# Patient Record
Sex: FEMALE | Race: BLACK OR AFRICAN AMERICAN | Employment: UNEMPLOYED | URBAN - METROPOLITAN AREA
[De-identification: names, ages, dates, MRNs, and addresses within clinical notes are randomized per-mention and may not be internally consistent; named-entity substitution may affect disease eponyms.]

---

## 2018-10-06 ENCOUNTER — HOSPITAL ENCOUNTER (EMERGENCY)
Age: 33
Discharge: HOME | End: 2018-10-06
Attending: FAMILY MEDICINE
Payer: COMMERCIAL

## 2018-10-06 ENCOUNTER — HOSPITAL ENCOUNTER (OUTPATIENT)
Dept: ULTRASOUND IMAGING | Age: 33
Discharge: HOME | End: 2018-10-08
Payer: COMMERCIAL

## 2018-10-06 ENCOUNTER — HOSPITAL ENCOUNTER (OUTPATIENT)
Age: 33
Discharge: HOME | End: 2018-10-08
Payer: COMMERCIAL

## 2018-10-06 VITALS
OXYGEN SATURATION: 100 % | HEIGHT: 61 IN | RESPIRATION RATE: 16 BRPM | DIASTOLIC BLOOD PRESSURE: 78 MMHG | SYSTOLIC BLOOD PRESSURE: 118 MMHG | WEIGHT: 148 LBS | HEART RATE: 78 BPM | TEMPERATURE: 98.5 F | BODY MASS INDEX: 27.94 KG/M2

## 2018-10-06 DIAGNOSIS — Z86.718 HISTORY OF DVT OF LOWER EXTREMITY: ICD-10-CM

## 2018-10-06 DIAGNOSIS — M79.662 PAIN OF LEFT CALF: Primary | ICD-10-CM

## 2018-10-06 DIAGNOSIS — M79.662 PAIN OF LEFT CALF: ICD-10-CM

## 2018-10-06 LAB — HCG(URINE) PREGNANCY TEST: NEGATIVE

## 2018-10-06 PROCEDURE — 93971 EXTREMITY STUDY: CPT

## 2018-10-06 PROCEDURE — 81025 URINE PREGNANCY TEST: CPT

## 2018-10-06 PROCEDURE — 99283 EMERGENCY DEPT VISIT LOW MDM: CPT

## 2018-10-06 PROCEDURE — 6360000002 HC RX W HCPCS: Performed by: FAMILY MEDICINE

## 2018-10-06 PROCEDURE — 96372 THER/PROPH/DIAG INJ SC/IM: CPT

## 2018-10-06 RX ADMIN — ENOXAPARIN SODIUM 67 MG: 100 INJECTION SUBCUTANEOUS at 13:30

## 2018-10-06 ASSESSMENT — PAIN DESCRIPTION - ORIENTATION
ORIENTATION: LEFT
ORIENTATION: LEFT

## 2018-10-06 ASSESSMENT — PAIN DESCRIPTION - PAIN TYPE: TYPE: ACUTE PAIN

## 2018-10-06 ASSESSMENT — PAIN SCALES - GENERAL
PAINLEVEL_OUTOF10: 10
PAINLEVEL_OUTOF10: 10

## 2018-10-06 ASSESSMENT — PAIN DESCRIPTION - PROGRESSION
CLINICAL_PROGRESSION: NOT CHANGED
CLINICAL_PROGRESSION: GRADUALLY WORSENING

## 2018-10-06 ASSESSMENT — PAIN DESCRIPTION - DESCRIPTORS: DESCRIPTORS: BURNING;THROBBING;TINGLING

## 2018-10-06 ASSESSMENT — PAIN DESCRIPTION - LOCATION
LOCATION: LEG
LOCATION: LEG

## 2018-10-06 ASSESSMENT — PAIN DESCRIPTION - ONSET: ONSET: PROGRESSIVE

## 2019-01-25 ENCOUNTER — HOSPITAL ENCOUNTER (EMERGENCY)
Age: 34
Discharge: HOME | End: 2019-01-25
Payer: COMMERCIAL

## 2019-01-25 VITALS
SYSTOLIC BLOOD PRESSURE: 120 MMHG | OXYGEN SATURATION: 100 % | TEMPERATURE: 98.7 F | WEIGHT: 152 LBS | BODY MASS INDEX: 28.7 KG/M2 | HEART RATE: 94 BPM | HEIGHT: 61 IN | DIASTOLIC BLOOD PRESSURE: 72 MMHG | RESPIRATION RATE: 16 BRPM

## 2019-01-25 DIAGNOSIS — J06.9 URI WITH COUGH AND CONGESTION: ICD-10-CM

## 2019-01-25 DIAGNOSIS — J02.9 ACUTE PHARYNGITIS, UNSPECIFIED ETIOLOGY: Primary | ICD-10-CM

## 2019-01-25 LAB — STREP GRP A PCR: NEGATIVE

## 2019-01-25 PROCEDURE — 87880 STREP A ASSAY W/OPTIC: CPT

## 2019-01-25 PROCEDURE — 99283 EMERGENCY DEPT VISIT LOW MDM: CPT

## 2019-01-25 RX ORDER — ALBUTEROL SULFATE 90 UG/1
2 AEROSOL, METERED RESPIRATORY (INHALATION) EVERY 6 HOURS PRN
Qty: 1 INHALER | Refills: 0 | Status: SHIPPED | OUTPATIENT
Start: 2019-01-25 | End: 2019-06-11

## 2019-01-25 RX ORDER — DEXTROMETHORPHAN HYDROBROMIDE AND PROMETHAZINE HYDROCHLORIDE 15; 6.25 MG/5ML; MG/5ML
5 SYRUP ORAL 4 TIMES DAILY PRN
Qty: 273 ML | Refills: 0 | Status: SHIPPED | OUTPATIENT
Start: 2019-01-25 | End: 2019-02-01

## 2019-01-25 RX ORDER — CEFDINIR 300 MG/1
300 CAPSULE ORAL 2 TIMES DAILY
Qty: 14 CAPSULE | Refills: 0 | Status: SHIPPED | OUTPATIENT
Start: 2019-01-25 | End: 2019-02-01

## 2019-01-25 RX ORDER — METHYLPREDNISOLONE 4 MG/1
TABLET ORAL
Qty: 1 KIT | Refills: 0 | Status: SHIPPED | OUTPATIENT
Start: 2019-01-25 | End: 2019-01-31

## 2019-01-25 ASSESSMENT — PAIN DESCRIPTION - ONSET: ONSET: ON-GOING

## 2019-01-25 ASSESSMENT — PAIN DESCRIPTION - DESCRIPTORS: DESCRIPTORS: ACHING

## 2019-01-25 ASSESSMENT — PAIN SCALES - GENERAL: PAINLEVEL_OUTOF10: 7

## 2019-01-25 ASSESSMENT — PAIN DESCRIPTION - PAIN TYPE: TYPE: ACUTE PAIN

## 2019-01-25 ASSESSMENT — PAIN DESCRIPTION - LOCATION: LOCATION: THROAT

## 2019-01-25 ASSESSMENT — PAIN DESCRIPTION - FREQUENCY: FREQUENCY: CONTINUOUS

## 2019-06-09 ENCOUNTER — APPOINTMENT (OUTPATIENT)
Dept: CT IMAGING | Age: 34
End: 2019-06-09
Payer: COMMERCIAL

## 2019-06-09 ENCOUNTER — APPOINTMENT (OUTPATIENT)
Dept: GENERAL RADIOLOGY | Age: 34
End: 2019-06-09
Payer: COMMERCIAL

## 2019-06-09 ENCOUNTER — HOSPITAL ENCOUNTER (EMERGENCY)
Age: 34
Discharge: HOME | End: 2019-06-09
Attending: EMERGENCY MEDICINE
Payer: COMMERCIAL

## 2019-06-09 VITALS
BODY MASS INDEX: 31.22 KG/M2 | RESPIRATION RATE: 14 BRPM | SYSTOLIC BLOOD PRESSURE: 107 MMHG | WEIGHT: 159 LBS | HEIGHT: 60 IN | DIASTOLIC BLOOD PRESSURE: 66 MMHG | OXYGEN SATURATION: 100 % | HEART RATE: 66 BPM | TEMPERATURE: 98.4 F

## 2019-06-09 DIAGNOSIS — N92.0 MENORRHAGIA WITH REGULAR CYCLE: ICD-10-CM

## 2019-06-09 DIAGNOSIS — R07.89 CHEST WALL PAIN: ICD-10-CM

## 2019-06-09 DIAGNOSIS — D64.9 SYMPTOMATIC ANEMIA: Primary | ICD-10-CM

## 2019-06-09 DIAGNOSIS — E87.6 HYPOKALEMIA: ICD-10-CM

## 2019-06-09 LAB
ABO/RH: NORMAL
ANION GAP SERPL CALCULATED.3IONS-SCNC: 12 MMOL/L (ref 7–16)
ANISOCYTOSIS: ABNORMAL
ANTIBODY SCREEN: NORMAL
BASOPHILS ABSOLUTE: 0.01 E9/L (ref 0–0.2)
BASOPHILS RELATIVE PERCENT: 0.2 % (ref 0–2)
BLOOD BANK DISPENSE STATUS: NORMAL
BLOOD BANK PRODUCT CODE: NORMAL
BPU ID: NORMAL
BUN BLDV-MCNC: 3 MG/DL (ref 6–20)
CALCIUM SERPL-MCNC: 9 MG/DL (ref 8.6–10.2)
CHLORIDE BLD-SCNC: 104 MMOL/L (ref 98–107)
CO2: 24 MMOL/L (ref 22–29)
CREAT SERPL-MCNC: 0.7 MG/DL (ref 0.5–1)
D DIMER: 391 NG/ML DDU
DESCRIPTION BLOOD BANK: NORMAL
EKG ATRIAL RATE: 82 BPM
EKG P-R INTERVAL: 144 MS
EKG Q-T INTERVAL: 404 MS
EKG QRS DURATION: 70 MS
EKG QTC CALCULATION (BAZETT): 472 MS
EKG R AXIS: 18 DEGREES
EKG T AXIS: 49 DEGREES
EKG VENTRICULAR RATE: 82 BPM
EOSINOPHILS ABSOLUTE: 0.05 E9/L (ref 0.05–0.5)
EOSINOPHILS RELATIVE PERCENT: 1.1 % (ref 0–6)
GFR AFRICAN AMERICAN: >60
GFR NON-AFRICAN AMERICAN: >60 ML/MIN/1.73
GLUCOSE BLD-MCNC: 106 MG/DL (ref 74–99)
HCT VFR BLD CALC: 24.8 % (ref 34–48)
HEMOGLOBIN: 6.9 G/DL (ref 11.5–15.5)
HYPOCHROMIA: ABNORMAL
IMMATURE GRANULOCYTES #: 0.01 E9/L
IMMATURE GRANULOCYTES %: 0.2 % (ref 0–5)
IRON SATURATION: 6 % (ref 15–50)
IRON: 26 MCG/DL (ref 37–145)
LYMPHOCYTES ABSOLUTE: 1.76 E9/L (ref 1.5–4)
LYMPHOCYTES RELATIVE PERCENT: 39.3 % (ref 20–42)
MCH RBC QN AUTO: 19.2 PG (ref 26–35)
MCHC RBC AUTO-ENTMCNC: 27.8 % (ref 32–34.5)
MCV RBC AUTO: 69.1 FL (ref 80–99.9)
MONOCYTES ABSOLUTE: 0.42 E9/L (ref 0.1–0.95)
MONOCYTES RELATIVE PERCENT: 9.4 % (ref 2–12)
NEUTROPHILS ABSOLUTE: 2.23 E9/L (ref 1.8–7.3)
NEUTROPHILS RELATIVE PERCENT: 49.8 % (ref 43–80)
OVALOCYTES: ABNORMAL
PDW BLD-RTO: 19.4 FL (ref 11.5–15)
PLATELET # BLD: 310 E9/L (ref 130–450)
PMV BLD AUTO: 8.3 FL (ref 7–12)
POIKILOCYTES: ABNORMAL
POTASSIUM SERPL-SCNC: 3 MMOL/L (ref 3.5–5)
RBC # BLD: 3.59 E12/L (ref 3.5–5.5)
SCHISTOCYTES: ABNORMAL
SODIUM BLD-SCNC: 140 MMOL/L (ref 132–146)
TARGET CELLS: ABNORMAL
TEAR DROP CELLS: ABNORMAL
TOTAL IRON BINDING CAPACITY: 430 MCG/DL (ref 250–450)
TROPONIN: <0.01 NG/ML (ref 0–0.03)
WBC # BLD: 4.5 E9/L (ref 4.5–11.5)

## 2019-06-09 PROCEDURE — 93005 ELECTROCARDIOGRAM TRACING: CPT | Performed by: EMERGENCY MEDICINE

## 2019-06-09 PROCEDURE — P9016 RBC LEUKOCYTES REDUCED: HCPCS

## 2019-06-09 PROCEDURE — 6370000000 HC RX 637 (ALT 250 FOR IP): Performed by: EMERGENCY MEDICINE

## 2019-06-09 PROCEDURE — 80048 BASIC METABOLIC PNL TOTAL CA: CPT

## 2019-06-09 PROCEDURE — 86850 RBC ANTIBODY SCREEN: CPT

## 2019-06-09 PROCEDURE — 83540 ASSAY OF IRON: CPT

## 2019-06-09 PROCEDURE — 84484 ASSAY OF TROPONIN QUANT: CPT

## 2019-06-09 PROCEDURE — 85025 COMPLETE CBC W/AUTO DIFF WBC: CPT

## 2019-06-09 PROCEDURE — 6360000002 HC RX W HCPCS: Performed by: EMERGENCY MEDICINE

## 2019-06-09 PROCEDURE — 6360000004 HC RX CONTRAST MEDICATION: Performed by: RADIOLOGY

## 2019-06-09 PROCEDURE — 96374 THER/PROPH/DIAG INJ IV PUSH: CPT

## 2019-06-09 PROCEDURE — 86901 BLOOD TYPING SEROLOGIC RH(D): CPT

## 2019-06-09 PROCEDURE — 83550 IRON BINDING TEST: CPT

## 2019-06-09 PROCEDURE — 71045 X-RAY EXAM CHEST 1 VIEW: CPT

## 2019-06-09 PROCEDURE — 36430 TRANSFUSION BLD/BLD COMPNT: CPT

## 2019-06-09 PROCEDURE — 85378 FIBRIN DEGRADE SEMIQUANT: CPT

## 2019-06-09 PROCEDURE — 99285 EMERGENCY DEPT VISIT HI MDM: CPT

## 2019-06-09 PROCEDURE — 86900 BLOOD TYPING SEROLOGIC ABO: CPT

## 2019-06-09 PROCEDURE — 2580000003 HC RX 258: Performed by: RADIOLOGY

## 2019-06-09 PROCEDURE — 71275 CT ANGIOGRAPHY CHEST: CPT

## 2019-06-09 PROCEDURE — 86923 COMPATIBILITY TEST ELECTRIC: CPT

## 2019-06-09 PROCEDURE — 93010 ELECTROCARDIOGRAM REPORT: CPT | Performed by: INTERNAL MEDICINE

## 2019-06-09 RX ORDER — POTASSIUM CHLORIDE 20 MEQ/1
40 TABLET, EXTENDED RELEASE ORAL ONCE
Status: COMPLETED | OUTPATIENT
Start: 2019-06-09 | End: 2019-06-09

## 2019-06-09 RX ORDER — 0.9 % SODIUM CHLORIDE 0.9 %
250 INTRAVENOUS SOLUTION INTRAVENOUS ONCE
Status: DISCONTINUED | OUTPATIENT
Start: 2019-06-09 | End: 2019-06-09 | Stop reason: HOSPADM

## 2019-06-09 RX ORDER — KETOROLAC TROMETHAMINE 30 MG/ML
15 INJECTION, SOLUTION INTRAMUSCULAR; INTRAVENOUS ONCE
Status: COMPLETED | OUTPATIENT
Start: 2019-06-09 | End: 2019-06-09

## 2019-06-09 RX ORDER — SODIUM CHLORIDE 0.9 % (FLUSH) 0.9 %
10 SYRINGE (ML) INJECTION ONCE
Status: COMPLETED | OUTPATIENT
Start: 2019-06-09 | End: 2019-06-09

## 2019-06-09 RX ORDER — LANOLIN ALCOHOL/MO/W.PET/CERES
325 CREAM (GRAM) TOPICAL
Qty: 90 TABLET | Refills: 0 | Status: SHIPPED | OUTPATIENT
Start: 2019-06-09 | End: 2019-11-01

## 2019-06-09 RX ADMIN — IOPAMIDOL 70 ML: 755 INJECTION, SOLUTION INTRAVENOUS at 05:15

## 2019-06-09 RX ADMIN — Medication 10 ML: at 05:15

## 2019-06-09 RX ADMIN — KETOROLAC TROMETHAMINE 15 MG: 30 INJECTION, SOLUTION INTRAMUSCULAR; INTRAVENOUS at 04:20

## 2019-06-09 RX ADMIN — POTASSIUM CHLORIDE 40 MEQ: 20 TABLET, EXTENDED RELEASE ORAL at 05:33

## 2019-06-09 ASSESSMENT — ENCOUNTER SYMPTOMS
COUGH: 0
BLOOD IN STOOL: 0
SHORTNESS OF BREATH: 1
NAUSEA: 0
VOMITING: 0
ABDOMINAL PAIN: 0
BACK PAIN: 0

## 2019-06-09 ASSESSMENT — PAIN SCALES - GENERAL
PAINLEVEL_OUTOF10: 8
PAINLEVEL_OUTOF10: 8

## 2019-06-09 ASSESSMENT — PAIN DESCRIPTION - PAIN TYPE: TYPE: ACUTE PAIN

## 2019-06-09 ASSESSMENT — PAIN DESCRIPTION - LOCATION: LOCATION: CHEST

## 2019-06-09 NOTE — ED PROVIDER NOTES
Aj Lundberg is a 30 y.o female who presents to the ED with a complaint of chest pain. Patient states that she developed chest pain around 2 hours ago. She describes the pain as a stabbing sensation. She says nothing worsens or improves the pain. She currently rates the pain as an 8 out of 10 on a pain scale. She states that she has also been feeling like it is difficult to breathe when she lies flat. She denies ever experiencing this in the past. She denies any cough, congestion, fever, chills, nausea, vomiting, lightheadedness. She denies any recent travels, swelling, hemoptysis, cancer history, recent surgeries. She states that she does have a history of DVT to both legs to years ago induced by pregnancy. She has not been on Lovenox for 2 years. Review of Systems   Constitutional: Negative for chills and fever. Respiratory: Positive for shortness of breath. Negative for cough. Cardiovascular: Positive for chest pain. Gastrointestinal: Negative for abdominal pain, blood in stool, nausea and vomiting. Genitourinary: Negative for dysuria and frequency. Musculoskeletal: Negative for back pain. Skin: Negative for rash. Neurological: Negative for weakness and headaches. All other systems reviewed and are negative. Physical Exam   Constitutional: She is oriented to person, place, and time. She appears well-developed and well-nourished. HENT:   Head: Normocephalic and atraumatic. Eyes: Pupils are equal, round, and reactive to light. Neck: Normal range of motion. Neck supple. Cardiovascular: Normal rate and regular rhythm. Pulmonary/Chest: Effort normal and breath sounds normal. No respiratory distress. She has no wheezes. She has no rales. ttp to sternum and left and right chest wall   Abdominal: Soft. Bowel sounds are normal. There is no tenderness. There is no rebound and no guarding. Musculoskeletal: She exhibits no edema.    Neurological: She is alert and oriented to person, place, and time. No cranial nerve deficit. Coordination normal.   Skin: Skin is warm and dry. Nursing note and vitals reviewed. Procedures    MDM  Number of Diagnoses or Management Options  Chest wall pain:   Hypokalemia:   Menorrhagia with regular cycle:   Symptomatic anemia:   Diagnosis management comments: Patient presented with chest pain, sob. No ischemic changes on EKG. Plan is for CXR, labs. Hgb was 6.9. On discussion with patient she states she has been experiencing prolonged and heavy periods. Last one was 17 days and has currently stopped yesterday. 1 unit PRBC ordered. D-dimer elevated at 391. CTA obtained and was neg for PE. K+ was replaced. Patient's symptoms likely related to anemia. Symptoms did resolve in the ED. Iron studies sent. 1 unit PRBC infused. Patient will be discharged with iron supplements and GYN follow up. She was given return instructions. ED Course as of 2035   Terrance Young 2019   9441 Discussed results of Hgb of 6.9. Patient states she has long heavy periods. She states she had period that started May 24th and ended yesterday lasting 17 days    []      ED Course User Index  [] Jarodia Nageotte, DO           --------------------------------------------- PAST HISTORY ---------------------------------------------  Past Medical History:  has a past medical history of DVT (deep venous thrombosis) ([address] DVT of lower extremity (deep venous thrombosis) ([address] History of DVT of lower extremity, and Migraine. Past Surgical History:  has a past surgical history that includes Induced  (10/12/12) and Dilation & curettage. Social History:  reports that she has never smoked. She has never used smokeless tobacco. She reports that she does not drink alcohol or use drugs. Family History: family history is not on file. The patients home medications have been reviewed.     Allergies: Latex    -------------------------------------------------- RESULTS Calculation ([place]) 472 ms    R Axis 18 degrees    T Axis 49 degrees   TYPE AND SCREEN   Result Value Ref Range    ABO/Rh A POS     Antibody Screen NEG    PREPARE RBC (CROSSMATCH), 1 Units   Result Value Ref Range    Product Code Blood Bank [id number]     Description Blood Bank Red Blood Cells, Leuko-reduced     Unit Number [id number]     Dispense Status Blood Bank issued      EKG Interpretation    Interpreted by emergency department physician    Rhythm: normal sinus   Rate: 82  Axis: normal  Ectopy: none  Conduction: normal  ST Segments: no acute change  T Waves: no acute change  Q Waves: none    Clinical Impression: [facility]  Radiology:  CTA PULMONARY W CONTRAST   Final Result   Normal CTA of the chest.      This report has been electronically signed by Adithya Mcgrath MD.      XR CHEST PORTABLE   Final Result   normal chest                         ------------------------- NURSING NOTES AND VITALS REVIEWED ---------------------------  Date / Time Roomed:  6/9/2019  3:42 AM  ED Bed Assignment:  21/21    The nursing notes within the ED encounter and vital signs as below have been reviewed. /66   Pulse 66   Temp 98.4 °F (36.9 °C)   Resp 14   Ht 5' (1.524 m)   Wt 159 lb (72.1 kg)   LMP 05/24/2019   SpO2 100%   BMI 31.05 kg/m²   Oxygen Saturation Interpretation: Normal      ------------------------------------------ PROGRESS NOTES ------------------------------------------  I have spoken with the patient and discussed todays results, in addition to providing specific details for the plan of care and counseling regarding the diagnosis and prognosis. Their questions are answered at this time and they are agreeable with the plan. I discussed at length with them reasons for immediate return here for re evaluation. They will followup with primary care by calling their office tomorrow.       --------------------------------- ADDITIONAL PROVIDER NOTES ---------------------------------  At this time the patient is without objective evidence of an acute process requiring hospitalization or inpatient management. They have remained hemodynamically stable throughout their entire ED visit and are stable for discharge with outpatient follow-up. The plan has been discussed in detail and they are aware of the specific conditions for emergent return, as well as the importance of follow-up. Discharge Medication List as of 6/9/2019  5:52 AM      START taking these medications    Details   ferrous sulfate 325 (65 Fe) MG EC tablet Take 1 tablet by mouth 3 times daily (with meals), Disp-90 tablet, R-0Print             Diagnosis:  1. Symptomatic anemia    2. Chest wall pain    3. Hypokalemia    4. Menorrhagia with regular cycle        Disposition:  Patient's disposition: Discharge to home  Patient's condition is stable.             Steven Flannery DO  Resident  06/09/19 7160

## 2019-06-09 NOTE — ED NOTES
Radiology Procedure Waiver   Name: Zeynep Barrios  : 1985  MRN: [id number]    Date:  19    Time: 5:07 AM    Benefits of immediately proceeding with Radiology exam(s) without pre-testing outweigh the risks or are not indicated as specified below and therefore the following is/are being waived:    [x] Pregnancy test   [] Patients LMP on-time and regular.   [] Patient had Tubal Ligation or has other Contraception Device. [] Patient  is Menopausal or Premenarcheal.    [] Patient had Full or Partial Hysterectomy. [] Protocol for Iodine allergy    [] MRI Questionnaire     [] BUN/Creatinine   [] Patient age w/no hx of renal dysfunction. [] Patient on Dialysis. [] Recent Normal Labs.   Electronically signed by Carmelita Serrato MD on 19 at 5:07 Angelo Abarca MD  19 0756

## 2019-06-09 NOTE — DISCHARGE INSTR - COC
Continuity of Care Form    Patient Name: Sandra Atwood   :  1985  MRN:  [id number]    Admit date:  2019  Discharge date:  ***    Code Status Order: Prior   Advance Directives:     Admitting Physician:  No admitting provider for patient encounter.   PCP: Keshawn Hoyt MD    Discharging Nurse: [facility] Unit/Room#:   Discharging Unit Phone Number: ***    Emergency Contact:   Extended Emergency Contact Information  Primary Emergency Contact: RonnieAnamaria guerrero  Address: [address] Phone: [phone]  Mobile Phone: [phone]  Relation: Parent    Past Surgical History:  Past Surgical History:   Procedure Laterality Date    DILATION AND CURETTAGE      INDUCED   10/12/12       Immunization History:   Immunization History   Administered Date(s) Administered    Td, unspecified formulation 2013       Active Problems:  Patient Active Problem List   Diagnosis Code    Post-thrombotic syndrome I87.009    [facility]) [facility] O22.30, I82.409    Long term current use of anticoagulant therapy Z79.01    Personal history of thromboembolic disease W58.810    Previous  delivery in second trimester, antepartum O09.212    High-risk pregnancy O09.90       Isolation/Infection:   Isolation          No Isolation            Nurse Assessment:  Last Vital Signs: /66   Pulse 66   Temp 98.4 °F (36.9 °C)   Resp 14   Ht 5' (1.524 m)   Wt 159 lb (72.1 kg)   LMP 2019   SpO2 100%   BMI 31.05 kg/m²     Last documented pain score (0-10 scale): Pain Level: 8  Last Weight:   Wt Readings from Last 1 Encounters:   19 159 lb (72.1 kg)     Mental Status:  {IP PT MENTAL STATUS:}    IV Access:  [facility] PROSPER IV ACCESS:588462958}    Nursing Mobility/ADLs:  Walking   {CHP DME UMXL:980396456}  Transfer  {CHP DME FKKI:173271024}  Bathing  {CHP DME EWWO:675688755}  Dressing  {CHP DME CXLJ:089793183}  Toileting  {CHP DME RFB}  Feeding  {P DME PPQJ:353765503}  Med Admin  {P DME EHTJ:553909586}  Med Delivery   { PROSPER MED Delivery:207321895}    Wound Care Documentation and Therapy:        Elimination:  Continence:   · Bowel: {YES / CZ:34117}  · Bladder: {YES / ZV:55552}  Urinary Catheter: {Urinary Catheter:110764462}   Colostomy/Ileostomy/Ileal Conduit: {YES / EM:67725}       Date of Last BM: ***  No intake or output data in the 24 hours ending 19 0931  No intake/output data recorded.     Safety Concerns:     [address] [employer] Safety Concerns:121456549}    Impairments/Disabilities:      [employer] Impairments/Disabilities:406209712}    Nutrition Therapy:  Current Nutrition Therapy:   [employer] Diet List:829234473}    Routes of Feeding: {[facility] DME Other Feedings:977465049}  Liquids: {Slp liquid thickness:39924}  Daily Fluid Restriction: {P DME Yes amt example:251352838}  Last Modified Barium Swallow with Video (Video Swallowing Test): {Done Not Done ZCDJ:501913002}    Treatments at the Time of Hospital Discharge:   Respiratory Treatments: ***  Oxygen Therapy:  {Therapy; copd oxygen:16887}  Ventilator:    {[facility] Vent PHFY:040319597}    Rehab Therapies: {THERAPEUTIC INTERVENTION:3147633582}  Weight Bearing Status/Restrictions: [address] [employer]  Weight Bearin}  Other Medical Equipment (for information only, NOT a DME order):  {EQUIPMENT:478308707}  Other Treatments: ***    Patient's personal belongings (please select all that are sent with patient):  {[facility] DME Belongings:516989514}    RN SIGNATURE:  {Esignature:032785989}    CASE MANAGEMENT/SOCIAL WORK SECTION    Inpatient Status Date: ***    Readmission Risk Assessment Score:  Readmission Risk              Risk of Unplanned Readmission:        0           Discharging to Facility/ Agency   · Name:   · Address:  · Phone:  · Fax:    Dialysis Facility (if applicable)   · Name:  · Address:  · Dialysis Schedule:  · Phone:  · Fax:    /Social Worker signature: {Esignature:754156764}    PHYSICIAN SECTION    Prognosis: {Prognosis:8101301367}    Condition at Discharge: [address] Krista Bowden Patient Condition:891114776}    Rehab Potential (if transferring to Rehab): {Prognosis:2734756331}    Recommended Labs or Other Treatments After Discharge: ***    Physician Certification: I certify the above information and transfer of Chio Noe  is necessary for the continuing treatment of the diagnosis listed and that she requires {Admit to Appropriate Level of Care:95105} for {GREATER/LESS:795037553} 30 days.      Update Admission H&P: {CHP DME Changes in XNGVQ:091169740}    PHYSICIAN SIGNATURE:  {Esignature:281681222}

## 2019-06-11 ENCOUNTER — HOSPITAL ENCOUNTER (EMERGENCY)
Age: 34
Discharge: HOME | End: 2019-06-11
Payer: COMMERCIAL

## 2019-06-11 ENCOUNTER — APPOINTMENT (OUTPATIENT)
Dept: GENERAL RADIOLOGY | Age: 34
End: 2019-06-11
Payer: COMMERCIAL

## 2019-06-11 VITALS
BODY MASS INDEX: 29.84 KG/M2 | DIASTOLIC BLOOD PRESSURE: 64 MMHG | OXYGEN SATURATION: 98 % | SYSTOLIC BLOOD PRESSURE: 116 MMHG | HEART RATE: 103 BPM | WEIGHT: 152 LBS | HEIGHT: 60 IN | RESPIRATION RATE: 16 BRPM | TEMPERATURE: 99.2 F

## 2019-06-11 DIAGNOSIS — J03.00 ACUTE STREPTOCOCCAL TONSILLITIS, NOT SPECIFIED AS RECURRENT OR NOT: ICD-10-CM

## 2019-06-11 DIAGNOSIS — J02.0 ACUTE STREPTOCOCCAL PHARYNGITIS: Primary | ICD-10-CM

## 2019-06-11 LAB — STREP GRP A PCR: POSITIVE

## 2019-06-11 PROCEDURE — 99283 EMERGENCY DEPT VISIT LOW MDM: CPT

## 2019-06-11 PROCEDURE — 87880 STREP A ASSAY W/OPTIC: CPT

## 2019-06-11 PROCEDURE — 6360000002 HC RX W HCPCS: Performed by: NURSE PRACTITIONER

## 2019-06-11 PROCEDURE — 6370000000 HC RX 637 (ALT 250 FOR IP): Performed by: NURSE PRACTITIONER

## 2019-06-11 PROCEDURE — 71046 X-RAY EXAM CHEST 2 VIEWS: CPT

## 2019-06-11 PROCEDURE — 96372 THER/PROPH/DIAG INJ SC/IM: CPT

## 2019-06-11 RX ORDER — IPRATROPIUM BROMIDE AND ALBUTEROL SULFATE 2.5; .5 MG/3ML; MG/3ML
1 SOLUTION RESPIRATORY (INHALATION) ONCE
Status: COMPLETED | OUTPATIENT
Start: 2019-06-11 | End: 2019-06-11

## 2019-06-11 RX ORDER — METHYLPREDNISOLONE SODIUM SUCCINATE 125 MG/2ML
80 INJECTION, POWDER, LYOPHILIZED, FOR SOLUTION INTRAMUSCULAR; INTRAVENOUS ONCE
Status: COMPLETED | OUTPATIENT
Start: 2019-06-11 | End: 2019-06-11

## 2019-06-11 RX ORDER — AMOXICILLIN AND CLAVULANATE POTASSIUM 875; 125 MG/1; MG/1
1 TABLET, FILM COATED ORAL 2 TIMES DAILY
Qty: 20 TABLET | Refills: 0 | Status: SHIPPED | OUTPATIENT
Start: 2019-06-11 | End: 2019-06-21

## 2019-06-11 RX ORDER — METHYLPREDNISOLONE 4 MG/1
TABLET ORAL
Qty: 21 TABLET | Status: SHIPPED | OUTPATIENT
Start: 2019-06-11 | End: 2019-06-17

## 2019-06-11 RX ORDER — IBUPROFEN 800 MG/1
800 TABLET ORAL EVERY 6 HOURS PRN
Qty: 20 TABLET | Refills: 0 | Status: SHIPPED | OUTPATIENT
Start: 2019-06-11 | End: 2019-11-01

## 2019-06-11 RX ORDER — AMOXICILLIN AND CLAVULANATE POTASSIUM 875; 125 MG/1; MG/1
1 TABLET, FILM COATED ORAL ONCE
Status: COMPLETED | OUTPATIENT
Start: 2019-06-11 | End: 2019-06-11

## 2019-06-11 RX ADMIN — METHYLPREDNISOLONE SODIUM SUCCINATE 80 MG: 125 INJECTION, POWDER, LYOPHILIZED, FOR SOLUTION INTRAMUSCULAR; INTRAVENOUS at 19:54

## 2019-06-11 RX ADMIN — AMOXICILLIN AND CLAVULANATE POTASSIUM 1 TABLET: 875; 125 TABLET, FILM COATED ORAL at 19:55

## 2019-06-11 RX ADMIN — IPRATROPIUM BROMIDE AND ALBUTEROL SULFATE 1 AMPULE: .5; 3 SOLUTION RESPIRATORY (INHALATION) at 19:55

## 2019-06-11 ASSESSMENT — PAIN DESCRIPTION - DESCRIPTORS: DESCRIPTORS: SORE

## 2019-06-11 ASSESSMENT — PAIN SCALES - GENERAL: PAINLEVEL_OUTOF10: 8

## 2019-06-11 ASSESSMENT — PAIN DESCRIPTION - PAIN TYPE: TYPE: ACUTE PAIN

## 2019-06-11 ASSESSMENT — PAIN DESCRIPTION - PROGRESSION: CLINICAL_PROGRESSION: GRADUALLY WORSENING

## 2019-06-11 ASSESSMENT — PAIN DESCRIPTION - FREQUENCY: FREQUENCY: CONTINUOUS

## 2019-06-11 ASSESSMENT — PAIN DESCRIPTION - ONSET: ONSET: SUDDEN

## 2019-06-11 ASSESSMENT — PAIN DESCRIPTION - LOCATION: LOCATION: THROAT

## 2019-06-11 NOTE — ED PROVIDER NOTES
of 4 or 5 = Probability of Strep Pharyngitis: 51% - 53%. Treat empirically with antibiotics. ROS    Pertinent positives and negatives are stated within HPI, all other systems reviewed and are negative. Past Surgical History:  has a past surgical history that includes Induced  (10/12/12) and Dilation & curettage. Social History:  reports that she has never smoked. She has never used smokeless tobacco. She reports that she does not drink alcohol or use drugs. Family History: family history is not on file. Allergies: Latex    Physical Exam           ED Triage Vitals   BP Temp Temp Source Pulse Resp SpO2 Height Weight   19 1859 19 1859 19 18519 18519 18519 19119   116/64 99.2 °F (37.3 °C) Oral 103 16 100 % 5' (1.524 m) 152 lb (68.9 kg)     Oxygen Saturation Interpretation: Normal.    Constitutional:  Alert, development consistent with age. .  Ears:  TMs without perforation, injection, or bulging. External canals clear without exudate. Throat: Airway Patent. moderate erythema, tonsillar hypertrophy, 3+, exudates present, throat culture taken and mucous membranes moist.       Neck/Lymphatic:  Supple. There is Bilateral  anterior cervical node tenderness. respiratory:  Clear to auscultation and breath sounds equal.    CV: Regular rate and rhythm, normal heart sounds, without pathological murmurs, ectopy, gallops, or rubs. GI:  Abdomen Soft, nontender, good bowel sounds. No firm or pulsatile mass. Inegument:  No rashes, erythema present. Neurological:  Oriented. Motor functions intact. Lab / Imaging Results   (All laboratory and radiology results have been personally reviewed by myself)  Labs:  Results for orders placed or performed during the hospital encounter of 19   Strep Screen Group A Throat   Result Value Ref Range    Strep Grp A PCR POSITIVE Negative     Imaging:   All Radiology results interpreted by Radiologist unless otherwise noted. XR CHEST STANDARD (2 VW)   Final Result   NO ACUTE CARDIOPULMONARY PROCESS              ED Course / Medical Decision Making     Medications   methylPREDNISolone sodium (SOLU-MEDROL) injection 80 mg (80 mg Intramuscular Given 6/11/19 1954)   ipratropium-albuterol (DUONEB) nebulizer solution 1 ampule (1 ampule Inhalation Given 6/11/19 1955)   amoxicillin-clavulanate (AUGMENTIN) 875-125 MG per tablet 1 tablet (1 tablet Oral Given 6/11/19 1955)        Consult(s):   None    Procedure(s):   none    MDM:   Based on moderate suspicion for Strep as per history/physical findings, Rapid Strep/Throat Culture testing was done and confirms the above findings  Pharyngitis is likely to  be Strep. Antibiotics are indicated at this time based on clinical presentation and physical findings. Not hypoxic, nothing to suggest pneumonia. Patient is well appearing, non toxic and appropriate for outpatient management. Plan of Care: Normal progression of disease discussed. All questions answered. Instruction provided in the use of fluids, vaporizer, acetaminophen, and other OTC medication for symptom control. Extra fluids  Analgesics as needed, dose reviewed. Follow up as needed should symptoms fail to improve. Counseling: The emergency provider has spoken with the patient and discussed todays results, in addition to providing specific details for the plan of care and counseling regarding the diagnosis and prognosis. Questions are answered at this time and they are agreeable with the plan. Assessment     1. Acute streptococcal pharyngitis    2.  Acute streptococcal tonsillitis, not specified as recurrent or not      Plan   Discharge to home  Patient condition is stable    New Medications     Discharge Medication List as of 6/11/2019  7:50 PM      START taking these medications    Details   methylPREDNISolone (MEDROL, EDELMIRA,) 4 MG tablet Take as directed on package insert days 1-6, Disp-21 tablet, R-zeroPrint      amoxicillin-clavulanate (AUGMENTIN) 875-125 MG per tablet Take 1 tablet by mouth 2 times daily for 10 days, Disp-20 tablet, R-0Print      Benzocaine-Menthol (CEPACOL SORE THROAT) 10-2.1 MG LOZG Take 1 lozenge by mouth every 2 hours as needed (sore throat), Disp-20 lozenge, R-0Print           Electronically signed by NOLAN Knapp CNP   DD: 6/11/19  **This report was transcribed using voice recognition software. Every effort was made to ensure accuracy; however, inadvertent computerized transcription errors may be present.   END OF ED PROVIDER NOTE      NOLAN Knapp CNP  06/16/19 2965

## 2019-08-01 ENCOUNTER — APPOINTMENT (OUTPATIENT)
Dept: CT IMAGING | Age: 34
End: 2019-08-01
Payer: COMMERCIAL

## 2019-08-01 ENCOUNTER — HOSPITAL ENCOUNTER (EMERGENCY)
Age: 34
Discharge: HOME | End: 2019-08-01
Attending: FAMILY MEDICINE
Payer: COMMERCIAL

## 2019-08-01 VITALS
RESPIRATION RATE: 14 BRPM | BODY MASS INDEX: 30.43 KG/M2 | HEART RATE: 80 BPM | HEIGHT: 60 IN | DIASTOLIC BLOOD PRESSURE: 72 MMHG | WEIGHT: 155 LBS | TEMPERATURE: 99.1 F | OXYGEN SATURATION: 100 % | SYSTOLIC BLOOD PRESSURE: 110 MMHG

## 2019-08-01 DIAGNOSIS — Z34.90 PREGNANCY, UNSPECIFIED GESTATIONAL AGE: ICD-10-CM

## 2019-08-01 DIAGNOSIS — R10.13 ABDOMINAL PAIN, EPIGASTRIC: Primary | ICD-10-CM

## 2019-08-01 LAB
ALBUMIN SERPL-MCNC: 4 G/DL (ref 3.5–5.2)
ALP BLD-CCNC: 64 U/L (ref 35–104)
ALT SERPL-CCNC: 5 U/L (ref 0–32)
ANION GAP SERPL CALCULATED.3IONS-SCNC: 9 MMOL/L (ref 7–16)
AST SERPL-CCNC: 20 U/L (ref 0–31)
BACTERIA: ABNORMAL /HPF
BASOPHILS ABSOLUTE: 0.02 E9/L (ref 0–0.2)
BASOPHILS RELATIVE PERCENT: 0.4 % (ref 0–2)
BILIRUB SERPL-MCNC: 0.2 MG/DL (ref 0–1.2)
BILIRUBIN URINE: NEGATIVE
BLOOD, URINE: NEGATIVE
BUN BLDV-MCNC: 3 MG/DL (ref 6–20)
CALCIUM SERPL-MCNC: 8.8 MG/DL (ref 8.6–10.2)
CHLORIDE BLD-SCNC: 104 MMOL/L (ref 98–107)
CLARITY: CLEAR
CO2: 26 MMOL/L (ref 22–29)
COLOR: YELLOW
CREAT SERPL-MCNC: 0.8 MG/DL (ref 0.5–1)
EOSINOPHILS ABSOLUTE: 0.02 E9/L (ref 0.05–0.5)
EOSINOPHILS RELATIVE PERCENT: 0.4 % (ref 0–6)
EPITHELIAL CELLS, UA: ABNORMAL /HPF
GFR AFRICAN AMERICAN: >60
GFR NON-AFRICAN AMERICAN: >60 ML/MIN/1.73
GLUCOSE BLD-MCNC: 103 MG/DL (ref 74–99)
GLUCOSE URINE: NEGATIVE MG/DL
HCG(URINE) PREGNANCY TEST: POSITIVE
HCT VFR BLD CALC: 29.7 % (ref 34–48)
HEMOGLOBIN: 8.7 G/DL (ref 11.5–15.5)
IMMATURE GRANULOCYTES #: 0.01 E9/L
IMMATURE GRANULOCYTES %: 0.2 % (ref 0–5)
KETONES, URINE: NEGATIVE MG/DL
LACTIC ACID: 2.9 MMOL/L (ref 0.5–2.2)
LEUKOCYTE ESTERASE, URINE: ABNORMAL
LIPASE: 26 U/L (ref 13–60)
LYMPHOCYTES ABSOLUTE: 1.65 E9/L (ref 1.5–4)
LYMPHOCYTES RELATIVE PERCENT: 34.5 % (ref 20–42)
MCH RBC QN AUTO: 21.8 PG (ref 26–35)
MCHC RBC AUTO-ENTMCNC: 29.3 % (ref 32–34.5)
MCV RBC AUTO: 74.4 FL (ref 80–99.9)
MONOCYTES ABSOLUTE: 0.45 E9/L (ref 0.1–0.95)
MONOCYTES RELATIVE PERCENT: 9.4 % (ref 2–12)
MUCUS: PRESENT
NEUTROPHILS ABSOLUTE: 2.63 E9/L (ref 1.8–7.3)
NEUTROPHILS RELATIVE PERCENT: 55.1 % (ref 43–80)
NITRITE, URINE: NEGATIVE
PDW BLD-RTO: 21.8 FL (ref 11.5–15)
PH UA: 6 (ref 5–9)
PLATELET # BLD: 339 E9/L (ref 130–450)
PMV BLD AUTO: 9.1 FL (ref 7–12)
POTASSIUM REFLEX MAGNESIUM: 4 MMOL/L (ref 3.5–5)
PROTEIN UA: NEGATIVE MG/DL
RBC # BLD: 3.99 E12/L (ref 3.5–5.5)
RBC UA: ABNORMAL /HPF (ref 0–2)
SODIUM BLD-SCNC: 139 MMOL/L (ref 132–146)
SPECIFIC GRAVITY UA: 1.02 (ref 1–1.03)
TOTAL PROTEIN: 7.5 G/DL (ref 6.4–8.3)
UROBILINOGEN, URINE: 0.2 E.U./DL
WBC # BLD: 4.8 E9/L (ref 4.5–11.5)
WBC UA: ABNORMAL /HPF (ref 0–5)

## 2019-08-01 PROCEDURE — 99283 EMERGENCY DEPT VISIT LOW MDM: CPT

## 2019-08-01 PROCEDURE — 81001 URINALYSIS AUTO W/SCOPE: CPT

## 2019-08-01 PROCEDURE — 36415 COLL VENOUS BLD VENIPUNCTURE: CPT

## 2019-08-01 PROCEDURE — 2500000003 HC RX 250 WO HCPCS: Performed by: FAMILY MEDICINE

## 2019-08-01 PROCEDURE — 83690 ASSAY OF LIPASE: CPT

## 2019-08-01 PROCEDURE — 85025 COMPLETE CBC W/AUTO DIFF WBC: CPT

## 2019-08-01 PROCEDURE — 83605 ASSAY OF LACTIC ACID: CPT

## 2019-08-01 PROCEDURE — 96375 TX/PRO/DX INJ NEW DRUG ADDON: CPT

## 2019-08-01 PROCEDURE — 96374 THER/PROPH/DIAG INJ IV PUSH: CPT

## 2019-08-01 PROCEDURE — 6360000002 HC RX W HCPCS: Performed by: FAMILY MEDICINE

## 2019-08-01 PROCEDURE — 80053 COMPREHEN METABOLIC PANEL: CPT

## 2019-08-01 PROCEDURE — 2580000003 HC RX 258: Performed by: FAMILY MEDICINE

## 2019-08-01 PROCEDURE — 6370000000 HC RX 637 (ALT 250 FOR IP): Performed by: FAMILY MEDICINE

## 2019-08-01 PROCEDURE — 81025 URINE PREGNANCY TEST: CPT

## 2019-08-01 RX ORDER — ONDANSETRON 2 MG/ML
4 INJECTION INTRAMUSCULAR; INTRAVENOUS ONCE
Status: COMPLETED | OUTPATIENT
Start: 2019-08-01 | End: 2019-08-01

## 2019-08-01 RX ORDER — CALCIUM CARBONATE 200(500)MG
1 TABLET,CHEWABLE ORAL DAILY
Qty: 30 TABLET | Refills: 0 | Status: SHIPPED | OUTPATIENT
Start: 2019-08-01 | End: 2019-08-31

## 2019-08-01 RX ORDER — 0.9 % SODIUM CHLORIDE 0.9 %
1000 INTRAVENOUS SOLUTION INTRAVENOUS ONCE
Status: COMPLETED | OUTPATIENT
Start: 2019-08-01 | End: 2019-08-01

## 2019-08-01 RX ORDER — DIPHENHYDRAMINE HYDROCHLORIDE 50 MG/ML
25 INJECTION INTRAMUSCULAR; INTRAVENOUS ONCE
Status: COMPLETED | OUTPATIENT
Start: 2019-08-01 | End: 2019-08-01

## 2019-08-01 RX ORDER — FAMOTIDINE 20 MG/1
20 TABLET, FILM COATED ORAL 2 TIMES DAILY
Qty: 60 TABLET | Refills: 0 | Status: SHIPPED | OUTPATIENT
Start: 2019-08-01 | End: 2019-11-01 | Stop reason: ALTCHOICE

## 2019-08-01 RX ORDER — PANTOPRAZOLE SODIUM 40 MG/10ML
40 INJECTION, POWDER, LYOPHILIZED, FOR SOLUTION INTRAVENOUS ONCE
Status: DISCONTINUED | OUTPATIENT
Start: 2019-08-01 | End: 2019-08-01

## 2019-08-01 RX ORDER — PNV NO.95/FERROUS FUM/FOLIC AC 28MG-0.8MG
1 TABLET ORAL DAILY
Qty: 30 TABLET | Refills: 0 | Status: SHIPPED | OUTPATIENT
Start: 2019-08-01 | End: 2019-11-01 | Stop reason: ALTCHOICE

## 2019-08-01 RX ORDER — KETOROLAC TROMETHAMINE 30 MG/ML
30 INJECTION, SOLUTION INTRAMUSCULAR; INTRAVENOUS ONCE
Status: DISCONTINUED | OUTPATIENT
Start: 2019-08-01 | End: 2019-08-01

## 2019-08-01 RX ADMIN — LIDOCAINE HYDROCHLORIDE: 20 SOLUTION ORAL; TOPICAL at 15:49

## 2019-08-01 RX ADMIN — SODIUM CHLORIDE 1000 ML: 9 INJECTION, SOLUTION INTRAVENOUS at 15:39

## 2019-08-01 RX ADMIN — ONDANSETRON HYDROCHLORIDE 4 MG: 2 SOLUTION INTRAMUSCULAR; INTRAVENOUS at 15:40

## 2019-08-01 RX ADMIN — FAMOTIDINE 20 MG: 10 INJECTION, SOLUTION INTRAVENOUS at 15:49

## 2019-08-01 RX ADMIN — DIPHENHYDRAMINE HYDROCHLORIDE 25 MG: 50 INJECTION, SOLUTION INTRAMUSCULAR; INTRAVENOUS at 15:40

## 2019-08-01 ASSESSMENT — PAIN DESCRIPTION - LOCATION: LOCATION: ABDOMEN;HEAD

## 2019-08-01 ASSESSMENT — PAIN SCALES - GENERAL: PAINLEVEL_OUTOF10: 7

## 2019-08-01 ASSESSMENT — PAIN DESCRIPTION - DESCRIPTORS: DESCRIPTORS: THROBBING;DISCOMFORT

## 2019-08-01 ASSESSMENT — PAIN DESCRIPTION - PAIN TYPE: TYPE: ACUTE PAIN

## 2019-08-01 ASSESSMENT — PAIN DESCRIPTION - FREQUENCY: FREQUENCY: CONTINUOUS

## 2019-08-01 NOTE — ED PROVIDER NOTES
1.80 - 7.30 E9/L    Immature Granulocytes # 0.01 E9/L    Lymphocytes # 1.65 1.50 - 4.00 E9/L    Monocytes # 0.45 0.10 - 0.95 E9/L    Eosinophils # 0.02 (L) 0.05 - 0.50 E9/L    Basophils # 0.02 0.00 - 0.20 E9/L   Comprehensive Metabolic Panel w/ Reflex to MG   Result Value Ref Range    Sodium 139 132 - 146 mmol/L    Potassium reflex Magnesium 4.0 3.5 - 5.0 mmol/L    Chloride 104 98 - 107 mmol/L    CO2 26 22 - 29 mmol/L    Anion Gap 9 7 - 16 mmol/L    Glucose 103 (H) 74 - 99 mg/dL    BUN 3 (L) 6 - 20 mg/dL    CREATININE 0.8 0.5 - 1.0 mg/dL    GFR Non-African American >60 >=60 mL/min/1.73    GFR African American >60     Calcium 8.8 8.6 - 10.2 mg/dL    Total Protein 7.5 6.4 - 8.3 g/dL    Alb 4.0 3.5 - 5.2 g/dL    Total Bilirubin 0.2 0.0 - 1.2 mg/dL    Alkaline Phosphatase 64 35 - 104 U/L    ALT 5 0 - 32 U/L    AST 20 0 - 31 U/L   Lipase   Result Value Ref Range    Lipase 26 13 - 60 U/L   Lactic Acid, Plasma   Result Value Ref Range    Lactic Acid 2.9 (H) 0.5 - 2.2 mmol/L   Pregnancy, Urine   Result Value Ref Range    HCG(Urine) Pregnancy Test POSITIVE NEGATIVE   Urinalysis   Result Value Ref Range    Color, UA Yellow Straw/Yellow    Clarity, UA Clear Clear    Glucose, Ur Negative Negative mg/dL    Bilirubin Urine Negative Negative    Ketones, Urine Negative Negative mg/dL    Specific Gravity, UA 1.020 1.005 - 1.030    Blood, Urine Negative Negative    pH, UA 6.0 5.0 - 9.0    Protein, UA Negative Negative mg/dL    Urobilinogen, Urine 0.2 <2.0 E.U./dL    Nitrite, Urine Negative Negative    Leukocyte Esterase, Urine MODERATE (A) Negative   Microscopic Urinalysis   Result Value Ref Range    Mucus, UA Present     WBC, UA 10-20 (A) 0 - 5 /HPF    RBC, UA 2-5 0 - 2 /HPF    Epi Cells FEW /HPF    Bacteria, UA MODERATE (A) /HPF     Imaging: All Radiology results interpreted by Radiologist unless otherwise noted.   No orders to display       ED Course / Medical Decision Making     Medications   0.9 % sodium chloride bolus (0 mLs chewable tablet Take 1 tablet by mouth daily, Disp-30 tablet, R-0Print      Prenatal Vit-Fe Fumarate-FA (PRENATAL VITAMINS) 28-0.8 MG TABS Take 1 tablet by mouth daily, Disp-30 tablet, R-0Print           Electronically signed by Ubaldo Deluna MD   DD: 8/1/19  **This report was transcribed using voice recognition software. Every effort was made to ensure accuracy; however, inadvertent computerized transcription errors may be present.   END OF PROVIDER NOTE        Ubaldo Deluna MD  08/01/19 7244

## 2019-08-05 ENCOUNTER — APPOINTMENT (OUTPATIENT)
Dept: ULTRASOUND IMAGING | Age: 34
End: 2019-08-05
Payer: COMMERCIAL

## 2019-08-05 ENCOUNTER — HOSPITAL ENCOUNTER (EMERGENCY)
Age: 34
Discharge: HOME | End: 2019-08-05
Attending: EMERGENCY MEDICINE
Payer: COMMERCIAL

## 2019-08-05 VITALS
SYSTOLIC BLOOD PRESSURE: 129 MMHG | HEART RATE: 89 BPM | RESPIRATION RATE: 14 BRPM | WEIGHT: 155 LBS | OXYGEN SATURATION: 99 % | BODY MASS INDEX: 25.83 KG/M2 | HEIGHT: 65 IN | TEMPERATURE: 98.3 F | DIASTOLIC BLOOD PRESSURE: 70 MMHG

## 2019-08-05 DIAGNOSIS — N93.9 VAGINAL BLEEDING: Primary | ICD-10-CM

## 2019-08-05 DIAGNOSIS — R10.9 ABDOMINAL CRAMPING: ICD-10-CM

## 2019-08-05 LAB
ALBUMIN SERPL-MCNC: 4.2 G/DL (ref 3.5–5.2)
ALP BLD-CCNC: 73 U/L (ref 35–104)
ALT SERPL-CCNC: 18 U/L (ref 0–32)
ANION GAP SERPL CALCULATED.3IONS-SCNC: 12 MMOL/L (ref 7–16)
ANISOCYTOSIS: ABNORMAL
AST SERPL-CCNC: 20 U/L (ref 0–31)
BACTERIA: NORMAL /HPF
BASOPHILS ABSOLUTE: 0 E9/L (ref 0–0.2)
BASOPHILS RELATIVE PERCENT: 0 % (ref 0–2)
BILIRUB SERPL-MCNC: <0.2 MG/DL (ref 0–1.2)
BILIRUBIN URINE: NEGATIVE
BLOOD, URINE: ABNORMAL
BUN BLDV-MCNC: 3 MG/DL (ref 6–20)
CALCIUM SERPL-MCNC: 9 MG/DL (ref 8.6–10.2)
CHLORIDE BLD-SCNC: 101 MMOL/L (ref 98–107)
CLARITY: CLEAR
CO2: 23 MMOL/L (ref 22–29)
COLOR: ABNORMAL
CREAT SERPL-MCNC: 0.6 MG/DL (ref 0.5–1)
EOSINOPHILS ABSOLUTE: 0.01 E9/L (ref 0.05–0.5)
EOSINOPHILS RELATIVE PERCENT: 0.2 % (ref 0–6)
GFR AFRICAN AMERICAN: >60
GFR NON-AFRICAN AMERICAN: >60 ML/MIN/1.73
GLUCOSE BLD-MCNC: 92 MG/DL (ref 74–99)
GLUCOSE URINE: NEGATIVE MG/DL
GONADOTROPIN, CHORIONIC (HCG) QUANT: 10.9 MIU/ML
HCG(URINE) PREGNANCY TEST: NEGATIVE
HCT VFR BLD CALC: 30.8 % (ref 34–48)
HEMOGLOBIN: 9 G/DL (ref 11.5–15.5)
HYPOCHROMIA: ABNORMAL
IMMATURE GRANULOCYTES #: 0.02 E9/L
IMMATURE GRANULOCYTES %: 0.4 % (ref 0–5)
KETONES, URINE: NEGATIVE MG/DL
LACTIC ACID: 1.3 MMOL/L (ref 0.5–2.2)
LEUKOCYTE ESTERASE, URINE: NEGATIVE
LIPASE: 19 U/L (ref 13–60)
LYMPHOCYTES ABSOLUTE: 1.75 E9/L (ref 1.5–4)
LYMPHOCYTES RELATIVE PERCENT: 35.5 % (ref 20–42)
MCH RBC QN AUTO: 21.9 PG (ref 26–35)
MCHC RBC AUTO-ENTMCNC: 29.2 % (ref 32–34.5)
MCV RBC AUTO: 74.9 FL (ref 80–99.9)
MONOCYTES ABSOLUTE: 0.38 E9/L (ref 0.1–0.95)
MONOCYTES RELATIVE PERCENT: 7.7 % (ref 2–12)
NEUTROPHILS ABSOLUTE: 2.77 E9/L (ref 1.8–7.3)
NEUTROPHILS RELATIVE PERCENT: 56.2 % (ref 43–80)
NITRITE, URINE: NEGATIVE
PDW BLD-RTO: 21.8 FL (ref 11.5–15)
PH UA: 6 (ref 5–9)
PLATELET # BLD: 326 E9/L (ref 130–450)
PMV BLD AUTO: 8.9 FL (ref 7–12)
POIKILOCYTES: ABNORMAL
POTASSIUM SERPL-SCNC: 2.9 MMOL/L (ref 3.5–5)
PROTEIN UA: NEGATIVE MG/DL
RBC # BLD: 4.11 E12/L (ref 3.5–5.5)
RBC UA: NORMAL /HPF (ref 0–2)
SODIUM BLD-SCNC: 136 MMOL/L (ref 132–146)
SPECIFIC GRAVITY UA: <=1.005 (ref 1–1.03)
SPHEROCYTES: ABNORMAL
TEAR DROP CELLS: ABNORMAL
TOTAL PROTEIN: 7.8 G/DL (ref 6.4–8.3)
UROBILINOGEN, URINE: 0.2 E.U./DL
WBC # BLD: 4.9 E9/L (ref 4.5–11.5)
WBC UA: NORMAL /HPF (ref 0–5)

## 2019-08-05 PROCEDURE — 76830 TRANSVAGINAL US NON-OB: CPT

## 2019-08-05 PROCEDURE — 83605 ASSAY OF LACTIC ACID: CPT

## 2019-08-05 PROCEDURE — 85025 COMPLETE CBC W/AUTO DIFF WBC: CPT

## 2019-08-05 PROCEDURE — 93975 VASCULAR STUDY: CPT

## 2019-08-05 PROCEDURE — 80053 COMPREHEN METABOLIC PANEL: CPT

## 2019-08-05 PROCEDURE — 83690 ASSAY OF LIPASE: CPT

## 2019-08-05 PROCEDURE — 81025 URINE PREGNANCY TEST: CPT

## 2019-08-05 PROCEDURE — 84702 CHORIONIC GONADOTROPIN TEST: CPT

## 2019-08-05 PROCEDURE — 6370000000 HC RX 637 (ALT 250 FOR IP): Performed by: EMERGENCY MEDICINE

## 2019-08-05 PROCEDURE — 99284 EMERGENCY DEPT VISIT MOD MDM: CPT

## 2019-08-05 PROCEDURE — 81001 URINALYSIS AUTO W/SCOPE: CPT

## 2019-08-05 RX ORDER — POTASSIUM CHLORIDE 7.45 MG/ML
10 INJECTION INTRAVENOUS ONCE
Status: DISCONTINUED | OUTPATIENT
Start: 2019-08-05 | End: 2019-08-05

## 2019-08-05 RX ORDER — POTASSIUM CHLORIDE 20 MEQ/1
40 TABLET, EXTENDED RELEASE ORAL ONCE
Status: COMPLETED | OUTPATIENT
Start: 2019-08-05 | End: 2019-08-05

## 2019-08-05 RX ORDER — NAPROXEN 500 MG/1
500 TABLET ORAL 2 TIMES DAILY
Qty: 14 TABLET | Refills: 0 | Status: SHIPPED | OUTPATIENT
Start: 2019-08-05 | End: 2019-11-01 | Stop reason: ALTCHOICE

## 2019-08-05 RX ORDER — ACETAMINOPHEN 325 MG/1
650 TABLET ORAL ONCE
Status: COMPLETED | OUTPATIENT
Start: 2019-08-05 | End: 2019-08-05

## 2019-08-05 RX ADMIN — ACETAMINOPHEN 650 MG: 325 TABLET ORAL at 20:54

## 2019-08-05 RX ADMIN — POTASSIUM CHLORIDE 40 MEQ: 20 TABLET, EXTENDED RELEASE ORAL at 20:55

## 2019-08-05 ASSESSMENT — ENCOUNTER SYMPTOMS
ABDOMINAL PAIN: 1
VOMITING: 1
COUGH: 0
BLOOD IN STOOL: 0
BACK PAIN: 0
SHORTNESS OF BREATH: 0
NAUSEA: 1

## 2019-08-05 ASSESSMENT — PAIN DESCRIPTION - PAIN TYPE: TYPE: ACUTE PAIN

## 2019-08-05 ASSESSMENT — PAIN SCALES - GENERAL
PAINLEVEL_OUTOF10: 8
PAINLEVEL_OUTOF10: 8

## 2019-08-05 NOTE — ED PROVIDER NOTES
they are agreeable with the plan. I discussed at length with them reasons for immediate return here for re evaluation. They will followup with their primary care physician by calling their office tomorrow and on Monday.      --------------------------------- ADDITIONAL PROVIDER NOTES ---------------------------------  At this time the patient is without objective evidence of an acute process requiring hospitalization or inpatient management. They have remained hemodynamically stable throughout their entire ED visit and are stable for discharge with outpatient follow-up. The plan has been discussed in detail and they are aware of the specific conditions for emergent return, as well as the importance of follow-up. Discharge Medication List as of 8/5/2019  8:51 PM      START taking these medications    Details   naproxen (NAPROSYN) 500 MG tablet Take 1 tablet by mouth 2 times daily for 7 days, Disp-14 tablet, R-0Print             Diagnosis:  1. Vaginal bleeding    2. Abdominal cramping        Disposition:  Patient's disposition: Discharge to home  Patient's condition is stable.          Da Fisher DO  08/06/19 1813

## 2019-08-05 NOTE — ED NOTES
Bed:  [place] [address]  Expected date:   Expected time:   Means of arrival:   Comments:  1214 Ida Howe RN  08/05/19 8928

## 2019-11-01 ENCOUNTER — APPOINTMENT (OUTPATIENT)
Dept: GENERAL RADIOLOGY | Age: 34
End: 2019-11-01
Payer: COMMERCIAL

## 2019-11-01 ENCOUNTER — HOSPITAL ENCOUNTER (EMERGENCY)
Age: 34
Discharge: HOME | End: 2019-11-01
Payer: COMMERCIAL

## 2019-11-01 VITALS
HEART RATE: 94 BPM | WEIGHT: 145 LBS | TEMPERATURE: 98.5 F | BODY MASS INDEX: 28.47 KG/M2 | SYSTOLIC BLOOD PRESSURE: 120 MMHG | HEIGHT: 60 IN | OXYGEN SATURATION: 100 % | RESPIRATION RATE: 16 BRPM | DIASTOLIC BLOOD PRESSURE: 80 MMHG

## 2019-11-01 DIAGNOSIS — K52.9 GASTROENTERITIS: ICD-10-CM

## 2019-11-01 DIAGNOSIS — J20.9 ACUTE BRONCHITIS, UNSPECIFIED ORGANISM: Primary | ICD-10-CM

## 2019-11-01 LAB
ALBUMIN SERPL-MCNC: 4.6 G/DL (ref 3.5–5.2)
ALP BLD-CCNC: 89 U/L (ref 35–104)
ALT SERPL-CCNC: 9 U/L (ref 0–32)
ANION GAP SERPL CALCULATED.3IONS-SCNC: 12 MMOL/L (ref 7–16)
AST SERPL-CCNC: 20 U/L (ref 0–31)
BASOPHILS ABSOLUTE: 0.02 E9/L (ref 0–0.2)
BASOPHILS RELATIVE PERCENT: 0.4 % (ref 0–2)
BILIRUB SERPL-MCNC: 0.2 MG/DL (ref 0–1.2)
BILIRUBIN URINE: NEGATIVE
BLOOD, URINE: NEGATIVE
BUN BLDV-MCNC: 6 MG/DL (ref 6–20)
CALCIUM SERPL-MCNC: 9.9 MG/DL (ref 8.6–10.2)
CHLORIDE BLD-SCNC: 102 MMOL/L (ref 98–107)
CLARITY: CLEAR
CO2: 26 MMOL/L (ref 22–29)
COLOR: YELLOW
CREAT SERPL-MCNC: 0.7 MG/DL (ref 0.5–1)
EOSINOPHILS ABSOLUTE: 0.04 E9/L (ref 0.05–0.5)
EOSINOPHILS RELATIVE PERCENT: 0.7 % (ref 0–6)
GFR AFRICAN AMERICAN: >60
GFR NON-AFRICAN AMERICAN: >60 ML/MIN/1.73
GLUCOSE BLD-MCNC: 100 MG/DL (ref 74–99)
GLUCOSE URINE: NEGATIVE MG/DL
HCG(URINE) PREGNANCY TEST: NEGATIVE
HCT VFR BLD CALC: 32.3 % (ref 34–48)
HEMOGLOBIN: 9.2 G/DL (ref 11.5–15.5)
IMMATURE GRANULOCYTES #: 0.02 E9/L
IMMATURE GRANULOCYTES %: 0.4 % (ref 0–5)
KETONES, URINE: NEGATIVE MG/DL
LEUKOCYTE ESTERASE, URINE: NEGATIVE
LYMPHOCYTES ABSOLUTE: 1.87 E9/L (ref 1.5–4)
LYMPHOCYTES RELATIVE PERCENT: 34.1 % (ref 20–42)
MCH RBC QN AUTO: 20.7 PG (ref 26–35)
MCHC RBC AUTO-ENTMCNC: 28.5 % (ref 32–34.5)
MCV RBC AUTO: 72.6 FL (ref 80–99.9)
MONOCYTES ABSOLUTE: 0.46 E9/L (ref 0.1–0.95)
MONOCYTES RELATIVE PERCENT: 8.4 % (ref 2–12)
NEUTROPHILS ABSOLUTE: 3.07 E9/L (ref 1.8–7.3)
NEUTROPHILS RELATIVE PERCENT: 56 % (ref 43–80)
NITRITE, URINE: NEGATIVE
PDW BLD-RTO: 19.3 FL (ref 11.5–15)
PH UA: 7 (ref 5–9)
PLATELET # BLD: 411 E9/L (ref 130–450)
PMV BLD AUTO: 9.7 FL (ref 7–12)
POTASSIUM REFLEX MAGNESIUM: 3.6 MMOL/L (ref 3.5–5)
PROTEIN UA: NEGATIVE MG/DL
RBC # BLD: 4.45 E12/L (ref 3.5–5.5)
SODIUM BLD-SCNC: 140 MMOL/L (ref 132–146)
SPECIFIC GRAVITY UA: 1.02 (ref 1–1.03)
TOTAL PROTEIN: 9.1 G/DL (ref 6.4–8.3)
UROBILINOGEN, URINE: 0.2 E.U./DL
WBC # BLD: 5.5 E9/L (ref 4.5–11.5)

## 2019-11-01 PROCEDURE — 99283 EMERGENCY DEPT VISIT LOW MDM: CPT

## 2019-11-01 PROCEDURE — 81025 URINE PREGNANCY TEST: CPT

## 2019-11-01 PROCEDURE — 96374 THER/PROPH/DIAG INJ IV PUSH: CPT

## 2019-11-01 PROCEDURE — 6360000002 HC RX W HCPCS: Performed by: PHYSICIAN ASSISTANT

## 2019-11-01 PROCEDURE — 81003 URINALYSIS AUTO W/O SCOPE: CPT

## 2019-11-01 PROCEDURE — 2580000003 HC RX 258: Performed by: PHYSICIAN ASSISTANT

## 2019-11-01 PROCEDURE — 85025 COMPLETE CBC W/AUTO DIFF WBC: CPT

## 2019-11-01 PROCEDURE — 96361 HYDRATE IV INFUSION ADD-ON: CPT

## 2019-11-01 PROCEDURE — 71046 X-RAY EXAM CHEST 2 VIEWS: CPT

## 2019-11-01 PROCEDURE — 36415 COLL VENOUS BLD VENIPUNCTURE: CPT

## 2019-11-01 PROCEDURE — 80053 COMPREHEN METABOLIC PANEL: CPT

## 2019-11-01 RX ORDER — ONDANSETRON 2 MG/ML
4 INJECTION INTRAMUSCULAR; INTRAVENOUS ONCE
Status: COMPLETED | OUTPATIENT
Start: 2019-11-01 | End: 2019-11-01

## 2019-11-01 RX ORDER — 0.9 % SODIUM CHLORIDE 0.9 %
1000 INTRAVENOUS SOLUTION INTRAVENOUS ONCE
Status: COMPLETED | OUTPATIENT
Start: 2019-11-01 | End: 2019-11-01

## 2019-11-01 RX ORDER — AZITHROMYCIN 250 MG/1
TABLET, FILM COATED ORAL
Qty: 1 PACKET | Refills: 0 | Status: SHIPPED | OUTPATIENT
Start: 2019-11-01 | End: 2019-11-05

## 2019-11-01 RX ORDER — PROMETHAZINE HYDROCHLORIDE 25 MG/1
25 TABLET ORAL EVERY 6 HOURS PRN
Qty: 12 TABLET | Refills: 1 | Status: SHIPPED | OUTPATIENT
Start: 2019-11-01 | End: 2019-11-08

## 2019-11-01 RX ADMIN — SODIUM CHLORIDE 1000 ML: 9 INJECTION, SOLUTION INTRAVENOUS at 20:02

## 2019-11-01 RX ADMIN — ONDANSETRON 4 MG: 2 INJECTION INTRAMUSCULAR; INTRAVENOUS at 20:03

## 2019-11-01 ASSESSMENT — PAIN DESCRIPTION - DESCRIPTORS: DESCRIPTORS: DISCOMFORT

## 2019-11-01 ASSESSMENT — PAIN SCALES - GENERAL: PAINLEVEL_OUTOF10: 8

## 2019-11-01 ASSESSMENT — PAIN DESCRIPTION - PAIN TYPE: TYPE: ACUTE PAIN

## 2019-11-01 ASSESSMENT — PAIN DESCRIPTION - LOCATION: LOCATION: HEAD

## 2020-01-02 ENCOUNTER — APPOINTMENT (OUTPATIENT)
Dept: ULTRASOUND IMAGING | Age: 35
End: 2020-01-02
Payer: COMMERCIAL

## 2020-01-02 ENCOUNTER — HOSPITAL ENCOUNTER (EMERGENCY)
Age: 35
Discharge: HOME | End: 2020-01-02
Attending: EMERGENCY MEDICINE
Payer: COMMERCIAL

## 2020-01-02 ENCOUNTER — APPOINTMENT (OUTPATIENT)
Dept: GENERAL RADIOLOGY | Age: 35
End: 2020-01-02
Payer: COMMERCIAL

## 2020-01-02 VITALS
BODY MASS INDEX: 31.41 KG/M2 | DIASTOLIC BLOOD PRESSURE: 76 MMHG | TEMPERATURE: 98.9 F | SYSTOLIC BLOOD PRESSURE: 110 MMHG | RESPIRATION RATE: 16 BRPM | WEIGHT: 160 LBS | HEART RATE: 66 BPM | HEIGHT: 60 IN | OXYGEN SATURATION: 98 %

## 2020-01-02 LAB
ALBUMIN SERPL-MCNC: 4.3 G/DL (ref 3.5–5.2)
ALP BLD-CCNC: 81 U/L (ref 35–104)
ALT SERPL-CCNC: 10 U/L (ref 0–32)
ANION GAP SERPL CALCULATED.3IONS-SCNC: 10 MMOL/L (ref 7–16)
ANISOCYTOSIS: ABNORMAL
AST SERPL-CCNC: 21 U/L (ref 0–31)
BASOPHILS ABSOLUTE: 0.01 E9/L (ref 0–0.2)
BASOPHILS RELATIVE PERCENT: 0.2 % (ref 0–2)
BILIRUB SERPL-MCNC: 0.2 MG/DL (ref 0–1.2)
BILIRUBIN URINE: NEGATIVE
BLOOD, URINE: NEGATIVE
BUN BLDV-MCNC: 2 MG/DL (ref 6–20)
CALCIUM SERPL-MCNC: 9.4 MG/DL (ref 8.6–10.2)
CHLORIDE BLD-SCNC: 98 MMOL/L (ref 98–107)
CLARITY: CLEAR
CO2: 26 MMOL/L (ref 22–29)
COLOR: YELLOW
CREAT SERPL-MCNC: 0.7 MG/DL (ref 0.5–1)
EOSINOPHILS ABSOLUTE: 0.07 E9/L (ref 0.05–0.5)
EOSINOPHILS RELATIVE PERCENT: 1.4 % (ref 0–6)
GFR AFRICAN AMERICAN: >60
GFR NON-AFRICAN AMERICAN: >60 ML/MIN/1.73
GLUCOSE BLD-MCNC: 95 MG/DL (ref 74–99)
GLUCOSE URINE: NEGATIVE MG/DL
HCG, URINE, POC: NEGATIVE
HCT VFR BLD CALC: 31.3 % (ref 34–48)
HEMOGLOBIN: 8.8 G/DL (ref 11.5–15.5)
HYPOCHROMIA: ABNORMAL
IMMATURE GRANULOCYTES #: 0.01 E9/L
IMMATURE GRANULOCYTES %: 0.2 % (ref 0–5)
INR BLD: 1.1
KETONES, URINE: NEGATIVE MG/DL
LACTIC ACID: 1.1 MMOL/L (ref 0.5–2.2)
LEUKOCYTE ESTERASE, URINE: NEGATIVE
LIPASE: 17 U/L (ref 13–60)
LYMPHOCYTES ABSOLUTE: 1.37 E9/L (ref 1.5–4)
LYMPHOCYTES RELATIVE PERCENT: 28.3 % (ref 20–42)
Lab: NORMAL
MCH RBC QN AUTO: 20.3 PG (ref 26–35)
MCHC RBC AUTO-ENTMCNC: 28.1 % (ref 32–34.5)
MCV RBC AUTO: 72.3 FL (ref 80–99.9)
MONOCYTES ABSOLUTE: 0.45 E9/L (ref 0.1–0.95)
MONOCYTES RELATIVE PERCENT: 9.3 % (ref 2–12)
NEGATIVE QC PASS/FAIL: NORMAL
NEUTROPHILS ABSOLUTE: 2.93 E9/L (ref 1.8–7.3)
NEUTROPHILS RELATIVE PERCENT: 60.6 % (ref 43–80)
NITRITE, URINE: NEGATIVE
OVALOCYTES: ABNORMAL
PDW BLD-RTO: 19.1 FL (ref 11.5–15)
PH UA: 7 (ref 5–9)
PLATELET # BLD: 334 E9/L (ref 130–450)
PMV BLD AUTO: 9.1 FL (ref 7–12)
POIKILOCYTES: ABNORMAL
POLYCHROMASIA: ABNORMAL
POSITIVE QC PASS/FAIL: NORMAL
POTASSIUM SERPL-SCNC: 3.4 MMOL/L (ref 3.5–5)
PROTEIN UA: NEGATIVE MG/DL
PROTHROMBIN TIME: 12.2 SEC (ref 9.3–12.4)
RBC # BLD: 4.33 E12/L (ref 3.5–5.5)
SODIUM BLD-SCNC: 134 MMOL/L (ref 132–146)
SPECIFIC GRAVITY UA: <=1.005 (ref 1–1.03)
TOTAL PROTEIN: 8.1 G/DL (ref 6.4–8.3)
UROBILINOGEN, URINE: 0.2 E.U./DL
WBC # BLD: 4.8 E9/L (ref 4.5–11.5)

## 2020-01-02 PROCEDURE — 83690 ASSAY OF LIPASE: CPT

## 2020-01-02 PROCEDURE — 93971 EXTREMITY STUDY: CPT

## 2020-01-02 PROCEDURE — 85610 PROTHROMBIN TIME: CPT

## 2020-01-02 PROCEDURE — 83605 ASSAY OF LACTIC ACID: CPT

## 2020-01-02 PROCEDURE — 85025 COMPLETE CBC W/AUTO DIFF WBC: CPT

## 2020-01-02 PROCEDURE — 6360000002 HC RX W HCPCS: Performed by: PREVENTIVE MEDICINE

## 2020-01-02 PROCEDURE — 73590 X-RAY EXAM OF LOWER LEG: CPT

## 2020-01-02 PROCEDURE — 74022 RADEX COMPL AQT ABD SERIES: CPT

## 2020-01-02 PROCEDURE — 80053 COMPREHEN METABOLIC PANEL: CPT

## 2020-01-02 PROCEDURE — 81003 URINALYSIS AUTO W/O SCOPE: CPT

## 2020-01-02 PROCEDURE — 6370000000 HC RX 637 (ALT 250 FOR IP): Performed by: PREVENTIVE MEDICINE

## 2020-01-02 PROCEDURE — 99285 EMERGENCY DEPT VISIT HI MDM: CPT

## 2020-01-02 PROCEDURE — 96372 THER/PROPH/DIAG INJ SC/IM: CPT

## 2020-01-02 RX ORDER — DICYCLOMINE HYDROCHLORIDE 10 MG/ML
20 INJECTION INTRAMUSCULAR ONCE
Status: COMPLETED | OUTPATIENT
Start: 2020-01-02 | End: 2020-01-02

## 2020-01-02 RX ORDER — DICYCLOMINE HYDROCHLORIDE 10 MG/1
20 CAPSULE ORAL 4 TIMES DAILY PRN
Qty: 20 CAPSULE | Refills: 0 | Status: ON HOLD | OUTPATIENT
Start: 2020-01-02 | End: 2020-07-08 | Stop reason: ALTCHOICE

## 2020-01-02 RX ORDER — ACETAMINOPHEN 500 MG
1000 TABLET ORAL ONCE
Status: COMPLETED | OUTPATIENT
Start: 2020-01-02 | End: 2020-01-02

## 2020-01-02 RX ORDER — SODIUM CHLORIDE 0.9 % (FLUSH) 0.9 %
10 SYRINGE (ML) INJECTION PRN
Status: DISCONTINUED | OUTPATIENT
Start: 2020-01-02 | End: 2020-01-02 | Stop reason: HOSPADM

## 2020-01-02 RX ADMIN — ACETAMINOPHEN 1000 MG: 500 TABLET ORAL at 15:55

## 2020-01-02 RX ADMIN — DICYCLOMINE HYDROCHLORIDE 20 MG: 20 INJECTION, SOLUTION INTRAMUSCULAR at 15:55

## 2020-01-02 ASSESSMENT — ENCOUNTER SYMPTOMS
SHORTNESS OF BREATH: 0
ABDOMINAL PAIN: 1
NAUSEA: 0
CHEST TIGHTNESS: 0
ALLERGIC/IMMUNOLOGIC NEGATIVE: 1
CONSTIPATION: 0
DIARRHEA: 0
COUGH: 0
VOMITING: 0

## 2020-01-02 ASSESSMENT — PAIN SCALES - GENERAL
PAINLEVEL_OUTOF10: 7
PAINLEVEL_OUTOF10: 7

## 2020-01-02 ASSESSMENT — PAIN DESCRIPTION - PAIN TYPE: TYPE: ACUTE PAIN

## 2020-01-02 ASSESSMENT — PAIN DESCRIPTION - LOCATION: LOCATION: ABDOMEN

## 2020-01-02 ASSESSMENT — PAIN DESCRIPTION - ORIENTATION: ORIENTATION: LOWER

## 2020-01-02 NOTE — ED PROVIDER NOTES
Ear: External ear normal.      Left Ear: External ear normal.      Nose: Nose normal.      Mouth/Throat:      Pharynx: No oropharyngeal exudate. Eyes:      General:         Right eye: No discharge. Left eye: No discharge. Conjunctiva/sclera: Conjunctivae normal.      Pupils: Pupils are equal, round, and reactive to light. Neck:      Musculoskeletal: Normal range of motion and neck supple. Thyroid: No thyromegaly. Vascular: No JVD. Trachea: No tracheal deviation. Cardiovascular:      Rate and Rhythm: Normal rate and regular rhythm. Heart sounds: Normal heart sounds. No murmur. No friction rub. No gallop. Pulmonary:      Effort: Pulmonary effort is normal. No respiratory distress. Breath sounds: Normal breath sounds. No wheezing or rales. Abdominal:      General: Bowel sounds are normal. There is no distension. Palpations: Abdomen is soft. Tenderness: There is no tenderness. There is no guarding or rebound. Comments: Soft, nontender, nondistended. Bowel sounds normoactive throughout. No rebound or involuntary guarding noted. Musculoskeletal: Normal range of motion. Comments: Examination of the bilateral lower extremities demonstrates soft compressible compartments throughout. Dorsalis pedis pulses 2 out of 4. Calf tenderness elicited on the left leg. Lymphadenopathy:      Cervical: No cervical adenopathy. Skin:     General: Skin is warm and dry. Neurological:      Mental Status: She is alert and oriented to person, place, and time. Psychiatric:         Behavior: Behavior normal.         Thought Content: Thought content normal.         Judgment: Judgment normal.          Procedures     MDM  Number of Diagnoses or Management Options  Abdominal pain, unspecified abdominal location:   Pain of left lower leg:   Diagnosis management comments: 29-year-old female with the above-stated history, signs, symptoms.   During the patient's emergency department stay he was evaluated for acute causes of her symptoms. She was found to have no evidence of acute pathology, laboratory studies were unremarkable as well as Doppler ultrasound and acute abdominal series. We discharged home with supportive management recommendation to follow-up as an outpatient return if symptoms worsen. I have discussed the results of the workup as well as the plan with the patient and family who indicate understanding and agreement with the plan. All questions answered at this time. --------------------------------------------- PAST HISTORY ---------------------------------------------  Past Medical History:  has a past medical history of DVT (deep venous thrombosis) ([facility] [address] DVT of lower extremity (deep venous thrombosis) ([facility] History of DVT of lower extremity, and Migraine. Past Surgical History:  has a past surgical history that includes Induced  (10/12/12) and Dilation & curettage. Social History:  reports that she has never smoked. She has never used smokeless tobacco. She reports that she does not drink alcohol or use drugs. Family History: family history is not on file. The patients home medications have been reviewed.     Allergies: Latex    -------------------------------------------------- RESULTS -------------------------------------------------  Labs:  Results for orders placed or performed during the hospital encounter of 20   Protime-INR   Result Value Ref Range    Protime 12.2 9.3 - 12.4 sec    INR 1.1    CBC auto differential   Result Value Ref Range    WBC 4.8 4.5 - 11.5 E9/L    RBC 4.33 3.50 - 5.50 E12/L    Hemoglobin 8.8 (L) 11.5 - 15.5 g/dL    Hematocrit 31.3 (L) 34.0 - 48.0 %    MCV 72.3 (L) 80.0 - 99.9 fL    MCH 20.3 (L) 26.0 - 35.0 pg    MCHC 28.1 (L) 32.0 - 34.5 %    RDW 19.1 (H) 11.5 - 15.0 fL    Platelets 167 926 - 398 E9/L    MPV 9.1 7.0 - 12.0 fL    Neutrophils % 60.6 43.0 - 80.0 %    Immature Granulocytes % 0.2 0.0 Final Result   No evidence of left lower extremity deep venous thrombosis. XR Acute Abd Series Chest 1 VW   Final Result      1. No airspace opacities or pleural effusion. 2. No bowel obstruction or free air. XR TIBIA FIBULA LEFT (2 VIEWS)   Final Result   No significant abnormal findings. ------------------------- NURSING NOTES AND VITALS REVIEWED ---------------------------  Date / Time Roomed:  1/2/2020  2:27 PM  ED Bed Assignment:  [id number]    The nursing notes within the ED encounter and vital signs as below have been reviewed. /76   Pulse 66   Temp 98.9 °F (37.2 °C) (Oral)   Resp 16   Ht 5' (1.524 m)   Wt 160 lb (72.6 kg)   LMP 12/22/2019   SpO2 98%   BMI 31.25 kg/m²   Oxygen Saturation Interpretation: Normal      ------------------------------------------ PROGRESS NOTES ------------------------------------------  12:20 AM  I have spoken with the patient and discussed todays results, in addition to providing specific details for the plan of care and counseling regarding the diagnosis and prognosis. Their questions are answered at this time and they are agreeable with the plan. I discussed at length with them reasons for immediate return here for re evaluation. They will followup with PCP.       --------------------------------- ADDITIONAL PROVIDER NOTES ---------------------------------  At this time the patient is without objective evidence of an acute process requiring hospitalization or inpatient management. They have remained hemodynamically stable throughout their entire ED visit and are stable for discharge with outpatient follow-up. The plan has been discussed in detail and they are aware of the specific conditions for emergent return, as well as the importance of follow-up.       Discharge Medication List as of 1/2/2020  5:17 PM      START taking these medications    Details   dicyclomine (BENTYL) 10 MG capsule Take 2 capsules by mouth 4 times daily as needed (pain), Disp-20 capsule, R-nonePrint             Diagnosis:  1. Abdominal pain, unspecified abdominal location    2. Pain of left lower leg        Disposition:  Patient's disposition: Discharge to home  Patient's condition is stable.                    Cydney Julian DO  Resident  01/03/20 0740

## 2020-01-03 ENCOUNTER — APPOINTMENT (OUTPATIENT)
Dept: ULTRASOUND IMAGING | Age: 35
End: 2020-01-03
Payer: COMMERCIAL

## 2020-01-03 ENCOUNTER — HOSPITAL ENCOUNTER (EMERGENCY)
Age: 35
Discharge: HOME | End: 2020-01-04
Attending: EMERGENCY MEDICINE
Payer: COMMERCIAL

## 2020-01-03 LAB
ABO/RH: NORMAL
ANISOCYTOSIS: ABNORMAL
BASOPHILS ABSOLUTE: 0.02 E9/L (ref 0–0.2)
BASOPHILS RELATIVE PERCENT: 0.3 % (ref 0–2)
EOSINOPHILS ABSOLUTE: 0.1 E9/L (ref 0.05–0.5)
EOSINOPHILS RELATIVE PERCENT: 1.4 % (ref 0–6)
GONADOTROPIN, CHORIONIC (HCG) QUANT: <0.1 MIU/ML
HCT VFR BLD CALC: 27.8 % (ref 34–48)
HEMOGLOBIN: 8 G/DL (ref 11.5–15.5)
HYPOCHROMIA: ABNORMAL
IMMATURE GRANULOCYTES #: 0.02 E9/L
IMMATURE GRANULOCYTES %: 0.3 % (ref 0–5)
INR BLD: 1.1
LYMPHOCYTES ABSOLUTE: 1.73 E9/L (ref 1.5–4)
LYMPHOCYTES RELATIVE PERCENT: 24.7 % (ref 20–42)
MCH RBC QN AUTO: 20.6 PG (ref 26–35)
MCHC RBC AUTO-ENTMCNC: 28.8 % (ref 32–34.5)
MCV RBC AUTO: 71.5 FL (ref 80–99.9)
MONOCYTES ABSOLUTE: 0.56 E9/L (ref 0.1–0.95)
MONOCYTES RELATIVE PERCENT: 8 % (ref 2–12)
NEUTROPHILS ABSOLUTE: 4.57 E9/L (ref 1.8–7.3)
NEUTROPHILS RELATIVE PERCENT: 65.3 % (ref 43–80)
OVALOCYTES: ABNORMAL
PDW BLD-RTO: 19.2 FL (ref 11.5–15)
PLATELET # BLD: 348 E9/L (ref 130–450)
PMV BLD AUTO: 9.5 FL (ref 7–12)
POIKILOCYTES: ABNORMAL
POLYCHROMASIA: ABNORMAL
PROTHROMBIN TIME: 12.2 SEC (ref 9.3–12.4)
RBC # BLD: 3.89 E12/L (ref 3.5–5.5)
WBC # BLD: 7 E9/L (ref 4.5–11.5)

## 2020-01-03 PROCEDURE — 85610 PROTHROMBIN TIME: CPT

## 2020-01-03 PROCEDURE — 85025 COMPLETE CBC W/AUTO DIFF WBC: CPT

## 2020-01-03 PROCEDURE — 99284 EMERGENCY DEPT VISIT MOD MDM: CPT

## 2020-01-03 PROCEDURE — 84702 CHORIONIC GONADOTROPIN TEST: CPT

## 2020-01-03 PROCEDURE — 76856 US EXAM PELVIC COMPLETE: CPT

## 2020-01-03 PROCEDURE — 86900 BLOOD TYPING SEROLOGIC ABO: CPT

## 2020-01-03 PROCEDURE — 86901 BLOOD TYPING SEROLOGIC RH(D): CPT

## 2020-01-03 RX ORDER — 0.9 % SODIUM CHLORIDE 0.9 %
1000 INTRAVENOUS SOLUTION INTRAVENOUS ONCE
Status: COMPLETED | OUTPATIENT
Start: 2020-01-03 | End: 2020-01-04

## 2020-01-03 ASSESSMENT — PAIN DESCRIPTION - DESCRIPTORS: DESCRIPTORS: STABBING

## 2020-01-03 ASSESSMENT — PAIN DESCRIPTION - ORIENTATION: ORIENTATION: MID;LOWER

## 2020-01-03 ASSESSMENT — PAIN DESCRIPTION - LOCATION: LOCATION: ABDOMEN;PELVIS

## 2020-01-03 ASSESSMENT — PAIN SCALES - GENERAL: PAINLEVEL_OUTOF10: 9

## 2020-01-03 ASSESSMENT — PAIN DESCRIPTION - PAIN TYPE: TYPE: ACUTE PAIN

## 2020-01-04 VITALS
HEIGHT: 60 IN | OXYGEN SATURATION: 99 % | HEART RATE: 82 BPM | WEIGHT: 160 LBS | RESPIRATION RATE: 14 BRPM | BODY MASS INDEX: 31.41 KG/M2 | DIASTOLIC BLOOD PRESSURE: 60 MMHG | TEMPERATURE: 97.9 F | SYSTOLIC BLOOD PRESSURE: 100 MMHG

## 2020-01-04 PROCEDURE — 96374 THER/PROPH/DIAG INJ IV PUSH: CPT

## 2020-01-04 PROCEDURE — 6360000002 HC RX W HCPCS: Performed by: EMERGENCY MEDICINE

## 2020-01-04 PROCEDURE — 96375 TX/PRO/DX INJ NEW DRUG ADDON: CPT

## 2020-01-04 PROCEDURE — 2580000003 HC RX 258: Performed by: EMERGENCY MEDICINE

## 2020-01-04 RX ORDER — IBUPROFEN 600 MG/1
600 TABLET ORAL EVERY 8 HOURS PRN
Qty: 30 TABLET | Refills: 1 | Status: ON HOLD | OUTPATIENT
Start: 2020-01-04 | End: 2020-07-09 | Stop reason: HOSPADM

## 2020-01-04 RX ORDER — OXYCODONE HYDROCHLORIDE AND ACETAMINOPHEN 5; 325 MG/1; MG/1
1 TABLET ORAL EVERY 6 HOURS PRN
Qty: 12 TABLET | Refills: 0 | Status: SHIPPED | OUTPATIENT
Start: 2020-01-04 | End: 2020-01-07

## 2020-01-04 RX ORDER — MORPHINE SULFATE 4 MG/ML
4 INJECTION, SOLUTION INTRAMUSCULAR; INTRAVENOUS ONCE
Status: COMPLETED | OUTPATIENT
Start: 2020-01-04 | End: 2020-01-04

## 2020-01-04 RX ORDER — KETOROLAC TROMETHAMINE 30 MG/ML
30 INJECTION, SOLUTION INTRAMUSCULAR; INTRAVENOUS ONCE
Status: COMPLETED | OUTPATIENT
Start: 2020-01-04 | End: 2020-01-04

## 2020-01-04 RX ORDER — ONDANSETRON 2 MG/ML
4 INJECTION INTRAMUSCULAR; INTRAVENOUS ONCE
Status: COMPLETED | OUTPATIENT
Start: 2020-01-04 | End: 2020-01-04

## 2020-01-04 RX ADMIN — SODIUM CHLORIDE 1000 ML: 9 INJECTION, SOLUTION INTRAVENOUS at 00:53

## 2020-01-04 RX ADMIN — MORPHINE SULFATE 4 MG: 4 INJECTION, SOLUTION INTRAMUSCULAR; INTRAVENOUS at 00:49

## 2020-01-04 RX ADMIN — KETOROLAC TROMETHAMINE 30 MG: 30 INJECTION, SOLUTION INTRAMUSCULAR at 00:48

## 2020-01-04 RX ADMIN — ONDANSETRON 4 MG: 2 INJECTION INTRAMUSCULAR; INTRAVENOUS at 00:48

## 2020-01-04 ASSESSMENT — PAIN SCALES - GENERAL
PAINLEVEL_OUTOF10: 2
PAINLEVEL_OUTOF10: 6
PAINLEVEL_OUTOF10: 2
PAINLEVEL_OUTOF10: 8

## 2020-01-04 ASSESSMENT — PAIN DESCRIPTION - PAIN TYPE
TYPE: ACUTE PAIN

## 2020-01-04 ASSESSMENT — PAIN DESCRIPTION - DESCRIPTORS
DESCRIPTORS: ACHING
DESCRIPTORS: ACHING

## 2020-01-04 ASSESSMENT — ENCOUNTER SYMPTOMS
BACK PAIN: 0
RESPIRATORY NEGATIVE: 1
ABDOMINAL PAIN: 1
NAUSEA: 0
EYES NEGATIVE: 1

## 2020-01-04 ASSESSMENT — PAIN DESCRIPTION - LOCATION
LOCATION: ABDOMEN

## 2020-01-04 ASSESSMENT — PAIN DESCRIPTION - FREQUENCY: FREQUENCY: INTERMITTENT

## 2020-01-04 NOTE — ED NOTES
Pt seen yesterday for lower abd pain. States that she just started having a large amount of vaginal bleeding and passed big clots.  She states that the abd pain has gotten worse and is getting dizzy with ambulation     Romel Piedra RN  01/03/20 Ragini Dumont RN  01/03/20 [id number]

## 2020-01-04 NOTE — ED PROVIDER NOTES
This is 30years old female who presented to our emergency department the chief complaint of vaginal bleeding since yesterday. Patient was seen by us yesterday. She had urine pregnancy which was negative. Patient is stated last menstruation was 2 weeks ago. However she is stating this vaginal bleeding is heavier than normal.  Patient denies any trauma. Patient denies any dizziness. No chest pain or shortness of breath. No other complaints at this time. The history is provided by the patient. No  was used. Vaginal Bleeding   Quality:  Bright red  Severity:  Moderate  Onset quality:  Gradual  Timing:  Intermittent  Progression:  Unchanged  Chronicity:  Recurrent  Menstrual history:  Irregular  Possible pregnancy: no    Context: spontaneously    Context: not after intercourse, not after urination, not at rest, not during intercourse, not during urination and not foreign body    Relieved by:  Nothing  Worsened by:  Nothing  Ineffective treatments:  None tried  Associated symptoms: abdominal pain    Associated symptoms: no back pain, no dizziness, no dyspareunia, no dysuria, no fatigue, no fever, no nausea and no vaginal discharge    Abdominal pain:     Location:  Suprapubic    Quality: aching      Severity:  Moderate    Onset quality:  Gradual    Timing:  Intermittent    Progression:  Unchanged    Chronicity:  Recurrent  Risk factors: ovarian cysts    Risk factors: no bleeding disorder, no hx of ectopic pregnancy, no gynecological surgery, does not have multiple partners, no ovarian torsion, no PID, no prior miscarriage, no STD, no STD exposure, no terminated pregnancies and does not have unprotected sex         Review of Systems   Constitutional: Negative for fatigue and fever. HENT: Negative. Eyes: Negative. Respiratory: Negative. Cardiovascular: Negative. Gastrointestinal: Positive for abdominal pain. Negative for nausea. Endocrine: Negative.     Genitourinary: Positive for vaginal bleeding. Negative for dyspareunia, dysuria and vaginal discharge. Musculoskeletal: Negative for back pain. Neurological: Negative for dizziness. All other systems reviewed and are negative. Physical Exam  Vitals signs and nursing note reviewed. Constitutional:       General: She is in acute distress (Mild distress). Appearance: Normal appearance. She is normal weight. She is not ill-appearing, toxic-appearing or diaphoretic. HENT:      Head: Normocephalic and atraumatic. Nose: Nose normal. No congestion or rhinorrhea. Eyes:      General: No scleral icterus. Right eye: No discharge. Left eye: No discharge. Extraocular Movements: Extraocular movements intact. Conjunctiva/sclera: Conjunctivae normal.   Neck:      Musculoskeletal: Normal range of motion and neck supple. No neck rigidity or muscular tenderness. Cardiovascular:      Rate and Rhythm: Normal rate and regular rhythm. Pulses: Normal pulses. Heart sounds: Normal heart sounds. Pulmonary:      Effort: Pulmonary effort is normal. No respiratory distress. Breath sounds: Normal breath sounds. No stridor. No wheezing, rhonchi or rales. Chest:      Chest wall: No tenderness. Abdominal:      General: Abdomen is flat. Bowel sounds are normal. There is no distension. Palpations: Abdomen is soft. Tenderness: There is tenderness (Positive mild tenderness upon palpation over suprapubic area. ). There is no guarding or rebound. Genitourinary:     Comments: Vaginal exam was deferred by the patient  Musculoskeletal: Normal range of motion. General: No tenderness, deformity or signs of injury. Right lower leg: No edema. Left lower leg: No edema. Skin:     General: Skin is warm and dry. Coloration: Skin is not jaundiced or pale. Findings: No bruising, erythema, lesion or rash. Neurological:      General: No focal deficit present. She has never used smokeless tobacco. She reports that she does not drink alcohol or use drugs. Family History: family history is not on file. The patients home medications have been reviewed. Allergies: Latex    -------------------------------------------------- RESULTS -------------------------------------------------  Labs:  Results for orders placed or performed during the hospital encounter of 01/03/20   CBC Auto Differential   Result Value Ref Range    WBC 7.0 4.5 - 11.5 E9/L    RBC 3.89 3.50 - 5.50 E12/L    Hemoglobin 8.0 (L) 11.5 - 15.5 g/dL    Hematocrit 27.8 (L) 34.0 - 48.0 %    MCV 71.5 (L) 80.0 - 99.9 fL    MCH 20.6 (L) 26.0 - 35.0 pg    MCHC 28.8 (L) 32.0 - 34.5 %    RDW 19.2 (H) 11.5 - 15.0 fL    Platelets 433 237 - 553 E9/L    MPV 9.5 7.0 - 12.0 fL    Neutrophils % 65.3 43.0 - 80.0 %    Immature Granulocytes % 0.3 0.0 - 5.0 %    Lymphocytes % 24.7 20.0 - 42.0 %    Monocytes % 8.0 2.0 - 12.0 %    Eosinophils % 1.4 0.0 - 6.0 %    Basophils % 0.3 0.0 - 2.0 %    Neutrophils Absolute 4.57 1.80 - 7.30 E9/L    Immature Granulocytes # 0.02 E9/L    Lymphocytes Absolute 1.73 1.50 - 4.00 E9/L    Monocytes Absolute 0.56 0.10 - 0.95 E9/L    Eosinophils Absolute 0.10 0.05 - 0.50 E9/L    Basophils Absolute 0.02 0.00 - 0.20 E9/L    Anisocytosis 2+     Polychromasia 1+     Hypochromia 1+     Poikilocytes . 1+     Ovalocytes 1+    HCG, Quantitative, Pregnancy   Result Value Ref Range    hCG Quant <0.1 <10 mIU/mL   Protime-INR   Result Value Ref Range    Protime 12.2 9.3 - 12.4 sec    INR 1.1    ABO/RH   Result Value Ref Range    ABO/Rh A POS        Radiology:  US PELVIS COMPLETE   Final Result      1. Thickened appearance of endometrial stripe measuring approximately   31 mm. Clinical correlation recommended.       2. Dominant follicle or cyst associated with right ovary measures up   to 3 cm.          ------------------------- NURSING NOTES AND VITALS REVIEWED ---------------------------  Date / Time Roomed:

## 2020-02-09 ENCOUNTER — APPOINTMENT (OUTPATIENT)
Dept: GENERAL RADIOLOGY | Age: 35
End: 2020-02-09
Payer: COMMERCIAL

## 2020-02-09 ENCOUNTER — HOSPITAL ENCOUNTER (EMERGENCY)
Age: 35
Discharge: HOME | End: 2020-02-10
Attending: EMERGENCY MEDICINE
Payer: COMMERCIAL

## 2020-02-09 PROCEDURE — 96375 TX/PRO/DX INJ NEW DRUG ADDON: CPT

## 2020-02-09 PROCEDURE — 2580000003 HC RX 258: Performed by: EMERGENCY MEDICINE

## 2020-02-09 PROCEDURE — 71045 X-RAY EXAM CHEST 1 VIEW: CPT

## 2020-02-09 PROCEDURE — 99284 EMERGENCY DEPT VISIT MOD MDM: CPT

## 2020-02-09 PROCEDURE — 6360000002 HC RX W HCPCS: Performed by: EMERGENCY MEDICINE

## 2020-02-09 PROCEDURE — 96374 THER/PROPH/DIAG INJ IV PUSH: CPT

## 2020-02-09 PROCEDURE — 87502 INFLUENZA DNA AMP PROBE: CPT

## 2020-02-09 RX ORDER — 0.9 % SODIUM CHLORIDE 0.9 %
1000 INTRAVENOUS SOLUTION INTRAVENOUS ONCE
Status: COMPLETED | OUTPATIENT
Start: 2020-02-09 | End: 2020-02-10

## 2020-02-09 RX ORDER — IPRATROPIUM BROMIDE AND ALBUTEROL SULFATE 2.5; .5 MG/3ML; MG/3ML
1 SOLUTION RESPIRATORY (INHALATION) ONCE
Status: DISCONTINUED | OUTPATIENT
Start: 2020-02-09 | End: 2020-02-10 | Stop reason: HOSPADM

## 2020-02-09 RX ORDER — DIPHENHYDRAMINE HYDROCHLORIDE 50 MG/ML
25 INJECTION INTRAMUSCULAR; INTRAVENOUS ONCE
Status: COMPLETED | OUTPATIENT
Start: 2020-02-09 | End: 2020-02-09

## 2020-02-09 RX ORDER — METOCLOPRAMIDE HYDROCHLORIDE 5 MG/ML
10 INJECTION INTRAMUSCULAR; INTRAVENOUS ONCE
Status: COMPLETED | OUTPATIENT
Start: 2020-02-09 | End: 2020-02-09

## 2020-02-09 RX ORDER — ACETAMINOPHEN 500 MG
1000 TABLET ORAL ONCE
Status: DISCONTINUED | OUTPATIENT
Start: 2020-02-09 | End: 2020-02-09

## 2020-02-09 RX ADMIN — DIPHENHYDRAMINE HYDROCHLORIDE 25 MG: 50 INJECTION, SOLUTION INTRAMUSCULAR; INTRAVENOUS at 23:29

## 2020-02-09 RX ADMIN — SODIUM CHLORIDE 1000 ML: 9 INJECTION, SOLUTION INTRAVENOUS at 23:30

## 2020-02-09 RX ADMIN — METOCLOPRAMIDE 10 MG: 5 INJECTION, SOLUTION INTRAMUSCULAR; INTRAVENOUS at 23:29

## 2020-02-09 ASSESSMENT — PAIN SCALES - GENERAL: PAINLEVEL_OUTOF10: 8

## 2020-02-09 ASSESSMENT — PAIN DESCRIPTION - PAIN TYPE: TYPE: ACUTE PAIN

## 2020-02-09 NOTE — LETTER
[facility] Emergency Department  [address] [place] [id number]  Phone: [phone]               February 10, 2020    Patient: Lisa Ventura   YOB: 1985   Date of Visit: 2/9/2020       To Whom It May Concern:    Oj Gallegos was seen and treated in our emergency department on 2/9/2020. She may return to work on 2/12/2020.       Sincerely,       Scott Bolaños RN         Signature:__________________________________

## 2020-02-10 VITALS
SYSTOLIC BLOOD PRESSURE: 115 MMHG | OXYGEN SATURATION: 100 % | HEIGHT: 60 IN | HEART RATE: 88 BPM | BODY MASS INDEX: 31.41 KG/M2 | TEMPERATURE: 98.2 F | DIASTOLIC BLOOD PRESSURE: 60 MMHG | RESPIRATION RATE: 17 BRPM | WEIGHT: 160 LBS

## 2020-02-10 LAB
INFLUENZA A BY PCR: NOT DETECTED
INFLUENZA B BY PCR: DETECTED

## 2020-02-10 NOTE — ED PROVIDER NOTES
HPI:  2/10/20,   Time: 12:14 AM       Boris Malhotra is a 37 y.o. female presenting to the ED for headache cough body aches, beginning 4 days ago. The complaint has been persistent, moderate in severity, and worsened by nothing. The patient states over the last 4 days she has had a diffuse headache body aches cough and congestion. She states she has been taking over-the-counter medications without any improvements. She states due to her persistent symptoms she came to the ED to be evaluated. The patient does state that she has been exposed to influenza patients at work. Otherwise no other acute symptoms. Review of Systems:   Pertinent positives and negatives are stated within HPI, all other systems reviewed and are negative.          --------------------------------------------- PAST HISTORY ---------------------------------------------  Past Medical History:  has a past medical history of DVT (deep venous thrombosis) ([facility] [address] DVT of lower extremity (deep venous thrombosis) ([facility] [address] History of DVT of lower extremity, and Migraine. Past Surgical History:  has a past surgical history that includes Induced  (10/12/12) and Dilation & curettage. Social History:  reports that she has never smoked. She has never used smokeless tobacco. She reports that she does not drink alcohol or use drugs. Family History: family history is not on file. The patients home medications have been reviewed.     Allergies: Latex        ---------------------------------------------------PHYSICAL EXAM--------------------------------------    Constitutional/General: Alert and oriented x3, well appearing, non toxic in NAD  Head: Normocephalic and atraumatic  Eyes: PERRL, EOMI, conjunctive normal, sclera non icteric  Mouth: Oropharynx clear, handling secretions, no trismus, no asymmetry of the posterior oropharynx or uvular edema  Neck: Supple, full ROM, non tender to palpation in the midline, no stridor, no crepitus, no

## 2020-07-08 ENCOUNTER — HOSPITAL ENCOUNTER (INPATIENT)
Age: 35
LOS: 1 days | Discharge: HOME | DRG: 532 | End: 2020-07-09
Attending: EMERGENCY MEDICINE | Admitting: INTERNAL MEDICINE
Payer: COMMERCIAL

## 2020-07-08 ENCOUNTER — APPOINTMENT (OUTPATIENT)
Dept: GENERAL RADIOLOGY | Age: 35
DRG: 532 | End: 2020-07-08
Payer: COMMERCIAL

## 2020-07-08 PROBLEM — D64.9 ANEMIA: Status: ACTIVE | Noted: 2020-07-08

## 2020-07-08 LAB
ABO/RH: NORMAL
ALBUMIN SERPL-MCNC: 3.9 G/DL (ref 3.5–5.2)
ALP BLD-CCNC: 68 U/L (ref 35–104)
ALT SERPL-CCNC: <5 U/L (ref 0–32)
ANION GAP SERPL CALCULATED.3IONS-SCNC: 10 MMOL/L (ref 7–16)
ANISOCYTOSIS: ABNORMAL
ANTIBODY SCREEN: NORMAL
AST SERPL-CCNC: 14 U/L (ref 0–31)
BASOPHILS ABSOLUTE: 0.01 E9/L (ref 0–0.2)
BASOPHILS RELATIVE PERCENT: 0.2 % (ref 0–2)
BILIRUB SERPL-MCNC: 0.3 MG/DL (ref 0–1.2)
BUN BLDV-MCNC: 3 MG/DL (ref 6–20)
CALCIUM SERPL-MCNC: 8.9 MG/DL (ref 8.6–10.2)
CHLORIDE BLD-SCNC: 101 MMOL/L (ref 98–107)
CO2: 25 MMOL/L (ref 22–29)
CREAT SERPL-MCNC: 0.8 MG/DL (ref 0.5–1)
EOSINOPHILS ABSOLUTE: 0.02 E9/L (ref 0.05–0.5)
EOSINOPHILS RELATIVE PERCENT: 0.4 % (ref 0–6)
GFR AFRICAN AMERICAN: >60
GFR NON-AFRICAN AMERICAN: >60 ML/MIN/1.73
GLUCOSE BLD-MCNC: 93 MG/DL (ref 74–99)
HCG, URINE, POC: NEGATIVE
HCT VFR BLD CALC: 22.6 % (ref 34–48)
HEMOGLOBIN: 6.1 G/DL (ref 11.5–15.5)
HYPOCHROMIA: ABNORMAL
IMMATURE GRANULOCYTES #: 0.01 E9/L
IMMATURE GRANULOCYTES %: 0.2 % (ref 0–5)
INR BLD: 1
LACTATE DEHYDROGENASE: 140 U/L (ref 135–214)
LYMPHOCYTES ABSOLUTE: 1.4 E9/L (ref 1.5–4)
LYMPHOCYTES RELATIVE PERCENT: 31.3 % (ref 20–42)
Lab: NORMAL
MCH RBC QN AUTO: 17.6 PG (ref 26–35)
MCHC RBC AUTO-ENTMCNC: 27 % (ref 32–34.5)
MCV RBC AUTO: 65.3 FL (ref 80–99.9)
MONOCYTES ABSOLUTE: 0.29 E9/L (ref 0.1–0.95)
MONOCYTES RELATIVE PERCENT: 6.5 % (ref 2–12)
NEGATIVE QC PASS/FAIL: NORMAL
NEUTROPHILS ABSOLUTE: 2.75 E9/L (ref 1.8–7.3)
NEUTROPHILS RELATIVE PERCENT: 61.4 % (ref 43–80)
OVALOCYTES: ABNORMAL
PDW BLD-RTO: 19.4 FL (ref 11.5–15)
PLATELET # BLD: 233 E9/L (ref 130–450)
PMV BLD AUTO: 9.2 FL (ref 7–12)
POIKILOCYTES: ABNORMAL
POLYCHROMASIA: ABNORMAL
POSITIVE QC PASS/FAIL: NORMAL
POTASSIUM SERPL-SCNC: 3.6 MMOL/L (ref 3.5–5)
PROTHROMBIN TIME: 12.1 SEC (ref 9.3–12.4)
RBC # BLD: 3.46 E12/L (ref 3.5–5.5)
SCHISTOCYTES: ABNORMAL
SODIUM BLD-SCNC: 136 MMOL/L (ref 132–146)
STOMATOCYTES: ABNORMAL
TARGET CELLS: ABNORMAL
TEAR DROP CELLS: ABNORMAL
TOTAL PROTEIN: 7.4 G/DL (ref 6.4–8.3)
WBC # BLD: 4.5 E9/L (ref 4.5–11.5)

## 2020-07-08 PROCEDURE — P9016 RBC LEUKOCYTES REDUCED: HCPCS

## 2020-07-08 PROCEDURE — 82746 ASSAY OF FOLIC ACID SERUM: CPT

## 2020-07-08 PROCEDURE — 93005 ELECTROCARDIOGRAM TRACING: CPT | Performed by: EMERGENCY MEDICINE

## 2020-07-08 PROCEDURE — 82607 VITAMIN B-12: CPT

## 2020-07-08 PROCEDURE — 83550 IRON BINDING TEST: CPT

## 2020-07-08 PROCEDURE — 85025 COMPLETE CBC W/AUTO DIFF WBC: CPT

## 2020-07-08 PROCEDURE — 83540 ASSAY OF IRON: CPT

## 2020-07-08 PROCEDURE — 82728 ASSAY OF FERRITIN: CPT

## 2020-07-08 PROCEDURE — 85610 PROTHROMBIN TIME: CPT

## 2020-07-08 PROCEDURE — 36415 COLL VENOUS BLD VENIPUNCTURE: CPT

## 2020-07-08 PROCEDURE — 2580000003 HC RX 258: Performed by: EMERGENCY MEDICINE

## 2020-07-08 PROCEDURE — 99285 EMERGENCY DEPT VISIT HI MDM: CPT

## 2020-07-08 PROCEDURE — 80053 COMPREHEN METABOLIC PANEL: CPT

## 2020-07-08 PROCEDURE — 83615 LACTATE (LD) (LDH) ENZYME: CPT

## 2020-07-08 PROCEDURE — 86850 RBC ANTIBODY SCREEN: CPT

## 2020-07-08 PROCEDURE — 2580000003 HC RX 258: Performed by: INTERNAL MEDICINE

## 2020-07-08 PROCEDURE — 6370000000 HC RX 637 (ALT 250 FOR IP): Performed by: INTERNAL MEDICINE

## 2020-07-08 PROCEDURE — 86901 BLOOD TYPING SEROLOGIC RH(D): CPT

## 2020-07-08 PROCEDURE — 86923 COMPATIBILITY TEST ELECTRIC: CPT

## 2020-07-08 PROCEDURE — 86900 BLOOD TYPING SEROLOGIC ABO: CPT

## 2020-07-08 PROCEDURE — 71045 X-RAY EXAM CHEST 1 VIEW: CPT

## 2020-07-08 PROCEDURE — 2060000000 HC ICU INTERMEDIATE R&B

## 2020-07-08 RX ORDER — SODIUM CHLORIDE 9 MG/ML
10 INJECTION INTRAVENOUS EVERY 12 HOURS
Status: DISCONTINUED | OUTPATIENT
Start: 2020-07-08 | End: 2020-07-09

## 2020-07-08 RX ORDER — DICYCLOMINE HYDROCHLORIDE 10 MG/1
20 CAPSULE ORAL 4 TIMES DAILY PRN
Status: DISCONTINUED | OUTPATIENT
Start: 2020-07-08 | End: 2020-07-09 | Stop reason: HOSPADM

## 2020-07-08 RX ORDER — SODIUM CHLORIDE 0.9 % (FLUSH) 0.9 %
10 SYRINGE (ML) INJECTION EVERY 12 HOURS SCHEDULED
Status: DISCONTINUED | OUTPATIENT
Start: 2020-07-08 | End: 2020-07-09 | Stop reason: HOSPADM

## 2020-07-08 RX ORDER — ACETAMINOPHEN 650 MG/1
650 SUPPOSITORY RECTAL EVERY 6 HOURS PRN
Status: DISCONTINUED | OUTPATIENT
Start: 2020-07-08 | End: 2020-07-09 | Stop reason: HOSPADM

## 2020-07-08 RX ORDER — ACETAMINOPHEN 325 MG/1
650 TABLET ORAL EVERY 6 HOURS PRN
Status: DISCONTINUED | OUTPATIENT
Start: 2020-07-08 | End: 2020-07-09 | Stop reason: HOSPADM

## 2020-07-08 RX ORDER — SODIUM CHLORIDE 0.9 % (FLUSH) 0.9 %
10 SYRINGE (ML) INJECTION PRN
Status: DISCONTINUED | OUTPATIENT
Start: 2020-07-08 | End: 2020-07-09 | Stop reason: HOSPADM

## 2020-07-08 RX ORDER — ONDANSETRON 2 MG/ML
4 INJECTION INTRAMUSCULAR; INTRAVENOUS EVERY 6 HOURS PRN
Status: DISCONTINUED | OUTPATIENT
Start: 2020-07-08 | End: 2020-07-09 | Stop reason: HOSPADM

## 2020-07-08 RX ORDER — SODIUM CHLORIDE 9 MG/ML
INJECTION, SOLUTION INTRAVENOUS CONTINUOUS
Status: DISCONTINUED | OUTPATIENT
Start: 2020-07-08 | End: 2020-07-09

## 2020-07-08 RX ORDER — PROMETHAZINE HYDROCHLORIDE 25 MG/1
12.5 TABLET ORAL EVERY 6 HOURS PRN
Status: DISCONTINUED | OUTPATIENT
Start: 2020-07-08 | End: 2020-07-09 | Stop reason: HOSPADM

## 2020-07-08 RX ORDER — PANTOPRAZOLE SODIUM 40 MG/10ML
40 INJECTION, POWDER, LYOPHILIZED, FOR SOLUTION INTRAVENOUS DAILY
Status: DISCONTINUED | OUTPATIENT
Start: 2020-07-09 | End: 2020-07-09

## 2020-07-08 RX ORDER — 0.9 % SODIUM CHLORIDE 0.9 %
500 INTRAVENOUS SOLUTION INTRAVENOUS ONCE
Status: COMPLETED | OUTPATIENT
Start: 2020-07-08 | End: 2020-07-08

## 2020-07-08 RX ORDER — 0.9 % SODIUM CHLORIDE 0.9 %
20 INTRAVENOUS SOLUTION INTRAVENOUS ONCE
Status: DISCONTINUED | OUTPATIENT
Start: 2020-07-08 | End: 2020-07-09 | Stop reason: HOSPADM

## 2020-07-08 RX ADMIN — SODIUM CHLORIDE 500 ML: 9 INJECTION, SOLUTION INTRAVENOUS at 21:02

## 2020-07-08 RX ADMIN — SODIUM CHLORIDE, PRESERVATIVE FREE 10 ML: 5 INJECTION INTRAVENOUS at 22:55

## 2020-07-08 RX ADMIN — ACETAMINOPHEN 650 MG: 325 TABLET ORAL at 22:50

## 2020-07-08 ASSESSMENT — PAIN DESCRIPTION - LOCATION
LOCATION: LEG
LOCATION: LEG

## 2020-07-08 ASSESSMENT — PAIN DESCRIPTION - FREQUENCY
FREQUENCY: INTERMITTENT
FREQUENCY: INTERMITTENT

## 2020-07-08 ASSESSMENT — PAIN SCALES - GENERAL
PAINLEVEL_OUTOF10: 0
PAINLEVEL_OUTOF10: 6
PAINLEVEL_OUTOF10: 6

## 2020-07-08 ASSESSMENT — PAIN DESCRIPTION - ORIENTATION
ORIENTATION: LEFT
ORIENTATION: LEFT

## 2020-07-08 ASSESSMENT — PAIN DESCRIPTION - DESCRIPTORS
DESCRIPTORS: ACHING;THROBBING
DESCRIPTORS: ACHING;THROBBING

## 2020-07-08 ASSESSMENT — PAIN DESCRIPTION - PAIN TYPE
TYPE: ACUTE PAIN
TYPE: ACUTE PAIN

## 2020-07-08 NOTE — ED PROVIDER NOTES
HPI:  7/8/20,   Time: 4:33 PM EDT       Ayla Swenson is a 37 y.o. female presenting to the ED for anemia, beginning 1 day ago. The complaint has been persistent, mild in severity, and worsened by nothing. Patient is a 29-year-old female with history of dysfunctional uterine bleeding and heavy periods. She states that when she gets her  menstrual cycle. She bleeds for at least 2 to 3 weeks every month. She is just finishing her period now. She has no abdominal pain. She has been feels in the past the last time was about 6 months ago. She states she felt a little lightheaded recently and dizzy occasionally at work. So she went to her doctor yesterday. I did an outpatient chest x-ray was normal but showed her hemoglobin on outpatient lab work was 6.7. She saw her doctor again today he sent her for a Doppler of the left lower extremity she has been noticing occasional pain and swelling to that leg. She is had DVT in the past.  The Doppler from outpatient services done today showed that she had a left calf peroneal DVT. She had been on Lovenox in the past for her DVT. She has not been on any anticoagulation for the past 2 years. Her doctor wrote her a prescription for Xarelto and is already called into the pharmacy, but he wanted her sent in here today to be transfused a unit of blood first.  She denies any chest pain or shortness of breath at this time. She has no complaints at this time. Patient states she has had a blood clot in the past, but is only been when she has been pregnant in the past.  She has never been told she has a blood clotting disorder.     Review of Systems:   Pertinent positives and negatives are stated within HPI, all other systems reviewed and are negative.          --------------------------------------------- PAST HISTORY ---------------------------------------------  Past Medical History:  has a past medical history of Anemia, DVT (deep venous thrombosis) ([facility] [address] DVT of lower extremity (deep venous thrombosis) ([facility] [address] History of DVT of lower extremity, and Migraine. Past Surgical History:  has a past surgical history that includes Induced  (10/12/12) and Dilation & curettage. Social History:  reports that she has never smoked. She has never used smokeless tobacco. She reports that she does not drink alcohol or use drugs. Family History: family history is not on file. The patients home medications have been reviewed. Allergies: Latex        ---------------------------------------------------PHYSICAL EXAM--------------------------------------    Constitutional/General: Alert and oriented x3, well appearing, non toxic in NAD  Head: Normocephalic and atraumatic  Eyes: PERRL, EOMI, conjunctive normal, sclera non icteric  Mouth: Oropharynx clear, handling secretions, no trismus, no asymmetry of the posterior oropharynx or uvular edema  Neck: Supple, full ROM, non tender to palpation in the midline, no stridor, no crepitus, no meningeal signs  Respiratory: Lungs clear to auscultation bilaterally, no wheezes, rales, or rhonchi. Not in respiratory distress  Cardiovascular:  Regular rate. Regular rhythm. No murmurs, gallops, or rubs. 2+ distal pulses  Chest: No chest wall tenderness  GI:  Abdomen Soft, Non tender, Non distended. +BS. No organomegaly, no palpable masses,  No rebound, guarding, or rigidity. Musculoskeletal: Moves all extremities x 4. Warm and well perfused, no clubbing, cyanosis, or edema. Capillary refill <3 seconds  Integument: skin warm and dry. No rashes. Lymphatic: no lymphadenopathy noted  Neurologic: GCS 15, no focal deficits, symmetric strength 5/5 in the upper and lower extremities bilaterally  Psychiatric: Normal Affect    -------------------------------------------------- RESULTS -------------------------------------------------  I have personally reviewed all laboratory and imaging results for this patient. Results are listed below. LABS:  Results for orders placed or performed during the hospital encounter of 07/08/20   CBC auto differential   Result Value Ref Range    WBC 4.5 4.5 - 11.5 E9/L    RBC 3.46 (L) 3.50 - 5.50 E12/L    Hemoglobin 6.1 (LL) 11.5 - 15.5 g/dL    Hematocrit 22.6 (L) 34.0 - 48.0 %    MCV 65.3 (L) 80.0 - 99.9 fL    MCH 17.6 (L) 26.0 - 35.0 pg    MCHC 27.0 (L) 32.0 - 34.5 %    RDW 19.4 (H) 11.5 - 15.0 fL    Platelets 137 247 - 710 E9/L    MPV 9.2 7.0 - 12.0 fL    Neutrophils % 61.4 43.0 - 80.0 %    Immature Granulocytes % 0.2 0.0 - 5.0 %    Lymphocytes % 31.3 20.0 - 42.0 %    Monocytes % 6.5 2.0 - 12.0 %    Eosinophils % 0.4 0.0 - 6.0 %    Basophils % 0.2 0.0 - 2.0 %    Neutrophils Absolute 2.75 1.80 - 7.30 E9/L    Immature Granulocytes # 0.01 E9/L    Lymphocytes Absolute 1.40 (L) 1.50 - 4.00 E9/L    Monocytes Absolute 0.29 0.10 - 0.95 E9/L    Eosinophils Absolute 0.02 (L) 0.05 - 0.50 E9/L    Basophils Absolute 0.01 0.00 - 0.20 E9/L    Anisocytosis 2+     Polychromasia 2+     Hypochromia 2+     Poikilocytes 1+     Schistocytes 1+     Ovalocytes 1+     Stomatocytes 1+     Target Cells 1+     Tear Drop Cells 1+    Comprehensive metabolic panel   Result Value Ref Range    Sodium 136 132 - 146 mmol/L    Potassium 3.6 3.5 - 5.0 mmol/L    Chloride 101 98 - 107 mmol/L    CO2 25 22 - 29 mmol/L    Anion Gap 10 7 - 16 mmol/L    Glucose 93 74 - 99 mg/dL    BUN 3 (L) 6 - 20 mg/dL    CREATININE 0.8 0.5 - 1.0 mg/dL    GFR Non-African American >60 >=60 mL/min/1.73    GFR African American >60     Calcium 8.9 8.6 - 10.2 mg/dL    Total Protein 7.4 6.4 - 8.3 g/dL    Alb 3.9 3.5 - 5.2 g/dL    Total Bilirubin 0.3 0.0 - 1.2 mg/dL    Alkaline Phosphatase 68 35 - 104 U/L    ALT <5 0 - 32 U/L    AST 14 0 - 31 U/L   Protime-INR   Result Value Ref Range    Protime 12.1 9.3 - 12.4 sec    INR 1.0    Lactate Dehydrogenase   Result Value Ref Range     135 - 214 U/L   POC Pregnancy Urine Qual   Result Value Ref Range    HCG, Urine, POC Negative Negative    Lot Number [id number]     Positive QC Pass/Fail Pass     Negative QC Pass/Fail Pass    EKG 12 Lead   Result Value Ref Range    Ventricular Rate 80 BPM    Atrial Rate 80 BPM    P-R Interval 160 ms    QRS Duration 66 ms    Q-T Interval 374 ms    QTc Calculation (Bazett) 431 ms    P Axis 42 degrees    R Axis 0 degrees    T Axis 23 degrees   TYPE AND SCREEN   Result Value Ref Range    ABO/Rh A POS     Antibody Screen NEG    PREPARE RBC (CROSSMATCH), 1 Units   Result Value Ref Range    Product Code Blood Bank [id number]     Description Blood Bank Red Blood Cells, Leuko-reduced     Unit Number [id number]     Dispense Status Blood Bank selected        RADIOLOGY:  Interpreted by Radiologist.  XR CHEST PORTABLE   Final Result      NO ACUTE CARDIOPULMONARY PROCESS             EKG:  This EKG is signed and interpreted by the EP. Time: 18:01  Rate: 80  Rhythm: Sinus  Interpretation: no acute changes; no ST changes  Comparison: no previous EKG and None      ------------------------- NURSING NOTES AND VITALS REVIEWED ---------------------------   The nursing notes within the ED encounter and vital signs as below have been reviewed by myself. /69   Pulse 87   Temp 98.5 °F (36.9 °C) (Oral)   Resp 16   Ht 5' (1.524 m)   Wt 164 lb (74.4 kg)   LMP 06/24/2020   SpO2 100%   BMI 32.03 kg/m²   Oxygen Saturation Interpretation: Normal    The patients available past medical records and past encounters were reviewed.         ------------------------------ ED COURSE/MEDICAL DECISION MAKING----------------------  Medications   0.9 % sodium chloride bolus (has no administration in time range)   dicyclomine (BENTYL) capsule 20 mg (has no administration in time range)   0.9 % sodium chloride infusion (has no administration in time range)   sodium chloride flush 0.9 % injection 10 mL (10 mLs Intravenous Given 7/8/20 4243)   sodium chloride flush 0.9 % injection 10 mL (has no administration in time range) acetaminophen (TYLENOL) tablet 650 mg (650 mg Oral Given 7/8/20 2250)     Or   acetaminophen (TYLENOL) suppository 650 mg ( Rectal See Alternative 7/8/20 2250)   promethazine (PHENERGAN) tablet 12.5 mg (has no administration in time range)     Or   ondansetron (ZOFRAN) injection 4 mg (has no administration in time range)   pantoprazole (PROTONIX) injection 40 mg (has no administration in time range)     And   sodium chloride (PF) 0.9 % injection 10 mL (10 mLs Intravenous Not Given 7/8/20 2255)   0.9 % sodium chloride bolus (0 mLs Intravenous Stopped 7/8/20 2251)         ED COURSE:       Medical Decision Making:    Patient is a pleasant 82-year-old female who comes in from her PCPs office today. She was found to have a hemoglobin of 6.7 on outpatient blood work and has been transfused in the past.  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 He wanted to be transfused unit of blood here today. Patient will repeat we will recheck lab work here I will talk to her PCP regarding her plan for transfusion. We will check EKG and chest x-ray prior to transfusion. Differential diagnosis dysfunctional uterine bleeding, dehydration, electrolyte abnormality, acute anemia, recent diagnosis of DVT. Patient had an EKG that was normal that showed a sinus rhythm 80 bpm no signs of any ST changes. He had a CBC that showed a markedly low hemoglobin at 6.1 her previous was from January of this year her hemoglobin was 8. She will need acute transfusion. Her chemistry was normal.  Coags were normal pregnancy test was negative as well. X-ray showed no acute process as read by radiology. I spoke to patient's PCP Dr. Liam Guadalupe who wants the patient to be admitted and have a hematology consult in-house.   He had planned on starting her on Xarelto as an outpatient, with her recent bleeding and new blood clot while she is not pregnant, he wanted her further worked up and it plan determined by hematology. She will be transfused a unit of blood I spoke to Dr. Elle Rosario for the [facility] will admit the patient to their service with hematology consult in house. Re-Evaluations:             Re-evaluation. Patients symptoms show no change    Re-examination  7/8/20   4:33 PM EDT          Vital Signs:   Vitals:    07/08/20 1533 07/08/20 2022 07/08/20 2112 07/08/20 2140   BP: 116/62 108/63 120/68 115/69   Pulse: 104 88 97 87   Resp: 16 16 14 16   Temp: 98.9 °F (37.2 °C)  98.7 °F (37.1 °C) 98.5 °F (36.9 °C)   TempSrc:   Oral Oral   SpO2: 100% 97% 100% 100%   Weight: 164 lb (74.4 kg)      Height: 5' (1.524 m)                Consultations:             patient's PCP Dr. Jah Santillan who requested the patient be admitted. He would like an heme-onc consult given patient's recent anemia as well as recent DVT. Spoke to Dr. Elle Rosario who admitted will admit the patient to their service. He will consult hematology in house. CRITICAL CARE:   33 MINUTES. Please note that the withdrawal or failure to initiate urgent interventions for this patient would likely result in a life threatening deterioration or permanent disability. Accordingly this patient received the above mentioned time, excluding separately billable procedures. Counseling: The emergency provider has spoken with the patient and discussed todays results, in addition to providing specific details for the plan of care and counseling regarding the diagnosis and prognosis. Questions are answered at this time and they are agreeable with the plan.       --------------------------------- IMPRESSION AND DISPOSITION ---------------------------------    IMPRESSION  1. Anemia due to acute blood loss    2.  Acute deep vein thrombosis (DVT) of left lower extremity, unspecified vein (HCC)        DISPOSITION  Disposition: Admit to med/surg floor  Patient condition is stable    NOTE: This report was transcribed using voice recognition software.  Every effort was made to ensure accuracy; however, inadvertent computerized transcription errors may be present        Simón Garcia MD  07/08/20 1200

## 2020-07-09 ENCOUNTER — APPOINTMENT (OUTPATIENT)
Dept: ULTRASOUND IMAGING | Age: 35
DRG: 532 | End: 2020-07-09
Payer: COMMERCIAL

## 2020-07-09 VITALS
HEART RATE: 89 BPM | RESPIRATION RATE: 18 BRPM | BODY MASS INDEX: 31.98 KG/M2 | DIASTOLIC BLOOD PRESSURE: 74 MMHG | HEIGHT: 60 IN | WEIGHT: 162.9 LBS | TEMPERATURE: 98.6 F | SYSTOLIC BLOOD PRESSURE: 106 MMHG | OXYGEN SATURATION: 100 %

## 2020-07-09 PROBLEM — Z86.718 HISTORY OF DVT (DEEP VEIN THROMBOSIS): Chronic | Status: ACTIVE | Noted: 2020-07-09

## 2020-07-09 PROBLEM — E66.9 OBESITY (BMI 30-39.9): Chronic | Status: ACTIVE | Noted: 2020-07-09

## 2020-07-09 PROBLEM — N93.8 DYSFUNCTIONAL UTERINE BLEEDING: Chronic | Status: ACTIVE | Noted: 2020-07-09

## 2020-07-09 LAB
ANION GAP SERPL CALCULATED.3IONS-SCNC: 8 MMOL/L (ref 7–16)
BLOOD BANK DISPENSE STATUS: NORMAL
BLOOD BANK PRODUCT CODE: NORMAL
BPU ID: NORMAL
BUN BLDV-MCNC: 3 MG/DL (ref 6–20)
CALCIUM SERPL-MCNC: 8.8 MG/DL (ref 8.6–10.2)
CHLORIDE BLD-SCNC: 105 MMOL/L (ref 98–107)
CO2: 26 MMOL/L (ref 22–29)
CREAT SERPL-MCNC: 0.7 MG/DL (ref 0.5–1)
DESCRIPTION BLOOD BANK: NORMAL
EKG ATRIAL RATE: 80 BPM
EKG P AXIS: 42 DEGREES
EKG P-R INTERVAL: 160 MS
EKG Q-T INTERVAL: 374 MS
EKG QRS DURATION: 66 MS
EKG QTC CALCULATION (BAZETT): 431 MS
EKG R AXIS: 0 DEGREES
EKG T AXIS: 23 DEGREES
EKG VENTRICULAR RATE: 80 BPM
FERRITIN: 3 NG/ML
FOLATE: 3.7 NG/ML (ref 4.8–24.2)
GFR AFRICAN AMERICAN: >60
GFR NON-AFRICAN AMERICAN: >60 ML/MIN/1.73
GLUCOSE BLD-MCNC: 97 MG/DL (ref 74–99)
HCT VFR BLD CALC: 25.1 % (ref 34–48)
HEMOGLOBIN: 7.1 G/DL (ref 11.5–15.5)
IRON SATURATION: 20 % (ref 15–50)
IRON SATURATION: 8 % (ref 15–50)
IRON: 30 MCG/DL (ref 37–145)
IRON: 70 MCG/DL (ref 37–145)
MAGNESIUM: 1.9 MG/DL (ref 1.6–2.6)
POTASSIUM REFLEX MAGNESIUM: 3.5 MMOL/L (ref 3.5–5)
SODIUM BLD-SCNC: 139 MMOL/L (ref 132–146)
TOTAL IRON BINDING CAPACITY: 354 MCG/DL (ref 250–450)
TOTAL IRON BINDING CAPACITY: 383 MCG/DL (ref 250–450)
VITAMIN B-12: 324 PG/ML (ref 211–946)

## 2020-07-09 PROCEDURE — C9113 INJ PANTOPRAZOLE SODIUM, VIA: HCPCS | Performed by: INTERNAL MEDICINE

## 2020-07-09 PROCEDURE — 2580000003 HC RX 258: Performed by: INTERNAL MEDICINE

## 2020-07-09 PROCEDURE — 80048 BASIC METABOLIC PNL TOTAL CA: CPT

## 2020-07-09 PROCEDURE — 76770 US EXAM ABDO BACK WALL COMP: CPT

## 2020-07-09 PROCEDURE — 6360000002 HC RX W HCPCS: Performed by: INTERNAL MEDICINE

## 2020-07-09 PROCEDURE — 76856 US EXAM PELVIC COMPLETE: CPT

## 2020-07-09 PROCEDURE — 36430 TRANSFUSION BLD/BLD COMPNT: CPT

## 2020-07-09 PROCEDURE — 36415 COLL VENOUS BLD VENIPUNCTURE: CPT

## 2020-07-09 PROCEDURE — 6370000000 HC RX 637 (ALT 250 FOR IP): Performed by: INTERNAL MEDICINE

## 2020-07-09 PROCEDURE — 83735 ASSAY OF MAGNESIUM: CPT

## 2020-07-09 PROCEDURE — 83550 IRON BINDING TEST: CPT

## 2020-07-09 PROCEDURE — 93970 EXTREMITY STUDY: CPT

## 2020-07-09 PROCEDURE — 83540 ASSAY OF IRON: CPT

## 2020-07-09 PROCEDURE — 85014 HEMATOCRIT: CPT

## 2020-07-09 PROCEDURE — P9016 RBC LEUKOCYTES REDUCED: HCPCS

## 2020-07-09 PROCEDURE — 93010 ELECTROCARDIOGRAM REPORT: CPT | Performed by: INTERNAL MEDICINE

## 2020-07-09 PROCEDURE — 85018 HEMOGLOBIN: CPT

## 2020-07-09 RX ORDER — CYANOCOBALAMIN 1000 UG/ML
1000 INJECTION INTRAMUSCULAR; SUBCUTANEOUS ONCE
Status: COMPLETED | OUTPATIENT
Start: 2020-07-09 | End: 2020-07-09

## 2020-07-09 RX ORDER — FERROUS SULFATE 325(65) MG
325 TABLET ORAL
Qty: 90 TABLET | Refills: 0 | Status: SHIPPED | OUTPATIENT
Start: 2020-07-09 | End: 2021-06-03 | Stop reason: ALTCHOICE

## 2020-07-09 RX ORDER — OMEPRAZOLE 40 MG/1
40 CAPSULE, DELAYED RELEASE ORAL EVERY MORNING
Status: ON HOLD | COMMUNITY
End: 2022-10-11 | Stop reason: HOSPADM

## 2020-07-09 RX ORDER — FERROUS SULFATE 325(65) MG
325 TABLET ORAL
Status: DISCONTINUED | OUTPATIENT
Start: 2020-07-09 | End: 2020-07-09 | Stop reason: HOSPADM

## 2020-07-09 RX ORDER — ASPIRIN 81 MG/1
81 TABLET ORAL DAILY
Qty: 90 TABLET | Refills: 0 | Status: SHIPPED | OUTPATIENT
Start: 2020-07-09 | End: 2022-10-04 | Stop reason: ALTCHOICE

## 2020-07-09 RX ADMIN — FERROUS SULFATE TAB 325 MG (65 MG ELEMENTAL FE) 325 MG: 325 (65 FE) TAB at 17:54

## 2020-07-09 RX ADMIN — SODIUM CHLORIDE 100 MG: 9 INJECTION, SOLUTION INTRAVENOUS at 14:57

## 2020-07-09 RX ADMIN — SODIUM CHLORIDE 25 MG: 9 INJECTION, SOLUTION INTRAVENOUS at 12:01

## 2020-07-09 RX ADMIN — ACETAMINOPHEN 650 MG: 325 TABLET ORAL at 17:18

## 2020-07-09 RX ADMIN — FERROUS SULFATE TAB 325 MG (65 MG ELEMENTAL FE) 325 MG: 325 (65 FE) TAB at 12:01

## 2020-07-09 RX ADMIN — PANTOPRAZOLE SODIUM 40 MG: 40 INJECTION, POWDER, FOR SOLUTION INTRAVENOUS at 08:07

## 2020-07-09 RX ADMIN — SODIUM CHLORIDE: 9 INJECTION, SOLUTION INTRAVENOUS at 03:16

## 2020-07-09 RX ADMIN — SODIUM CHLORIDE, PRESERVATIVE FREE 10 ML: 5 INJECTION INTRAVENOUS at 08:06

## 2020-07-09 RX ADMIN — CYANOCOBALAMIN 1000 MCG: 1000 INJECTION, SOLUTION INTRAMUSCULAR at 12:01

## 2020-07-09 ASSESSMENT — PAIN SCALES - GENERAL
PAINLEVEL_OUTOF10: 0
PAINLEVEL_OUTOF10: 8
PAINLEVEL_OUTOF10: 0

## 2020-07-09 NOTE — PROGRESS NOTES
Patient c/o severe right sided flank pain. Dr. Lauren Richards here to see patient. To order an ultrasound. Dr. Miko Parikh notified of above per Dr. Kimmy Lopez request and also of his recommendation of no anticoagulation at this time.

## 2020-07-09 NOTE — PROGRESS NOTES
CLINICAL PHARMACY NOTE: MEDS TO [address] Select Patient?: No  Total # of Prescriptions Filled: 2   The following medications were delivered to the patient:  · Iron 325 mg  · Adult aspirin      Total # of Interventions Completed: 2  Time Spent (min): 30    Additional Documentation:

## 2020-07-09 NOTE — H&P
[id number] [facility]  Attending History and Physical      CHIEF COMPLAINT:  weakness      HISTORY OF PRESENT ILLNESS:      The patient is a 37 y.o. female patient of dr Marlee Rebolledo who presents with complains of weakness. Past Medical History:    Past Medical History:   Diagnosis Date    Anemia 2020    DVT (deep venous thrombosis) (HCC)     DVT of lower extremity (deep venous thrombosis) (HCC)     left lower    History of DVT of lower extremity     left leg     Migraine        Past Surgical History:    Past Surgical History:   Procedure Laterality Date    DILATION AND CURETTAGE      INDUCED   10/12/12       Medications Prior to Admission:    Medications Prior to Admission: omeprazole (PRILOSEC) 40 MG delayed release capsule, Take 40 mg by mouth daily  ibuprofen (IBU) 600 MG tablet, Take 1 tablet by mouth every 8 hours as needed for Pain Take with food. Allergies:    Latex    Social History:    reports that she has never smoked. She has never used smokeless tobacco. She reports that she does not drink alcohol or use drugs. Family History:   family history is not on file. REVIEW OF SYSTEMS:  As above in the HPI, otherwise negative    PHYSICAL EXAM:    Vitals:  BP (!) 91/50   Pulse 80   Temp 98.2 °F (36.8 °C) (Oral)   Resp 16   Ht 5' (1.524 m)   Wt 162 lb 14.4 oz (73.9 kg)   LMP 2020   SpO2 100%   BMI 31.81 kg/m²     General:  Awake, alert, oriented X 3. Well developed, well nourished, well groomed. No apparent distress. HEENT:  Normocephalic, atraumatic. Pupils equal, round, reactive to light. No scleral icterus. No conjunctival injection. Normal lips, teeth, and gums. No nasal discharge. Neck:  Supple  Heart:  RRR, no murmurs, gallops, rubs  Lungs:  CTA bilaterally, bilat symmetrical expansion, no wheeze, rales, or rhonchi  Abdomen:   Bowel sounds present, soft, nontender, no masses, no organomegaly, no peritoneal signs  Extremities:  No clubbing, cyanosis, or edema  Skin:  Warm and dry, no open lesions or rash  Neuro:  Cranial nerves 2-12 intact, no focal deficits  Breast: deferred  Rectal: deferred  Genitalia:  deferred    LABS:    CBC with Differential:    Lab Results   Component Value Date    WBC 4.5 07/08/2020    RBC 3.46 07/08/2020    HGB 7.1 07/09/2020    HCT 25.1 07/09/2020     07/08/2020    MCV 65.3 07/08/2020    MCH 17.6 07/08/2020    MCHC 27.0 07/08/2020    RDW 19.4 07/08/2020    SEGSPCT 71 07/18/2013    LYMPHOPCT 31.3 07/08/2020    MONOPCT 6.5 07/08/2020    BASOPCT 0.2 07/08/2020    MONOSABS 0.29 07/08/2020    LYMPHSABS 1.40 07/08/2020    EOSABS 0.02 07/08/2020    BASOSABS 0.01 07/08/2020     CMP:    Lab Results   Component Value Date     07/09/2020    K 3.5 07/09/2020     07/09/2020    CO2 26 07/09/2020    BUN 3 07/09/2020    CREATININE 0.7 07/09/2020    GFRAA >60 07/09/2020    LABGLOM >60 07/09/2020    GLUCOSE 97 07/09/2020    GLUCOSE 96 02/16/2012    PROT 7.4 07/08/2020    LABALBU 3.9 07/08/2020    LABALBU 4.0 02/16/2012    CALCIUM 8.8 07/09/2020    BILITOT 0.3 07/08/2020    ALKPHOS 68 07/08/2020    AST 14 07/08/2020    ALT <5 07/08/2020     BMP:    Lab Results   Component Value Date     07/09/2020    K 3.5 07/09/2020     07/09/2020    CO2 26 07/09/2020    BUN 3 07/09/2020    LABALBU 3.9 07/08/2020    LABALBU 4.0 02/16/2012    CREATININE 0.7 07/09/2020    CALCIUM 8.8 07/09/2020    GFRAA >60 07/09/2020    LABGLOM >60 07/09/2020    GLUCOSE 97 07/09/2020    GLUCOSE 96 02/16/2012     Magnesium:    Lab Results   Component Value Date    MG 1.9 07/09/2020     Phosphorus:  No results found for: PHOS  PT/INR:    Lab Results   Component Value Date    PROTIME 12.1 07/08/2020    INR 1.0 07/08/2020     PTT:    Lab Results   Component Value Date    APTT 23.5 02/11/2016   [APTT}  Troponin:    Lab Results   Component Value Date    TROPONINI <0.01 06/09/2019     Last 3 Troponin:    Lab Results   Component Value Date TROPONINI <0.01 06/09/2019     U/A:    Lab Results   Component Value Date    COLORU Yellow 01/02/2020    PROTEINU Negative 01/02/2020    PHUR 7.0 01/02/2020    WBCUA NONE 08/05/2019    WBCUA 5-10 03/21/2012    RBCUA 1-3 08/05/2019    RBCUA 1-3 07/18/2013    MUCUS Present 08/01/2019    BACTERIA NONE 08/05/2019    CLARITYU Clear 01/02/2020    SPECGRAV <=1.005 01/02/2020    LEUKOCYTESUR Negative 01/02/2020    UROBILINOGEN 0.2 01/02/2020    BILIRUBINUR Negative 01/02/2020    BILIRUBINUR NEGATIVE 03/21/2012    BLOODU Negative 01/02/2020    GLUCOSEU Negative 01/02/2020    GLUCOSEU NEGATIVE 03/21/2012     HgBA1c:  No results found for: LABA1C  FLP:  No results found for: TRIG, HDL, LDLCALC, LDLDIRECT, LABVLDL  TSH:  No results found for: TSH  VITAMIN B12: No components found for: B12  FOLATE:  No results found for: FOLATE  IRON:    Lab Results   Component Value Date    IRON 30 07/08/2020     Iron Saturation:  No components found for: PERCENTFE  TIBC:    Lab Results   Component Value Date    TIBC 383 07/08/2020     FERRITIN:    Lab Results   Component Value Date    FERRITIN 3 07/08/2020       ASSESSMENT:      Patient Active Problem List   Diagnosis    Anemia    Obesity (BMI 30-39. 9)    History of DVT (deep vein thrombosis)    Dysfunctional uterine bleeding         PLAN:    Microcytic anemia secondary to chronic dysfunctional uterine bleeding. Transfused. Supplement iron. Continue to encourage weight loss. No acute issues. Follow up with Ob/Gyn. Discharge soon.     Sohail Nguyen MD  8:19 AM  7/9/2020

## 2020-07-09 NOTE — PLAN OF CARE
Problem: Pain:  Goal: Pain level will decrease  Description: Pain level will decrease  Outcome: Met This Shift  Goal: Control of acute pain  Description: Control of acute pain  Outcome: Met This Shift  Goal: Control of chronic pain  Description: Control of chronic pain  Outcome: Met This Shift     Problem: Falls - Risk of:  Goal: Will remain free from falls  Description: Will remain free from falls  Outcome: Met This Shift  Goal: Absence of physical injury  Description: Absence of physical injury  Outcome: Met This Shift     Problem: Bleeding:  Goal: Will show no signs and symptoms of excessive bleeding  Description: Will show no signs and symptoms of excessive bleeding  Outcome: Met This Shift

## 2020-07-09 NOTE — CONSULTS
Historical Provider, MD   ferrous sulfate (IRON 325) 325 (65 Fe) MG tablet Take 1 tablet by mouth 3 times daily (with meals) 7/9/20  Yes Jude Winkler MD   aspirin EC 81 MG EC tablet Take 1 tablet by mouth daily 7/9/20  Yes Jude Winlker MD       Allergies  Allergies   Allergen Reactions    Latex        Review of Systems: Relevant for fatigue and severe menorrhagia otherwise non contributory     Constitutional:  No fever chills or rigors.  Eyes: No changes in vision, discharge, or pain   ENT: No Headaches, hearing loss or vertigo. No mouth sores or sore throat. No change in taste or smell.  Cardiovascular: No chest discomfort, dyspnea on exertion, palpitations or loss of consciousness. or phlebitis.  Respiratory: Has no cough or wheezing, Has no sputum production. Has no hemoptysis, Has no pleuritic pain, .  Gastrointestinal: No abdominal pain, appetite loss, blood in stools. No change in bowel habits. No hematemesis    Genitourinary: Patient acknowledges no dysuria, trouble voiding, or hematuria. No nocturia or increased frequency.  Musculoskeletal: No gait disturbance, weakness or joint complaints.  Integumentary: No rash or pruritis.  Neurological: No headache, diplopia, change in muscle strength, numbness or tingling. No change in gait, balance, coordination, mood, affect, memory, mentation, behavior.  Psychiatric: No anxiety, or depression.  Endocrine: No temperature intolerance. No excessive thirst, fluid intake, or urination. No tremor.  Hematologic/Lymphatic: No abnormal bruising or bleeding, blood clots or swollen lymph nodes.  Allergic/Immunologic: No nasal congestion or hives. Objective  BP (!) 91/50   Pulse 80   Temp 98.2 °F (36.8 °C) (Oral)   Resp 16   Ht 5' (1.524 m)   Wt 162 lb 14.4 oz (73.9 kg)   LMP 06/24/2020   SpO2 100%   BMI 31.81 kg/m²     Physical Exam:     General: AAO to person, place, time, in no acute distress,   Head and neck : PERRLA, EOMI . The bony thorax demonstrates no gross abnormality. NO ACUTE CARDIOPULMONARY PROCESS     Us Dup Lower Extremities Bilateral Venous    Result Date: 2020  Patient MRN:  [id number] : 1985 Age: 37 years Gender: Female Order Date:  2020 10:30 AM EXAM: US DUP LOWER EXTREMITIES BILATERAL VENOUS NUMBER OF IMAGES:  46 INDICATION: Bilateral lower extremity pain and swelling, greater on the left COMPARISON: Previous ultrasound evaluation 2020 reporting no evidence of left-sided DVT TECHNIQUE: Venous structures were evaluated for patency with color Doppler imaging, and compressibility was evaluated with 2-D grayscale ultrasound imaging. Response to augmentation maneuvers and respiratory variation was assessed with spectral Doppler. FINDINGS: Both lower extremities were evaluated ultrasonographically. The right lower extremity shows no intraluminal thrombus was identified, and there is good compressibility, appropriate response to augmentation maneuvers and respiratory variation, and color flow confirm patency of venous structures. The left lower extremity shows nonocclusive peroneal thrombus in the left calf region, without propagation into the popliteal vein or more proximal vessels. The finding is manifest by incompressibility and vague internal echoes. The more proximal venous structures show normal compressibility, respiratory variation, and augmentation color and spectral Doppler appearance. There is a thrombus developing in the posterior tibial tributary of the popliteal vein in the left calf, which does not propagate into the more proximal deep venous system. The left side is otherwise unremarkable. The right leg shows no evidence of deep venous thrombosis. ALERT:  THIS IS AN ABNORMAL REPORT-left calf nonocclusive peroneal acute thrombus. ASSESSMENT/PLAN :36 yo woman    Severe Iron deficiency anemia related to menorrhagia   May be discharged home.   She will follow in office for Parenteral IV Iron to normalize Hb and optimize iron stores    Left distal DVT involving left peroneal vein with non occlusive thrombus . Would not recommend anticoagulation since left distal DVT puts her at minimal risk of PE and anticoagulation would aggravate her menorrhagia and severe anemia . Recommend GYN follow up for menorrhagia and to avoid hormonal therapy to control her menorrahgia to decrease risk of VTE, venous thromboembolism. Patient was scheduled for discharge this afternoon but developed acute severe right flank pain radiating to her right groin  She will be scheduled for renal ultrasound to exclude kidney stone  She will also have a pelvic ultrasound for her severe menorrhagia. Evan Evans. Genaro Anderson M.D., F.A.C.P.   Electronically signed 7/9/2020 at 4:39 PM

## 2020-07-09 NOTE — CARE COORDINATION
7/9/2020  Social Work Discharge Planning: This worker met with Pt to discuss  role and transition of care/discharge planning. Pt resides alone, is independent and employed. Pt anticipates no needs at discharge. Pharmacy is [address] [place] [address] and PCP is Dr. Aman Ma.  Electronically signed by ANN Yost on 7/9/2020 at 10:22 AM

## 2020-07-09 NOTE — PROGRESS NOTES
Pt began receiving blood transfusion at [facility]. After 12mL had been infused pt's IV infiltrated. Blood was stopped and stat consult to IV team was put in to gain IV access. Initial 15 minute observation with vitals was completed. New IV was inserted and blood was restarted. Another 15 minute observation with vitals was completed.

## 2020-08-27 ENCOUNTER — HOSPITAL ENCOUNTER (EMERGENCY)
Age: 35
Discharge: HOME | End: 2020-08-28
Attending: EMERGENCY MEDICINE
Payer: COMMERCIAL

## 2020-08-27 ENCOUNTER — APPOINTMENT (OUTPATIENT)
Dept: CT IMAGING | Age: 35
End: 2020-08-27
Payer: COMMERCIAL

## 2020-08-27 LAB
ANION GAP SERPL CALCULATED.3IONS-SCNC: 8 MMOL/L (ref 7–16)
ANISOCYTOSIS: ABNORMAL
BASOPHILS ABSOLUTE: 0 E9/L (ref 0–0.2)
BASOPHILS RELATIVE PERCENT: 0 % (ref 0–2)
BUN BLDV-MCNC: 5 MG/DL (ref 6–20)
CALCIUM SERPL-MCNC: 8.9 MG/DL (ref 8.6–10.2)
CHLORIDE BLD-SCNC: 102 MMOL/L (ref 98–107)
CO2: 24 MMOL/L (ref 22–29)
CREAT SERPL-MCNC: 0.8 MG/DL (ref 0.5–1)
EKG ATRIAL RATE: 88 BPM
EKG P AXIS: 45 DEGREES
EKG P-R INTERVAL: 160 MS
EKG Q-T INTERVAL: 344 MS
EKG QRS DURATION: 64 MS
EKG QTC CALCULATION (BAZETT): 416 MS
EKG R AXIS: 17 DEGREES
EKG T AXIS: 46 DEGREES
EKG VENTRICULAR RATE: 88 BPM
EOSINOPHILS ABSOLUTE: 0.11 E9/L (ref 0.05–0.5)
EOSINOPHILS RELATIVE PERCENT: 1.8 % (ref 0–6)
GFR AFRICAN AMERICAN: >60
GFR NON-AFRICAN AMERICAN: >60 ML/MIN/1.73
GLUCOSE BLD-MCNC: 83 MG/DL (ref 74–99)
HCG QUALITATIVE: NEGATIVE
HCT VFR BLD CALC: 35 % (ref 34–48)
HEMOGLOBIN: 11.3 G/DL (ref 11.5–15.5)
HYPOCHROMIA: ABNORMAL
INR BLD: 1
LYMPHOCYTES ABSOLUTE: 1.38 E9/L (ref 1.5–4)
LYMPHOCYTES RELATIVE PERCENT: 22.6 % (ref 20–42)
MAGNESIUM: 2 MG/DL (ref 1.6–2.6)
MCH RBC QN AUTO: 28.7 PG (ref 26–35)
MCHC RBC AUTO-ENTMCNC: 32.3 % (ref 32–34.5)
MCV RBC AUTO: 88.8 FL (ref 80–99.9)
MONOCYTES ABSOLUTE: 0.48 E9/L (ref 0.1–0.95)
MONOCYTES RELATIVE PERCENT: 7.8 % (ref 2–12)
NEUTROPHILS ABSOLUTE: 4.08 E9/L (ref 1.8–7.3)
NEUTROPHILS RELATIVE PERCENT: 67.8 % (ref 43–80)
NUCLEATED RED BLOOD CELLS: 0 /100 WBC
PDW BLD-RTO: ABNORMAL FL (ref 11.5–15)
PLATELET # BLD: 219 E9/L (ref 130–450)
PMV BLD AUTO: 9.7 FL (ref 7–12)
POLYCHROMASIA: ABNORMAL
POTASSIUM REFLEX MAGNESIUM: 3.5 MMOL/L (ref 3.5–5)
PRO-BNP: 54 PG/ML (ref 0–125)
PROTHROMBIN TIME: 12.1 SEC (ref 9.3–12.4)
RBC # BLD: 3.94 E12/L (ref 3.5–5.5)
REASON FOR REJECTION: NORMAL
REJECTED TEST: NORMAL
SODIUM BLD-SCNC: 134 MMOL/L (ref 132–146)
TROPONIN: <0.01 NG/ML (ref 0–0.03)
WBC # BLD: 6 E9/L (ref 4.5–11.5)

## 2020-08-27 PROCEDURE — 80048 BASIC METABOLIC PNL TOTAL CA: CPT

## 2020-08-27 PROCEDURE — 85025 COMPLETE CBC W/AUTO DIFF WBC: CPT

## 2020-08-27 PROCEDURE — 83735 ASSAY OF MAGNESIUM: CPT

## 2020-08-27 PROCEDURE — 99283 EMERGENCY DEPT VISIT LOW MDM: CPT

## 2020-08-27 PROCEDURE — 84484 ASSAY OF TROPONIN QUANT: CPT

## 2020-08-27 PROCEDURE — 71275 CT ANGIOGRAPHY CHEST: CPT

## 2020-08-27 PROCEDURE — 99282 EMERGENCY DEPT VISIT SF MDM: CPT

## 2020-08-27 PROCEDURE — 93005 ELECTROCARDIOGRAM TRACING: CPT | Performed by: EMERGENCY MEDICINE

## 2020-08-27 PROCEDURE — 83880 ASSAY OF NATRIURETIC PEPTIDE: CPT

## 2020-08-27 PROCEDURE — 93010 ELECTROCARDIOGRAM REPORT: CPT | Performed by: INTERNAL MEDICINE

## 2020-08-27 PROCEDURE — 85610 PROTHROMBIN TIME: CPT

## 2020-08-27 PROCEDURE — 6360000004 HC RX CONTRAST MEDICATION: Performed by: RADIOLOGY

## 2020-08-27 PROCEDURE — 84703 CHORIONIC GONADOTROPIN ASSAY: CPT

## 2020-08-27 RX ADMIN — IOPAMIDOL 70 ML: 755 INJECTION, SOLUTION INTRAVENOUS at 23:16

## 2020-08-27 ASSESSMENT — PAIN DESCRIPTION - ONSET
ONSET: ON-GOING
ONSET_2: ON-GOING

## 2020-08-27 ASSESSMENT — PAIN DESCRIPTION - ORIENTATION
ORIENTATION_2: LEFT
ORIENTATION: MID

## 2020-08-27 ASSESSMENT — PAIN DESCRIPTION - FREQUENCY: FREQUENCY: CONTINUOUS

## 2020-08-27 ASSESSMENT — PAIN SCALES - GENERAL: PAINLEVEL_OUTOF10: 8

## 2020-08-27 ASSESSMENT — PAIN DESCRIPTION - INTENSITY: RATING_2: 7

## 2020-08-27 ASSESSMENT — PAIN DESCRIPTION - PROGRESSION
CLINICAL_PROGRESSION: NOT CHANGED
CLINICAL_PROGRESSION_2: NOT CHANGED

## 2020-08-27 ASSESSMENT — PAIN DESCRIPTION - DESCRIPTORS
DESCRIPTORS: SHARP
DESCRIPTORS_2: SHARP

## 2020-08-27 ASSESSMENT — PAIN DESCRIPTION - PAIN TYPE: TYPE: ACUTE PAIN

## 2020-08-27 ASSESSMENT — PAIN DESCRIPTION - LOCATION
LOCATION: CHEST
LOCATION_2: LEG

## 2020-08-28 VITALS
HEIGHT: 60 IN | OXYGEN SATURATION: 99 % | DIASTOLIC BLOOD PRESSURE: 63 MMHG | HEART RATE: 70 BPM | WEIGHT: 158 LBS | RESPIRATION RATE: 18 BRPM | TEMPERATURE: 97.1 F | SYSTOLIC BLOOD PRESSURE: 101 MMHG | BODY MASS INDEX: 31.02 KG/M2

## 2020-08-28 NOTE — ED PROVIDER NOTES
HPI:  20, Time: 11:40 PM EDT         Mis Graham is a 37 y.o. female presenting to the ED for chest pain, beginning 1300 ago. The complaint has been intermittent, mild in severity, and worsened by nothing. 36 yo f with history of severe iron def anemia from heavy periods, pt states her h/h has been as low as 4-5. Pt notes f/w dr Ruben avila and dr Don Bean and gets iron infusions every 2 weeks. Pt notes had a dvt in her left leg dr Don Bean decided not to anticoagulate for until he sees her next visit scheduled in 2 weeks. Pt notes inspiratory chest pain today 3/10 and sharp since 1300. Pt denies cpressure or exertional sx, denies sob, denies cough, fever, covid exposures, n,v,d,abd pain, lmp 20 and period more controlled now. Review of Systems:   Pertinent positives and negatives are stated within HPI, all other systems reviewed and are negative.          --------------------------------------------- PAST HISTORY ---------------------------------------------  Past Medical History:  has a past medical history of Anemia, [facility] DVT of lower extremity (deep venous thrombosis) ([facility] History of DVT of lower extremity, and Migraine. Past Surgical History:  has a past surgical history that includes Induced  (10/12/12) and Dilation & curettage. Social History:  reports that she has never smoked. She has never used smokeless tobacco. She reports that she does not drink alcohol or use drugs. Family History: family history is not on file. The patients home medications have been reviewed.     Allergies: Latex    -------------------------------------------------- RESULTS -------------------------------------------------  All laboratory and radiology results have been personally reviewed by myself   LABS:  Results for orders placed or performed during the hospital encounter of    Basic Metabolic Panel w/ Reflex to MG   Result Value Ref Range    Sodium unremarakb,e cta showed no pe. Pt can be discharged she needs to f/w hematology dr Cherry Otero and her ob and family doctor. She is to call them tomorrow and review her h/h. Counseled regarding todays diagnosis, including possible risks and complications,  especially if left uncontrolled. Counseled regarding the possible side effects, risks, benefits and alternatives to treatment; patient and/or guardian verbalizes understanding, agrees, feels comfortable with and wishes to proceed with treatment plan. Advised patient to call her primary care physician with any new medication issues, and read all Rx info from pharmacy to assure aware of all possible risks and side effects of medication before taking. I did discuss warning signs for when to return to the Emergency Room, and the patient verbalized understanding      Counseling: The emergency provider has spoken with the patient and discussed todays results, in addition to providing specific details for the plan of care and counseling regarding the diagnosis and prognosis. Questions are answered at this time and they are agreeable with the plan.      --------------------------------- IMPRESSION AND DISPOSITION ---------------------------------    IMPRESSION  1. Chest pain, unspecified type    2. History of anemia    3. History of DVT (deep vein thrombosis)        DISPOSITION  Disposition: Discharge to home  Patient condition is good      NOTE: This report was transcribed using voice recognition software.  Every effort was made to ensure accuracy; however, inadvertent computerized transcription errors may be present       Radha Acosta DO  08/27/20 7844

## 2020-10-20 ENCOUNTER — APPOINTMENT (OUTPATIENT)
Dept: ULTRASOUND IMAGING | Age: 35
End: 2020-10-20
Payer: COMMERCIAL

## 2020-10-20 ENCOUNTER — HOSPITAL ENCOUNTER (EMERGENCY)
Age: 35
Discharge: HOME | End: 2020-10-20
Attending: EMERGENCY MEDICINE
Payer: COMMERCIAL

## 2020-10-20 VITALS
WEIGHT: 160 LBS | DIASTOLIC BLOOD PRESSURE: 79 MMHG | TEMPERATURE: 98 F | SYSTOLIC BLOOD PRESSURE: 112 MMHG | HEIGHT: 60 IN | HEART RATE: 86 BPM | OXYGEN SATURATION: 99 % | BODY MASS INDEX: 31.41 KG/M2 | RESPIRATION RATE: 18 BRPM

## 2020-10-20 LAB
ALBUMIN SERPL-MCNC: 3.7 G/DL (ref 3.5–5.2)
ALP BLD-CCNC: 90 U/L (ref 35–104)
ALT SERPL-CCNC: 24 U/L (ref 0–32)
ANION GAP SERPL CALCULATED.3IONS-SCNC: 6 MMOL/L (ref 7–16)
AST SERPL-CCNC: 23 U/L (ref 0–31)
BACTERIA: ABNORMAL /HPF
BASOPHILS ABSOLUTE: 0.01 E9/L (ref 0–0.2)
BASOPHILS RELATIVE PERCENT: 0.2 % (ref 0–2)
BILIRUB SERPL-MCNC: <0.2 MG/DL (ref 0–1.2)
BILIRUBIN URINE: NEGATIVE
BLOOD, URINE: ABNORMAL
BUN BLDV-MCNC: 5 MG/DL (ref 6–20)
CALCIUM SERPL-MCNC: 9.1 MG/DL (ref 8.6–10.2)
CHLORIDE BLD-SCNC: 104 MMOL/L (ref 98–107)
CLARITY: CLEAR
CO2: 27 MMOL/L (ref 22–29)
COLOR: YELLOW
CREAT SERPL-MCNC: 0.7 MG/DL (ref 0.5–1)
EOSINOPHILS ABSOLUTE: 0.02 E9/L (ref 0.05–0.5)
EOSINOPHILS RELATIVE PERCENT: 0.4 % (ref 0–6)
GFR AFRICAN AMERICAN: >60
GFR NON-AFRICAN AMERICAN: >60 ML/MIN/1.73
GLUCOSE BLD-MCNC: 95 MG/DL (ref 74–99)
GLUCOSE URINE: NEGATIVE MG/DL
HCG, URINE, POC: NEGATIVE
HCT VFR BLD CALC: 41.2 % (ref 34–48)
HEMOGLOBIN: 13.5 G/DL (ref 11.5–15.5)
IMMATURE GRANULOCYTES #: 0.01 E9/L
IMMATURE GRANULOCYTES %: 0.2 % (ref 0–5)
KETONES, URINE: NEGATIVE MG/DL
LEUKOCYTE ESTERASE, URINE: NEGATIVE
LIPASE: 18 U/L (ref 13–60)
LYMPHOCYTES ABSOLUTE: 1.15 E9/L (ref 1.5–4)
LYMPHOCYTES RELATIVE PERCENT: 22.4 % (ref 20–42)
Lab: NORMAL
MCH RBC QN AUTO: 32 PG (ref 26–35)
MCHC RBC AUTO-ENTMCNC: 32.8 % (ref 32–34.5)
MCV RBC AUTO: 97.6 FL (ref 80–99.9)
MONOCYTES ABSOLUTE: 0.28 E9/L (ref 0.1–0.95)
MONOCYTES RELATIVE PERCENT: 5.5 % (ref 2–12)
NEGATIVE QC PASS/FAIL: NORMAL
NEUTROPHILS ABSOLUTE: 3.66 E9/L (ref 1.8–7.3)
NEUTROPHILS RELATIVE PERCENT: 71.3 % (ref 43–80)
NITRITE, URINE: NEGATIVE
PDW BLD-RTO: 14.1 FL (ref 11.5–15)
PH UA: 7 (ref 5–9)
PLATELET # BLD: 246 E9/L (ref 130–450)
PMV BLD AUTO: 9.4 FL (ref 7–12)
POSITIVE QC PASS/FAIL: NORMAL
POTASSIUM REFLEX MAGNESIUM: 4.9 MMOL/L (ref 3.5–5)
PROTEIN UA: NEGATIVE MG/DL
RBC # BLD: 4.22 E12/L (ref 3.5–5.5)
RBC UA: ABNORMAL /HPF (ref 0–2)
SODIUM BLD-SCNC: 137 MMOL/L (ref 132–146)
SPECIFIC GRAVITY UA: 1.01 (ref 1–1.03)
TOTAL PROTEIN: 7.3 G/DL (ref 6.4–8.3)
UROBILINOGEN, URINE: 0.2 E.U./DL
WBC # BLD: 5.1 E9/L (ref 4.5–11.5)
WBC UA: ABNORMAL /HPF (ref 0–5)

## 2020-10-20 PROCEDURE — 93970 EXTREMITY STUDY: CPT

## 2020-10-20 PROCEDURE — 80053 COMPREHEN METABOLIC PANEL: CPT

## 2020-10-20 PROCEDURE — 6360000002 HC RX W HCPCS: Performed by: STUDENT IN AN ORGANIZED HEALTH CARE EDUCATION/TRAINING PROGRAM

## 2020-10-20 PROCEDURE — 96374 THER/PROPH/DIAG INJ IV PUSH: CPT

## 2020-10-20 PROCEDURE — 96375 TX/PRO/DX INJ NEW DRUG ADDON: CPT

## 2020-10-20 PROCEDURE — 81001 URINALYSIS AUTO W/SCOPE: CPT

## 2020-10-20 PROCEDURE — 85025 COMPLETE CBC W/AUTO DIFF WBC: CPT

## 2020-10-20 PROCEDURE — 83690 ASSAY OF LIPASE: CPT

## 2020-10-20 PROCEDURE — 2580000003 HC RX 258: Performed by: STUDENT IN AN ORGANIZED HEALTH CARE EDUCATION/TRAINING PROGRAM

## 2020-10-20 PROCEDURE — 99284 EMERGENCY DEPT VISIT MOD MDM: CPT

## 2020-10-20 RX ORDER — 0.9 % SODIUM CHLORIDE 0.9 %
1000 INTRAVENOUS SOLUTION INTRAVENOUS ONCE
Status: COMPLETED | OUTPATIENT
Start: 2020-10-20 | End: 2020-10-20

## 2020-10-20 RX ORDER — ONDANSETRON 4 MG/1
4 TABLET, ORALLY DISINTEGRATING ORAL EVERY 12 HOURS PRN
Qty: 4 TABLET | Refills: 0 | Status: SHIPPED | OUTPATIENT
Start: 2020-10-20 | End: 2020-10-22

## 2020-10-20 RX ORDER — KETOROLAC TROMETHAMINE 30 MG/ML
15 INJECTION, SOLUTION INTRAMUSCULAR; INTRAVENOUS ONCE
Status: COMPLETED | OUTPATIENT
Start: 2020-10-20 | End: 2020-10-20

## 2020-10-20 RX ORDER — ONDANSETRON 2 MG/ML
4 INJECTION INTRAMUSCULAR; INTRAVENOUS ONCE
Status: COMPLETED | OUTPATIENT
Start: 2020-10-20 | End: 2020-10-20

## 2020-10-20 RX ORDER — RIVAROXABAN 20 MG/1
20 TABLET, FILM COATED ORAL DAILY
COMMUNITY
Start: 2020-08-31 | End: 2020-12-06 | Stop reason: ALTCHOICE

## 2020-10-20 RX ADMIN — ONDANSETRON 4 MG: 2 INJECTION INTRAMUSCULAR; INTRAVENOUS at 11:23

## 2020-10-20 RX ADMIN — KETOROLAC TROMETHAMINE 15 MG: 30 INJECTION, SOLUTION INTRAMUSCULAR; INTRAVENOUS at 11:23

## 2020-10-20 RX ADMIN — SODIUM CHLORIDE 1000 ML: 9 INJECTION, SOLUTION INTRAVENOUS at 11:23

## 2020-10-20 ASSESSMENT — PAIN DESCRIPTION - PAIN TYPE
TYPE: ACUTE PAIN
TYPE: ACUTE PAIN

## 2020-10-20 ASSESSMENT — PAIN DESCRIPTION - LOCATION
LOCATION: ABDOMEN
LOCATION: ABDOMEN

## 2020-10-20 ASSESSMENT — PAIN SCALES - GENERAL
PAINLEVEL_OUTOF10: 8
PAINLEVEL_OUTOF10: 4

## 2020-10-20 ASSESSMENT — PAIN DESCRIPTION - DESCRIPTORS: DESCRIPTORS: STABBING;SHARP

## 2020-10-20 ASSESSMENT — PAIN DESCRIPTION - FREQUENCY: FREQUENCY: CONTINUOUS

## 2020-10-20 NOTE — ED PROVIDER NOTES
[facility] ENCOUNTER      Pt Name: Mariam Angel  MRN: [id number]  [place] 1985  Date of evaluation: 10/20/2020      CHIEF COMPLAINT       Chief Complaint   Patient presents with    Abdominal Pain     started last night radiate to back. N/V/D    Leg Pain     BLE hx of blood clots, on xarelto        HPI  Mariam Angel is a 37 y.o. female past medical history of DVTs on Xarelto presents with complaints of abdominal pain and bilateral leg pain. Patient states that she has epigastric abdominal pain that is sharp, moderate in severity, intermittent, that radiates to her back. No alleviating or exacerbating factors. She denies taking any medications or measures to alleviate her symptoms. In addition she is complaining of \"numbness of her calves\" bilaterally starting yesterday as well. Also no alleviating or exacerbating factors. States that she is never had these symptoms before. States that she is consistent taking her Xarelto as she does have a history of bilateral DVTs. No other associated symptoms. Denies fever, chills, syncope, near syncope, cough, hemoptysis, unilateral leg swelling, recent surgeries, and history of cancer. Except as noted above the remainder of the review of systems was reviewed and negative. Review of Systems   Constitutional: Negative for activity change, appetite change, chills and fever. HENT: Negative for sinus pressure, sore throat and voice change. Respiratory: Negative for cough, shortness of breath and wheezing. Cardiovascular: Negative for chest pain, palpitations and leg swelling. Gastrointestinal: Positive for abdominal pain and nausea. Negative for abdominal distention, diarrhea and vomiting. Endocrine: Negative for polyphagia and polyuria. Genitourinary: Negative for dysuria and frequency. Musculoskeletal: Negative for arthralgias and back pain.    Skin: Negative for color change, pallor, rash and wound. Neurological: Negative for dizziness, facial asymmetry, weakness and headaches. Hematological: Negative for adenopathy. Psychiatric/Behavioral: Negative for agitation. All other systems reviewed and are negative. Physical Exam  Vitals signs and nursing note reviewed. Constitutional:       General: She is not in acute distress. Appearance: She is well-developed. She is obese. She is not ill-appearing or diaphoretic. HENT:      Head: Normocephalic and atraumatic. Mouth/Throat:      Mouth: Mucous membranes are moist.   Eyes:      Pupils: Pupils are equal, round, and reactive to light. Neck:      Musculoskeletal: Normal range of motion and neck supple. Cardiovascular:      Rate and Rhythm: Normal rate and regular rhythm. Heart sounds: No murmur. No friction rub. No gallop. Pulmonary:      Effort: Pulmonary effort is normal. No respiratory distress. Breath sounds: Normal breath sounds. No stridor. No wheezing, rhonchi or rales. Chest:      Chest wall: No tenderness. Abdominal:      General: Abdomen is flat. Bowel sounds are normal.      Palpations: Abdomen is soft. There is no fluid wave, hepatomegaly or mass. Tenderness: There is generalized abdominal tenderness. There is no right CVA tenderness, left CVA tenderness, guarding or rebound. Positive signs include Segura's sign. Negative signs include McBurney's sign. Skin:     General: Skin is warm and dry. Capillary Refill: Capillary refill takes less than 2 seconds. Neurological:      General: No focal deficit present. Mental Status: She is alert and oriented to person, place, and time.       Coordination: Coordination normal.   Psychiatric:         Mood and Affect: Mood normal.         Behavior: Behavior normal.          Procedures     MDM  Number of Diagnoses or Management Options  Epigastric pain:   Diagnosis management comments: 70-year-old female with a past medical history of DVTs on Xarelto presents with complaints of abdominal pain, and leg pain. Vitals are within normal limits. On physical exam patient is in no acute distress or discomfort. She is speaking full sentences. Alert and oriented x4. Lungs are clear to auscultation bilaterally. Normal S1-2. No murmurs rubs gallops. Mild epigastric tenderness. No rebound or guarding. Negative Segura sign. No tenderness at McBurney's point. No periumbilical or flank bruising. Bowel sounds normal.  Patient's calves are nontender to touch. Radial and dorsal pedal pulses 2+ and intact. Diagnostic labs are unremarkable. No signs of pancreatitis on labs. In addition her ALT and AST and ALP are normal.  Low suspicion for biliary involvement. Bilateral ultrasounds of patient's legs show a nonocclusive thrombus seen within the left posterior tibial vein. Patient is already on outpatient Xarelto from PCP. She was given fluids, Zofran, and Toradol with relief of her symptoms. Patient was formed all the results for evaluation. She has unspecified abdominal pain. Patient is awake alert, hemodynamic stable, afebrile and in no respiratory distress. Discussed with patient plan for close outpatient follow-up with the patient's PCP as well as return precautions and the patient understands and agrees to the plan.                  --------------------------------------------- PAST HISTORY ---------------------------------------------  Past Medical History:  has a past medical history of Anemia, DVT (deep venous thrombosis) ([facility] DVT of lower extremity (deep venous thrombosis) ([facility] History of DVT of lower extremity, and Migraine. Past Surgical History:  has a past surgical history that includes Induced  (10/12/12) and Dilation & curettage. Social History:  reports that she has never smoked.  She has never used smokeless tobacco. She reports that she does not drink alcohol or use drugs. Family History: family history is not on file. The patients home medications have been reviewed.     Allergies: Latex    -------------------------------------------------- RESULTS -------------------------------------------------  Labs:  Results for orders placed or performed during the hospital encounter of 10/20/20   CBC Auto Differential   Result Value Ref Range    WBC 5.1 4.5 - 11.5 E9/L    RBC 4.22 3.50 - 5.50 E12/L    Hemoglobin 13.5 11.5 - 15.5 g/dL    Hematocrit 41.2 34.0 - 48.0 %    MCV 97.6 80.0 - 99.9 fL    MCH 32.0 26.0 - 35.0 pg    MCHC 32.8 32.0 - 34.5 %    RDW 14.1 11.5 - 15.0 fL    Platelets 246 190 - 252 E9/L    MPV 9.4 7.0 - 12.0 fL    Neutrophils % 71.3 43.0 - 80.0 %    Immature Granulocytes % 0.2 0.0 - 5.0 %    Lymphocytes % 22.4 20.0 - 42.0 %    Monocytes % 5.5 2.0 - 12.0 %    Eosinophils % 0.4 0.0 - 6.0 %    Basophils % 0.2 0.0 - 2.0 %    Neutrophils Absolute 3.66 1.80 - 7.30 E9/L    Immature Granulocytes # 0.01 E9/L    Lymphocytes Absolute 1.15 (L) 1.50 - 4.00 E9/L    Monocytes Absolute 0.28 0.10 - 0.95 E9/L    Eosinophils Absolute 0.02 (L) 0.05 - 0.50 E9/L    Basophils Absolute 0.01 0.00 - 0.20 E9/L   Comprehensive Metabolic Panel w/ Reflex to MG   Result Value Ref Range    Sodium 137 132 - 146 mmol/L    Potassium reflex Magnesium 4.9 3.5 - 5.0 mmol/L    Chloride 104 98 - 107 mmol/L    CO2 27 22 - 29 mmol/L    Anion Gap 6 (L) 7 - 16 mmol/L    Glucose 95 74 - 99 mg/dL    BUN 5 (L) 6 - 20 mg/dL    CREATININE 0.7 0.5 - 1.0 mg/dL    GFR Non-African American >60 >=60 mL/min/1.73    GFR African American >60     Calcium 9.1 8.6 - 10.2 mg/dL    Total Protein 7.3 6.4 - 8.3 g/dL    Alb 3.7 3.5 - 5.2 g/dL    Total Bilirubin <0.2 0.0 - 1.2 mg/dL    Alkaline Phosphatase 90 35 - 104 U/L    ALT 24 0 - 32 U/L    AST 23 0 - 31 U/L   Lipase   Result Value Ref Range    Lipase 18 13 - 60 U/L   Urinalysis, reflex to microscopic   Result Value Ref Range    Color, UA Yellow Straw/Yellow    Clarity, UA Clear Clear    Glucose, Ur Negative Negative mg/dL    Bilirubin Urine Negative Negative    Ketones, Urine Negative Negative mg/dL    Specific Gravity, UA 1.015 1.005 - 1.030    Blood, Urine LARGE (A) Negative    pH, UA 7.0 5.0 - 9.0    Protein, UA Negative Negative mg/dL    Urobilinogen, Urine 0.2 <2.0 E.U./dL    Nitrite, Urine Negative Negative    Leukocyte Esterase, Urine Negative Negative   Microscopic Urinalysis   Result Value Ref Range    WBC, UA 2-5 0 - 5 /HPF    RBC, UA 2-5 0 - 2 /HPF    Bacteria, UA FEW (A) None Seen /HPF   POC Pregnancy Urine   Result Value Ref Range    HCG, Urine, POC Negative Negative    Lot Number [id number]     Positive QC Pass/Fail Pass     Negative QC Pass/Fail Pass        Radiology:  US DUP LOWER EXTREMITIES BILATERAL VENOUS   Final Result   1. There is no evidence of right lower extremity deep venous thrombosis. 2. Nonocclusive thrombus seen within the left posterior tibial vein. There   is no thrombus above the level of the left knee.             ------------------------- NURSING NOTES AND VITALS REVIEWED ---------------------------  Date / Time Roomed:  10/20/2020  9:05 AM  ED Bed Assignment:  14/14    The nursing notes within the ED encounter and vital signs as below have been reviewed. /79   Pulse 86   Temp 98 °F (36.7 °C)   Resp 18   Ht 5' (1.524 m)   Wt 160 lb (72.6 kg)   SpO2 99%   BMI 31.25 kg/m²   Oxygen Saturation Interpretation: normal      ------------------------------------------ PROGRESS NOTES ------------------------------------------    I have spoken with the patient and discussed todays results, in addition to providing specific details for the plan of care and counseling regarding the diagnosis and prognosis. Their questions are answered at this time and they are agreeable with the plan. I discussed at length with them reasons for immediate return here for re evaluation.  They will followup with their PCP by calling their office tomorrow. --------------------------------- ADDITIONAL PROVIDER NOTES ---------------------------------  At this time the patient is without objective evidence of an acute process requiring hospitalization or inpatient management. They have remained hemodynamically stable throughout their entire ED visit and are stable for discharge with outpatient follow-up. The plan has been discussed in detail and they are aware of the specific conditions for emergent return, as well as the importance of follow-up. Discharge Medication List as of 10/20/2020  1:42 PM      START taking these medications    Details   ondansetron (ZOFRAN ODT) 4 MG disintegrating tablet Take 1 tablet by mouth every 12 hours as needed for Nausea or Vomiting, Disp-4 tablet,R-0Print             Diagnosis:  1. Epigastric pain    2. Nonspecific abdominal pain        Disposition:  Patient's disposition: Discharge to home  Patient's condition is stable.       Darrel Martinez MD  Resident  10/21/20 0787

## 2020-10-21 ASSESSMENT — ENCOUNTER SYMPTOMS
COUGH: 0
BACK PAIN: 0
DIARRHEA: 0
SINUS PRESSURE: 0
COLOR CHANGE: 0
VOMITING: 0
SHORTNESS OF BREATH: 0
ABDOMINAL PAIN: 1
NAUSEA: 1
VOICE CHANGE: 0
SORE THROAT: 0
WHEEZING: 0
ABDOMINAL DISTENTION: 0

## 2020-10-28 ENCOUNTER — TELEPHONE (OUTPATIENT)
Dept: VASCULAR SURGERY | Age: 35
End: 2020-10-28

## 2020-11-15 ENCOUNTER — HOSPITAL ENCOUNTER (EMERGENCY)
Age: 35
Discharge: HOME | End: 2020-11-15
Attending: EMERGENCY MEDICINE
Payer: COMMERCIAL

## 2020-11-15 ENCOUNTER — APPOINTMENT (OUTPATIENT)
Dept: ULTRASOUND IMAGING | Age: 35
End: 2020-11-15
Payer: COMMERCIAL

## 2020-11-15 VITALS
TEMPERATURE: 98.1 F | BODY MASS INDEX: 31.41 KG/M2 | WEIGHT: 160 LBS | HEART RATE: 88 BPM | RESPIRATION RATE: 18 BRPM | OXYGEN SATURATION: 99 % | HEIGHT: 60 IN | DIASTOLIC BLOOD PRESSURE: 93 MMHG | SYSTOLIC BLOOD PRESSURE: 135 MMHG

## 2020-11-15 LAB
ALBUMIN SERPL-MCNC: 3.8 G/DL (ref 3.5–5.2)
ALP BLD-CCNC: 97 U/L (ref 35–104)
ALT SERPL-CCNC: 15 U/L (ref 0–32)
ANION GAP SERPL CALCULATED.3IONS-SCNC: 7 MMOL/L (ref 7–16)
APTT: 24.6 SEC (ref 24.5–35.1)
AST SERPL-CCNC: 17 U/L (ref 0–31)
BACTERIA: ABNORMAL /HPF
BASOPHILS ABSOLUTE: 0.01 E9/L (ref 0–0.2)
BASOPHILS RELATIVE PERCENT: 0.2 % (ref 0–2)
BILIRUB SERPL-MCNC: 0.4 MG/DL (ref 0–1.2)
BILIRUBIN URINE: NEGATIVE
BLOOD, URINE: ABNORMAL
BUN BLDV-MCNC: 5 MG/DL (ref 6–20)
CALCIUM SERPL-MCNC: 8.8 MG/DL (ref 8.6–10.2)
CHLORIDE BLD-SCNC: 102 MMOL/L (ref 98–107)
CLARITY: CLEAR
CO2: 26 MMOL/L (ref 22–29)
COLOR: YELLOW
CREAT SERPL-MCNC: 0.8 MG/DL (ref 0.5–1)
EOSINOPHILS ABSOLUTE: 0.02 E9/L (ref 0.05–0.5)
EOSINOPHILS RELATIVE PERCENT: 0.4 % (ref 0–6)
GFR AFRICAN AMERICAN: >60
GFR NON-AFRICAN AMERICAN: >60 ML/MIN/1.73
GLUCOSE BLD-MCNC: 92 MG/DL (ref 74–99)
GLUCOSE URINE: NEGATIVE MG/DL
HCG, URINE, POC: NEGATIVE
HCT VFR BLD CALC: 40.3 % (ref 34–48)
HEMOGLOBIN: 13.5 G/DL (ref 11.5–15.5)
IMMATURE GRANULOCYTES #: 0.01 E9/L
IMMATURE GRANULOCYTES %: 0.2 % (ref 0–5)
INR BLD: 1
KETONES, URINE: NEGATIVE MG/DL
LEUKOCYTE ESTERASE, URINE: NEGATIVE
LYMPHOCYTES ABSOLUTE: 0.97 E9/L (ref 1.5–4)
LYMPHOCYTES RELATIVE PERCENT: 19.9 % (ref 20–42)
Lab: NORMAL
MCH RBC QN AUTO: 32.3 PG (ref 26–35)
MCHC RBC AUTO-ENTMCNC: 33.5 % (ref 32–34.5)
MCV RBC AUTO: 96.4 FL (ref 80–99.9)
MONOCYTES ABSOLUTE: 0.25 E9/L (ref 0.1–0.95)
MONOCYTES RELATIVE PERCENT: 5.1 % (ref 2–12)
NEGATIVE QC PASS/FAIL: NORMAL
NEUTROPHILS ABSOLUTE: 3.62 E9/L (ref 1.8–7.3)
NEUTROPHILS RELATIVE PERCENT: 74.2 % (ref 43–80)
NITRITE, URINE: NEGATIVE
PDW BLD-RTO: 13.4 FL (ref 11.5–15)
PH UA: 6.5 (ref 5–9)
PLATELET # BLD: 266 E9/L (ref 130–450)
PMV BLD AUTO: 9.9 FL (ref 7–12)
POSITIVE QC PASS/FAIL: NORMAL
POTASSIUM SERPL-SCNC: 3.5 MMOL/L (ref 3.5–5)
PROTEIN UA: NEGATIVE MG/DL
PROTHROMBIN TIME: 11.7 SEC (ref 9.3–12.4)
RBC # BLD: 4.18 E12/L (ref 3.5–5.5)
RBC UA: >20 /HPF (ref 0–2)
SODIUM BLD-SCNC: 135 MMOL/L (ref 132–146)
SPECIFIC GRAVITY UA: 1.01 (ref 1–1.03)
TOTAL PROTEIN: 7.4 G/DL (ref 6.4–8.3)
UROBILINOGEN, URINE: 0.2 E.U./DL
WBC # BLD: 4.9 E9/L (ref 4.5–11.5)
WBC UA: ABNORMAL /HPF (ref 0–5)

## 2020-11-15 PROCEDURE — 6360000002 HC RX W HCPCS: Performed by: EMERGENCY MEDICINE

## 2020-11-15 PROCEDURE — 99282 EMERGENCY DEPT VISIT SF MDM: CPT

## 2020-11-15 PROCEDURE — 85730 THROMBOPLASTIN TIME PARTIAL: CPT

## 2020-11-15 PROCEDURE — 81001 URINALYSIS AUTO W/SCOPE: CPT

## 2020-11-15 PROCEDURE — 76856 US EXAM PELVIC COMPLETE: CPT

## 2020-11-15 PROCEDURE — 85025 COMPLETE CBC W/AUTO DIFF WBC: CPT

## 2020-11-15 PROCEDURE — 36415 COLL VENOUS BLD VENIPUNCTURE: CPT

## 2020-11-15 PROCEDURE — 96375 TX/PRO/DX INJ NEW DRUG ADDON: CPT

## 2020-11-15 PROCEDURE — 80053 COMPREHEN METABOLIC PANEL: CPT

## 2020-11-15 PROCEDURE — 85610 PROTHROMBIN TIME: CPT

## 2020-11-15 PROCEDURE — 96374 THER/PROPH/DIAG INJ IV PUSH: CPT

## 2020-11-15 RX ORDER — KETOROLAC TROMETHAMINE 30 MG/ML
30 INJECTION, SOLUTION INTRAMUSCULAR; INTRAVENOUS ONCE
Status: COMPLETED | OUTPATIENT
Start: 2020-11-15 | End: 2020-11-15

## 2020-11-15 RX ORDER — MORPHINE SULFATE 8 MG/ML
5 INJECTION, SOLUTION INTRAMUSCULAR; INTRAVENOUS ONCE
Status: DISCONTINUED | OUTPATIENT
Start: 2020-11-15 | End: 2020-11-15 | Stop reason: HOSPADM

## 2020-11-15 RX ORDER — HYDROCODONE BITARTRATE AND ACETAMINOPHEN 5; 325 MG/1; MG/1
1 TABLET ORAL EVERY 4 HOURS PRN
Qty: 18 TABLET | Refills: 0 | Status: SHIPPED | OUTPATIENT
Start: 2020-11-15 | End: 2020-11-18

## 2020-11-15 RX ORDER — ONDANSETRON 2 MG/ML
4 INJECTION INTRAMUSCULAR; INTRAVENOUS ONCE
Status: COMPLETED | OUTPATIENT
Start: 2020-11-15 | End: 2020-11-15

## 2020-11-15 RX ADMIN — ONDANSETRON 4 MG: 2 INJECTION INTRAMUSCULAR; INTRAVENOUS at 13:23

## 2020-11-15 RX ADMIN — KETOROLAC TROMETHAMINE 30 MG: 30 INJECTION, SOLUTION INTRAMUSCULAR at 13:23

## 2020-11-15 ASSESSMENT — PAIN DESCRIPTION - ORIENTATION: ORIENTATION: RIGHT;LEFT;LOWER

## 2020-11-15 ASSESSMENT — PAIN DESCRIPTION - DESCRIPTORS: DESCRIPTORS: CRAMPING

## 2020-11-15 ASSESSMENT — PAIN SCALES - GENERAL: PAINLEVEL_OUTOF10: 8

## 2020-11-15 ASSESSMENT — PAIN DESCRIPTION - PAIN TYPE: TYPE: ACUTE PAIN

## 2020-11-15 ASSESSMENT — PAIN DESCRIPTION - FREQUENCY: FREQUENCY: CONTINUOUS

## 2020-11-15 ASSESSMENT — PAIN - FUNCTIONAL ASSESSMENT: PAIN_FUNCTIONAL_ASSESSMENT: ACTIVITIES ARE NOT PREVENTED

## 2020-11-15 ASSESSMENT — PAIN DESCRIPTION - LOCATION: LOCATION: ABDOMEN

## 2020-11-25 NOTE — ED PROVIDER NOTES
non contributory    The patients home medications have been reviewed. Allergies: Latex        ---------------------------------------------------PHYSICAL EXAM--------------------------------------    Constitutional/General: Alert and oriented x3 in distress due to pain  Head: Normocephalic and atraumatic  Eyes: PERRL, EOMI, sclera non icteric  Mouth: Oropharynx clear, handling secretions, no trismus, no asymmetry of the posterior oropharynx or uvular edema  Neck: Supple, full ROM, no stridor, no meningeal signs  Respiratory: Lungs clear to auscultation bilaterally,Not in respiratory distress  Cardiovascular:  Regular rate. Regular rhythm. 2+ distal pulses. Equal extremity pulses. Chest: No chest wall tenderness  GI:  Abdomen Soft, nondistended, no CVA tenderness, she has diffuse nonfocal lower abdominal pain. Musculoskeletal: Moves all extremities x 4. Warm and well perfused, no clubbing, cyanosis, or edema. Capillary refill <3 seconds  Integument: skin warm and dry. No rashes. Neurologic: GCS 15, no focal deficits, symmetric strength 5/5 in the upper and lower extremities bilaterally  Psychiatric: Normal Affect         -------------------------------------------------- RESULTS -------------------------------------------------  I have personally reviewed all laboratory and imaging results for this patient. Results are listed below.      LABS: (Lab results interpreted by me)  Results for orders placed or performed during the hospital encounter of 11/15/20   CBC Auto Differential   Result Value Ref Range    WBC 4.9 4.5 - 11.5 E9/L    RBC 4.18 3.50 - 5.50 E12/L    Hemoglobin 13.5 11.5 - 15.5 g/dL    Hematocrit 40.3 34.0 - 48.0 %    MCV 96.4 80.0 - 99.9 fL    MCH 32.3 26.0 - 35.0 pg    MCHC 33.5 32.0 - 34.5 %    RDW 13.4 11.5 - 15.0 fL    Platelets 422 047 - 117 E9/L    MPV 9.9 7.0 - 12.0 fL    Neutrophils % 74.2 43.0 - 80.0 %    Immature Granulocytes % 0.2 0.0 - 5.0 %    Lymphocytes % 19.9 (L) 20.0 - 42.0 % Monocytes % 5.1 2.0 - 12.0 %    Eosinophils % 0.4 0.0 - 6.0 %    Basophils % 0.2 0.0 - 2.0 %    Neutrophils Absolute 3.62 1.80 - 7.30 E9/L    Immature Granulocytes # 0.01 E9/L    Lymphocytes Absolute 0.97 (L) 1.50 - 4.00 E9/L    Monocytes Absolute 0.25 0.10 - 0.95 E9/L    Eosinophils Absolute 0.02 (L) 0.05 - 0.50 E9/L    Basophils Absolute 0.01 0.00 - 0.20 E9/L   Comprehensive Metabolic Panel   Result Value Ref Range    Sodium 135 132 - 146 mmol/L    Potassium 3.5 3.5 - 5.0 mmol/L    Chloride 102 98 - 107 mmol/L    CO2 26 22 - 29 mmol/L    Anion Gap 7 7 - 16 mmol/L    Glucose 92 74 - 99 mg/dL    BUN 5 (L) 6 - 20 mg/dL    CREATININE 0.8 0.5 - 1.0 mg/dL    GFR Non-African American >60 >=60 mL/min/1.73    GFR African American >60     Calcium 8.8 8.6 - 10.2 mg/dL    Total Protein 7.4 6.4 - 8.3 g/dL    Alb 3.8 3.5 - 5.2 g/dL    Total Bilirubin 0.4 0.0 - 1.2 mg/dL    Alkaline Phosphatase 97 35 - 104 U/L    ALT 15 0 - 32 U/L    AST 17 0 - 31 U/L   Protime-INR   Result Value Ref Range    Protime 11.7 9.3 - 12.4 sec    INR 1.0    APTT   Result Value Ref Range    aPTT 24.6 24.5 - 35.1 sec   Urinalysis   Result Value Ref Range    Color, UA Yellow Straw/Yellow    Clarity, UA Clear Clear    Glucose, Ur Negative Negative mg/dL    Bilirubin Urine Negative Negative    Ketones, Urine Negative Negative mg/dL    Specific Gravity, UA 1.010 1.005 - 1.030    Blood, Urine LARGE (A) Negative    pH, UA 6.5 5.0 - 9.0    Protein, UA Negative Negative mg/dL    Urobilinogen, Urine 0.2 <2.0 E.U./dL    Nitrite, Urine Negative Negative    Leukocyte Esterase, Urine Negative Negative   Microscopic Urinalysis   Result Value Ref Range    WBC, UA 1-3 0 - 5 /HPF    RBC, UA >20 0 - 2 /HPF    Bacteria, UA FEW (A) None Seen /HPF   POC Pregnancy Urine   Result Value Ref Range    HCG, Urine, POC Negative Negative    Lot Number [id number]     Positive QC Pass/Fail Pass     Negative QC Pass/Fail Pass    ,       RADIOLOGY:  Interpreted by Radiologist unless addition to providing specific details for the plan of care and counseling regarding the diagnosis and prognosis. Questions are answered at this time and they are agreeable with the plan.       --------------------------------- IMPRESSION AND DISPOSITION ---------------------------------    IMPRESSION  1. DUB (dysfunctional uterine bleeding)    2. Lower abdominal pain        DISPOSITION  Disposition: Discharge to home  Patient condition is stable        NOTE: This report was transcribed using voice recognition software.  Every effort was made to ensure accuracy; however, inadvertent computerized transcription errors may be present       Renea Marie DO  11/25/20 0000

## 2020-12-04 ENCOUNTER — TELEPHONE (OUTPATIENT)
Dept: VASCULAR SURGERY | Age: 35
End: 2020-12-04

## 2020-12-06 ENCOUNTER — HOSPITAL ENCOUNTER (EMERGENCY)
Age: 35
Discharge: HOME | End: 2020-12-06
Payer: COMMERCIAL

## 2020-12-06 VITALS
OXYGEN SATURATION: 100 % | BODY MASS INDEX: 31.41 KG/M2 | WEIGHT: 160 LBS | SYSTOLIC BLOOD PRESSURE: 121 MMHG | DIASTOLIC BLOOD PRESSURE: 93 MMHG | TEMPERATURE: 98.4 F | HEART RATE: 107 BPM | RESPIRATION RATE: 16 BRPM | HEIGHT: 60 IN

## 2020-12-06 PROCEDURE — U0003 INFECTIOUS AGENT DETECTION BY NUCLEIC ACID (DNA OR RNA); SEVERE ACUTE RESPIRATORY SYNDROME CORONAVIRUS 2 (SARS-COV-2) (CORONAVIRUS DISEASE [COVID-19]), AMPLIFIED PROBE TECHNIQUE, MAKING USE OF HIGH THROUGHPUT TECHNOLOGIES AS DESCRIBED BY CMS-2020-01-R: HCPCS

## 2020-12-06 PROCEDURE — 99283 EMERGENCY DEPT VISIT LOW MDM: CPT

## 2020-12-06 RX ORDER — IBUPROFEN 800 MG/1
800 TABLET ORAL EVERY 6 HOURS PRN
Qty: 120 TABLET | Refills: 3 | Status: SHIPPED | OUTPATIENT
Start: 2020-12-06 | End: 2020-12-06 | Stop reason: CLARIF

## 2020-12-06 RX ORDER — AZITHROMYCIN 250 MG/1
TABLET, FILM COATED ORAL
Qty: 1 PACKET | Refills: 0 | Status: SHIPPED | OUTPATIENT
Start: 2020-12-06 | End: 2020-12-16

## 2020-12-06 RX ORDER — BROMPHENIRAMINE MALEATE, PSEUDOEPHEDRINE HYDROCHLORIDE, AND DEXTROMETHORPHAN HYDROBROMIDE 2; 30; 10 MG/5ML; MG/5ML; MG/5ML
5 SYRUP ORAL 4 TIMES DAILY PRN
Qty: 118 ML | Refills: 0 | Status: SHIPPED | OUTPATIENT
Start: 2020-12-06 | End: 2021-05-16

## 2020-12-06 ASSESSMENT — PAIN DESCRIPTION - PROGRESSION: CLINICAL_PROGRESSION: NOT CHANGED

## 2020-12-06 ASSESSMENT — PAIN DESCRIPTION - ONSET: ONSET: ON-GOING

## 2020-12-06 ASSESSMENT — PAIN DESCRIPTION - PAIN TYPE: TYPE: ACUTE PAIN

## 2020-12-06 ASSESSMENT — PAIN DESCRIPTION - FREQUENCY: FREQUENCY: CONTINUOUS

## 2020-12-06 ASSESSMENT — PAIN DESCRIPTION - LOCATION: LOCATION: HEAD

## 2020-12-06 ASSESSMENT — PAIN SCALES - GENERAL: PAINLEVEL_OUTOF10: 7

## 2020-12-06 ASSESSMENT — PAIN DESCRIPTION - DESCRIPTORS: DESCRIPTORS: CONSTANT;THROBBING

## 2020-12-06 ASSESSMENT — PAIN - FUNCTIONAL ASSESSMENT: PAIN_FUNCTIONAL_ASSESSMENT: ACTIVITIES ARE NOT PREVENTED

## 2020-12-07 ENCOUNTER — CARE COORDINATION (OUTPATIENT)
Dept: CARE COORDINATION | Age: 35
End: 2020-12-07

## 2020-12-07 NOTE — CARE COORDINATION
Patient contacted regarding COVID-19 exposure. Discussed COVID-19 related testing which was pending at this time. Test results were pending. Patient informed of results, if available? Pending at time of this call. Pt was encouraged to activate a [employer] account. Pt has information regarding [employer] sign up    Care Transition Nurse/ Ambulatory Care Manager contacted the patient by telephone to perform post discharge assessment. Call within 2 business days of discharge: Yes. Verified name and  with patient as identifiers. Provided introduction to self, and explanation of the CTN/ACM role, and reason for call due to risk factors for infection and/or exposure to COVID-19. Symptoms reviewed with patient who verbalized the following symptoms: cough, no new symptoms, no worsening symptoms and sore throat, R ear pain, HA. Due to no new or worsening symptoms encounter was not routed to provider for escalation. Discussed follow-up appointments. If no appointment was previously scheduled, appointment scheduling offered: No-pt states that she is scheduled for an appt tomorrow with her pcp. [facility] follow up appointment(s):   Future Appointments   Date Time Provider Ragini Rose   2020  2:15 PM Jaswinder Woodward MD [facility]     Non-[facility] follow up appointment(s): Dr. Rahel Iqbal 2020    Non-face-to-face services provided:  Obtained and reviewed discharge summary and/or continuity of care documents     Advance Care Planning:   Does patient have an Advance Directive:  Health care decision maker information verified. Patient has following risk factors of: obesity, anemia. CTN/ACM reviewed discharge instructions, medical action plan and red flags such as increased shortness of breath, increasing fever and signs of decompensation with patient who verbalized understanding. Discussed exposure protocols and quarantine with CDC Guidelines What to do if you are sick with coronavirus disease 2019.  Patient was given an opportunity for questions and concerns. The patient agrees to contact the [employer] exposure line [phone], local [facility] department [place] [facility]: ([phone]) and PCP office for questions related to their healthcare. CTN/ACM provided contact information for future needs. Reviewed and educated patient on any new and changed medications related to discharge diagnosis     Patient/family/caregiver given information for Get[employer] Loop and agrees to enroll yes  Patient's preferred e-mail: [email or web address]   Patient's preferred phone number: [phone]  Based on Loop alert triggers, patient will be contacted by nurse care manager for worsening symptoms. Pt will be further monitored by COVID Loop Team based on severity of symptoms and risk factors. Called and spoke with pt regarding her ED visit on 12/6/2020 for cough, sore throat, HA, and R ear pain. Pt +COVID-19 exposure through work. Pt states that she feels about the same. States she feels \"stuffy\" in her head/nose. Pt has started new scripts. Reports +cough with yellow-green mucus production. Denies sob, fevers, or chills. Pt was agreeable to Loop enrollment. Was encouraged to activate [employer] account to monitor COVID-19 results.

## 2020-12-07 NOTE — ED PROVIDER NOTES
[facility]    HPI:  20,   Time: 7:40 PM MATILDE Ruggiero is a 37 y.o. female presenting to the ED for cough, headache, right ear pain, sore throat for the last 3 days. Patient states that she works at a [employer] unit states that she has been in close contact with Covid exposure. Patient denies fever nausea vomiting abdominal pain chest pain or shortness of breath. Patient states nothing makes her symptoms better nothing makes her symptoms worse. ROS:   Pertinent positives and negatives are stated within HPI, all other systems reviewed and are negative.  --------------------------------------------- PAST HISTORY ---------------------------------------------  Past Medical History:  has a past medical history of Anemia, DVT (deep venous thrombosis) ([facility] [address] DVT of lower extremity (deep venous thrombosis) ([facility] [address] History of DVT of lower extremity, and Migraine. Past Surgical History:  has a past surgical history that includes Induced  (10/12/12) and Dilation & curettage. Social History:  reports that she has never smoked. She has never used smokeless tobacco. She reports that she does not drink alcohol or use drugs. Family History: family history is not on file. The patients home medications have been reviewed. Allergies: Latex    -------------------------------------------------- RESULTS -------------------------------------------------  All laboratory and radiology results have been personally reviewed by myself   LABS:  Results for orders placed or performed during the hospital encounter of 20   Covid-19 Ambulatory   Result Value Ref Range    Source OP swab        RADIOLOGY:  Interpreted by Radiologist.  No orders to display       ------------------------- NURSING NOTES AND VITALS REVIEWED ---------------------------   The nursing notes within the ED encounter and vital signs as below have been reviewed.    BP (!) 121/93   Pulse 107   Temp 98.4 °F (36.9 °C) (Infrared)   Resp 16   Ht 5' (1.524 m)   Wt 160 lb (72.6 kg)   LMP 11/25/2020   SpO2 100%   BMI 31.25 kg/m²   Oxygen Saturation Interpretation: Normal      ---------------------------------------------------PHYSICAL EXAM--------------------------------------      Constitutional/General: Alert and oriented x3, well appearing, non toxic in NAD  Head: NC/AT  Eyes: PERRL, EOMI  Mouth: Oropharynx clear, handling secretions, no trismus  Neck: Supple, full ROM, no meningeal signs  Pulmonary: Lungs clear to auscultation bilaterally, no wheezes, rales, or rhonchi. Not in respiratory distress  Cardiovascular:  Regular rate and rhythm, no murmurs, gallops, or rubs. 2+ distal pulses  Abdomen: Soft, non tender, non distended,   Extremities: Moves all extremities x 4. Warm and well perfused  Skin: warm and dry without rash  Neurologic: GCS 15,  Psych: Normal Affect      ------------------------------ ED COURSE/MEDICAL DECISION MAKING----------------------  Medications - No data to display      Medical Decision Making: At this time the patient is without objective evidence of an acute process requiring hospitalization or inpatient management. They have remained hemodynamically stable throughout their entire ED visit and are stable for discharge with outpatient follow-up. The plan has been discussed in detail and they are aware of the specific conditions for emergent return, as well as the importance of follow-up. Patient at this time is nontoxic in appearance and in no distress she is hemodynamically stable she is not hypoxic. Patient is stable for outpatient follow-up she is educated to self quarantine until her Covid test results. Patient is agreeable to plan of care all questions were answered    Counseling: The emergency provider has spoken with the patient and discussed todays results, in addition to providing specific details for the plan of care and counseling regarding the diagnosis and prognosis.

## 2020-12-08 LAB
SARS-COV-2: DETECTED
SOURCE: ABNORMAL

## 2020-12-13 ENCOUNTER — APPOINTMENT (OUTPATIENT)
Dept: ULTRASOUND IMAGING | Age: 35
End: 2020-12-13
Payer: COMMERCIAL

## 2020-12-13 ENCOUNTER — APPOINTMENT (OUTPATIENT)
Dept: CT IMAGING | Age: 35
End: 2020-12-13
Payer: COMMERCIAL

## 2020-12-13 ENCOUNTER — APPOINTMENT (OUTPATIENT)
Dept: GENERAL RADIOLOGY | Age: 35
End: 2020-12-13
Payer: COMMERCIAL

## 2020-12-13 ENCOUNTER — HOSPITAL ENCOUNTER (EMERGENCY)
Age: 35
Discharge: HOME | End: 2020-12-13
Attending: EMERGENCY MEDICINE
Payer: COMMERCIAL

## 2020-12-13 VITALS
RESPIRATION RATE: 16 BRPM | DIASTOLIC BLOOD PRESSURE: 55 MMHG | TEMPERATURE: 97.6 F | HEIGHT: 60 IN | BODY MASS INDEX: 31.8 KG/M2 | HEART RATE: 75 BPM | WEIGHT: 162 LBS | SYSTOLIC BLOOD PRESSURE: 107 MMHG | OXYGEN SATURATION: 98 %

## 2020-12-13 LAB
ABO/RH: NORMAL
ALBUMIN SERPL-MCNC: 4.1 G/DL (ref 3.5–5.2)
ALP BLD-CCNC: 90 U/L (ref 35–104)
ALT SERPL-CCNC: 13 U/L (ref 0–32)
ANION GAP SERPL CALCULATED.3IONS-SCNC: 8 MMOL/L (ref 7–16)
ANTIBODY SCREEN: NORMAL
APTT: 24.6 SEC (ref 24.5–35.1)
AST SERPL-CCNC: 15 U/L (ref 0–31)
BACTERIA: NORMAL /HPF
BASOPHILS ABSOLUTE: 0.01 E9/L (ref 0–0.2)
BASOPHILS RELATIVE PERCENT: 0.2 % (ref 0–2)
BILIRUB SERPL-MCNC: 0.4 MG/DL (ref 0–1.2)
BILIRUBIN URINE: NEGATIVE
BLOOD, URINE: ABNORMAL
BUN BLDV-MCNC: 6 MG/DL (ref 6–20)
CALCIUM SERPL-MCNC: 9.3 MG/DL (ref 8.6–10.2)
CHLORIDE BLD-SCNC: 102 MMOL/L (ref 98–107)
CLARITY: CLEAR
CO2: 26 MMOL/L (ref 22–29)
COLOR: YELLOW
CREAT SERPL-MCNC: 0.7 MG/DL (ref 0.5–1)
EOSINOPHILS ABSOLUTE: 0.01 E9/L (ref 0.05–0.5)
EOSINOPHILS RELATIVE PERCENT: 0.2 % (ref 0–6)
GFR AFRICAN AMERICAN: >60
GFR NON-AFRICAN AMERICAN: >60 ML/MIN/1.73
GLUCOSE BLD-MCNC: 113 MG/DL (ref 74–99)
GLUCOSE URINE: NEGATIVE MG/DL
HCG(URINE) PREGNANCY TEST: NEGATIVE
HCT VFR BLD CALC: 37.6 % (ref 34–48)
HEMOGLOBIN: 13 G/DL (ref 11.5–15.5)
IMMATURE GRANULOCYTES #: 0.01 E9/L
IMMATURE GRANULOCYTES %: 0.2 % (ref 0–5)
INR BLD: 1.1
KETONES, URINE: NEGATIVE MG/DL
LEUKOCYTE ESTERASE, URINE: NEGATIVE
LYMPHOCYTES ABSOLUTE: 1.5 E9/L (ref 1.5–4)
LYMPHOCYTES RELATIVE PERCENT: 33.6 % (ref 20–42)
MCH RBC QN AUTO: 32.5 PG (ref 26–35)
MCHC RBC AUTO-ENTMCNC: 34.6 % (ref 32–34.5)
MCV RBC AUTO: 94 FL (ref 80–99.9)
MONOCYTES ABSOLUTE: 0.22 E9/L (ref 0.1–0.95)
MONOCYTES RELATIVE PERCENT: 4.9 % (ref 2–12)
NEUTROPHILS ABSOLUTE: 2.72 E9/L (ref 1.8–7.3)
NEUTROPHILS RELATIVE PERCENT: 60.9 % (ref 43–80)
NITRITE, URINE: NEGATIVE
PDW BLD-RTO: 12.5 FL (ref 11.5–15)
PH UA: 5.5 (ref 5–9)
PLATELET # BLD: 259 E9/L (ref 130–450)
PMV BLD AUTO: 9.8 FL (ref 7–12)
POTASSIUM SERPL-SCNC: 4 MMOL/L (ref 3.5–5)
PROTEIN UA: NEGATIVE MG/DL
PROTHROMBIN TIME: 13 SEC (ref 9.3–12.4)
RBC # BLD: 4 E12/L (ref 3.5–5.5)
RBC UA: NORMAL /HPF (ref 0–2)
SODIUM BLD-SCNC: 136 MMOL/L (ref 132–146)
SPECIFIC GRAVITY UA: <=1.005 (ref 1–1.03)
TOTAL PROTEIN: 7.5 G/DL (ref 6.4–8.3)
UROBILINOGEN, URINE: 0.2 E.U./DL
WBC # BLD: 4.5 E9/L (ref 4.5–11.5)
WBC UA: NORMAL /HPF (ref 0–5)

## 2020-12-13 PROCEDURE — 80053 COMPREHEN METABOLIC PANEL: CPT

## 2020-12-13 PROCEDURE — 85730 THROMBOPLASTIN TIME PARTIAL: CPT

## 2020-12-13 PROCEDURE — C9113 INJ PANTOPRAZOLE SODIUM, VIA: HCPCS | Performed by: PHYSICIAN ASSISTANT

## 2020-12-13 PROCEDURE — 96375 TX/PRO/DX INJ NEW DRUG ADDON: CPT

## 2020-12-13 PROCEDURE — 86850 RBC ANTIBODY SCREEN: CPT

## 2020-12-13 PROCEDURE — 85610 PROTHROMBIN TIME: CPT

## 2020-12-13 PROCEDURE — 71275 CT ANGIOGRAPHY CHEST: CPT

## 2020-12-13 PROCEDURE — 6360000002 HC RX W HCPCS: Performed by: EMERGENCY MEDICINE

## 2020-12-13 PROCEDURE — 6360000004 HC RX CONTRAST MEDICATION: Performed by: RADIOLOGY

## 2020-12-13 PROCEDURE — 86901 BLOOD TYPING SEROLOGIC RH(D): CPT

## 2020-12-13 PROCEDURE — 93971 EXTREMITY STUDY: CPT

## 2020-12-13 PROCEDURE — 93005 ELECTROCARDIOGRAM TRACING: CPT | Performed by: PHYSICIAN ASSISTANT

## 2020-12-13 PROCEDURE — 85025 COMPLETE CBC W/AUTO DIFF WBC: CPT

## 2020-12-13 PROCEDURE — 6370000000 HC RX 637 (ALT 250 FOR IP): Performed by: PHYSICIAN ASSISTANT

## 2020-12-13 PROCEDURE — 86900 BLOOD TYPING SEROLOGIC ABO: CPT

## 2020-12-13 PROCEDURE — 2580000003 HC RX 258: Performed by: PHYSICIAN ASSISTANT

## 2020-12-13 PROCEDURE — 81025 URINE PREGNANCY TEST: CPT

## 2020-12-13 PROCEDURE — 81001 URINALYSIS AUTO W/SCOPE: CPT

## 2020-12-13 PROCEDURE — 96372 THER/PROPH/DIAG INJ SC/IM: CPT

## 2020-12-13 PROCEDURE — 99283 EMERGENCY DEPT VISIT LOW MDM: CPT

## 2020-12-13 PROCEDURE — 71045 X-RAY EXAM CHEST 1 VIEW: CPT

## 2020-12-13 PROCEDURE — 96374 THER/PROPH/DIAG INJ IV PUSH: CPT

## 2020-12-13 PROCEDURE — 6360000002 HC RX W HCPCS: Performed by: PHYSICIAN ASSISTANT

## 2020-12-13 RX ORDER — ALBUTEROL SULFATE 90 UG/1
2 AEROSOL, METERED RESPIRATORY (INHALATION) EVERY 6 HOURS PRN
Qty: 1 INHALER | Refills: 0 | Status: SHIPPED | OUTPATIENT
Start: 2020-12-13 | End: 2021-05-16

## 2020-12-13 RX ORDER — 0.9 % SODIUM CHLORIDE 0.9 %
1000 INTRAVENOUS SOLUTION INTRAVENOUS ONCE
Status: COMPLETED | OUTPATIENT
Start: 2020-12-13 | End: 2020-12-13

## 2020-12-13 RX ORDER — PANTOPRAZOLE SODIUM 40 MG/10ML
40 INJECTION, POWDER, LYOPHILIZED, FOR SOLUTION INTRAVENOUS ONCE
Status: COMPLETED | OUTPATIENT
Start: 2020-12-13 | End: 2020-12-13

## 2020-12-13 RX ORDER — KETOROLAC TROMETHAMINE 30 MG/ML
15 INJECTION, SOLUTION INTRAMUSCULAR; INTRAVENOUS ONCE
Status: COMPLETED | OUTPATIENT
Start: 2020-12-13 | End: 2020-12-13

## 2020-12-13 RX ORDER — METHYLPREDNISOLONE 4 MG/1
TABLET ORAL
Qty: 21 TABLET | Status: SHIPPED | OUTPATIENT
Start: 2020-12-13 | End: 2020-12-19

## 2020-12-13 RX ADMIN — PANTOPRAZOLE SODIUM 40 MG: 40 INJECTION, POWDER, FOR SOLUTION INTRAVENOUS at 22:15

## 2020-12-13 RX ADMIN — KETOROLAC TROMETHAMINE 15 MG: 30 INJECTION, SOLUTION INTRAMUSCULAR at 20:27

## 2020-12-13 RX ADMIN — IOPAMIDOL 75 ML: 755 INJECTION, SOLUTION INTRAVENOUS at 21:21

## 2020-12-13 RX ADMIN — LIDOCAINE HYDROCHLORIDE: 20 SOLUTION ORAL; TOPICAL at 22:15

## 2020-12-13 RX ADMIN — ENOXAPARIN SODIUM 70 MG: 80 INJECTION SUBCUTANEOUS at 22:15

## 2020-12-13 RX ADMIN — SODIUM CHLORIDE 1000 ML: 9 INJECTION, SOLUTION INTRAVENOUS at 20:27

## 2020-12-13 ASSESSMENT — PAIN SCALES - GENERAL: PAINLEVEL_OUTOF10: 7

## 2020-12-13 NOTE — ED PROVIDER NOTES
ED Attending  [facility]  [facility] of [facility] Medicine   ED  Encounter Note  Admit Date/RoomTime: 2020  6:14 PM  ED Room:     NAME: Kerri Ruggiero  : 1985  MRN: [id number]     Chief Complaint:  Leg Pain (Left, Started this morning, reports she has a history of dvt in that leg, no on thinner due to blood condition) and Concern For COVID-19 (Pt diagnosed on Monday, has fever and cough)    History of Present 1800 Regis Carey is a 37 y.o. old female who presents to the emergency department by private vehicle, for swelling of the left calf with pain, and has a history of blood clots in that leg. She is tested positive for covid Monday (6 days ago) and has been  Having continued body aches. Her fever and NVD have been resolved for a few days. She reports she is still having chest pain and exertional shortness of breath. She also reports she has been on her menses for 11 days and it is heavy and she is having cramps. She says this is her usual and her GYN has done many tests to determine cause. She had a Mirena and it did not work,same with depot shot. She states she cannot take blood thinners due to having low hemoglobin and instead takes asa daily. Her LLE blood clots are intermittent usually for the past few years. Minimal cough at this point. Associated Signs & Symptoms:     []  Fever     [x]  Cough     [x]  Dyspnea     []  Hemoptysis     [x]  Chest Pain     Wells' Score Criteria for DVT: (possible score ? 2 to 9)      Active cancer (treatment within last 6 months or palliative) no (0)     Calf swelling ?  3 cm compared to asymptomatic calf (measured 10 cm             below tibial tuberosity) yes (1)     Swollen unilateral superficial veins (non-varicose, in symptomatic leg) yes (1)     Unilateral pitting edema (in symptomatic leg): no (0)     Previous documented DVT: yes (1)     Swelling of entire leg no (0)      Localized tenderness along the deep venous system yes (1)     Paralysis, paresis, or recent cast immobilization of lower extremities no (0)     Recently bedridden ? 3 days, or major surgery requiring regional or                    general anesthetic in the past 12 weeks no (0)     Alternative diagnosis at least as likely No (0)     TOTAL SCORE 4     * Those with Wells scores of two or more have a 28% chance of having DVT, those with    a lower score have 6% odds. ** Alternatively, Wells scores can be categorized as high if greater than two, moderate if      one or two, and low if less than one, with likelihoods of 53%, 17%, and 5%     respectively. ROS   Pertinent positives and negatives are stated within HPI, all other systems reviewed and are negative. Past Medical History:  has a past medical history of Anemia, DVT (deep venous thrombosis) ([address] DVT of lower extremity (deep venous thrombosis) ([address] History of DVT of lower extremity, and Migraine. Surgical History:  has a past surgical history that includes Induced  (10/12/12) and Dilation & curettage. Social History:  reports that she has never smoked. She has never used smokeless tobacco. She reports that she does not drink alcohol or use drugs. Family History: family history is not on file. Allergies: Latex    Physical Exam   Oxygen Saturation Interpretation: Normal.        ED Triage Vitals [20 1416]   BP Temp Temp Source Pulse Resp SpO2 Height Weight   124/74 97.6 °F (36.4 °C) Temporal 110 16 98 % 5' (1.524 m) 162 lb (73.5 kg)         Constitutional:  Alert, development consistent with age. HEENT:  NC/NT. Airway patent. Oropharynx normal appearing, TM normal bilateral, nasal passages clear. Neck:  Normal ROM. Supple. No thyromegaly noted  Respiratory: diminished breath sound of right upper lobe, normal respiratory effort and rate.  No wheezes, rales, or rhonchi  CV:  Tachycardic rate and regular rhythm, normal heart sounds, without pathological - 107 mmol/L    CO2 26 22 - 29 mmol/L    Anion Gap 8 7 - 16 mmol/L    Glucose 113 (H) 74 - 99 mg/dL    BUN 6 6 - 20 mg/dL    CREATININE 0.7 0.5 - 1.0 mg/dL    GFR Non-African American >60 >=60 mL/min/1.73    GFR African American >60     Calcium 9.3 8.6 - 10.2 mg/dL    Total Protein 7.5 6.4 - 8.3 g/dL    Alb 4.1 3.5 - 5.2 g/dL    Total Bilirubin 0.4 0.0 - 1.2 mg/dL    Alkaline Phosphatase 90 35 - 104 U/L    ALT 13 0 - 32 U/L    AST 15 0 - 31 U/L   Protime-INR   Result Value Ref Range    Protime 13.0 (H) 9.3 - 12.4 sec    INR 1.1    APTT   Result Value Ref Range    aPTT 24.6 24.5 - 35.1 sec   Urinalysis   Result Value Ref Range    Color, UA Yellow Straw/Yellow    Clarity, UA Clear Clear    Glucose, Ur Negative Negative mg/dL    Bilirubin Urine Negative Negative    Ketones, Urine Negative Negative mg/dL    Specific Gravity, UA <=1.005 1.005 - 1.030    Blood, Urine MODERATE (A) Negative    pH, UA 5.5 5.0 - 9.0    Protein, UA Negative Negative mg/dL    Urobilinogen, Urine 0.2 <2.0 E.U./dL    Nitrite, Urine Negative Negative    Leukocyte Esterase, Urine Negative Negative   Microscopic Urinalysis   Result Value Ref Range    WBC, UA NONE 0 - 5 /HPF    RBC, UA 1-3 0 - 2 /HPF    Bacteria, UA NONE SEEN None Seen /HPF   Pregnancy, Urine   Result Value Ref Range    HCG(Urine) Pregnancy Test NEGATIVE NEGATIVE   EKG 12 Lead   Result Value Ref Range    Ventricular Rate 86 BPM    Atrial Rate 86 BPM    P-R Interval 146 ms    QRS Duration 64 ms    Q-T Interval 338 ms    QTc Calculation (Bazett) 404 ms    P Axis 44 degrees    R Axis 9 degrees    T Axis 50 degrees   TYPE AND SCREEN   Result Value Ref Range    ABO/Rh A POS     Antibody Screen NEG        Imaging: All Radiology results interpreted by Radiologist unless otherwise noted. CTA PULMONARY W CONTRAST   Final Result   No evidence of pulmonary embolism.    Trace patchy and ground-glass opacities are noted in the bilateral lower   lungs, these are nonspecific and could be related to the provided history of   COVID-19. US DUP LOWER EXTREMITY LEFT MILADYS   Final Result   1. Partially occlusive thrombus in one  of the duplicated posterior tibial   veins. 2. No above the knee DVT seen. XR CHEST PORTABLE   Final Result   Normal chest.          ED Course / Medical Decision Making     Medications   0.9 % sodium chloride bolus (1,000 mLs Intravenous New Bag 12/13/20 2027)   ketorolac (TORADOL) injection 15 mg (15 mg Intravenous Given 12/13/20 2027)   enoxaparin (LOVENOX) injection 70 mg (70 mg Subcutaneous Given 12/13/20 2215)   pantoprazole (PROTONIX) injection 40 mg (40 mg Intravenous Given 12/13/20 2215)   aluminum & magnesium hydroxide-simethicone (MAALOX) 30 mL, lidocaine viscous hcl (XYLOCAINE) 5 mL (GI COCKTAIL) ( Oral Given 12/13/20 2215)   iopamidol (ISOVUE-370) 76 % injection 75 mL (75 mLs Intravenous Given 12/13/20 2121)     ED Course as of Dec 13 2249   Sun Dec 13, 2020   2000 Pt still having pain, will order toradol. Pt asking for breathing treatment. There is no wheezing and her HR is 110, will hold off on this as there is no benefit. [LS]   2050 Pt pain is not resolved. [LS]      ED Course User Index  [LS] Judith Frost PA-C     Consult(s):   None    Procedure(s):   none    MDM:  Imaging was obtained based on high suspicion for PE as per history/physical findings. Pt ultrasound positive for non occlusive DVT of left lower extremity. Pt is covid positive but symptoms are minimal at this time, though she has persistent central chest pain, normal oxygen, normal breathing. CTA negative for PE, bloodwork wnl. Pt home with eliquis and advised to follow-up with OB/GYN about heavier menstrual periods. Patient reports she is trying to get her doctor to give her hysterectomy. She already has 4 children. I gave her a referral to a different gynecologist if she wants to try alternate provider.   Patient advised to return emergency if she develops severe shortness of breath. Plan of Care/Counseling:  I reviewed today's visit with the patient in addition to providing specific details for the plan of care and counseling regarding the diagnosis and prognosis. Questions are answered at this time and are agreeable with the plan. Assessment      1. Deep vein thrombosis (DVT) of tibial vein of left lower extremity, unspecified chronicity ([facility] [address]    2. Menstrual cramps    3. Shortness of breath      Plan   Discharge to home. Patient condition is stable    New Medications     New Prescriptions    ALBUTEROL SULFATE HFA (PROAIR HFA) 108 (90 BASE) MCG/ACT INHALER    Inhale 2 puffs into the lungs every 6 hours as needed for Shortness of Breath    APIXABAN (ELIQUIS) 5 MG TABS TABLET    Take 10mg twice daily for seven days,  Then take 5mg twice daily until discontinued by PCP or specialist.    METHYLPREDNISOLONE (MEDROL, EDELMIRA,) 4 MG TABLET    Take as directed on package insert days 1-6     Electronically signed by Judith Frost PA-C   DD: 12/13/20  **This report was transcribed using voice recognition software. Every effort was made to ensure accuracy; however, inadvertent computerized transcription errors may be present.   END OF ED PROVIDER NOTE       Judith Frost PA-C  12/13/20 2323

## 2020-12-14 ENCOUNTER — CARE COORDINATION (OUTPATIENT)
Dept: CARE COORDINATION | Age: 35
End: 2020-12-14

## 2020-12-14 LAB
EKG ATRIAL RATE: 86 BPM
EKG P AXIS: 44 DEGREES
EKG P-R INTERVAL: 146 MS
EKG Q-T INTERVAL: 338 MS
EKG QRS DURATION: 64 MS
EKG QTC CALCULATION (BAZETT): 404 MS
EKG R AXIS: 9 DEGREES
EKG T AXIS: 50 DEGREES
EKG VENTRICULAR RATE: 86 BPM

## 2020-12-14 PROCEDURE — 93010 ELECTROCARDIOGRAM REPORT: CPT | Performed by: INTERNAL MEDICINE

## 2020-12-14 NOTE — CARE COORDINATION
Patient contacted regarding TBWYP-65 diagnosis\". Discussed COVID-19 related testing which was available at this time. Test results were positive. Patient informed of results, if available? Yes    Care Transition Nurse/ Ambulatory Care Manager contacted the patient by telephone to perform post discharge assessment. Call within 2 business days of discharge: Yes. Verified name and  with patient as identifiers. Provided introduction to self, and explanation of the CTN/ACM role, and reason for call due to risk factors for infection and/or exposure to COVID-19. Symptoms reviewed with patient who verbalized the following symptoms: pain or aching joints, cough, loss of taste or smell, chest pain and left lef pain and swelling. Due to no new or worsening symptoms encounter was not routed to provider for escalation. Discussed follow-up appointments. If no appointment was previously scheduled, appointment scheduling offered: Yes  [facility] follow up appointment(s): No future appointments. Non-[facility] follow up appointment(s): yes. -PCP appt 2020. Non-face-to-face services provided:  Reviewed AVS     Advance Care Planning:   Does patient have an Advance Directive:  decision maker updated. Patient has following risk factors of: obesity, DVT left, anemia. CTN/ACM reviewed discharge instructions, medical action plan and red flags such as increased shortness of breath, increasing fever and signs of decompensation with patient who verbalized understanding. Discussed exposure protocols and quarantine with CDC Guidelines What to do if you are sick with coronavirus disease .  Patient was given an opportunity for questions and concerns. The patient agrees to contact the [facility] exposure line [phone], local [facility] department [place] [facility]: ([phone]) and PCP office for questions related to their healthcare. CTN/ACM provided contact information for future needs.     Reviewed and educated patient on any new and changed medications related to discharge diagnosis     Patient/family/caregiver given information for GetWell Loop and agrees to enroll yes  Patient's preferred e-mail: [email or web address]   Patient's preferred phone number: [phone]  Based on Loop alert triggers, patient will be contacted by nurse care manager for worsening symptoms.    -Pt reports that she feels some better.    -Left leg pain and swelling continues. She took Ibuprofen 800mg with minimal relief. She started the Eliquis & prednisone today.    -Loss of taste & smell continue. Cough and body aches are less. She only has chest pain when she coughs. Pt said she is taking Bromphed DM and Robitussin with honey and these give relief. Fever and SOB are gone. Pt used albuterol inhaler once today with a little relief.    -Pt reports that she is trying to keep hydrated but nothing will stay down. She took Zofran with minimal relief.    -Pt reports that she wears a mask around family members and they wear a mask around her. -PCP appt 12-.   -Pt is active with [employer].   -Pt agrees to continue LOOP. Message sent to LOOP Team to extend LOOP. Pt will be further monitored by COVID Loop Team based on severity of symptoms and risk factors.

## 2021-04-22 ENCOUNTER — HOSPITAL ENCOUNTER (EMERGENCY)
Age: 36
Discharge: HOME | End: 2021-04-22
Payer: COMMERCIAL

## 2021-04-22 VITALS
SYSTOLIC BLOOD PRESSURE: 110 MMHG | HEIGHT: 60 IN | BODY MASS INDEX: 31.41 KG/M2 | OXYGEN SATURATION: 100 % | WEIGHT: 160 LBS | RESPIRATION RATE: 14 BRPM | HEART RATE: 88 BPM | TEMPERATURE: 97.3 F | DIASTOLIC BLOOD PRESSURE: 77 MMHG

## 2021-04-22 DIAGNOSIS — K08.89 PAIN, DENTAL: Primary | ICD-10-CM

## 2021-04-22 PROCEDURE — 99283 EMERGENCY DEPT VISIT LOW MDM: CPT

## 2021-04-22 RX ORDER — AMOXICILLIN 500 MG/1
500 CAPSULE ORAL 3 TIMES DAILY
Qty: 30 CAPSULE | Refills: 0 | Status: SHIPPED | OUTPATIENT
Start: 2021-04-22 | End: 2021-05-02

## 2021-04-22 RX ORDER — LIDOCAINE HYDROCHLORIDE 20 MG/ML
15 SOLUTION OROPHARYNGEAL
Qty: 100 ML | Refills: 0 | Status: SHIPPED | OUTPATIENT
Start: 2021-04-22 | End: 2021-08-11

## 2021-04-22 RX ORDER — CHLORHEXIDINE GLUCONATE 0.12 MG/ML
15 RINSE ORAL 2 TIMES DAILY
Qty: 420 ML | Refills: 0 | Status: SHIPPED | OUTPATIENT
Start: 2021-04-22 | End: 2021-05-06

## 2021-04-22 ASSESSMENT — PAIN DESCRIPTION - FREQUENCY: FREQUENCY: CONTINUOUS

## 2021-04-22 ASSESSMENT — PAIN DESCRIPTION - ORIENTATION: ORIENTATION: RIGHT;LOWER

## 2021-04-22 ASSESSMENT — PAIN SCALES - GENERAL: PAINLEVEL_OUTOF10: 10

## 2021-04-22 NOTE — ED PROVIDER NOTES
· Constitutional:  Alert, development consistent with age. · HEENT:  NC/NT. Airway patent. · Neck:  Supple. Normal ROM. · Lips:  upper and lower normal.  · Mouth:  normal tongue and buccal mucosa. · Dental:  Cracked tooth with filling to tooth number 30. Trismus: No.         Drooling: No.           Airway stridor: No.  · Facial skin: bilateral no erythema, rash or swelling noted. · Respiratory:  Clear to auscultation and breath sounds equal.    · CV: Regular rate and rhythm, normal heart sounds, without pathological murmurs, ectopy, gallops, or rubs. · Skin:  No rashes, erythema or lesions present, unless noted elsewhere. .  · Lymphatics: No lymphangitis or adenopathy noted. · Neurological:  Oriented. Motor functions intact. Lab / Imaging Results   (All laboratory and radiology results have been personally reviewed by myself)  Labs:  No results found for this visit on 04/22/21. Imaging: All Radiology results interpreted by Radiologist unless otherwise noted. No orders to display     ED Course / Medical Decision Making   Medications - No data to display     Consult(s):   Dental Resident was not consulted to see patient regarding complaint. Procedure(s):    none. Counseling/MDM:    I discussed todays visit summary with patient,  in addition to providing specific details for the plan of care and counseling regarding the diagnosis and prognosis. Questions are answered at this time and they are agreeable with the plan. Assessment      1. Pain, dental      Plan   Discharge home.   Patient condition is good    New Medications     Discharge Medication List as of 4/22/2021  7:22 PM      START taking these medications    Details   amoxicillin (AMOXIL) 500 MG capsule Take 1 capsule by mouth 3 times daily for 10 days, Disp-30 capsule, R-0Normal      lidocaine viscous hcl (XYLOCAINE) 2 % SOLN solution Take 15 mLs by mouth every 3 hours as needed for Irritation or Dental Pain (SWISH AND SPIT), Disp-100 mL, R-0Normal      chlorhexidine (PERIDEX) 0.12 % solution Take 15 mLs by mouth 2 times daily for 14 days, Disp-420 mL, R-0Normal           Electronically signed by NOLAN Crane CNP   DD: 4/22/21  **This report was transcribed using voice recognition software. Every effort was made to ensure accuracy; however, inadvertent computerized transcription errors may be present.   END OF ED PROVIDER NOTE        NOLAN Llanos CNP  04/23/21 1363

## 2021-05-16 ENCOUNTER — HOSPITAL ENCOUNTER (EMERGENCY)
Age: 36
Discharge: HOME | End: 2021-05-16
Attending: EMERGENCY MEDICINE
Payer: COMMERCIAL

## 2021-05-16 ENCOUNTER — APPOINTMENT (OUTPATIENT)
Dept: CT IMAGING | Age: 36
End: 2021-05-16
Payer: COMMERCIAL

## 2021-05-16 VITALS
WEIGHT: 180 LBS | SYSTOLIC BLOOD PRESSURE: 108 MMHG | RESPIRATION RATE: 18 BRPM | OXYGEN SATURATION: 100 % | TEMPERATURE: 98.6 F | DIASTOLIC BLOOD PRESSURE: 71 MMHG | BODY MASS INDEX: 35.34 KG/M2 | HEART RATE: 82 BPM | HEIGHT: 60 IN

## 2021-05-16 DIAGNOSIS — R10.30 LOWER ABDOMINAL PAIN: Primary | ICD-10-CM

## 2021-05-16 LAB
ALBUMIN SERPL-MCNC: 4.3 G/DL (ref 3.5–5.2)
ALP BLD-CCNC: 121 U/L (ref 35–104)
ALT SERPL-CCNC: 12 U/L (ref 0–32)
ANION GAP SERPL CALCULATED.3IONS-SCNC: 9 MMOL/L (ref 7–16)
AST SERPL-CCNC: 16 U/L (ref 0–31)
BASOPHILS ABSOLUTE: 0.02 E9/L (ref 0–0.2)
BASOPHILS RELATIVE PERCENT: 0.4 % (ref 0–2)
BILIRUB SERPL-MCNC: 0.3 MG/DL (ref 0–1.2)
BUN BLDV-MCNC: 4 MG/DL (ref 6–20)
CALCIUM SERPL-MCNC: 9 MG/DL (ref 8.6–10.2)
CHLORIDE BLD-SCNC: 101 MMOL/L (ref 98–107)
CO2: 26 MMOL/L (ref 22–29)
CREAT SERPL-MCNC: 0.8 MG/DL (ref 0.5–1)
EOSINOPHILS ABSOLUTE: 0.02 E9/L (ref 0.05–0.5)
EOSINOPHILS RELATIVE PERCENT: 0.4 % (ref 0–6)
GFR AFRICAN AMERICAN: >60
GFR NON-AFRICAN AMERICAN: >60 ML/MIN/1.73
GLUCOSE BLD-MCNC: 90 MG/DL (ref 74–99)
HCG, URINE, POC: NEGATIVE
HCT VFR BLD CALC: 34.4 % (ref 34–48)
HEMOGLOBIN: 10.2 G/DL (ref 11.5–15.5)
IMMATURE GRANULOCYTES #: 0.01 E9/L
IMMATURE GRANULOCYTES %: 0.2 % (ref 0–5)
LYMPHOCYTES ABSOLUTE: 1.28 E9/L (ref 1.5–4)
LYMPHOCYTES RELATIVE PERCENT: 22.4 % (ref 20–42)
Lab: NORMAL
MCH RBC QN AUTO: 24 PG (ref 26–35)
MCHC RBC AUTO-ENTMCNC: 29.7 % (ref 32–34.5)
MCV RBC AUTO: 80.9 FL (ref 80–99.9)
MONOCYTES ABSOLUTE: 0.5 E9/L (ref 0.1–0.95)
MONOCYTES RELATIVE PERCENT: 8.8 % (ref 2–12)
NEGATIVE QC PASS/FAIL: NORMAL
NEUTROPHILS ABSOLUTE: 3.88 E9/L (ref 1.8–7.3)
NEUTROPHILS RELATIVE PERCENT: 67.8 % (ref 43–80)
PDW BLD-RTO: 16.8 FL (ref 11.5–15)
PLATELET # BLD: 358 E9/L (ref 130–450)
PMV BLD AUTO: 9.8 FL (ref 7–12)
POSITIVE QC PASS/FAIL: NORMAL
POTASSIUM REFLEX MAGNESIUM: 3.6 MMOL/L (ref 3.5–5)
RBC # BLD: 4.25 E12/L (ref 3.5–5.5)
SODIUM BLD-SCNC: 136 MMOL/L (ref 132–146)
TOTAL PROTEIN: 7.8 G/DL (ref 6.4–8.3)
WBC # BLD: 5.7 E9/L (ref 4.5–11.5)

## 2021-05-16 PROCEDURE — 74177 CT ABD & PELVIS W/CONTRAST: CPT

## 2021-05-16 PROCEDURE — 6370000000 HC RX 637 (ALT 250 FOR IP): Performed by: FAMILY MEDICINE

## 2021-05-16 PROCEDURE — 6360000004 HC RX CONTRAST MEDICATION: Performed by: RADIOLOGY

## 2021-05-16 PROCEDURE — 99285 EMERGENCY DEPT VISIT HI MDM: CPT

## 2021-05-16 PROCEDURE — 85025 COMPLETE CBC W/AUTO DIFF WBC: CPT

## 2021-05-16 PROCEDURE — 80053 COMPREHEN METABOLIC PANEL: CPT

## 2021-05-16 RX ORDER — ONDANSETRON 4 MG/1
4 TABLET, ORALLY DISINTEGRATING ORAL 3 TIMES DAILY PRN
Qty: 21 TABLET | Refills: 0 | Status: SHIPPED | OUTPATIENT
Start: 2021-05-16 | End: 2021-08-11

## 2021-05-16 RX ORDER — ACETAMINOPHEN 500 MG
1000 TABLET ORAL ONCE
Status: COMPLETED | OUTPATIENT
Start: 2021-05-16 | End: 2021-05-16

## 2021-05-16 RX ADMIN — IOPAMIDOL 75 ML: 755 INJECTION, SOLUTION INTRAVENOUS at 19:38

## 2021-05-16 RX ADMIN — ACETAMINOPHEN 1000 MG: 500 TABLET ORAL at 19:02

## 2021-05-16 ASSESSMENT — ENCOUNTER SYMPTOMS
NAUSEA: 0
SHORTNESS OF BREATH: 0
WHEEZING: 0
COUGH: 0
RHINORRHEA: 0
BLOOD IN STOOL: 0
CONSTIPATION: 1
VOMITING: 0
ABDOMINAL PAIN: 1
SORE THROAT: 0
FACIAL SWELLING: 0

## 2021-05-16 ASSESSMENT — PAIN SCALES - GENERAL
PAINLEVEL_OUTOF10: 8

## 2021-05-16 ASSESSMENT — PAIN DESCRIPTION - LOCATION
LOCATION: ABDOMEN
LOCATION: ABDOMEN

## 2021-05-16 ASSESSMENT — PAIN DESCRIPTION - PAIN TYPE: TYPE: ACUTE PAIN

## 2021-05-16 NOTE — LETTER
[facility] Emergency Department  [address] [place] [id number]  Phone: [phone]             May 16, 2021    Patient: Florencia Salcido   YOB: 1985   Date of Visit: 5/16/2021       To Whom It May Concern:    Abby Johnson was seen and treated in our emergency department on 5/16/2021. She may return to work on 5/17/21.       Sincerely,             Signature:__________________________________

## 2021-05-16 NOTE — ED PROVIDER NOTES
31year-old female with no significant past medical history presented to ER with constant lower abdominal pain ongoing for 4 days now. Patient stated that the pain is an 8 out of 10 generalized, dull, no alleviating or exacerbating factors. Patient stated that she recently started on iron supplements for anemia 3 to 4 weeks ago and she did not have bowel movement for 2 weeks. Patient stated that she tried 2 enemas few laxatives and stool softeners which did not help at all. She denies any fever, chills, nausea, vomiting, chest pain, shortness of breath, difficulty breathing, fall, dysuria ,injury, trauma, sore throat, headache, lightheadedness. Review of Systems   Constitutional: Negative for chills and fever. HENT: Negative for ear pain, facial swelling, rhinorrhea and sore throat. Eyes: Negative for visual disturbance. Respiratory: Negative for cough, shortness of breath and wheezing. Cardiovascular: Negative for chest pain, palpitations and leg swelling. Gastrointestinal: Positive for abdominal pain and constipation. Negative for blood in stool, nausea and vomiting. Endocrine: Negative for polyuria. Genitourinary: Negative for hematuria. Musculoskeletal: Negative for arthralgias and myalgias. Skin: Negative for rash and wound. Neurological: Negative for dizziness, weakness and light-headedness. Psychiatric/Behavioral: Negative for confusion and sleep disturbance. Physical Exam  Vitals and nursing note reviewed. HENT:      Head: Normocephalic and atraumatic. Right Ear: External ear normal.      Left Ear: External ear normal.      Mouth/Throat:      Mouth: Mucous membranes are moist.   Eyes:      General: No scleral icterus. Extraocular Movements: Extraocular movements intact. Pupils: Pupils are equal, round, and reactive to light. Cardiovascular:      Rate and Rhythm: Normal rate and regular rhythm. Heart sounds: No murmur heard.    No friction rub. No gallop. Pulmonary:      Effort: Pulmonary effort is normal.      Breath sounds: Normal breath sounds. No wheezing, rhonchi or rales. Abdominal:      General: Abdomen is flat. Palpations: Abdomen is soft. Tenderness: There is abdominal tenderness in the suprapubic area. Musculoskeletal:      Cervical back: Normal range of motion and neck supple. Right lower leg: No edema. Left lower leg: No edema. Skin:     General: Skin is warm. Findings: No erythema or rash. Neurological:      General: No focal deficit present. Mental Status: She is alert. Psychiatric:         Mood and Affect: Mood normal.         Behavior: Behavior normal.          Procedures     MDM         Patient presents to the ED for the constipation  for 2 weeks . differential diagnoses included but not limited to diverticulitis, bowel obstruction, . Workup in the ED revealed normal labs and CT abdomen showed masslike focus in the uterine fundus most likely fibroid versus malignant process . Patient was given tylenol  for their symptoms with good improvement. Patient continues to be non-toxic on re-evaluation. Findings were discussed with the patient and reasons to immediately return to the ED were articulated to them. They will follow-up with their PMD and Gyn doctor for evaluation and management of fibroid. Effie Ron --------------------------------------------- PAST HISTORY ---------------------------------------------  Past Medical History:  has a past medical history of Anemia, DVT (deep venous thrombosis) ([facility] [address] DVT of lower extremity (deep venous thrombosis) ([facility] [address] History of DVT of lower extremity, and Migraine. Past Surgical History:  has a past surgical history that includes Induced  (10/12/12) and Dilation & curettage. Social History:  reports that she has never smoked. She has never used smokeless tobacco. She reports that she does not drink alcohol and does not use drugs.     Family History: family history is not on file. The patients home medications have been reviewed.     Allergies: Latex    -------------------------------------------------- RESULTS -------------------------------------------------  Labs:  Results for orders placed or performed during the hospital encounter of 05/16/21   CBC WITH AUTO DIFFERENTIAL   Result Value Ref Range    WBC 5.7 4.5 - 11.5 E9/L    RBC 4.25 3.50 - 5.50 E12/L    Hemoglobin 10.2 (L) 11.5 - 15.5 g/dL    Hematocrit 34.4 34.0 - 48.0 %    MCV 80.9 80.0 - 99.9 fL    MCH 24.0 (L) 26.0 - 35.0 pg    MCHC 29.7 (L) 32.0 - 34.5 %    RDW 16.8 (H) 11.5 - 15.0 fL    Platelets 160 824 - 088 E9/L    MPV 9.8 7.0 - 12.0 fL    Neutrophils % 67.8 43.0 - 80.0 %    Immature Granulocytes % 0.2 0.0 - 5.0 %    Lymphocytes % 22.4 20.0 - 42.0 %    Monocytes % 8.8 2.0 - 12.0 %    Eosinophils % 0.4 0.0 - 6.0 %    Basophils % 0.4 0.0 - 2.0 %    Neutrophils Absolute 3.88 1.80 - 7.30 E9/L    Immature Granulocytes # 0.01 E9/L    Lymphocytes Absolute 1.28 (L) 1.50 - 4.00 E9/L    Monocytes Absolute 0.50 0.10 - 0.95 E9/L    Eosinophils Absolute 0.02 (L) 0.05 - 0.50 E9/L    Basophils Absolute 0.02 0.00 - 0.20 E9/L   Comprehensive Metabolic Panel w/ Reflex to MG   Result Value Ref Range    Sodium 136 132 - 146 mmol/L    Potassium reflex Magnesium 3.6 3.5 - 5.0 mmol/L    Chloride 101 98 - 107 mmol/L    CO2 26 22 - 29 mmol/L    Anion Gap 9 7 - 16 mmol/L    Glucose 90 74 - 99 mg/dL    BUN 4 (L) 6 - 20 mg/dL    CREATININE 0.8 0.5 - 1.0 mg/dL    GFR Non-African American >60 >=60 mL/min/1.73    GFR African American >60     Calcium 9.0 8.6 - 10.2 mg/dL    Total Protein 7.8 6.4 - 8.3 g/dL    Albumin 4.3 3.5 - 5.2 g/dL    Total Bilirubin 0.3 0.0 - 1.2 mg/dL    Alkaline Phosphatase 121 (H) 35 - 104 U/L    ALT 12 0 - 32 U/L    AST 16 0 - 31 U/L   POC Pregnancy Urine Qual   Result Value Ref Range    HCG, Urine, POC Negative Negative    Lot Number [id number]     Positive QC Pass/Fail Pass     Negative QC Pass/Fail Pass        Radiology:  CT ABDOMEN PELVIS W IV CONTRAST Additional Contrast? None   Preliminary Result   Heterogeneous masslike focus in the uterine fundus measuring 5.7 cm. This   may be reflective of a degenerating fibroid although a malignant process   cannot be completely excluded. Correlation with pelvic ultrasound is   recommended. Additionally, the endometrial canal is dilated up to 2.1 cm and   there is a 3.7 cm left ovarian cyst.  Each of these abnormalities would be   better appreciated with pelvic ultrasound.             ------------------------- NURSING NOTES AND VITALS REVIEWED ---------------------------  Date / Time Roomed:  5/16/2021  5:18 PM  ED Bed Assignment:  06/06    The nursing notes within the ED encounter and vital signs as below have been reviewed. /71   Pulse 82   Temp 98.6 °F (37 °C) (Oral)   Resp 18   Ht 5' (1.524 m)   Wt 180 lb (81.6 kg)   LMP 05/08/2021   SpO2 100%   BMI 35.15 kg/m²   Oxygen Saturation Interpretation: Normal      ------------------------------------------ PROGRESS NOTES ------------------------------------------  8:23 PM EDT  I have spoken with the patient and discussed todays results, in addition to providing specific details for the plan of care and counseling regarding the diagnosis and prognosis. Their questions are answered at this time and they are agreeable with the plan. I discussed at length with them reasons for immediate return here for re evaluation. They will followup with their primary care physician by calling their office tomorrow and ObGyn      --------------------------------- ADDITIONAL PROVIDER NOTES ---------------------------------  At this time the patient is without objective evidence of an acute process requiring hospitalization or inpatient management. They have remained hemodynamically stable throughout their entire ED visit and are stable for discharge with outpatient follow-up.      The plan has been discussed in detail and they are aware of the specific conditions for emergent return, as well as the importance of follow-up. New Prescriptions    No medications on file       Diagnosis:  1. Lower abdominal pain        Disposition:  Patient's disposition: Discharge to home  Patient's condition is stable.            Sara Tesfaye MD  Resident  05/16/21 4157

## 2021-05-16 NOTE — LETTER
[facility] Emergency Department  [address] [place] [address]  Phone: [phone]             May 16, 2021    Patient: Ish Mckay   YOB: 1985   Date of Visit: 5/16/2021       To Whom It May Concern:    Francia Osgood was seen and treated in our emergency department on 5/16/2021. She may return to work on 5/17/21.       Sincerely,             Signature:__________________________________

## 2021-06-03 ENCOUNTER — HOSPITAL ENCOUNTER (EMERGENCY)
Age: 36
Discharge: HOME | End: 2021-06-03
Attending: EMERGENCY MEDICINE
Payer: COMMERCIAL

## 2021-06-03 VITALS
HEIGHT: 60 IN | OXYGEN SATURATION: 100 % | WEIGHT: 160 LBS | HEART RATE: 109 BPM | DIASTOLIC BLOOD PRESSURE: 74 MMHG | SYSTOLIC BLOOD PRESSURE: 108 MMHG | TEMPERATURE: 97.7 F | RESPIRATION RATE: 14 BRPM | BODY MASS INDEX: 31.41 KG/M2

## 2021-06-03 DIAGNOSIS — Z86.018 HX OF UTERINE LEIOMYOMA: ICD-10-CM

## 2021-06-03 DIAGNOSIS — N93.9 VAGINAL BLEEDING: Primary | ICD-10-CM

## 2021-06-03 LAB
ALBUMIN SERPL-MCNC: 3.8 G/DL (ref 3.5–5.2)
ALP BLD-CCNC: 105 U/L (ref 35–104)
ALT SERPL-CCNC: 14 U/L (ref 0–32)
ANION GAP SERPL CALCULATED.3IONS-SCNC: 9 MMOL/L (ref 7–16)
AST SERPL-CCNC: 16 U/L (ref 0–31)
BACTERIA: ABNORMAL /HPF
BASOPHILS ABSOLUTE: 0.02 E9/L (ref 0–0.2)
BASOPHILS RELATIVE PERCENT: 0.3 % (ref 0–2)
BILIRUB SERPL-MCNC: <0.2 MG/DL (ref 0–1.2)
BILIRUBIN DIRECT: <0.2 MG/DL (ref 0–0.3)
BILIRUBIN URINE: NEGATIVE
BILIRUBIN, INDIRECT: ABNORMAL MG/DL (ref 0–1)
BLOOD, URINE: ABNORMAL
BUN BLDV-MCNC: 7 MG/DL (ref 6–20)
CALCIUM SERPL-MCNC: 8.8 MG/DL (ref 8.6–10.2)
CHLORIDE BLD-SCNC: 104 MMOL/L (ref 98–107)
CLARITY: CLEAR
CO2: 25 MMOL/L (ref 22–29)
COLOR: YELLOW
CREAT SERPL-MCNC: 0.8 MG/DL (ref 0.5–1)
EOSINOPHILS ABSOLUTE: 0.04 E9/L (ref 0.05–0.5)
EOSINOPHILS RELATIVE PERCENT: 0.7 % (ref 0–6)
GFR AFRICAN AMERICAN: >60
GFR NON-AFRICAN AMERICAN: >60 ML/MIN/1.73
GLUCOSE BLD-MCNC: 90 MG/DL (ref 74–99)
GLUCOSE URINE: NEGATIVE MG/DL
HCG, URINE, POC: NEGATIVE
HCT VFR BLD CALC: 29.2 % (ref 34–48)
HEMOGLOBIN: 8.7 G/DL (ref 11.5–15.5)
IMMATURE GRANULOCYTES #: 0.01 E9/L
IMMATURE GRANULOCYTES %: 0.2 % (ref 0–5)
KETONES, URINE: NEGATIVE MG/DL
LEUKOCYTE ESTERASE, URINE: NEGATIVE
LYMPHOCYTES ABSOLUTE: 1.31 E9/L (ref 1.5–4)
LYMPHOCYTES RELATIVE PERCENT: 22.2 % (ref 20–42)
Lab: NORMAL
MCH RBC QN AUTO: 23.3 PG (ref 26–35)
MCHC RBC AUTO-ENTMCNC: 29.8 % (ref 32–34.5)
MCV RBC AUTO: 78.3 FL (ref 80–99.9)
MONOCYTES ABSOLUTE: 0.37 E9/L (ref 0.1–0.95)
MONOCYTES RELATIVE PERCENT: 6.3 % (ref 2–12)
NEGATIVE QC PASS/FAIL: NORMAL
NEUTROPHILS ABSOLUTE: 4.15 E9/L (ref 1.8–7.3)
NEUTROPHILS RELATIVE PERCENT: 70.3 % (ref 43–80)
NITRITE, URINE: NEGATIVE
PDW BLD-RTO: 17.6 FL (ref 11.5–15)
PH UA: 6 (ref 5–9)
PLATELET # BLD: 305 E9/L (ref 130–450)
PMV BLD AUTO: 8.6 FL (ref 7–12)
POSITIVE QC PASS/FAIL: NORMAL
POTASSIUM SERPL-SCNC: 3.4 MMOL/L (ref 3.5–5)
PROTEIN UA: NEGATIVE MG/DL
RBC # BLD: 3.73 E12/L (ref 3.5–5.5)
RBC UA: ABNORMAL /HPF (ref 0–2)
SODIUM BLD-SCNC: 138 MMOL/L (ref 132–146)
SPECIFIC GRAVITY UA: <=1.005 (ref 1–1.03)
TOTAL PROTEIN: 6.9 G/DL (ref 6.4–8.3)
UROBILINOGEN, URINE: 0.2 E.U./DL
WBC # BLD: 5.9 E9/L (ref 4.5–11.5)
WBC UA: ABNORMAL /HPF (ref 0–5)

## 2021-06-03 PROCEDURE — 2580000003 HC RX 258: Performed by: EMERGENCY MEDICINE

## 2021-06-03 PROCEDURE — 85025 COMPLETE CBC W/AUTO DIFF WBC: CPT

## 2021-06-03 PROCEDURE — 80076 HEPATIC FUNCTION PANEL: CPT

## 2021-06-03 PROCEDURE — 36415 COLL VENOUS BLD VENIPUNCTURE: CPT

## 2021-06-03 PROCEDURE — 96374 THER/PROPH/DIAG INJ IV PUSH: CPT

## 2021-06-03 PROCEDURE — 81001 URINALYSIS AUTO W/SCOPE: CPT

## 2021-06-03 PROCEDURE — 80048 BASIC METABOLIC PNL TOTAL CA: CPT

## 2021-06-03 PROCEDURE — 99284 EMERGENCY DEPT VISIT MOD MDM: CPT

## 2021-06-03 PROCEDURE — 6360000002 HC RX W HCPCS: Performed by: EMERGENCY MEDICINE

## 2021-06-03 RX ORDER — SODIUM CHLORIDE 0.9 % (FLUSH) 0.9 %
10 SYRINGE (ML) INJECTION PRN
Status: DISCONTINUED | OUTPATIENT
Start: 2021-06-03 | End: 2021-06-03 | Stop reason: HOSPADM

## 2021-06-03 RX ORDER — MEDROXYPROGESTERONE ACETATE 2.5 MG/1
10 TABLET ORAL DAILY
Qty: 10 TABLET | Refills: 3 | Status: SHIPPED | OUTPATIENT
Start: 2021-06-03 | End: 2021-08-11

## 2021-06-03 RX ORDER — 0.9 % SODIUM CHLORIDE 0.9 %
1000 INTRAVENOUS SOLUTION INTRAVENOUS ONCE
Status: COMPLETED | OUTPATIENT
Start: 2021-06-03 | End: 2021-06-03

## 2021-06-03 RX ORDER — FERROUS SULFATE 325(65) MG
325 TABLET ORAL 2 TIMES DAILY
Qty: 60 TABLET | Refills: 0 | Status: SHIPPED | OUTPATIENT
Start: 2021-06-03 | End: 2022-10-04 | Stop reason: ALTCHOICE

## 2021-06-03 RX ORDER — KETOROLAC TROMETHAMINE 30 MG/ML
30 INJECTION, SOLUTION INTRAMUSCULAR; INTRAVENOUS ONCE
Status: COMPLETED | OUTPATIENT
Start: 2021-06-03 | End: 2021-06-03

## 2021-06-03 RX ORDER — KETOROLAC TROMETHAMINE 10 MG/1
10 TABLET, FILM COATED ORAL EVERY 8 HOURS PRN
Qty: 20 TABLET | Refills: 0 | Status: SHIPPED | OUTPATIENT
Start: 2021-06-03 | End: 2021-12-15

## 2021-06-03 RX ADMIN — KETOROLAC TROMETHAMINE 30 MG: 30 INJECTION, SOLUTION INTRAMUSCULAR; INTRAVENOUS at 06:24

## 2021-06-03 RX ADMIN — SODIUM CHLORIDE 1000 ML: 9 INJECTION, SOLUTION INTRAVENOUS at 06:49

## 2021-06-03 ASSESSMENT — ENCOUNTER SYMPTOMS
EYE REDNESS: 0
NAUSEA: 0
SINUS PRESSURE: 0
ABDOMINAL DISTENTION: 0
SHORTNESS OF BREATH: 0
EYE PAIN: 0
COUGH: 0
EYE DISCHARGE: 0
ABDOMINAL PAIN: 1
CONSTIPATION: 0
SORE THROAT: 0
HEMATOCHEZIA: 0
DIARRHEA: 0
BACK PAIN: 0
WHEEZING: 0
BELCHING: 0
VOMITING: 0

## 2021-06-03 ASSESSMENT — PAIN DESCRIPTION - PAIN TYPE: TYPE: ACUTE PAIN

## 2021-06-03 ASSESSMENT — PAIN DESCRIPTION - LOCATION: LOCATION: ABDOMEN

## 2021-06-03 ASSESSMENT — PAIN SCALES - GENERAL
PAINLEVEL_OUTOF10: 9
PAINLEVEL_OUTOF10: 9

## 2021-06-03 ASSESSMENT — PAIN DESCRIPTION - DESCRIPTORS: DESCRIPTORS: ACHING;CONSTANT;DISCOMFORT

## 2021-06-03 ASSESSMENT — PAIN DESCRIPTION - ORIENTATION: ORIENTATION: LOWER

## 2021-06-03 ASSESSMENT — PAIN DESCRIPTION - PROGRESSION: CLINICAL_PROGRESSION: GRADUALLY WORSENING

## 2021-06-03 ASSESSMENT — PAIN DESCRIPTION - FREQUENCY: FREQUENCY: CONTINUOUS

## 2021-06-03 NOTE — ED PROVIDER NOTES
68-year-old female history of uterine fibroids presents to the emergency department with lower abdominal pain. She states she has an appointment later today to see her OB/GYN doctor,states the pain is been significantly worse than usual.  The patient states that these pains flareup during her menstrual cycle which is currently on. Patient also was recently diagnosed with DVT and states some left leg pain. She is currently on Eliquis and states she has not missed any doses. She denies nausea vomiting or diarrhea does have some vaginal bleeding but this is not unexpected given her current menstrual flow. Patient denies diarrhea or constipation fevers chills cough or shortness of breath. The history is provided by the patient. Abdominal Pain  Pain location:  Suprapubic  Pain quality: aching    Pain radiates to:  Does not radiate  Pain severity:  Mild  Onset quality:  Gradual  Duration:  2 days  Timing:  Intermittent  Progression:  Waxing and waning  Chronicity:  Recurrent  Relieved by:  Nothing  Worsened by:  Nothing  Ineffective treatments:  None tried  Associated symptoms: vaginal bleeding    Associated symptoms: no anorexia, no belching, no chest pain, no chills, no constipation, no cough, no diarrhea, no dysuria, no fatigue, no fever, no hematochezia, no hematuria, no nausea, no shortness of breath, no sore throat and no vomiting         Review of Systems   Constitutional: Negative for chills, fatigue and fever. HENT: Negative for ear pain, sinus pressure and sore throat. Eyes: Negative for pain, discharge and redness. Respiratory: Negative for cough, shortness of breath and wheezing. Cardiovascular: Negative for chest pain. Gastrointestinal: Positive for abdominal pain. Negative for abdominal distention, anorexia, constipation, diarrhea, hematochezia, nausea and vomiting. Genitourinary: Positive for vaginal bleeding. Negative for dysuria, frequency and hematuria.    Musculoskeletal: ---------------------------------------------  Past Medical History:  has a past medical history of Anemia, DVT (deep venous thrombosis) ([facility] DVT of lower extremity (deep venous thrombosis) ([facility] History of DVT of lower extremity, and Migraine. Past Surgical History:  has a past surgical history that includes Induced  (10/12/12) and Dilation & curettage. Social History:  reports that she has never smoked. She has never used smokeless tobacco. She reports that she does not drink alcohol and does not use drugs. Family History: family history is not on file. The patients home medications have been reviewed.     Allergies: Latex    -------------------------------------------------- RESULTS -------------------------------------------------  Labs:  Results for orders placed or performed during the hospital encounter of 21   CBC Auto Differential   Result Value Ref Range    WBC 5.9 4.5 - 11.5 E9/L    RBC 3.73 3.50 - 5.50 E12/L    Hemoglobin 8.7 (L) 11.5 - 15.5 g/dL    Hematocrit 29.2 (L) 34.0 - 48.0 %    MCV 78.3 (L) 80.0 - 99.9 fL    MCH 23.3 (L) 26.0 - 35.0 pg    MCHC 29.8 (L) 32.0 - 34.5 %    RDW 17.6 (H) 11.5 - 15.0 fL    Platelets 089 327 - 789 E9/L    MPV 8.6 7.0 - 12.0 fL    Neutrophils % 70.3 43.0 - 80.0 %    Immature Granulocytes % 0.2 0.0 - 5.0 %    Lymphocytes % 22.2 20.0 - 42.0 %    Monocytes % 6.3 2.0 - 12.0 %    Eosinophils % 0.7 0.0 - 6.0 %    Basophils % 0.3 0.0 - 2.0 %    Neutrophils Absolute 4.15 1.80 - 7.30 E9/L    Immature Granulocytes # 0.01 E9/L    Lymphocytes Absolute 1.31 (L) 1.50 - 4.00 E9/L    Monocytes Absolute 0.37 0.10 - 0.95 E9/L    Eosinophils Absolute 0.04 (L) 0.05 - 0.50 E9/L    Basophils Absolute 0.02 0.00 - 0.20 R6/K   Basic Metabolic Panel   Result Value Ref Range    Sodium 138 132 - 146 mmol/L    Potassium 3.4 (L) 3.5 - 5.0 mmol/L    Chloride 104 98 - 107 mmol/L    CO2 25 22 - 29 mmol/L    Anion Gap 9 7 - 16 mmol/L    Glucose 90 74 - 99 mg/dL    BUN 7 6 - 20 mg/dL    CREATININE 0.8 0.5 - 1.0 mg/dL    GFR Non-African American >60 >=60 mL/min/1.73    GFR African American >60     Calcium 8.8 8.6 - 10.2 mg/dL   Hepatic Function Panel   Result Value Ref Range    Total Protein 6.9 6.4 - 8.3 g/dL    Albumin 3.8 3.5 - 5.2 g/dL    Alkaline Phosphatase 105 (H) 35 - 104 U/L    ALT 14 0 - 32 U/L    AST 16 0 - 31 U/L    Total Bilirubin <0.2 0.0 - 1.2 mg/dL    Bilirubin, Direct <0.2 0.0 - 0.3 mg/dL    Bilirubin, Indirect see below 0.0 - 1.0 mg/dL   Urinalysis   Result Value Ref Range    Color, UA Yellow Straw/Yellow    Clarity, UA Clear Clear    Glucose, Ur Negative Negative mg/dL    Bilirubin Urine Negative Negative    Ketones, Urine Negative Negative mg/dL    Specific Gravity, UA <=1.005 1.005 - 1.030    Blood, Urine MODERATE (A) Negative    pH, UA 6.0 5.0 - 9.0    Protein, UA Negative Negative mg/dL    Urobilinogen, Urine 0.2 <2.0 E.U./dL    Nitrite, Urine Negative Negative    Leukocyte Esterase, Urine Negative Negative   Microscopic Urinalysis   Result Value Ref Range    WBC, UA NONE 0 - 5 /HPF    RBC, UA 1-3 0 - 2 /HPF    Bacteria, UA RARE (A) None Seen /HPF   POC Pregnancy Urine   Result Value Ref Range    HCG, Urine, POC Negative Negative    Lot Number [id number]     Positive QC Pass/Fail Pass     Negative QC Pass/Fail Pass        Radiology:  No orders to display       ------------------------- NURSING NOTES AND VITALS REVIEWED ---------------------------  Date / Time Roomed:  6/3/2021  5:12 AM  ED Bed Assignment:  20/20    The nursing notes within the ED encounter and vital signs as below have been reviewed.    /74   Pulse 109   Temp 97.7 °F (36.5 °C) (Temporal)   Resp 14   Ht 5' (1.524 m)   Wt 160 lb (72.6 kg)   LMP 05/08/2021   SpO2 100%   BMI 31.25 kg/m²   Oxygen Saturation Interpretation: Normal      ------------------------------------------ PROGRESS NOTES ------------------------------------------  I have spoken with the patient and discussed todays results, in addition to providing specific details for the plan of care and counseling regarding the diagnosis and prognosis. Their questions are answered at this time and they are agreeable with the plan. I discussed at length with them reasons for immediate return here for re evaluation. They will followup with their primary care physician by calling their office tomorrow. --------------------------------- ADDITIONAL PROVIDER NOTES ---------------------------------  At this time the patient is without objective evidence of an acute process requiring hospitalization or inpatient management. They have remained hemodynamically stable throughout their entire ED visit and are stable for discharge with outpatient follow-up. The plan has been discussed in detail and they are aware of the specific conditions for emergent return, as well as the importance of follow-up. Discharge Medication List as of 6/3/2021  9:42 AM      START taking these medications    Details   medroxyPROGESTERone (PROVERA) 2.5 MG tablet Take 4 tablets by mouth daily for 10 days, Disp-10 tablet, R-3Print      ferrous sulfate (IRON 325) 325 (65 Fe) MG tablet Take 1 tablet by mouth 2 times daily, Disp-60 tablet, R-0Print      ketorolac (TORADOL) 10 MG tablet Take 1 tablet by mouth every 8 hours as needed for Pain, Disp-20 tablet, R-0Print             Diagnosis:  1. Vaginal bleeding    2. Hx of uterine leiomyoma        Disposition:  Patient's disposition: Discharge to home  Patient's condition is stable.        Andrew Bruno DO  06/03/21 3995

## 2021-06-03 NOTE — ED NOTES
Assumed care of patient. Introduced self to patient as RN. Patient denies any complaints at this time. Awaiting dis postioning.  [address]  06/03/21 3778

## 2021-08-11 ENCOUNTER — HOSPITAL ENCOUNTER (EMERGENCY)
Age: 36
Discharge: HOME | End: 2021-08-11
Payer: COMMERCIAL

## 2021-08-11 VITALS
BODY MASS INDEX: 31.41 KG/M2 | HEIGHT: 60 IN | HEART RATE: 89 BPM | RESPIRATION RATE: 16 BRPM | DIASTOLIC BLOOD PRESSURE: 82 MMHG | SYSTOLIC BLOOD PRESSURE: 112 MMHG | WEIGHT: 160 LBS | TEMPERATURE: 96.9 F | OXYGEN SATURATION: 100 %

## 2021-08-11 DIAGNOSIS — T23.111A SUPERFICIAL BURN OF RIGHT THUMB, INITIAL ENCOUNTER: Primary | ICD-10-CM

## 2021-08-11 PROCEDURE — 16020 DRESS/DEBRID P-THICK BURN S: CPT

## 2021-08-11 PROCEDURE — 90471 IMMUNIZATION ADMIN: CPT

## 2021-08-11 PROCEDURE — 99283 EMERGENCY DEPT VISIT LOW MDM: CPT

## 2021-08-11 PROCEDURE — 6360000002 HC RX W HCPCS

## 2021-08-11 PROCEDURE — 2500000003 HC RX 250 WO HCPCS: Performed by: NURSE PRACTITIONER

## 2021-08-11 PROCEDURE — 99282 EMERGENCY DEPT VISIT SF MDM: CPT

## 2021-08-11 PROCEDURE — 90715 TDAP VACCINE 7 YRS/> IM: CPT

## 2021-08-11 RX ORDER — IBUPROFEN 600 MG/1
600 TABLET ORAL 3 TIMES DAILY PRN
Qty: 30 TABLET | Refills: 0 | Status: SHIPPED | OUTPATIENT
Start: 2021-08-11 | End: 2021-12-15

## 2021-08-11 RX ORDER — IBUPROFEN 800 MG/1
800 TABLET ORAL ONCE
Status: DISCONTINUED | OUTPATIENT
Start: 2021-08-11 | End: 2021-08-11 | Stop reason: HOSPADM

## 2021-08-11 RX ADMIN — SILVER SULFADIAZINE: 10 CREAM TOPICAL at 14:27

## 2021-08-11 RX ADMIN — TETANUS TOXOID, REDUCED DIPHTHERIA TOXOID AND ACELLULAR PERTUSSIS VACCINE, ADSORBED 0.5 ML: 5; 2.5; 8; 8; 2.5 SUSPENSION INTRAMUSCULAR at 14:39

## 2021-08-11 ASSESSMENT — PAIN DESCRIPTION - ONSET: ONSET: SUDDEN

## 2021-08-11 ASSESSMENT — PAIN DESCRIPTION - ORIENTATION: ORIENTATION: RIGHT

## 2021-08-11 ASSESSMENT — PAIN DESCRIPTION - PROGRESSION: CLINICAL_PROGRESSION: GRADUALLY WORSENING

## 2021-08-11 ASSESSMENT — PAIN SCALES - GENERAL: PAINLEVEL_OUTOF10: 10

## 2021-08-11 ASSESSMENT — PAIN DESCRIPTION - PAIN TYPE: TYPE: ACUTE PAIN

## 2021-08-11 ASSESSMENT — PAIN DESCRIPTION - DESCRIPTORS: DESCRIPTORS: BURNING

## 2021-08-11 ASSESSMENT — PAIN DESCRIPTION - LOCATION: LOCATION: HAND

## 2021-08-11 ASSESSMENT — PAIN DESCRIPTION - FREQUENCY: FREQUENCY: CONTINUOUS

## 2021-08-11 NOTE — LETTER
[facility] Emergency Department  [id number] [facility] [address]  Phone: [phone]               August 11, 2021    Patient: Mariam Angel   YOB: 1985   Date of Visit: 8/11/2021       To Whom It May Concern:    Jerson Moran was seen and treated in our emergency department on 8/11/2021. She may return to work on 8/12/2021.       Sincerely,       Geena Asif RN

## 2021-08-11 NOTE — ED NOTES
rx x 2 escribed to anupama manuel [address] [place] [address] kirstin Lopez, JACK  08/11/21 7419
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
## 2021-10-02 ENCOUNTER — HOSPITAL ENCOUNTER (EMERGENCY)
Age: 36
Discharge: HOME | End: 2021-10-02
Attending: EMERGENCY MEDICINE
Payer: COMMERCIAL

## 2021-10-02 ENCOUNTER — APPOINTMENT (OUTPATIENT)
Dept: ULTRASOUND IMAGING | Age: 36
End: 2021-10-02
Payer: COMMERCIAL

## 2021-10-02 VITALS
HEART RATE: 98 BPM | OXYGEN SATURATION: 99 % | SYSTOLIC BLOOD PRESSURE: 136 MMHG | BODY MASS INDEX: 31.41 KG/M2 | TEMPERATURE: 98.3 F | RESPIRATION RATE: 14 BRPM | WEIGHT: 160 LBS | HEIGHT: 60 IN | DIASTOLIC BLOOD PRESSURE: 59 MMHG

## 2021-10-02 DIAGNOSIS — R10.84 GENERALIZED ABDOMINAL PAIN: Primary | ICD-10-CM

## 2021-10-02 LAB
ALBUMIN SERPL-MCNC: 3.7 G/DL (ref 3.5–5.2)
ALP BLD-CCNC: 111 U/L (ref 35–104)
ALT SERPL-CCNC: 9 U/L (ref 0–32)
ANION GAP SERPL CALCULATED.3IONS-SCNC: 10 MMOL/L (ref 7–16)
ANISOCYTOSIS: ABNORMAL
AST SERPL-CCNC: 19 U/L (ref 0–31)
BACTERIA: ABNORMAL /HPF
BASOPHILS ABSOLUTE: 0.01 E9/L (ref 0–0.2)
BASOPHILS RELATIVE PERCENT: 0.2 % (ref 0–2)
BILIRUB SERPL-MCNC: <0.2 MG/DL (ref 0–1.2)
BILIRUBIN URINE: NEGATIVE
BLOOD, URINE: ABNORMAL
BUN BLDV-MCNC: 4 MG/DL (ref 6–20)
BURR CELLS: ABNORMAL
CALCIUM SERPL-MCNC: 8.7 MG/DL (ref 8.6–10.2)
CHLORIDE BLD-SCNC: 104 MMOL/L (ref 98–107)
CLARITY: CLEAR
CO2: 23 MMOL/L (ref 22–29)
COLOR: YELLOW
CREAT SERPL-MCNC: 0.7 MG/DL (ref 0.5–1)
EOSINOPHILS ABSOLUTE: 0.01 E9/L (ref 0.05–0.5)
EOSINOPHILS RELATIVE PERCENT: 0.2 % (ref 0–6)
EPITHELIAL CELLS, UA: ABNORMAL /HPF
GFR AFRICAN AMERICAN: >60
GFR NON-AFRICAN AMERICAN: >60 ML/MIN/1.73
GLUCOSE BLD-MCNC: 96 MG/DL (ref 74–99)
GLUCOSE URINE: NEGATIVE MG/DL
HCG(URINE) PREGNANCY TEST: NEGATIVE
HCT VFR BLD CALC: 32.3 % (ref 34–48)
HEMOGLOBIN: 9.7 G/DL (ref 11.5–15.5)
HYPOCHROMIA: ABNORMAL
IMMATURE GRANULOCYTES #: 0.01 E9/L
IMMATURE GRANULOCYTES %: 0.2 % (ref 0–5)
KETONES, URINE: NEGATIVE MG/DL
LEUKOCYTE ESTERASE, URINE: ABNORMAL
LIPASE: 18 U/L (ref 13–60)
LYMPHOCYTES ABSOLUTE: 1.36 E9/L (ref 1.5–4)
LYMPHOCYTES RELATIVE PERCENT: 25 % (ref 20–42)
MCH RBC QN AUTO: 23.7 PG (ref 26–35)
MCHC RBC AUTO-ENTMCNC: 30 % (ref 32–34.5)
MCV RBC AUTO: 78.8 FL (ref 80–99.9)
MONOCYTES ABSOLUTE: 0.36 E9/L (ref 0.1–0.95)
MONOCYTES RELATIVE PERCENT: 6.6 % (ref 2–12)
MUCUS: PRESENT /LPF
NEUTROPHILS ABSOLUTE: 3.7 E9/L (ref 1.8–7.3)
NEUTROPHILS RELATIVE PERCENT: 67.8 % (ref 43–80)
NITRITE, URINE: NEGATIVE
OVALOCYTES: ABNORMAL
PDW BLD-RTO: 20.1 FL (ref 11.5–15)
PH UA: 6 (ref 5–9)
PLATELET # BLD: 342 E9/L (ref 130–450)
PMV BLD AUTO: 9.5 FL (ref 7–12)
POIKILOCYTES: ABNORMAL
POLYCHROMASIA: ABNORMAL
POTASSIUM REFLEX MAGNESIUM: 3.8 MMOL/L (ref 3.5–5)
PROTEIN UA: NEGATIVE MG/DL
RBC # BLD: 4.1 E12/L (ref 3.5–5.5)
RBC UA: >20 /HPF (ref 0–2)
SCHISTOCYTES: ABNORMAL
SODIUM BLD-SCNC: 137 MMOL/L (ref 132–146)
SPECIFIC GRAVITY UA: 1.01 (ref 1–1.03)
TOTAL PROTEIN: 7.4 G/DL (ref 6.4–8.3)
TOXIC GRANULATION: ABNORMAL
UROBILINOGEN, URINE: 0.2 E.U./DL
VACUOLATED NEUTROPHILS: ABNORMAL
WBC # BLD: 5.5 E9/L (ref 4.5–11.5)
WBC UA: ABNORMAL /HPF (ref 0–5)

## 2021-10-02 PROCEDURE — 81001 URINALYSIS AUTO W/SCOPE: CPT

## 2021-10-02 PROCEDURE — 6360000002 HC RX W HCPCS: Performed by: STUDENT IN AN ORGANIZED HEALTH CARE EDUCATION/TRAINING PROGRAM

## 2021-10-02 PROCEDURE — 76856 US EXAM PELVIC COMPLETE: CPT

## 2021-10-02 PROCEDURE — 96374 THER/PROPH/DIAG INJ IV PUSH: CPT

## 2021-10-02 PROCEDURE — 81025 URINE PREGNANCY TEST: CPT

## 2021-10-02 PROCEDURE — 87088 URINE BACTERIA CULTURE: CPT

## 2021-10-02 PROCEDURE — 85025 COMPLETE CBC W/AUTO DIFF WBC: CPT

## 2021-10-02 PROCEDURE — 99282 EMERGENCY DEPT VISIT SF MDM: CPT

## 2021-10-02 PROCEDURE — 80053 COMPREHEN METABOLIC PANEL: CPT

## 2021-10-02 PROCEDURE — 93975 VASCULAR STUDY: CPT

## 2021-10-02 PROCEDURE — 83690 ASSAY OF LIPASE: CPT

## 2021-10-02 RX ORDER — KETOROLAC TROMETHAMINE 30 MG/ML
15 INJECTION, SOLUTION INTRAMUSCULAR; INTRAVENOUS ONCE
Status: COMPLETED | OUTPATIENT
Start: 2021-10-02 | End: 2021-10-02

## 2021-10-02 RX ADMIN — KETOROLAC TROMETHAMINE 15 MG: 30 INJECTION, SOLUTION INTRAMUSCULAR at 12:48

## 2021-10-02 ASSESSMENT — ENCOUNTER SYMPTOMS
DIARRHEA: 0
NAUSEA: 0
ABDOMINAL PAIN: 1
BACK PAIN: 0
COUGH: 0
VOMITING: 0
SHORTNESS OF BREATH: 0
CHEST TIGHTNESS: 0
SORE THROAT: 0
ABDOMINAL DISTENTION: 0

## 2021-10-02 ASSESSMENT — PAIN DESCRIPTION - PAIN TYPE: TYPE: ACUTE PAIN

## 2021-10-02 ASSESSMENT — PAIN DESCRIPTION - LOCATION: LOCATION: ABDOMEN

## 2021-10-02 ASSESSMENT — PAIN DESCRIPTION - ORIENTATION: ORIENTATION: LEFT

## 2021-10-02 ASSESSMENT — PAIN SCALES - GENERAL: PAINLEVEL_OUTOF10: 8

## 2021-10-02 NOTE — ED PROVIDER NOTES
HPI     Patient is a 31 y.o. female presents with a chief complaint of abdominal pain  This has been occurring for one day. Patient states that it gets better with nothing. Patient states that it gets worse with nothing. Patient states that it is moderate in severity. Patient presents with left lower quadrant pain. Patient was previously scanned with a CT scanner of the abdomen that indicated a uterine fibroid. Patient states that she has been doing well for the past couple months. Patient notes that she developed sharp left lower quadrant pain starting yesterday. Patient states he is the same quality of pain as prior but is just worse. Patient does not closely follow with her gynecologist.  Patient states that she is currently on her period. Patient denies any fevers, chills, chest pain, shortness of breath, changes in urinary or bowel habits. Patient denies any abnormal vaginal discharge. Patient states that she is not currently sexually active. Review of Systems   Constitutional: Negative for activity change, appetite change, chills, fatigue and fever. HENT: Negative for congestion, drooling and sore throat. Respiratory: Negative for cough, chest tightness and shortness of breath. Cardiovascular: Negative for chest pain and palpitations. Gastrointestinal: Positive for abdominal pain. Negative for abdominal distention, diarrhea, nausea and vomiting. Genitourinary: Negative for decreased urine volume, difficulty urinating, enuresis, flank pain, frequency and hematuria. Musculoskeletal: Negative for arthralgias, back pain and neck stiffness. Skin: Negative for rash and wound. Neurological: Negative for dizziness, facial asymmetry, light-headedness and headaches. Psychiatric/Behavioral: Negative for agitation, confusion and decreased concentration. Physical Exam  Vitals and nursing note reviewed. Constitutional:       Appearance: She is well-developed.    HENT:      Head: Normocephalic and atraumatic. Eyes:      Conjunctiva/sclera: Conjunctivae normal.   Cardiovascular:      Rate and Rhythm: Normal rate and regular rhythm. Heart sounds: Normal heart sounds. No murmur heard. Pulmonary:      Effort: Pulmonary effort is normal. No respiratory distress. Breath sounds: Normal breath sounds. No wheezing or rales. Abdominal:      General: Bowel sounds are normal.      Palpations: Abdomen is soft. Tenderness: There is abdominal tenderness. There is no guarding or rebound. Comments: Lower left abdominal pain to deep palpation. Musculoskeletal:      Cervical back: Normal range of motion and neck supple. Skin:     General: Skin is warm and dry. Neurological:      Mental Status: She is alert and oriented to person, place, and time. Cranial Nerves: No cranial nerve deficit. Coordination: Coordination normal.          Procedures     MDM         Patient is a 31 y.o. female presenting with abdominal pain. Patient has a history of uterine fibroids. Patient states that this feels similar to her previous pain but just slightly worse. Patient denies any abnormal vaginal discharge. Patient is currently on her period. Patient will have a pregnancy test. Patient pregnancy test was negative. Patient had labs drawn. Patient's white count is normal. Patient is afebrile. Patient will have an ultrasound. Patient was given return precautions. Patient will follow up with their primary care provider. Patient is agreeable to this plan. Patient has remained stable throughout their stay in the ED. Patient was seen and evaluated by myself and my attending Dr. Rudi Santos. Assessment and Plan discussed with attending provider, please see attestation for final plan of care. This note was done using dictation software and there may be some grammatical errors associated with this.     Trish Bautista MD          --------------------------------------------- PAST HISTORY ---------------------------------------------  Past Medical History:  has a past medical history of Anemia, DVT (deep venous thrombosis) ([facility].), DVT of lower extremity (deep venous thrombosis) ([facility].), History of DVT of lower extremity, and Migraine. Past Surgical History:  has a past surgical history that includes Induced  (10/12/12) and Dilation & curettage. Social History:  reports that she has never smoked. She has never used smokeless tobacco. She reports that she does not drink alcohol and does not use drugs. Family History: family history is not on file. The patients home medications have been reviewed.     Allergies: Latex    -------------------------------------------------- RESULTS -------------------------------------------------  Labs:  Results for orders placed or performed during the hospital encounter of 10/02/21   CBC Auto Differential   Result Value Ref Range    WBC 5.5 4.5 - 11.5 E9/L    RBC 4.10 3.50 - 5.50 E12/L    Hemoglobin 9.7 (L) 11.5 - 15.5 g/dL    Hematocrit 32.3 (L) 34.0 - 48.0 %    MCV 78.8 (L) 80.0 - 99.9 fL    MCH 23.7 (L) 26.0 - 35.0 pg    MCHC 30.0 (L) 32.0 - 34.5 %    RDW 20.1 (H) 11.5 - 15.0 fL    Platelets 059 666 - 051 E9/L    MPV 9.5 7.0 - 12.0 fL    Neutrophils % 67.8 43.0 - 80.0 %    Immature Granulocytes % 0.2 0.0 - 5.0 %    Lymphocytes % 25.0 20.0 - 42.0 %    Monocytes % 6.6 2.0 - 12.0 %    Eosinophils % 0.2 0.0 - 6.0 %    Basophils % 0.2 0.0 - 2.0 %    Neutrophils Absolute 3.70 1.80 - 7.30 E9/L    Immature Granulocytes # 0.01 E9/L    Lymphocytes Absolute 1.36 (L) 1.50 - 4.00 E9/L    Monocytes Absolute 0.36 0.10 - 0.95 E9/L    Eosinophils Absolute 0.01 (L) 0.05 - 0.50 E9/L    Basophils Absolute 0.01 0.00 - 0.20 E9/L    Toxic Granulation 1+     Vacuolated Neutrophils 1+     Anisocytosis 1+     Polychromasia 1+     Hypochromia 1+     Poikilocytes 1+     Schistocytes 1+     Gonzalo Cells 1+     Ovalocytes 1+    Comprehensive Metabolic Panel w/ Reflex to MG Result Value Ref Range    Sodium 137 132 - 146 mmol/L    Potassium reflex Magnesium 3.8 3.5 - 5.0 mmol/L    Chloride 104 98 - 107 mmol/L    CO2 23 22 - 29 mmol/L    Anion Gap 10 7 - 16 mmol/L    Glucose 96 74 - 99 mg/dL    BUN 4 (L) 6 - 20 mg/dL    CREATININE 0.7 0.5 - 1.0 mg/dL    GFR Non-African American >60 >=60 mL/min/1.73    GFR African American >60     Calcium 8.7 8.6 - 10.2 mg/dL    Total Protein 7.4 6.4 - 8.3 g/dL    Albumin 3.7 3.5 - 5.2 g/dL    Total Bilirubin <0.2 0.0 - 1.2 mg/dL    Alkaline Phosphatase 111 (H) 35 - 104 U/L    ALT 9 0 - 32 U/L    AST 19 0 - 31 U/L   Lipase   Result Value Ref Range    Lipase 18 13 - 60 U/L   Urinalysis, reflex to microscopic   Result Value Ref Range    Color, UA Yellow Straw/Yellow    Clarity, UA Clear Clear    Glucose, Ur Negative Negative mg/dL    Bilirubin Urine Negative Negative    Ketones, Urine Negative Negative mg/dL    Specific Gravity, UA 1.015 1.005 - 1.030    Blood, Urine LARGE (A) Negative    pH, UA 6.0 5.0 - 9.0    Protein, UA Negative Negative mg/dL    Urobilinogen, Urine 0.2 <2.0 E.U./dL    Nitrite, Urine Negative Negative    Leukocyte Esterase, Urine SMALL (A) Negative   Microscopic Urinalysis   Result Value Ref Range    Mucus, UA Present (A) None Seen /LPF    WBC, UA 5-10 (A) 0 - 5 /HPF    RBC, UA >20 0 - 2 /HPF    Epithelial Cells, UA RARE /HPF    Bacteria, UA FEW (A) None Seen /HPF   Pregnancy, Urine   Result Value Ref Range    HCG(Urine) Pregnancy Test NEGATIVE NEGATIVE       Radiology:  US PELVIS COMPLETE   Final Result   1. No acute ovarian abnormality   2. Endometrium appears thickened, may be physiologic with menses reported   although pathologic etiology is not excluded   3. Uterine heterogeneity redemonstrated, again consider adenomyosis and   indistinct fibroid disease         US DUP ABD PEL RETRO SCROT COMPLETE   Final Result   No ovarian torsion is seen.              ------------------------- NURSING NOTES AND VITALS REVIEWED ---------------------------  Date / Time Roomed:  10/2/2021 10:44 AM  ED Bed Assignment:  29/29    The nursing notes within the ED encounter and vital signs as below have been reviewed. BP (!) 136/59   Pulse 98   Temp 98.3 °F (36.8 °C) (Oral)   Resp 14   Ht 5' (1.524 m)   Wt 160 lb (72.6 kg)   SpO2 99%   BMI 31.25 kg/m²   Oxygen Saturation Interpretation: Normal      ------------------------------------------ PROGRESS NOTES ------------------------------------------  7:04 PM EDT  I have spoken with the patient and family if present and discussed todays results, in addition to providing specific details for the plan of care and counseling regarding the diagnosis and prognosis. Their questions are answered at this time and they are agreeable with the plan. I discussed at length with them reasons for immediate return here for re evaluation. They will followup with their primary care provider by calling their office as soon as possible.      --------------------------------- ADDITIONAL PROVIDER NOTES ---------------------------------  At this time the patient is without objective evidence of an acute process requiring hospitalization or inpatient management. They have remained hemodynamically stable throughout their entire ED visit and are stable for discharge with outpatient follow-up. The plan has been discussed in detail and they are aware of the specific conditions for emergent return, as well as the importance of follow-up. Discharge Medication List as of 10/2/2021  1:33 PM          Diagnosis:  1. Generalized abdominal pain        Disposition:  Patient's disposition: Discharge to home  Patient's condition is stable.          Felisa Mcdonald MD  Resident  10/02/21 5428

## 2021-10-04 LAB — URINE CULTURE, ROUTINE: NORMAL

## 2021-10-24 ENCOUNTER — APPOINTMENT (OUTPATIENT)
Dept: CT IMAGING | Age: 36
End: 2021-10-24
Payer: COMMERCIAL

## 2021-10-24 ENCOUNTER — APPOINTMENT (OUTPATIENT)
Dept: GENERAL RADIOLOGY | Age: 36
End: 2021-10-24
Payer: COMMERCIAL

## 2021-10-24 ENCOUNTER — HOSPITAL ENCOUNTER (EMERGENCY)
Age: 36
Discharge: HOME | End: 2021-10-24
Attending: EMERGENCY MEDICINE
Payer: COMMERCIAL

## 2021-10-24 VITALS
DIASTOLIC BLOOD PRESSURE: 77 MMHG | TEMPERATURE: 98.1 F | OXYGEN SATURATION: 97 % | RESPIRATION RATE: 19 BRPM | WEIGHT: 152 LBS | HEART RATE: 109 BPM | BODY MASS INDEX: 28.7 KG/M2 | HEIGHT: 61 IN | SYSTOLIC BLOOD PRESSURE: 132 MMHG

## 2021-10-24 DIAGNOSIS — E87.6 HYPOKALEMIA: ICD-10-CM

## 2021-10-24 DIAGNOSIS — R07.9 CHEST PAIN, UNSPECIFIED TYPE: Primary | ICD-10-CM

## 2021-10-24 LAB
ALBUMIN SERPL-MCNC: 3.7 G/DL (ref 3.5–5.2)
ALP BLD-CCNC: 109 U/L (ref 35–104)
ALT SERPL-CCNC: 33 U/L (ref 0–32)
ANION GAP SERPL CALCULATED.3IONS-SCNC: 12 MMOL/L (ref 7–16)
ANISOCYTOSIS: ABNORMAL
AST SERPL-CCNC: 44 U/L (ref 0–31)
BACTERIA: ABNORMAL /HPF
BASOPHILS ABSOLUTE: 0.02 E9/L (ref 0–0.2)
BASOPHILS RELATIVE PERCENT: 0.2 % (ref 0–2)
BILIRUB SERPL-MCNC: 0.2 MG/DL (ref 0–1.2)
BILIRUBIN URINE: NEGATIVE
BLOOD, URINE: NEGATIVE
BUN BLDV-MCNC: 5 MG/DL (ref 6–20)
CALCIUM SERPL-MCNC: 8.6 MG/DL (ref 8.6–10.2)
CHLORIDE BLD-SCNC: 103 MMOL/L (ref 98–107)
CLARITY: CLEAR
CO2: 23 MMOL/L (ref 22–29)
COLOR: YELLOW
CREAT SERPL-MCNC: 0.9 MG/DL (ref 0.5–1)
EOSINOPHILS ABSOLUTE: 0.06 E9/L (ref 0.05–0.5)
EOSINOPHILS RELATIVE PERCENT: 0.6 % (ref 0–6)
GFR AFRICAN AMERICAN: >60
GFR NON-AFRICAN AMERICAN: >60 ML/MIN/1.73
GLUCOSE BLD-MCNC: 120 MG/DL (ref 74–99)
GLUCOSE URINE: NEGATIVE MG/DL
HCG, URINE, POC: NEGATIVE
HCT VFR BLD CALC: 28.5 % (ref 34–48)
HEMOGLOBIN: 8.5 G/DL (ref 11.5–15.5)
HYPOCHROMIA: ABNORMAL
IMMATURE GRANULOCYTES #: 0.05 E9/L
IMMATURE GRANULOCYTES %: 0.5 % (ref 0–5)
KETONES, URINE: NEGATIVE MG/DL
LEUKOCYTE ESTERASE, URINE: ABNORMAL
LYMPHOCYTES ABSOLUTE: 0.46 E9/L (ref 1.5–4)
LYMPHOCYTES RELATIVE PERCENT: 4.6 % (ref 20–42)
Lab: NORMAL
MCH RBC QN AUTO: 22.1 PG (ref 26–35)
MCHC RBC AUTO-ENTMCNC: 29.8 % (ref 32–34.5)
MCV RBC AUTO: 74 FL (ref 80–99.9)
MONOCYTES ABSOLUTE: 0.49 E9/L (ref 0.1–0.95)
MONOCYTES RELATIVE PERCENT: 4.9 % (ref 2–12)
NEGATIVE QC PASS/FAIL: NORMAL
NEUTROPHILS ABSOLUTE: 9.01 E9/L (ref 1.8–7.3)
NEUTROPHILS RELATIVE PERCENT: 89.2 % (ref 43–80)
NITRITE, URINE: NEGATIVE
OVALOCYTES: ABNORMAL
PDW BLD-RTO: 18.7 FL (ref 11.5–15)
PH UA: 7 (ref 5–9)
PLATELET # BLD: 266 E9/L (ref 130–450)
PMV BLD AUTO: 9.2 FL (ref 7–12)
POIKILOCYTES: ABNORMAL
POLYCHROMASIA: ABNORMAL
POSITIVE QC PASS/FAIL: NORMAL
POTASSIUM SERPL-SCNC: 3.4 MMOL/L (ref 3.5–5)
PROTEIN UA: NEGATIVE MG/DL
RBC # BLD: 3.85 E12/L (ref 3.5–5.5)
RBC UA: ABNORMAL /HPF (ref 0–2)
SODIUM BLD-SCNC: 138 MMOL/L (ref 132–146)
SPECIFIC GRAVITY UA: 1.02 (ref 1–1.03)
TOTAL PROTEIN: 7.2 G/DL (ref 6.4–8.3)
TROPONIN, HIGH SENSITIVITY: <6 NG/L (ref 0–9)
UROBILINOGEN, URINE: 0.2 E.U./DL
WBC # BLD: 10.1 E9/L (ref 4.5–11.5)
WBC UA: ABNORMAL /HPF (ref 0–5)

## 2021-10-24 PROCEDURE — 80053 COMPREHEN METABOLIC PANEL: CPT

## 2021-10-24 PROCEDURE — 71275 CT ANGIOGRAPHY CHEST: CPT

## 2021-10-24 PROCEDURE — 96374 THER/PROPH/DIAG INJ IV PUSH: CPT

## 2021-10-24 PROCEDURE — 96375 TX/PRO/DX INJ NEW DRUG ADDON: CPT

## 2021-10-24 PROCEDURE — 71045 X-RAY EXAM CHEST 1 VIEW: CPT

## 2021-10-24 PROCEDURE — 85025 COMPLETE CBC W/AUTO DIFF WBC: CPT

## 2021-10-24 PROCEDURE — 84484 ASSAY OF TROPONIN QUANT: CPT

## 2021-10-24 PROCEDURE — 6360000002 HC RX W HCPCS: Performed by: NURSE PRACTITIONER

## 2021-10-24 PROCEDURE — 6370000000 HC RX 637 (ALT 250 FOR IP): Performed by: NURSE PRACTITIONER

## 2021-10-24 PROCEDURE — 2580000003 HC RX 258: Performed by: NURSE PRACTITIONER

## 2021-10-24 PROCEDURE — 99283 EMERGENCY DEPT VISIT LOW MDM: CPT

## 2021-10-24 PROCEDURE — 96361 HYDRATE IV INFUSION ADD-ON: CPT

## 2021-10-24 PROCEDURE — 93005 ELECTROCARDIOGRAM TRACING: CPT | Performed by: NURSE PRACTITIONER

## 2021-10-24 PROCEDURE — 81001 URINALYSIS AUTO W/SCOPE: CPT

## 2021-10-24 PROCEDURE — 6360000004 HC RX CONTRAST MEDICATION: Performed by: RADIOLOGY

## 2021-10-24 RX ORDER — POTASSIUM CHLORIDE 20 MEQ/1
40 TABLET, EXTENDED RELEASE ORAL ONCE
Status: COMPLETED | OUTPATIENT
Start: 2021-10-24 | End: 2021-10-24

## 2021-10-24 RX ORDER — ONDANSETRON 2 MG/ML
4 INJECTION INTRAMUSCULAR; INTRAVENOUS ONCE
Status: COMPLETED | OUTPATIENT
Start: 2021-10-24 | End: 2021-10-24

## 2021-10-24 RX ORDER — FENTANYL CITRATE 50 UG/ML
25 INJECTION, SOLUTION INTRAMUSCULAR; INTRAVENOUS ONCE
Status: COMPLETED | OUTPATIENT
Start: 2021-10-24 | End: 2021-10-24

## 2021-10-24 RX ORDER — 0.9 % SODIUM CHLORIDE 0.9 %
1000 INTRAVENOUS SOLUTION INTRAVENOUS ONCE
Status: COMPLETED | OUTPATIENT
Start: 2021-10-24 | End: 2021-10-24

## 2021-10-24 RX ADMIN — POTASSIUM CHLORIDE 40 MEQ: 1500 TABLET, EXTENDED RELEASE ORAL at 05:37

## 2021-10-24 RX ADMIN — IOPAMIDOL 75 ML: 755 INJECTION, SOLUTION INTRAVENOUS at 04:56

## 2021-10-24 RX ADMIN — FENTANYL CITRATE 25 MCG: 0.05 INJECTION, SOLUTION INTRAMUSCULAR; INTRAVENOUS at 03:28

## 2021-10-24 RX ADMIN — SODIUM CHLORIDE 1000 ML: 9 INJECTION, SOLUTION INTRAVENOUS at 03:28

## 2021-10-24 RX ADMIN — ONDANSETRON HYDROCHLORIDE 4 MG: 2 SOLUTION INTRAMUSCULAR; INTRAVENOUS at 03:28

## 2021-10-24 ASSESSMENT — PAIN SCALES - GENERAL: PAINLEVEL_OUTOF10: 7

## 2021-10-24 NOTE — ED PROVIDER NOTES
ED Physician     HPI:  10/24/21, Time: 2:51 AM EDT         [id number] Lilliana Aquino is a 31 y.o. female presenting to the ED for sudden onset of chest pain. Patient presents to emergency department with complaints of midsternal chest pain, states that it woke her up out of her sleep around 1 AM.  Patient does have a history significant for left lower extremity DVT and currently on Eliquis 5 mg twice daily. Patient reports compliance with medication. Patient does report feeling slightly nauseous as well as having an episode of vomiting. She denies fever she denies any abdominal pain as well as no no back or flank pain. She also denies any recent long travel and no unusual lower extremity swelling. She also denies being a smoker. Denies take anything at home for symptom relief. Patient states that the midsternal chest pain does not radiate anywhere else. Symptoms moderate in severity and persistent. Review of Systems:   A complete review of systems was performed and pertinent positives and negatives are stated within HPI, all other systems reviewed and are negative.          --------------------------------------------- PAST HISTORY ---------------------------------------------  Past Medical History:  has a past medical history of Anemia, DVT (deep venous thrombosis) ([facility].), DVT of lower extremity (deep venous thrombosis) ([facility] History of DVT of lower extremity, and Migraine. Past Surgical History:  has a past surgical history that includes Induced  (10/12/12) and Dilation & curettage. Social History:  reports that she has never smoked. She has never used smokeless tobacco. She reports that she does not drink alcohol and does not use drugs. Family History: family history is not on file. The patients home medications have been reviewed.     Allergies: Latex    -------------------------------------------------- RESULTS -------------------------------------------------  All laboratory and radiology results have been personally reviewed by myself   LABS:  Results for orders placed or performed during the hospital encounter of 10/24/21   Troponin   Result Value Ref Range    Troponin, High Sensitivity <6 0 - 9 ng/L   CBC Auto Differential   Result Value Ref Range    WBC 10.1 4.5 - 11.5 E9/L    RBC 3.85 3.50 - 5.50 E12/L    Hemoglobin 8.5 (L) 11.5 - 15.5 g/dL    Hematocrit 28.5 (L) 34.0 - 48.0 %    MCV 74.0 (L) 80.0 - 99.9 fL    MCH 22.1 (L) 26.0 - 35.0 pg    MCHC 29.8 (L) 32.0 - 34.5 %    RDW 18.7 (H) 11.5 - 15.0 fL    Platelets 314 257 - 405 E9/L    MPV 9.2 7.0 - 12.0 fL    Neutrophils % 89.2 (H) 43.0 - 80.0 %    Immature Granulocytes % 0.5 0.0 - 5.0 %    Lymphocytes % 4.6 (L) 20.0 - 42.0 %    Monocytes % 4.9 2.0 - 12.0 %    Eosinophils % 0.6 0.0 - 6.0 %    Basophils % 0.2 0.0 - 2.0 %    Neutrophils Absolute 9.01 (H) 1.80 - 7.30 E9/L    Immature Granulocytes # 0.05 E9/L    Lymphocytes Absolute 0.46 (L) 1.50 - 4.00 E9/L    Monocytes Absolute 0.49 0.10 - 0.95 E9/L    Eosinophils Absolute 0.06 0.05 - 0.50 E9/L    Basophils Absolute 0.02 0.00 - 0.20 E9/L    Anisocytosis 1+     Polychromasia 2+     Hypochromia 2+     Poikilocytes 1+     Ovalocytes 1+    Comprehensive Metabolic Panel   Result Value Ref Range    Sodium 138 132 - 146 mmol/L    Potassium 3.4 (L) 3.5 - 5.0 mmol/L    Chloride 103 98 - 107 mmol/L    CO2 23 22 - 29 mmol/L    Anion Gap 12 7 - 16 mmol/L    Glucose 120 (H) 74 - 99 mg/dL    BUN 5 (L) 6 - 20 mg/dL    CREATININE 0.9 0.5 - 1.0 mg/dL    GFR Non-African American >60 >=60 mL/min/1.73    GFR African American >60     Calcium 8.6 8.6 - 10.2 mg/dL    Total Protein 7.2 6.4 - 8.3 g/dL    Albumin 3.7 3.5 - 5.2 g/dL    Total Bilirubin 0.2 0.0 - 1.2 mg/dL    Alkaline Phosphatase 109 (H) 35 - 104 U/L    ALT 33 (H) 0 - 32 U/L    AST 44 (H) 0 - 31 U/L   Urinalysis   Result Value Ref Range    Color, UA Yellow Straw/Yellow    Clarity, UA Clear Clear    Glucose, Ur Negative Negative mg/dL    Bilirubin Urine Negative Negative    Ketones, Urine Negative Negative mg/dL    Specific Gravity, UA 1.020 1.005 - 1.030    Blood, Urine Negative Negative    pH, UA 7.0 5.0 - 9.0    Protein, UA Negative Negative mg/dL    Urobilinogen, Urine 0.2 <2.0 E.U./dL    Nitrite, Urine Negative Negative    Leukocyte Esterase, Urine TRACE (A) Negative   Microscopic Urinalysis   Result Value Ref Range    WBC, UA 2-5 0 - 5 /HPF    RBC, UA NONE 0 - 2 /HPF    Bacteria, UA RARE (A) None Seen /HPF   POC Pregnancy Urine   Result Value Ref Range    HCG, Urine, POC Negative Negative    Lot Number [id number]     Positive QC Pass/Fail Pass     Negative QC Pass/Fail Pass        RADIOLOGY:  Interpreted by Radiologist.  CTA CHEST W CONTRAST   Final Result   No evidence of pulmonary embolism or acute pulmonary abnormality. XR CHEST PORTABLE   Final Result   No acute abnormality identified.             ------------------------- NURSING NOTES AND VITALS REVIEWED ---------------------------   The nursing notes within the ED encounter and vital signs as below have been reviewed. /63   Pulse 109   Resp 20   Ht 5' 1\" (1.549 m)   Wt 152 lb (68.9 kg)   SpO2 (!) 9%   BMI 28.72 kg/m²   Oxygen Saturation Interpretation: Normal      ---------------------------------------------------PHYSICAL EXAM--------------------------------------      Constitutional/General: Alert and oriented x3, mildly uncomfortable  Head: Normocephalic and atraumatic  Eyes: PERRL, EOMI  Mouth: Oropharynx clear, handling secretions, no trismus  Neck: Supple, full ROM,   Pulmonary: Lungs clear to auscultation bilaterally, no wheezes, rales, or rhonchi. Not in respiratory distress, point tenderness across anterior chest on exam.  Cardiovascular:  Regular rate and rhythm, no murmurs, gallops, or rubs. 2+ distal pulses  Abdomen: Soft, non tender, non distended,   Extremities: Moves all extremities x 4.  Warm and well perfused  Skin: warm and dry without rash  Neurologic: GCS 15,  Psych: Normal Affect      ------------------------------ ED COURSE/MEDICAL DECISION MAKING----------------------  Medications   fentaNYL (SUBLIMAZE) injection 25 mcg (25 mcg IntraVENous Given 10/24/21 0328)   0.9 % sodium chloride bolus (0 mLs IntraVENous Stopped 10/24/21 5068)   ondansetron (ZOFRAN) injection 4 mg (4 mg IntraVENous Given 10/24/21 0328)   potassium chloride (KLOR-CON M) extended release tablet 40 mEq (40 mEq Oral Given 10/24/21 1837)   iopamidol (ISOVUE-370) 76 % injection 75 mL (75 mLs IntraVENous Given 10/24/21 8446)         ED COURSE:  ED Course as of Oct 24 2249   Sun Oct 24, 2021   0520 Patient CT was negative. Patient's laboratory work-up unremarkable except for potassium 3.4 which wasReplaced in the emergency department. Troponin EKG showed no ischemic findings. On reevaluation patient feeling much better. Patient appears nontoxic. Patient be discharged home with close follow-up. Return precautions given. Patient agrees with plan.    [CB]      ED Course User Index  [CB] Melanie Jauregui, DO       Medical Decision Making:    Plan will be for labs will also obtain imaging. Twelve-lead EKG interpreted showing a heart rate of 103, sinus tachycardia. No acute ST elevation or depression noted. Left axis deviation. Labs resulted, troponin negative at less than 6, CBC resulted with hemoglobin hematocrit slightly low at 8.5 and 28, WBC normal at 10, chemistry panel resulted potassium level slightly low at 3.4, also very slight elevation in liver enzymes, alk phos 109, ALT 33, AST 44, pregnancy test negative. Chest x-ray unremarkable, patient will be provided with 40 mEq of p.o. K. Dur her potassium of 3.4. Awaiting CT of the chest.  Anticipate discharge home if CAT scan is negative. CTA of the chest negative. Plan be for discharge home. Patient made aware the importance to get better. There is no suspicion for any acute coronary syndrome.   Patient provided with reassurance and educated on the importance of good follow-up care as well as when to return back to the emergency department. Patient expressed understanding and will be safely discharged home    Counseling: The emergency provider has spoken with the patient and discussed todays results, in addition to providing specific details for the plan of care and counseling regarding the diagnosis and prognosis. Questions are answered at this time and they are agreeable with the plan.      --------------------------------- IMPRESSION AND DISPOSITION ---------------------------------    IMPRESSION  1. Chest pain, unspecified type    2. Hypokalemia        DISPOSITION  Disposition: Discharge to home  Patient condition is good      NOTE: This report was transcribed using voice recognition software.  Every effort was made to ensure accuracy; however, inadvertent computerized transcription errors may be present     NOLAN Lowe - CNP  10/24/21 7517

## 2021-10-25 LAB
EKG ATRIAL RATE: 103 BPM
EKG P AXIS: 44 DEGREES
EKG P-R INTERVAL: 150 MS
EKG Q-T INTERVAL: 338 MS
EKG QRS DURATION: 62 MS
EKG QTC CALCULATION (BAZETT): 442 MS
EKG R AXIS: 2 DEGREES
EKG T AXIS: 33 DEGREES
EKG VENTRICULAR RATE: 103 BPM

## 2021-10-25 PROCEDURE — 93010 ELECTROCARDIOGRAM REPORT: CPT | Performed by: INTERNAL MEDICINE

## 2021-11-15 ENCOUNTER — HOSPITAL ENCOUNTER (EMERGENCY)
Age: 36
Discharge: HOME | End: 2021-11-15
Attending: EMERGENCY MEDICINE
Payer: COMMERCIAL

## 2021-11-15 ENCOUNTER — APPOINTMENT (OUTPATIENT)
Dept: CT IMAGING | Age: 36
End: 2021-11-15
Payer: COMMERCIAL

## 2021-11-15 ENCOUNTER — APPOINTMENT (OUTPATIENT)
Dept: GENERAL RADIOLOGY | Age: 36
End: 2021-11-15
Payer: COMMERCIAL

## 2021-11-15 VITALS
BODY MASS INDEX: 31.41 KG/M2 | HEIGHT: 60 IN | DIASTOLIC BLOOD PRESSURE: 80 MMHG | HEART RATE: 90 BPM | RESPIRATION RATE: 16 BRPM | TEMPERATURE: 97.6 F | SYSTOLIC BLOOD PRESSURE: 144 MMHG | WEIGHT: 160 LBS | OXYGEN SATURATION: 100 %

## 2021-11-15 DIAGNOSIS — R11.2 NON-INTRACTABLE VOMITING WITH NAUSEA, UNSPECIFIED VOMITING TYPE: Primary | ICD-10-CM

## 2021-11-15 DIAGNOSIS — R07.9 CHEST PAIN, UNSPECIFIED TYPE: ICD-10-CM

## 2021-11-15 LAB
ALBUMIN SERPL-MCNC: 4.3 G/DL (ref 3.5–5.2)
ALP BLD-CCNC: 105 U/L (ref 35–104)
ALT SERPL-CCNC: 12 U/L (ref 0–32)
ANION GAP SERPL CALCULATED.3IONS-SCNC: 12 MMOL/L (ref 7–16)
AST SERPL-CCNC: 19 U/L (ref 0–31)
BASOPHILS ABSOLUTE: 0.01 E9/L (ref 0–0.2)
BASOPHILS RELATIVE PERCENT: 0.2 % (ref 0–2)
BILIRUB SERPL-MCNC: <0.2 MG/DL (ref 0–1.2)
BUN BLDV-MCNC: 7 MG/DL (ref 6–20)
CALCIUM SERPL-MCNC: 9.1 MG/DL (ref 8.6–10.2)
CHLORIDE BLD-SCNC: 102 MMOL/L (ref 98–107)
CO2: 24 MMOL/L (ref 22–29)
CREAT SERPL-MCNC: 0.7 MG/DL (ref 0.5–1)
D DIMER: 417 NG/ML DDU
EKG ATRIAL RATE: 81 BPM
EKG P AXIS: 21 DEGREES
EKG P-R INTERVAL: 150 MS
EKG Q-T INTERVAL: 372 MS
EKG QRS DURATION: 64 MS
EKG QTC CALCULATION (BAZETT): 432 MS
EKG R AXIS: 4 DEGREES
EKG T AXIS: 39 DEGREES
EKG VENTRICULAR RATE: 81 BPM
EOSINOPHILS ABSOLUTE: 0 E9/L (ref 0.05–0.5)
EOSINOPHILS RELATIVE PERCENT: 0 % (ref 0–6)
GFR AFRICAN AMERICAN: >60
GFR NON-AFRICAN AMERICAN: >60 ML/MIN/1.73
GLUCOSE BLD-MCNC: 106 MG/DL (ref 74–99)
HCG QUALITATIVE: NEGATIVE
HCT VFR BLD CALC: 29.9 % (ref 34–48)
HEMOGLOBIN: 8.9 G/DL (ref 11.5–15.5)
IMMATURE GRANULOCYTES #: 0.02 E9/L
IMMATURE GRANULOCYTES %: 0.4 % (ref 0–5)
LIPASE: 16 U/L (ref 13–60)
LYMPHOCYTES ABSOLUTE: 1.33 E9/L (ref 1.5–4)
LYMPHOCYTES RELATIVE PERCENT: 27.4 % (ref 20–42)
MCH RBC QN AUTO: 22.3 PG (ref 26–35)
MCHC RBC AUTO-ENTMCNC: 29.8 % (ref 32–34.5)
MCV RBC AUTO: 74.8 FL (ref 80–99.9)
MONOCYTES ABSOLUTE: 0.34 E9/L (ref 0.1–0.95)
MONOCYTES RELATIVE PERCENT: 7 % (ref 2–12)
NEUTROPHILS ABSOLUTE: 3.16 E9/L (ref 1.8–7.3)
NEUTROPHILS RELATIVE PERCENT: 65 % (ref 43–80)
PDW BLD-RTO: 18.6 FL (ref 11.5–15)
PLATELET # BLD: 310 E9/L (ref 130–450)
PMV BLD AUTO: 9.2 FL (ref 7–12)
POTASSIUM REFLEX MAGNESIUM: 3.8 MMOL/L (ref 3.5–5)
RBC # BLD: 4 E12/L (ref 3.5–5.5)
SODIUM BLD-SCNC: 138 MMOL/L (ref 132–146)
TOTAL PROTEIN: 8.1 G/DL (ref 6.4–8.3)
TROPONIN, HIGH SENSITIVITY: <6 NG/L (ref 0–9)
WBC # BLD: 4.9 E9/L (ref 4.5–11.5)

## 2021-11-15 PROCEDURE — 93005 ELECTROCARDIOGRAM TRACING: CPT | Performed by: EMERGENCY MEDICINE

## 2021-11-15 PROCEDURE — 84703 CHORIONIC GONADOTROPIN ASSAY: CPT

## 2021-11-15 PROCEDURE — 93010 ELECTROCARDIOGRAM REPORT: CPT | Performed by: INTERNAL MEDICINE

## 2021-11-15 PROCEDURE — 80053 COMPREHEN METABOLIC PANEL: CPT

## 2021-11-15 PROCEDURE — 71045 X-RAY EXAM CHEST 1 VIEW: CPT

## 2021-11-15 PROCEDURE — 99284 EMERGENCY DEPT VISIT MOD MDM: CPT

## 2021-11-15 PROCEDURE — 6360000004 HC RX CONTRAST MEDICATION: Performed by: RADIOLOGY

## 2021-11-15 PROCEDURE — 71275 CT ANGIOGRAPHY CHEST: CPT

## 2021-11-15 PROCEDURE — 85025 COMPLETE CBC W/AUTO DIFF WBC: CPT

## 2021-11-15 PROCEDURE — 83690 ASSAY OF LIPASE: CPT

## 2021-11-15 PROCEDURE — 84484 ASSAY OF TROPONIN QUANT: CPT

## 2021-11-15 PROCEDURE — 85378 FIBRIN DEGRADE SEMIQUANT: CPT

## 2021-11-15 RX ADMIN — IOPAMIDOL 75 ML: 755 INJECTION, SOLUTION INTRAVENOUS at 09:34

## 2021-11-15 ASSESSMENT — PAIN DESCRIPTION - LOCATION: LOCATION: CHEST

## 2021-11-15 ASSESSMENT — ENCOUNTER SYMPTOMS
SHORTNESS OF BREATH: 1
EYE PAIN: 0
BLOOD IN STOOL: 0
BACK PAIN: 0
VOMITING: 1
NAUSEA: 1
SORE THROAT: 0
ABDOMINAL PAIN: 0
DIARRHEA: 0

## 2021-11-15 ASSESSMENT — PAIN DESCRIPTION - PAIN TYPE: TYPE: ACUTE PAIN

## 2021-11-15 ASSESSMENT — PAIN SCALES - GENERAL: PAINLEVEL_OUTOF10: 5

## 2021-11-15 NOTE — ED PROVIDER NOTES
[address] [facility] ENCOUNTER      Pt Name: Darek Snow  MRN: [id number]  [place] 1985  Date of evaluation: 11/15/2021      CHIEF COMPLAINT       Chief Complaint   Patient presents with    Chest Pain     \"I have the jitters\" x30 minutes    Nausea    Emesis        HPI  Darek Snow is a 31 y.o. female presents to the emergency department with a complaint of chest pain, nausea, and vomiting that started 30 minutes prior to arrival.  She admits to a previous episode 3 weeks ago. Patient states she has a history of blood clots in her legs, and anemia for an unknown reason. Patient is G5, P4 with her last delivery being 4 years ago. She denies taking any birth control and is not on blood thinners. She states her symptoms were mild in severity, intermittent, and lasted approximately 4 hours. Her pain was worse with palpation of her chest and had no relieving factors. She denies taking any medications for treatment. Associated symptoms include headache and shortness of breath. By the time I examined the patient she states she had had complete resolution of all of her symptoms. She denies any fevers, chills, dizziness, lightheadedness, abdominal pain, hematemesis, hematochezia, or dysuria. Except as noted above the remainder of the review of systems was reviewed and negative. Review of Systems   Constitutional: Negative for chills and fever. HENT: Negative for ear pain and sore throat. Eyes: Negative for pain. Respiratory: Positive for shortness of breath. Cardiovascular: Positive for chest pain. Negative for palpitations and leg swelling. Gastrointestinal: Positive for nausea and vomiting. Negative for abdominal pain, blood in stool and diarrhea. Genitourinary: Negative for dysuria, flank pain, menstrual problem and vaginal bleeding. Musculoskeletal: Negative for back pain and neck pain.    Skin: Negative for rash. Neurological: Positive for headaches. Psychiatric/Behavioral: Negative for behavioral problems. The patient is not nervous/anxious. Physical Exam  Constitutional:       Appearance: Normal appearance. HENT:      Head: Normocephalic and atraumatic. Right Ear: External ear normal.      Left Ear: External ear normal.      Nose: Nose normal.      Mouth/Throat:      Mouth: Mucous membranes are moist.      Pharynx: No oropharyngeal exudate. Eyes:      Extraocular Movements: Extraocular movements intact. Conjunctiva/sclera: Conjunctivae normal.      Pupils: Pupils are equal, round, and reactive to light. Cardiovascular:      Rate and Rhythm: Normal rate. Pulses: Normal pulses. Pulmonary:      Effort: No respiratory distress. Breath sounds: Normal breath sounds. Abdominal:      Palpations: Abdomen is soft. Tenderness: There is no abdominal tenderness. There is no guarding or rebound. Musculoskeletal:         General: Tenderness (Tenderness to palpation on her sternum) present. No deformity or signs of injury. Cervical back: Neck supple. No rigidity. Right lower leg: No edema. Left lower leg: No edema. Skin:     General: Skin is warm and dry. Capillary Refill: Capillary refill takes less than 2 seconds. Neurological:      General: No focal deficit present. Mental Status: She is alert. Mental status is at baseline. Psychiatric:         Mood and Affect: Mood normal.          Procedures     MDM   Patient is a 82-year-old female who presents with a complaint of chest pain, shortness of breath, nausea, and vomiting. She appeared in no acute distress with stable vitals although she was borderline tachycardic. An EKG was obtained which showed no ST elevation. We started a cardiac work-up. Her troponin was negative and the rest of her blood work was within normal limits except for a D-dimer. We ordered a CTA pulmonary to rule out PE.   The CT was negative for blood clots. Patient was p.o. challenged and tolerated it well. Patient is awake alert, hemodynamic stable, afebrile and in no respiratory distress. Discussed with patient plan for close outpatient follow-up with the patient's PCP as well as return precautions and the patient understands and agrees to the plan. ED Course as of 11/15/21 1032   Mon Nov 15, 2021   0751 EKG read and interpreted by me. Rate 81, normal sinus rhythm, normal axis, NH interval 150, , no ST elevation or depression, stable compared to previous EKG. [TO]   [id number] Patient reevaluated, she denies any chest pain or nausea at this time and states she feels fine. [TO]      ED Course User Index  [TO] Sky Vivas, DO               --------------------------------------------- PAST HISTORY ---------------------------------------------  Past Medical History:  has a past medical history of Anemia, DVT (deep venous thrombosis) ([facility] [address] DVT of lower extremity (deep venous thrombosis) ([facility] [address] History of DVT of lower extremity, and Migraine. Past Surgical History:  has a past surgical history that includes Induced  (10/12/12) and Dilation & curettage. Social History:  reports that she has never smoked. She has never used smokeless tobacco. She reports that she does not drink alcohol and does not use drugs. Family History: family history is not on file. The patients home medications have been reviewed.     Allergies: Latex    -------------------------------------------------- RESULTS -------------------------------------------------  Labs:  Results for orders placed or performed during the hospital encounter of 11/15/21   CBC Auto Differential   Result Value Ref Range    WBC 4.9 4.5 - 11.5 E9/L    RBC 4.00 3.50 - 5.50 E12/L    Hemoglobin 8.9 (L) 11.5 - 15.5 g/dL    Hematocrit 29.9 (L) 34.0 - 48.0 %    MCV 74.8 (L) 80.0 - 99.9 fL    MCH 22.3 (L) 26.0 - 35.0 pg    MCHC 29.8 (L) 32.0 - 34.5 %    RDW 18.6 (H) 11.5 - 15.0 fL    Platelets 024 410 - 733 E9/L    MPV 9.2 7.0 - 12.0 fL    Neutrophils % 65.0 43.0 - 80.0 %    Immature Granulocytes % 0.4 0.0 - 5.0 %    Lymphocytes % 27.4 20.0 - 42.0 %    Monocytes % 7.0 2.0 - 12.0 %    Eosinophils % 0.0 0.0 - 6.0 %    Basophils % 0.2 0.0 - 2.0 %    Neutrophils Absolute 3.16 1.80 - 7.30 E9/L    Immature Granulocytes # 0.02 E9/L    Lymphocytes Absolute 1.33 (L) 1.50 - 4.00 E9/L    Monocytes Absolute 0.34 0.10 - 0.95 E9/L    Eosinophils Absolute 0.00 (L) 0.05 - 0.50 E9/L    Basophils Absolute 0.01 0.00 - 0.20 E9/L   Comprehensive Metabolic Panel w/ Reflex to MG   Result Value Ref Range    Sodium 138 132 - 146 mmol/L    Potassium reflex Magnesium 3.8 3.5 - 5.0 mmol/L    Chloride 102 98 - 107 mmol/L    CO2 24 22 - 29 mmol/L    Anion Gap 12 7 - 16 mmol/L    Glucose 106 (H) 74 - 99 mg/dL    BUN 7 6 - 20 mg/dL    CREATININE 0.7 0.5 - 1.0 mg/dL    GFR Non-African American >60 >=60 mL/min/1.73    GFR African American >60     Calcium 9.1 8.6 - 10.2 mg/dL    Total Protein 8.1 6.4 - 8.3 g/dL    Albumin 4.3 3.5 - 5.2 g/dL    Total Bilirubin <0.2 0.0 - 1.2 mg/dL    Alkaline Phosphatase 105 (H) 35 - 104 U/L    ALT 12 0 - 32 U/L    AST 19 0 - 31 U/L   Lipase   Result Value Ref Range    Lipase 16 13 - 60 U/L   Troponin   Result Value Ref Range    Troponin, High Sensitivity <6 0 - 9 ng/L   HCG Qualitative, Serum   Result Value Ref Range    hCG Qual NEGATIVE NEGATIVE   D-Dimer, Quantitative   Result Value Ref Range    D-Dimer, Quant 417 ng/mL DDU   EKG 12 Lead   Result Value Ref Range    Ventricular Rate 81 BPM    Atrial Rate 81 BPM    P-R Interval 150 ms    QRS Duration 64 ms    Q-T Interval 372 ms    QTc Calculation (Bazett) 432 ms    P Axis 21 degrees    R Axis 4 degrees    T Axis 39 degrees       Radiology:  CTA PULMONARY W CONTRAST   Final Result   No evidence of pulmonary embolism or acute pulmonary abnormality. XR CHEST PORTABLE   Final Result   No acute process. ------------------------- NURSING NOTES AND VITALS REVIEWED ---------------------------  Date / Time Roomed:  11/15/2021  7:22 AM  ED Bed Assignment:  26/26    The nursing notes within the ED encounter and vital signs as below have been reviewed. BP (!) 144/80   Pulse 90   Temp 97.6 °F (36.4 °C)   Resp 16   Ht 5' (1.524 m)   Wt 160 lb (72.6 kg)   SpO2 100%   BMI 31.25 kg/m²   Oxygen Saturation Interpretation: normal      ------------------------------------------ PROGRESS NOTES ------------------------------------------    I have spoken with the patient and discussed todays results, in addition to providing specific details for the plan of care and counseling regarding the diagnosis and prognosis. Their questions are answered at this time and they are agreeable with the plan. I discussed at length with them reasons for immediate return here for re evaluation. They will followup with their PCP by calling their office tomorrow. --------------------------------- ADDITIONAL PROVIDER NOTES ---------------------------------  At this time the patient is without objective evidence of an acute process requiring hospitalization or inpatient management. They have remained hemodynamically stable throughout their entire ED visit and are stable for discharge with outpatient follow-up. The plan has been discussed in detail and they are aware of the specific conditions for emergent return, as well as the importance of follow-up. Current Discharge Medication List          Diagnosis:  1. Non-intractable vomiting with nausea, unspecified vomiting type    2. Chest pain, unspecified type        Disposition:  Patient's disposition: Discharge to home  Patient's condition is stable.       Eagle Sanchez DO  Resident  11/15/21 2997

## 2021-12-15 ENCOUNTER — HOSPITAL ENCOUNTER (EMERGENCY)
Age: 36
Discharge: HOME | End: 2021-12-15
Payer: COMMERCIAL

## 2021-12-15 ENCOUNTER — APPOINTMENT (OUTPATIENT)
Dept: CT IMAGING | Age: 36
End: 2021-12-15
Payer: COMMERCIAL

## 2021-12-15 VITALS
SYSTOLIC BLOOD PRESSURE: 116 MMHG | BODY MASS INDEX: 32.39 KG/M2 | WEIGHT: 165 LBS | HEART RATE: 104 BPM | RESPIRATION RATE: 16 BRPM | OXYGEN SATURATION: 100 % | HEIGHT: 60 IN | DIASTOLIC BLOOD PRESSURE: 75 MMHG | TEMPERATURE: 97.3 F

## 2021-12-15 DIAGNOSIS — D25.9 UTERINE LEIOMYOMA, UNSPECIFIED LOCATION: Primary | ICD-10-CM

## 2021-12-15 DIAGNOSIS — R10.84 GENERALIZED ABDOMINAL PAIN: ICD-10-CM

## 2021-12-15 LAB
ALBUMIN SERPL-MCNC: 4.1 G/DL (ref 3.5–5.2)
ALP BLD-CCNC: 97 U/L (ref 35–104)
ALT SERPL-CCNC: 11 U/L (ref 0–32)
ANION GAP SERPL CALCULATED.3IONS-SCNC: 9 MMOL/L (ref 7–16)
AST SERPL-CCNC: 18 U/L (ref 0–31)
BACTERIA: ABNORMAL /HPF
BASOPHILS ABSOLUTE: 0.02 E9/L (ref 0–0.2)
BASOPHILS RELATIVE PERCENT: 0.5 % (ref 0–2)
BILIRUB SERPL-MCNC: 0.3 MG/DL (ref 0–1.2)
BILIRUBIN URINE: NEGATIVE
BLOOD, URINE: ABNORMAL
BUN BLDV-MCNC: 6 MG/DL (ref 6–20)
CALCIUM SERPL-MCNC: 9.2 MG/DL (ref 8.6–10.2)
CHLORIDE BLD-SCNC: 104 MMOL/L (ref 98–107)
CLARITY: CLEAR
CO2: 24 MMOL/L (ref 22–29)
COLOR: YELLOW
CREAT SERPL-MCNC: 0.7 MG/DL (ref 0.5–1)
EOSINOPHILS ABSOLUTE: 0.02 E9/L (ref 0.05–0.5)
EOSINOPHILS RELATIVE PERCENT: 0.5 % (ref 0–6)
EPITHELIAL CELLS, UA: ABNORMAL /HPF
GFR AFRICAN AMERICAN: >60
GFR NON-AFRICAN AMERICAN: >60 ML/MIN/1.73
GLUCOSE BLD-MCNC: 93 MG/DL (ref 74–99)
GLUCOSE URINE: NEGATIVE MG/DL
HCG(URINE) PREGNANCY TEST: NEGATIVE
HCT VFR BLD CALC: 31.9 % (ref 34–48)
HEMOGLOBIN: 9.6 G/DL (ref 11.5–15.5)
IMMATURE GRANULOCYTES #: 0.02 E9/L
IMMATURE GRANULOCYTES %: 0.5 % (ref 0–5)
KETONES, URINE: NEGATIVE MG/DL
LACTIC ACID: 1 MMOL/L (ref 0.5–2.2)
LEUKOCYTE ESTERASE, URINE: NEGATIVE
LIPASE: 14 U/L (ref 13–60)
LYMPHOCYTES ABSOLUTE: 1.35 E9/L (ref 1.5–4)
LYMPHOCYTES RELATIVE PERCENT: 31.7 % (ref 20–42)
MCH RBC QN AUTO: 23.5 PG (ref 26–35)
MCHC RBC AUTO-ENTMCNC: 30.1 % (ref 32–34.5)
MCV RBC AUTO: 78.2 FL (ref 80–99.9)
MONOCYTES ABSOLUTE: 0.33 E9/L (ref 0.1–0.95)
MONOCYTES RELATIVE PERCENT: 7.7 % (ref 2–12)
NEUTROPHILS ABSOLUTE: 2.52 E9/L (ref 1.8–7.3)
NEUTROPHILS RELATIVE PERCENT: 59.1 % (ref 43–80)
NITRITE, URINE: NEGATIVE
PDW BLD-RTO: 24 FL (ref 11.5–15)
PH UA: 6.5 (ref 5–9)
PLATELET # BLD: 348 E9/L (ref 130–450)
PMV BLD AUTO: 9.9 FL (ref 7–12)
POTASSIUM REFLEX MAGNESIUM: 4.2 MMOL/L (ref 3.5–5)
PROTEIN UA: NEGATIVE MG/DL
RBC # BLD: 4.08 E12/L (ref 3.5–5.5)
RBC UA: ABNORMAL /HPF (ref 0–2)
SODIUM BLD-SCNC: 137 MMOL/L (ref 132–146)
SPECIFIC GRAVITY UA: <=1.005 (ref 1–1.03)
TOTAL PROTEIN: 7.5 G/DL (ref 6.4–8.3)
UROBILINOGEN, URINE: 0.2 E.U./DL
WBC # BLD: 4.3 E9/L (ref 4.5–11.5)
WBC UA: ABNORMAL /HPF (ref 0–5)

## 2021-12-15 PROCEDURE — 74176 CT ABD & PELVIS W/O CONTRAST: CPT

## 2021-12-15 PROCEDURE — 36415 COLL VENOUS BLD VENIPUNCTURE: CPT

## 2021-12-15 PROCEDURE — 96374 THER/PROPH/DIAG INJ IV PUSH: CPT

## 2021-12-15 PROCEDURE — 85025 COMPLETE CBC W/AUTO DIFF WBC: CPT

## 2021-12-15 PROCEDURE — 81001 URINALYSIS AUTO W/SCOPE: CPT

## 2021-12-15 PROCEDURE — 87088 URINE BACTERIA CULTURE: CPT

## 2021-12-15 PROCEDURE — 6360000002 HC RX W HCPCS: Performed by: NURSE PRACTITIONER

## 2021-12-15 PROCEDURE — 2580000003 HC RX 258: Performed by: NURSE PRACTITIONER

## 2021-12-15 PROCEDURE — 83690 ASSAY OF LIPASE: CPT

## 2021-12-15 PROCEDURE — 80053 COMPREHEN METABOLIC PANEL: CPT

## 2021-12-15 PROCEDURE — 81025 URINE PREGNANCY TEST: CPT

## 2021-12-15 PROCEDURE — 83605 ASSAY OF LACTIC ACID: CPT

## 2021-12-15 PROCEDURE — 99282 EMERGENCY DEPT VISIT SF MDM: CPT

## 2021-12-15 RX ORDER — IBUPROFEN 600 MG/1
600 TABLET ORAL 3 TIMES DAILY PRN
Qty: 30 TABLET | Refills: 0 | Status: ON HOLD | OUTPATIENT
Start: 2021-12-15 | End: 2022-01-03 | Stop reason: HOSPADM

## 2021-12-15 RX ORDER — KETOROLAC TROMETHAMINE 30 MG/ML
30 INJECTION, SOLUTION INTRAMUSCULAR; INTRAVENOUS ONCE
Status: COMPLETED | OUTPATIENT
Start: 2021-12-15 | End: 2021-12-15

## 2021-12-15 RX ORDER — HYDROCODONE BITARTRATE AND ACETAMINOPHEN 5; 325 MG/1; MG/1
1 TABLET ORAL EVERY 8 HOURS PRN
Qty: 9 TABLET | Refills: 0 | Status: SHIPPED | OUTPATIENT
Start: 2021-12-15 | End: 2021-12-18

## 2021-12-15 RX ORDER — 0.9 % SODIUM CHLORIDE 0.9 %
1000 INTRAVENOUS SOLUTION INTRAVENOUS ONCE
Status: COMPLETED | OUTPATIENT
Start: 2021-12-15 | End: 2021-12-15

## 2021-12-15 RX ADMIN — KETOROLAC TROMETHAMINE 30 MG: 30 INJECTION, SOLUTION INTRAMUSCULAR at 12:18

## 2021-12-15 RX ADMIN — SODIUM CHLORIDE 1000 ML: 9 INJECTION, SOLUTION INTRAVENOUS at 12:18

## 2021-12-15 ASSESSMENT — PAIN SCALES - GENERAL
PAINLEVEL_OUTOF10: 7
PAINLEVEL_OUTOF10: 10

## 2021-12-15 ASSESSMENT — PAIN DESCRIPTION - LOCATION: LOCATION: ABDOMEN

## 2021-12-15 ASSESSMENT — PAIN DESCRIPTION - FREQUENCY: FREQUENCY: CONTINUOUS

## 2021-12-15 ASSESSMENT — PAIN DESCRIPTION - ONSET: ONSET: SUDDEN

## 2021-12-15 ASSESSMENT — PAIN DESCRIPTION - PAIN TYPE: TYPE: ACUTE PAIN

## 2021-12-15 ASSESSMENT — PAIN DESCRIPTION - DESCRIPTORS: DESCRIPTORS: STABBING;SHARP

## 2021-12-15 ASSESSMENT — PAIN DESCRIPTION - PROGRESSION: CLINICAL_PROGRESSION: GRADUALLY WORSENING

## 2021-12-16 NOTE — ED PROVIDER NOTES
[address]  Department of Emergency Medicine   ED  Encounter Note  Admit Date/RoomTime: 12/15/2021 10:49 AM  ED Room: COSME/COSME    NAME: Ramon Elizabeth  : 1985  MRN: [id number]     Chief Complaint:  Abdominal Pain (started 2 days ago and is getting worse. )    History of Present Illness        Ramon Elizabeth is a 31 y.o. old female who presents to the emergency department by private vehicle, for sudden onset aching, cramping pain in the lower abdomen without radiation which began 2 day(s) prior to arrival.  There has been similar episodes in the past due to a uterine fibroid. Since onset the symptoms have been waxing and waning. The pain is associated with no additional symptoms. The pain is aggravated by nothing in particular and relieved by nothing. There has been NO back pain, chest pain, shortness of breath, fever, chills, sweating, nausea, vomiting or anorexia. .  ROS   Pertinent positives and negatives are stated within HPI, all other systems reviewed and are negative. Past Medical History:  has a past medical history of Anemia, DVT (deep venous thrombosis) ([address] DVT of lower extremity (deep venous thrombosis) ([address] History of DVT of lower extremity, and Migraine. Surgical History:  has a past surgical history that includes Induced  (10/12/12) and Dilation & curettage. Social History:  reports that she has never smoked. She has never used smokeless tobacco. She reports that she does not drink alcohol and does not use drugs. Family History: family history is not on file.      Allergies: Latex    Physical Exam   Oxygen Saturation Interpretation: Normal.        ED Triage Vitals   BP Temp Temp src Pulse Resp SpO2 Height Weight   12/15/21 1028 12/15/21 1028 -- 12/15/21 1028 12/15/21 1028 12/15/21 1028 12/15/21 1046 12/15/21 1046   116/75 97.3 °F (36.3 °C)  104 16 100 % 5' (1.524 m) 165 lb (74.8 kg)       Physical Exam  General Neutrophils % 59.1 43.0 - 80.0 %    Immature Granulocytes % 0.5 0.0 - 5.0 %    Lymphocytes % 31.7 20.0 - 42.0 %    Monocytes % 7.7 2.0 - 12.0 %    Eosinophils % 0.5 0.0 - 6.0 %    Basophils % 0.5 0.0 - 2.0 %    Neutrophils Absolute 2.52 1.80 - 7.30 E9/L    Immature Granulocytes # 0.02 E9/L    Lymphocytes Absolute 1.35 (L) 1.50 - 4.00 E9/L    Monocytes Absolute 0.33 0.10 - 0.95 E9/L    Eosinophils Absolute 0.02 (L) 0.05 - 0.50 E9/L    Basophils Absolute 0.02 0.00 - 0.20 E9/L   Comprehensive Metabolic Panel w/ Reflex to MG   Result Value Ref Range    Sodium 137 132 - 146 mmol/L    Potassium reflex Magnesium 4.2 3.5 - 5.0 mmol/L    Chloride 104 98 - 107 mmol/L    CO2 24 22 - 29 mmol/L    Anion Gap 9 7 - 16 mmol/L    Glucose 93 74 - 99 mg/dL    BUN 6 6 - 20 mg/dL    CREATININE 0.7 0.5 - 1.0 mg/dL    GFR Non-African American >60 >=60 mL/min/1.73    GFR African American >60     Calcium 9.2 8.6 - 10.2 mg/dL    Total Protein 7.5 6.4 - 8.3 g/dL    Albumin 4.1 3.5 - 5.2 g/dL    Total Bilirubin 0.3 0.0 - 1.2 mg/dL    Alkaline Phosphatase 97 35 - 104 U/L    ALT 11 0 - 32 U/L    AST 18 0 - 31 U/L   Lactic Acid, Plasma   Result Value Ref Range    Lactic Acid 1.0 0.5 - 2.2 mmol/L   Lipase   Result Value Ref Range    Lipase 14 13 - 60 U/L   Microscopic Urinalysis   Result Value Ref Range    WBC, UA 1-3 0 - 5 /HPF    RBC, UA 1-3 0 - 2 /HPF    Epithelial Cells, UA FEW /HPF    Bacteria, UA FEW (A) None Seen /HPF   Pregnancy, urine   Result Value Ref Range    HCG(Urine) Pregnancy Test NEGATIVE NEGATIVE     Imaging: All Radiology results interpreted by Radiologist unless otherwise noted. CT ABDOMEN PELVIS WO CONTRAST Additional Contrast? None   Final Result   1. No acute abnormality on noncontrast examination. 2. Stable uterine enlargement likely associated with leiomyoma.              ED Course / Medical Decision Making     Medications   0.9 % sodium chloride bolus (0 mLs IntraVENous Stopped 12/15/21 3124)   ketorolac (TORADOL) injection 30 mg (30 mg IntraVENous Given 12/15/21 1218)        Re-Evaluations:  12/15/21      Time: 7623  Patients condition is improving after treatment. Consultations:             None    Procedures:   none    MDM:  At this time the patient is without objective evidence of an acute process requiring hospitalization or inpatient management. They have remained hemodynamically stable throughout their entire ED visit and are stable for discharge with outpatient follow-up. The plan has been discussed in detail and they are aware of the specific conditions for emergent return, as well as the importance of follow-up. Updated on CT scan results and lab results. Patient educated that she does have a uterine fibroid no other acute process identified. Patient follow-up with her OB/GYN she is agreeable to plan of care all questions were answered. Patient stable for close outpatient follow-up. Plan of Care/Counseling:  NOLAN Fisher CNP  reviewed today's visit with the patient in addition to providing specific details for the plan of care and counseling regarding the diagnosis and prognosis. Questions are answered at this time and are agreeable with the plan. Assessment      1. Uterine leiomyoma, unspecified location    2. Generalized abdominal pain      This patient's ED course included: a personal history and physicial examination  This patient has remained hemodynamically stable during their ED course. Plan   Discharged home  Patient condition is good. New Medications     New Prescriptions    HYDROCODONE-ACETAMINOPHEN (NORCO) 5-325 MG PER TABLET    Take 1 tablet by mouth every 8 hours as needed for Pain for up to 3 days. Intended supply: 3 days.  Take lowest dose possible to manage pain    IBUPROFEN (ADVIL;MOTRIN) 600 MG TABLET    Take 1 tablet by mouth 3 times daily as needed for Pain     Electronically signed by NOLAN Fisher CNP   DD: 12/15/21  **This report was transcribed using voice recognition software. Every effort was made to ensure accuracy; however, inadvertent computerized transcription errors may be present.   END OF PROVIDER NOTE     NOLAN Owens CNP  12/15/21 9944

## 2021-12-17 LAB
ORGANISM: ABNORMAL
URINE CULTURE, ROUTINE: ABNORMAL

## 2021-12-18 RX ORDER — DOXYCYCLINE HYCLATE 100 MG
100 TABLET ORAL 2 TIMES DAILY
Qty: 20 TABLET | Refills: 0 | Status: SHIPPED | OUTPATIENT
Start: 2021-12-18 | End: 2021-12-28

## 2021-12-30 ENCOUNTER — APPOINTMENT (OUTPATIENT)
Dept: GENERAL RADIOLOGY | Age: 36
DRG: 137 | End: 2021-12-30
Payer: COMMERCIAL

## 2021-12-30 ENCOUNTER — HOSPITAL ENCOUNTER (EMERGENCY)
Age: 36
Discharge: ANOTHER HOSPITAL | DRG: 137 | End: 2021-12-31
Attending: STUDENT IN AN ORGANIZED HEALTH CARE EDUCATION/TRAINING PROGRAM | Admitting: INTERNAL MEDICINE
Payer: COMMERCIAL

## 2021-12-30 ENCOUNTER — APPOINTMENT (OUTPATIENT)
Dept: CT IMAGING | Age: 36
DRG: 137 | End: 2021-12-30
Payer: COMMERCIAL

## 2021-12-30 DIAGNOSIS — D64.9 SYMPTOMATIC ANEMIA: ICD-10-CM

## 2021-12-30 DIAGNOSIS — U07.1 COVID-19: Primary | ICD-10-CM

## 2021-12-30 DIAGNOSIS — D62 ACUTE BLOOD LOSS ANEMIA: ICD-10-CM

## 2021-12-30 LAB
ALBUMIN SERPL-MCNC: 3.9 G/DL (ref 3.5–5.2)
ALP BLD-CCNC: 94 U/L (ref 35–104)
ALT SERPL-CCNC: 9 U/L (ref 0–32)
ANION GAP SERPL CALCULATED.3IONS-SCNC: 13 MMOL/L (ref 7–16)
ANISOCYTOSIS: ABNORMAL
AST SERPL-CCNC: 31 U/L (ref 0–31)
BACTERIA: ABNORMAL /HPF
BASOPHILS ABSOLUTE: 0.02 E9/L (ref 0–0.2)
BASOPHILS RELATIVE PERCENT: 0.8 % (ref 0–2)
BILIRUB SERPL-MCNC: 0.2 MG/DL (ref 0–1.2)
BILIRUBIN URINE: NEGATIVE
BLOOD, URINE: ABNORMAL
BUN BLDV-MCNC: 4 MG/DL (ref 6–20)
CALCIUM SERPL-MCNC: 8.7 MG/DL (ref 8.6–10.2)
CHLORIDE BLD-SCNC: 101 MMOL/L (ref 98–107)
CLARITY: CLEAR
CO2: 20 MMOL/L (ref 22–29)
COLOR: YELLOW
CREAT SERPL-MCNC: 0.7 MG/DL (ref 0.5–1)
D DIMER: 589 NG/ML DDU
EOSINOPHILS ABSOLUTE: 0.01 E9/L (ref 0.05–0.5)
EOSINOPHILS RELATIVE PERCENT: 0.4 % (ref 0–6)
GFR AFRICAN AMERICAN: >60
GFR NON-AFRICAN AMERICAN: >60 ML/MIN/1.73
GLUCOSE BLD-MCNC: 99 MG/DL (ref 74–99)
GLUCOSE URINE: NEGATIVE MG/DL
HCG(URINE) PREGNANCY TEST: NEGATIVE
HCT VFR BLD CALC: 25.1 % (ref 34–48)
HEMOGLOBIN: 7.5 G/DL (ref 11.5–15.5)
HYPOCHROMIA: ABNORMAL
IMMATURE GRANULOCYTES #: 0.01 E9/L
IMMATURE GRANULOCYTES %: 0.4 % (ref 0–5)
INFLUENZA A BY PCR: NOT DETECTED
INFLUENZA B BY PCR: NOT DETECTED
KETONES, URINE: NEGATIVE MG/DL
LEUKOCYTE ESTERASE, URINE: NEGATIVE
LYMPHOCYTES ABSOLUTE: 0.71 E9/L (ref 1.5–4)
LYMPHOCYTES RELATIVE PERCENT: 29.1 % (ref 20–42)
MAGNESIUM: 1.9 MG/DL (ref 1.6–2.6)
MCH RBC QN AUTO: 23.4 PG (ref 26–35)
MCHC RBC AUTO-ENTMCNC: 29.9 % (ref 32–34.5)
MCV RBC AUTO: 78.4 FL (ref 80–99.9)
MONOCYTES ABSOLUTE: 0.35 E9/L (ref 0.1–0.95)
MONOCYTES RELATIVE PERCENT: 14.3 % (ref 2–12)
NEUTROPHILS ABSOLUTE: 1.34 E9/L (ref 1.8–7.3)
NEUTROPHILS RELATIVE PERCENT: 55 % (ref 43–80)
NITRITE, URINE: NEGATIVE
OVALOCYTES: ABNORMAL
PDW BLD-RTO: 23.4 FL (ref 11.5–15)
PH UA: 6 (ref 5–9)
PLATELET # BLD: 328 E9/L (ref 130–450)
PMV BLD AUTO: 10 FL (ref 7–12)
POIKILOCYTES: ABNORMAL
POTASSIUM SERPL-SCNC: 3.6 MMOL/L (ref 3.5–5)
PROTEIN UA: NEGATIVE MG/DL
RBC # BLD: 3.2 E12/L (ref 3.5–5.5)
RBC UA: >20 /HPF (ref 0–2)
SARS-COV-2, NAAT: DETECTED
SODIUM BLD-SCNC: 134 MMOL/L (ref 132–146)
SPECIFIC GRAVITY UA: <=1.005 (ref 1–1.03)
SPHEROCYTES: ABNORMAL
TOTAL PROTEIN: 7.3 G/DL (ref 6.4–8.3)
TROPONIN, HIGH SENSITIVITY: <6 NG/L (ref 0–9)
UROBILINOGEN, URINE: 0.2 E.U./DL
WBC # BLD: 2.4 E9/L (ref 4.5–11.5)
WBC UA: ABNORMAL /HPF (ref 0–5)

## 2021-12-30 PROCEDURE — 81001 URINALYSIS AUTO W/SCOPE: CPT

## 2021-12-30 PROCEDURE — 6360000002 HC RX W HCPCS: Performed by: PHYSICIAN ASSISTANT

## 2021-12-30 PROCEDURE — 85378 FIBRIN DEGRADE SEMIQUANT: CPT

## 2021-12-30 PROCEDURE — 85025 COMPLETE CBC W/AUTO DIFF WBC: CPT

## 2021-12-30 PROCEDURE — 71275 CT ANGIOGRAPHY CHEST: CPT

## 2021-12-30 PROCEDURE — 83735 ASSAY OF MAGNESIUM: CPT

## 2021-12-30 PROCEDURE — 93005 ELECTROCARDIOGRAM TRACING: CPT

## 2021-12-30 PROCEDURE — 84443 ASSAY THYROID STIM HORMONE: CPT

## 2021-12-30 PROCEDURE — 87088 URINE BACTERIA CULTURE: CPT

## 2021-12-30 PROCEDURE — 84484 ASSAY OF TROPONIN QUANT: CPT

## 2021-12-30 PROCEDURE — 6360000002 HC RX W HCPCS

## 2021-12-30 PROCEDURE — 80053 COMPREHEN METABOLIC PANEL: CPT

## 2021-12-30 PROCEDURE — 71046 X-RAY EXAM CHEST 2 VIEWS: CPT

## 2021-12-30 PROCEDURE — 87502 INFLUENZA DNA AMP PROBE: CPT

## 2021-12-30 PROCEDURE — 99285 EMERGENCY DEPT VISIT HI MDM: CPT

## 2021-12-30 PROCEDURE — 96374 THER/PROPH/DIAG INJ IV PUSH: CPT

## 2021-12-30 PROCEDURE — 87635 SARS-COV-2 COVID-19 AMP PRB: CPT

## 2021-12-30 PROCEDURE — 96375 TX/PRO/DX INJ NEW DRUG ADDON: CPT

## 2021-12-30 PROCEDURE — 84436 ASSAY OF TOTAL THYROXINE: CPT

## 2021-12-30 PROCEDURE — 2500000003 HC RX 250 WO HCPCS

## 2021-12-30 PROCEDURE — 6360000004 HC RX CONTRAST MEDICATION: Performed by: RADIOLOGY

## 2021-12-30 PROCEDURE — 81025 URINE PREGNANCY TEST: CPT

## 2021-12-30 PROCEDURE — 36415 COLL VENOUS BLD VENIPUNCTURE: CPT

## 2021-12-30 RX ORDER — SODIUM CHLORIDE 9 MG/ML
INJECTION, SOLUTION INTRAVENOUS CONTINUOUS
Status: CANCELLED | OUTPATIENT
Start: 2021-12-30

## 2021-12-30 RX ORDER — ACETAMINOPHEN 650 MG/1
650 SUPPOSITORY RECTAL EVERY 6 HOURS PRN
Status: CANCELLED | OUTPATIENT
Start: 2021-12-30

## 2021-12-30 RX ORDER — FLUOXETINE HYDROCHLORIDE 20 MG/1
20 CAPSULE ORAL ONCE
Status: DISCONTINUED | OUTPATIENT
Start: 2021-12-30 | End: 2021-12-30 | Stop reason: CLARIF

## 2021-12-30 RX ORDER — METOCLOPRAMIDE HYDROCHLORIDE 5 MG/ML
INJECTION INTRAMUSCULAR; INTRAVENOUS
Status: COMPLETED
Start: 2021-12-30 | End: 2021-12-30

## 2021-12-30 RX ORDER — METOCLOPRAMIDE HYDROCHLORIDE 5 MG/ML
10 INJECTION INTRAMUSCULAR; INTRAVENOUS ONCE
Status: COMPLETED | OUTPATIENT
Start: 2021-12-30 | End: 2021-12-30

## 2021-12-30 RX ORDER — IBUPROFEN 600 MG/1
600 TABLET ORAL 3 TIMES DAILY PRN
Status: CANCELLED | OUTPATIENT
Start: 2021-12-30

## 2021-12-30 RX ORDER — FLUOXETINE HYDROCHLORIDE 20 MG/1
20 CAPSULE ORAL ONCE
Status: DISCONTINUED | OUTPATIENT
Start: 2021-12-30 | End: 2021-12-31 | Stop reason: HOSPADM

## 2021-12-30 RX ORDER — SODIUM CHLORIDE 9 MG/ML
25 INJECTION, SOLUTION INTRAVENOUS PRN
Status: CANCELLED | OUTPATIENT
Start: 2021-12-30

## 2021-12-30 RX ORDER — GUAIFENESIN/DEXTROMETHORPHAN 100-10MG/5
5 SYRUP ORAL EVERY 4 HOURS PRN
Status: CANCELLED | OUTPATIENT
Start: 2021-12-30

## 2021-12-30 RX ORDER — SODIUM CHLORIDE 0.9 % (FLUSH) 0.9 %
5-40 SYRINGE (ML) INJECTION PRN
Status: CANCELLED | OUTPATIENT
Start: 2021-12-30

## 2021-12-30 RX ORDER — SODIUM CHLORIDE 0.9 % (FLUSH) 0.9 %
5-40 SYRINGE (ML) INJECTION EVERY 12 HOURS SCHEDULED
Status: CANCELLED | OUTPATIENT
Start: 2021-12-30

## 2021-12-30 RX ORDER — POLYETHYLENE GLYCOL 3350 17 G/17G
17 POWDER, FOR SOLUTION ORAL DAILY PRN
Status: CANCELLED | OUTPATIENT
Start: 2021-12-30

## 2021-12-30 RX ORDER — DIPHENHYDRAMINE HYDROCHLORIDE 50 MG/ML
25 INJECTION INTRAMUSCULAR; INTRAVENOUS ONCE
Status: COMPLETED | OUTPATIENT
Start: 2021-12-30 | End: 2021-12-30

## 2021-12-30 RX ORDER — ACETAMINOPHEN 325 MG/1
650 TABLET ORAL EVERY 6 HOURS PRN
Status: CANCELLED | OUTPATIENT
Start: 2021-12-30

## 2021-12-30 RX ORDER — OMEPRAZOLE 40 MG/1
40 CAPSULE, DELAYED RELEASE ORAL DAILY
Status: CANCELLED | OUTPATIENT
Start: 2021-12-31

## 2021-12-30 RX ADMIN — METOCLOPRAMIDE HYDROCHLORIDE 10 MG: 5 INJECTION INTRAMUSCULAR; INTRAVENOUS at 18:42

## 2021-12-30 RX ADMIN — DIPHENHYDRAMINE HYDROCHLORIDE 25 MG: 50 INJECTION, SOLUTION INTRAMUSCULAR; INTRAVENOUS at 23:29

## 2021-12-30 RX ADMIN — FAMOTIDINE 20 MG: 10 INJECTION INTRAVENOUS at 18:42

## 2021-12-30 RX ADMIN — IOPAMIDOL 75 ML: 755 INJECTION, SOLUTION INTRAVENOUS at 18:45

## 2021-12-30 ASSESSMENT — PAIN DESCRIPTION - LOCATION: LOCATION: THROAT

## 2021-12-30 ASSESSMENT — PAIN SCALES - GENERAL: PAINLEVEL_OUTOF10: 7

## 2021-12-30 NOTE — ED NOTES
FIRST PROVIDER CONTACT ASSESSMENT NOTE           Department of Emergency Medicine                 First Provider Note            21  4:12 PM EST    Date of Encounter: No admission date for patient encounter. Patient Name: Racquel Cortes  : 1985  MRN: [id number]    Chief Complaint: Otalgia (Patient c/o ear ache and cold and hot chills for last two days )      History of Present Illness:   Racquel Cortes is a [address] y.o. female who presents to the ED for multiple symptoms. Primarily c/o chills, sweats, cough. Hx DVT. Focused Physical Exam:  VS:    ED Triage Vitals   BP Temp Temp Source Pulse Resp SpO2 Height Weight   21 1154 21 1154 21 1154 21 1154 21 1154 21 1154 21 1608 21 1608   110/62 97.5 °F (36.4 °C) Temporal 110 16 100 % 5' (1.524 m) 165 lb (74.8 kg)        Physical Ex: Constitutional: Alert and non-toxic. Medical History:  has a past medical history of Anemia, DVT (deep venous thrombosis) ([address] DVT of lower extremity (deep venous thrombosis) ([address] History of DVT of lower extremity, and Migraine. Surgical History:  has a past surgical history that includes Induced  (10/12/12) and Dilation & curettage. Social History:  reports that she has never smoked. She has never used smokeless tobacco. She reports that she does not drink alcohol and does not use drugs. Family History: family history is not on file.     Allergies: Latex     Initial Plan of Care: Initiate Treatment-Testing, Proceed toTreatment Area When Bed Available for ED Attending/MLP to Continue Care      ---END OF FIRST PROVIDER CONTACT ASSESSMENT NOTE---  Electronically signed by Tristan Mallory PA-C   DD: 21       Tristan Mallory PA-C  [id number]

## 2021-12-30 NOTE — ED PROVIDER NOTES
Seen with physician:      HPI:  21, Time: 5:24 PM MATILDE Hughes is a 31 y.o. female presenting to the ED for evaluation of shortness of breath, cough, congestion as well as left sided chest pain which has been sharp on and off over the last 2 days. The patient history of previous DVT and is taking Eliquis. She states she has not missed any doses. She reports that she also has a history of iron deficiency anemia and has had low blood count. She also has a history of dysfunctional uterine bleeding and last saw her gynecologist over 1 year ago . She states she has been bleeding vaginally over the last 3 weeks and states she has been changing her pads 4-5 times per hour in the last few days. She states that she has not contacted her GYN for this heavy bleeding. She has been told in the past that she needed a hysterectomy by her GYN Dr. Elin Ferrari , but she has not chosen to do so at this time. She has had a headache as well over the last few days with  extreme fatigue. The complaint has been persistent, moderate to severe in severity. She reports she has not had the COVID vaccines or the flu vaccine. Review of Systems:   A complete review of systems was performed and pertinent positives and negatives are stated within HPI, all other systems reviewed and are negative.    --------------------------------------------- PAST HISTORY ---------------------------------------------  Past Medical History:  has a past medical history of Anemia, DVT (deep venous thrombosis) ([facility] [address] DVT of lower extremity (deep venous thrombosis) ([facility] [address] History of DVT of lower extremity, and Migraine. Past Surgical History:  has a past surgical history that includes Induced  (10/12/12) and Dilation & curettage. Social History:  reports that she has never smoked. She has never used smokeless tobacco. She reports that she does not drink alcohol and does not use drugs.     Family History: family history is not on file.     The patients home medications have been reviewed.     Allergies: Latex    -------------------------------------------------- RESULTS -------------------------------------------------  All laboratory and radiology results have been personally reviewed by myself   LABS:  Results for orders placed or performed during the hospital encounter of 12/30/21   Rapid influenza A/B antigens    Specimen: Nares   Result Value Ref Range    Influenza A by PCR Not Detected Not Detected    Influenza B by PCR Not Detected Not Detected   COVID-19, Rapid    Specimen: Nasopharyngeal Swab   Result Value Ref Range    SARS-CoV-2, NAAT DETECTED (A) Not Detected   CBC Auto Differential   Result Value Ref Range    WBC 2.4 (L) 4.5 - 11.5 E9/L    RBC 3.20 (L) 3.50 - 5.50 E12/L    Hemoglobin 7.5 (L) 11.5 - 15.5 g/dL    Hematocrit 25.1 (L) 34.0 - 48.0 %    MCV 78.4 (L) 80.0 - 99.9 fL    MCH 23.4 (L) 26.0 - 35.0 pg    MCHC 29.9 (L) 32.0 - 34.5 %    RDW 23.4 (H) 11.5 - 15.0 fL    Platelets 899 615 - 164 E9/L    MPV 10.0 7.0 - 12.0 fL    Neutrophils % 55.0 43.0 - 80.0 %    Immature Granulocytes % 0.4 0.0 - 5.0 %    Lymphocytes % 29.1 20.0 - 42.0 %    Monocytes % 14.3 (H) 2.0 - 12.0 %    Eosinophils % 0.4 0.0 - 6.0 %    Basophils % 0.8 0.0 - 2.0 %    Neutrophils Absolute 1.34 (L) 1.80 - 7.30 E9/L    Immature Granulocytes # 0.01 E9/L    Lymphocytes Absolute 0.71 (L) 1.50 - 4.00 E9/L    Monocytes Absolute 0.35 0.10 - 0.95 E9/L    Eosinophils Absolute 0.01 (L) 0.05 - 0.50 E9/L    Basophils Absolute 0.02 0.00 - 0.20 E9/L    Anisocytosis 1+     Hypochromia 1+     Poikilocytes 1+     Ovalocytes 1+     Spherocytes 1+    Comprehensive Metabolic Panel   Result Value Ref Range    Sodium 134 132 - 146 mmol/L    Potassium 3.6 3.5 - 5.0 mmol/L    Chloride 101 98 - 107 mmol/L    CO2 20 (L) 22 - 29 mmol/L    Anion Gap 13 7 - 16 mmol/L    Glucose 99 74 - 99 mg/dL    BUN 4 (L) 6 - 20 mg/dL    CREATININE 0.7 0.5 - 1.0 mg/dL    GFR Non-African American >60 >=60 mL/min/1.73    GFR African American >60     Calcium 8.7 8.6 - 10.2 mg/dL    Total Protein 7.3 6.4 - 8.3 g/dL    Albumin 3.9 3.5 - 5.2 g/dL    Total Bilirubin 0.2 0.0 - 1.2 mg/dL    Alkaline Phosphatase 94 35 - 104 U/L    ALT 9 0 - 32 U/L    AST 31 0 - 31 U/L   Troponin   Result Value Ref Range    Troponin, High Sensitivity <6 0 - 9 ng/L   Magnesium   Result Value Ref Range    Magnesium 1.9 1.6 - 2.6 mg/dL   D-Dimer, Quantitative   Result Value Ref Range    D-Dimer, Quant 589 ng/mL DDU   Urinalysis with Microscopic   Result Value Ref Range    Color, UA Yellow Straw/Yellow    Clarity, UA Clear Clear    Glucose, Ur Negative Negative mg/dL    Bilirubin Urine Negative Negative    Ketones, Urine Negative Negative mg/dL    Specific Gravity, UA <=1.005 1.005 - 1.030    Blood, Urine LARGE (A) Negative    pH, UA 6.0 5.0 - 9.0    Protein, UA Negative Negative mg/dL    Urobilinogen, Urine 0.2 <2.0 E.U./dL    Nitrite, Urine Negative Negative    Leukocyte Esterase, Urine Negative Negative    WBC, UA 0-1 0 - 5 /HPF    RBC, UA >20 0 - 2 /HPF    Bacteria, UA RARE (A) None Seen /HPF   Pregnancy, Urine   Result Value Ref Range    HCG(Urine) Pregnancy Test NEGATIVE NEGATIVE   EKG 12 Lead   Result Value Ref Range    Ventricular Rate 99 BPM    Atrial Rate 99 BPM    P-R Interval 154 ms    QRS Duration 64 ms    Q-T Interval 336 ms    QTc Calculation (Bazett) 431 ms    P Axis 67 degrees    R Axis 43 degrees    T Axis 61 degrees       RADIOLOGY:  Interpreted by Radiologist.  CTA CHEST W CONTRAST   Final Result   No evidence of pulmonary embolism or acute pulmonary abnormality. RECOMMENDATIONS:   Unavailable         XR CHEST (2 VW)   Final Result   No acute process. ------------------------- NURSING NOTES AND VITALS REVIEWED ---------------------------   The nursing notes within the ED encounter and vital signs as below have been reviewed.    /60   Pulse 94   Temp 97.5 °F (36.4 °C) (Temporal)   Resp 16   Ht 5' (1.524 m)   Wt 165 lb (74.8 kg)   LMP 12/03/2021   SpO2 99%   BMI 32.22 kg/m²   Oxygen Saturation Interpretation: Normal      ---------------------------------------------------PHYSICAL EXAM--------------------------------------      Constitutional/General: Alert and oriented x3, anxious appearing, non toxic in NAD  Head: Normocephalic and atraumatic  Eyes: PERRL, EOMI, conjunctiva pale  Ears: TM's dull, no erythema  Nose: Turbinates clear, no erythema. Mouth: Oropharynx with mild erythema, no exudate, mucosa moist,  handling secretions, no trismus  Neck: Supple, full ROM, no significant adenopathy, no rigidity or stridor. Pulmonary: Lungs clear to auscultation bilaterally, no wheezes or rhonchi. Not in respiratory distress  Cardiovascular:  Tachycardic rate and rhythm. 2+ distal pulses  Abdomen: Soft, mild diffuse lower abdominal tenderness, no rebound or guarding. Pelvic: No external vaginal lesions. Speculum exam reveals large amount of dark vaginal bleeding, flowing quickly from vaginal area with some clots noted. Os appears closed. Bimanual not performed- done just to access amount of bleeding at this time. Extremities: Moves all extremities x 4. No local tenderness or STS noted. Warm and well perfused  Skin: warm and dry without rash  Neurologic: GCS 15  Psych: Normal Affect    EKG: This EKG is signed and interpreted by me.     Rate: 99  Rhythm: Sinus  Interpretation: no acute changes and non-specific EKG  Comparison: stable as compared to patient's most recent EKG    ------------------------------ ED COURSE/MEDICAL DECISION MAKING----------------------  Medications   FLUoxetine (PROZAC) capsule 20 mg (has no administration in time range)   iopamidol (ISOVUE-370) 76 % injection 75 mL (75 mLs IntraVENous Given 12/30/21 1845)   metoclopramide (REGLAN) injection 10 mg (10 mg IntraVENous Given 12/30/21 1842)   famotidine (PEPCID) injection 20 mg (20 mg IntraVENous Given 12/30/21 1842)   diphenhydrAMINE (BENADRYL) injection 25 mg (25 mg IntraVENous Given 12/30/21 6472)         ED COURSE:  ED Course as of 12/30/21 2342   Thu Dec 30, 2021   1725 EKG: This EKG is signed and interpreted by me. Rate: 99  Rhythm: Sinus  Interpretation: non-specific EKG, no ST-Elevations or Depressions; , QRS 64, QTc 431  Comparison: no significant changes when compared to prior    [BB]   2108 Spoke with Marci Mensah here for Dr. Ashli Arreola and discussed the patient. Notes that pending discussion with gynecology, they will admit the patient for symptomatic anemia. Page out to on-call GYN at this time. [BB]   2117 Spoke with Dr. Marysol Rodríguez on call gyn and discussed the patient. He will provide consult. [BB]      ED Course User Index  [BB] Jeanine Campos,        Medical Decision Making:    Patient to ER, complaints of headache, intermittent sharp chest pain beneath left breast as well as ear pain, intermittent cough as well as some chills and generalized ill feeling onset over the last 2 days. Patient recently with labs done 15 days ago, HGB at that time 9.6. HGB today at 7.5. Patient reports she has been bleeding for the last 3 weeks, states changing her pad with her menses 4 to 5 times per hour, heavy bleeding. Patient advised that her COVID swab is positive. Patient very tearful and upset, began vomiting in bag then having dry heaves, IV Reglan and Pepcid ordered. Patient also advised of elevated DDimer, HR continues to be in 120 range, will check CTA- discussed with ER attending. Patient advised of concerns due to positive COVID, patient on Eliquis due to history of DVT, persistent dysfunction uterine bleeding with dropping HGB and now symptoms of lightheadedness and dizziness as well as current chest pain and shortness of breath. 8:30PM At bedside with patient for orthostatic vitals. Upon standing, patient had near syncopal episode, had to catch patient from falling forward and striking head with EA present.  Patient very lightheaded and dizzy and helped back to bed. Patient pelvic exam performed and large amount of bleeding noted to vaginal vault. Pad soaked with blood and large amount of blood per vaginal area with some clots noted. With patient drop in HGB as well as tachycardia which has been persistent and patient on Eliquis with history of DVT as well as positive COVID, admission medically recommended at this time. Consult:  Dr. [place] spoke with hospitalist Ginna Jean-Baptiste for Dr. Arnaldo Quezada service and she accepted admission and Dr. Devonte Cid agrees to be available as consultant as needed as patient GYN does not come to [facility]. Patient will be transferred once bed is available. Patient aware of transfer. Patient normally takes Fluoxetine at bedtime. Asking for something to help her relax. Dose of IV Benadryl given. Written for Fluoxetine, but patient advised not sure if we carry here at this ER since we are free standing location. Counseling: The emergency provider has spoken with the patient and discussed todays results, in addition to providing specific details for the plan of care and counseling regarding the diagnosis and prognosis. Questions are answered at this time and they are agreeable with the plan.      --------------------------------- IMPRESSION AND DISPOSITION ---------------------------------    IMPRESSION  1. COVID-19    2. Acute blood loss anemia        DISPOSITION  Disposition: Transfer to [facility] for admission  Patient condition is stable      NOTE: This report was transcribed using voice recognition software.  Every effort was made to ensure accuracy; however, inadvertent computerized transcription errors may be present       Tamiko Lawrence PA-C  12/30/21 0875

## 2021-12-30 NOTE — Clinical Note
Patient Class: Inpatient [101]   REQUIRED: Diagnosis: Symptomatic anemia [[id number]]   Estimated Length of Stay: Estimated stay of more than 2 midnights   Admitting Provider: Fariba REDDY [de-identified]   Preferred Department: Intermediate, COVID +

## 2021-12-30 NOTE — ED NOTES
Radiology Procedure Waiver   Name: Aleta North  : 1985  MRN: [id number]    Date:  21    Time: 6:12 PM EST    Benefits of immediately proceeding with Radiology exam(s) without pre-testing outweigh the risks or are not indicated as specified below and therefore the following is/are being waived:    [x] Pregnancy test   [x] Patients LMP on-time and regular.   [] Patient had Tubal Ligation or has other Contraception Device. [] Patient  is Menopausal or Premenarcheal.    [] Patient had Full or Partial Hysterectomy. Currently on menses    [] Protocol for Iodine allergy    [] MRI Questionnaire     [] BUN/Creatinine   [] Patient age w/no hx of renal dysfunction. [] Patient on Dialysis. [] Recent Normal Labs.   Electronically signed by Riana Ortiz PA-C on 21 at [address] [facility] [place]  21 [id number]

## 2021-12-31 ENCOUNTER — HOSPITAL ENCOUNTER (INPATIENT)
Age: 36
LOS: 3 days | Discharge: HOME | DRG: 532 | End: 2022-01-03
Attending: INTERNAL MEDICINE | Admitting: INTERNAL MEDICINE
Payer: COMMERCIAL

## 2021-12-31 VITALS
HEART RATE: 108 BPM | SYSTOLIC BLOOD PRESSURE: 114 MMHG | RESPIRATION RATE: 18 BRPM | TEMPERATURE: 97.9 F | DIASTOLIC BLOOD PRESSURE: 61 MMHG | BODY MASS INDEX: 32.39 KG/M2 | OXYGEN SATURATION: 100 % | HEIGHT: 60 IN | WEIGHT: 165 LBS

## 2021-12-31 DIAGNOSIS — D50.0 IRON DEFICIENCY ANEMIA DUE TO CHRONIC BLOOD LOSS: ICD-10-CM

## 2021-12-31 DIAGNOSIS — Z86.718 HISTORY OF DVT (DEEP VEIN THROMBOSIS): Primary | Chronic | ICD-10-CM

## 2021-12-31 LAB
BASOPHILS ABSOLUTE: 0.01 E9/L (ref 0–0.2)
BASOPHILS RELATIVE PERCENT: 0.2 % (ref 0–2)
EOSINOPHILS ABSOLUTE: 0.01 E9/L (ref 0.05–0.5)
EOSINOPHILS RELATIVE PERCENT: 0.2 % (ref 0–6)
HCT VFR BLD CALC: 23.1 % (ref 34–48)
HEMOGLOBIN: 7 G/DL (ref 11.5–15.5)
IMMATURE GRANULOCYTES #: 0.01 E9/L
IMMATURE GRANULOCYTES %: 0.2 % (ref 0–5)
LYMPHOCYTES ABSOLUTE: 1.07 E9/L (ref 1.5–4)
LYMPHOCYTES RELATIVE PERCENT: 26.3 % (ref 20–42)
MCH RBC QN AUTO: 23.3 PG (ref 26–35)
MCHC RBC AUTO-ENTMCNC: 30.3 % (ref 32–34.5)
MCV RBC AUTO: 77 FL (ref 80–99.9)
MONOCYTES ABSOLUTE: 0.4 E9/L (ref 0.1–0.95)
MONOCYTES RELATIVE PERCENT: 9.8 % (ref 2–12)
NEUTROPHILS ABSOLUTE: 2.57 E9/L (ref 1.8–7.3)
NEUTROPHILS RELATIVE PERCENT: 63.3 % (ref 43–80)
PDW BLD-RTO: 22.8 FL (ref 11.5–15)
PLATELET # BLD: 308 E9/L (ref 130–450)
PMV BLD AUTO: 9.8 FL (ref 7–12)
RBC # BLD: 3 E12/L (ref 3.5–5.5)
T4 TOTAL: 8.5 MCG/DL (ref 4.5–11.7)
TSH SERPL DL<=0.05 MIU/L-ACNC: 0.27 UIU/ML (ref 0.27–4.2)
WBC # BLD: 4.1 E9/L (ref 4.5–11.5)

## 2021-12-31 PROCEDURE — 6370000000 HC RX 637 (ALT 250 FOR IP)

## 2021-12-31 PROCEDURE — 6360000002 HC RX W HCPCS: Performed by: EMERGENCY MEDICINE

## 2021-12-31 PROCEDURE — 36415 COLL VENOUS BLD VENIPUNCTURE: CPT

## 2021-12-31 PROCEDURE — 84145 PROCALCITONIN (PCT): CPT

## 2021-12-31 PROCEDURE — 85378 FIBRIN DEGRADE SEMIQUANT: CPT

## 2021-12-31 PROCEDURE — 6370000000 HC RX 637 (ALT 250 FOR IP): Performed by: EMERGENCY MEDICINE

## 2021-12-31 PROCEDURE — 2580000003 HC RX 258: Performed by: PHYSICIAN ASSISTANT

## 2021-12-31 PROCEDURE — 85018 HEMOGLOBIN: CPT

## 2021-12-31 PROCEDURE — 85025 COMPLETE CBC W/AUTO DIFF WBC: CPT

## 2021-12-31 PROCEDURE — 6370000000 HC RX 637 (ALT 250 FOR IP): Performed by: PHYSICIAN ASSISTANT

## 2021-12-31 PROCEDURE — 83615 LACTATE (LD) (LDH) ENZYME: CPT

## 2021-12-31 PROCEDURE — 2060000000 HC ICU INTERMEDIATE R&B

## 2021-12-31 PROCEDURE — 85014 HEMATOCRIT: CPT

## 2021-12-31 PROCEDURE — 86140 C-REACTIVE PROTEIN: CPT

## 2021-12-31 PROCEDURE — 82728 ASSAY OF FERRITIN: CPT

## 2021-12-31 RX ORDER — LORAZEPAM 2 MG/ML
1 INJECTION INTRAMUSCULAR ONCE
Status: COMPLETED | OUTPATIENT
Start: 2021-12-31 | End: 2021-12-31

## 2021-12-31 RX ORDER — ACETAMINOPHEN 500 MG
500 TABLET ORAL ONCE
Status: COMPLETED | OUTPATIENT
Start: 2021-12-31 | End: 2021-12-31

## 2021-12-31 RX ORDER — LORAZEPAM 1 MG/1
TABLET ORAL
Status: COMPLETED
Start: 2021-12-31 | End: 2021-12-31

## 2021-12-31 RX ORDER — IBUPROFEN 600 MG/1
600 TABLET ORAL 3 TIMES DAILY PRN
Status: DISCONTINUED | OUTPATIENT
Start: 2021-12-31 | End: 2022-01-03 | Stop reason: HOSPADM

## 2021-12-31 RX ORDER — PANTOPRAZOLE SODIUM 40 MG/1
40 TABLET, DELAYED RELEASE ORAL
Status: DISCONTINUED | OUTPATIENT
Start: 2022-01-01 | End: 2022-01-03 | Stop reason: HOSPADM

## 2021-12-31 RX ORDER — ACETAMINOPHEN 650 MG/1
650 SUPPOSITORY RECTAL EVERY 6 HOURS PRN
Status: DISCONTINUED | OUTPATIENT
Start: 2021-12-31 | End: 2022-01-03 | Stop reason: HOSPADM

## 2021-12-31 RX ORDER — FLUOXETINE HYDROCHLORIDE 20 MG/1
20 CAPSULE ORAL DAILY
COMMUNITY
End: 2022-10-04 | Stop reason: ALTCHOICE

## 2021-12-31 RX ORDER — SODIUM CHLORIDE 9 MG/ML
25 INJECTION, SOLUTION INTRAVENOUS PRN
Status: DISCONTINUED | OUTPATIENT
Start: 2021-12-31 | End: 2022-01-03 | Stop reason: HOSPADM

## 2021-12-31 RX ORDER — OMEPRAZOLE 20 MG/1
40 CAPSULE, DELAYED RELEASE ORAL DAILY
Status: DISCONTINUED | OUTPATIENT
Start: 2022-01-01 | End: 2021-12-31 | Stop reason: CLARIF

## 2021-12-31 RX ORDER — GUAIFENESIN/DEXTROMETHORPHAN 100-10MG/5
5 SYRUP ORAL EVERY 4 HOURS PRN
Status: DISCONTINUED | OUTPATIENT
Start: 2021-12-31 | End: 2022-01-03 | Stop reason: HOSPADM

## 2021-12-31 RX ORDER — ACETAMINOPHEN 325 MG/1
650 TABLET ORAL EVERY 6 HOURS PRN
Status: DISCONTINUED | OUTPATIENT
Start: 2021-12-31 | End: 2022-01-03 | Stop reason: HOSPADM

## 2021-12-31 RX ORDER — LORAZEPAM 0.5 MG/1
0.5 TABLET ORAL ONCE
Status: COMPLETED | OUTPATIENT
Start: 2021-12-31 | End: 2022-01-01

## 2021-12-31 RX ORDER — LORAZEPAM 1 MG/1
1 TABLET ORAL ONCE
Status: COMPLETED | OUTPATIENT
Start: 2021-12-31 | End: 2021-12-31

## 2021-12-31 RX ORDER — SODIUM CHLORIDE 0.9 % (FLUSH) 0.9 %
5-40 SYRINGE (ML) INJECTION EVERY 12 HOURS SCHEDULED
Status: DISCONTINUED | OUTPATIENT
Start: 2021-12-31 | End: 2022-01-03 | Stop reason: HOSPADM

## 2021-12-31 RX ORDER — SODIUM CHLORIDE 0.9 % (FLUSH) 0.9 %
5-40 SYRINGE (ML) INJECTION PRN
Status: DISCONTINUED | OUTPATIENT
Start: 2021-12-31 | End: 2022-01-03 | Stop reason: HOSPADM

## 2021-12-31 RX ORDER — POLYETHYLENE GLYCOL 3350 17 G/17G
17 POWDER, FOR SOLUTION ORAL DAILY PRN
Status: DISCONTINUED | OUTPATIENT
Start: 2021-12-31 | End: 2022-01-03 | Stop reason: HOSPADM

## 2021-12-31 RX ORDER — SODIUM CHLORIDE 9 MG/ML
INJECTION, SOLUTION INTRAVENOUS CONTINUOUS
Status: DISCONTINUED | OUTPATIENT
Start: 2021-12-31 | End: 2022-01-03

## 2021-12-31 RX ADMIN — ACETAMINOPHEN 500 MG: 500 TABLET ORAL at 13:12

## 2021-12-31 RX ADMIN — Medication 10 ML: at 22:58

## 2021-12-31 RX ADMIN — LORAZEPAM 1 MG: 1 TABLET ORAL at 17:44

## 2021-12-31 RX ADMIN — LORAZEPAM 1 MG: 1 TABLET ORAL at 08:08

## 2021-12-31 RX ADMIN — IBUPROFEN 600 MG: 600 TABLET ORAL at 22:49

## 2021-12-31 RX ADMIN — SODIUM CHLORIDE: 9 INJECTION, SOLUTION INTRAVENOUS at 23:00

## 2021-12-31 RX ADMIN — LORAZEPAM 1 MG: 2 INJECTION INTRAMUSCULAR; INTRAVENOUS at 01:53

## 2021-12-31 ASSESSMENT — PAIN SCALES - GENERAL
PAINLEVEL_OUTOF10: 10
PAINLEVEL_OUTOF10: 9

## 2021-12-31 NOTE — ED NOTES
Called access line to see if pt will get a bed tonight  Probably sometime tomorrow  ([facility] in [place])     Johnnie Chadwicktingham[facility]  12/31/21 6530

## 2021-12-31 NOTE — ED NOTES
Pt rang call light- requesting something for anxiety  Water given  ( pt stated ginger ale made her nauseated)     Sergio Johnson RN  12/31/21 0144

## 2021-12-31 NOTE — ED NOTES
Upon ambulation, patient's pulse ox remained 98-99%. Patient also denied SOB.       Sidney Aleman  12/30/21 1946

## 2022-01-01 LAB
ABO/RH: NORMAL
ALBUMIN SERPL-MCNC: 3.7 G/DL (ref 3.5–5.2)
ALP BLD-CCNC: 87 U/L (ref 35–104)
ALT SERPL-CCNC: 7 U/L (ref 0–32)
ANION GAP SERPL CALCULATED.3IONS-SCNC: 14 MMOL/L (ref 7–16)
ANISOCYTOSIS: ABNORMAL
ANTIBODY SCREEN: NORMAL
APTT: 22.5 SEC (ref 24.5–35.1)
AST SERPL-CCNC: 14 U/L (ref 0–31)
BASOPHILS ABSOLUTE: 0.01 E9/L (ref 0–0.2)
BASOPHILS RELATIVE PERCENT: 0.2 % (ref 0–2)
BILIRUB SERPL-MCNC: 0.3 MG/DL (ref 0–1.2)
BLOOD BANK DISPENSE STATUS: NORMAL
BLOOD BANK PRODUCT CODE: NORMAL
BPU ID: NORMAL
BUN BLDV-MCNC: 8 MG/DL (ref 6–20)
C-REACTIVE PROTEIN: 0.7 MG/DL (ref 0–0.4)
C-REACTIVE PROTEIN: 0.9 MG/DL (ref 0–0.4)
CALCIUM SERPL-MCNC: 8.5 MG/DL (ref 8.6–10.2)
CHLORIDE BLD-SCNC: 99 MMOL/L (ref 98–107)
CO2: 22 MMOL/L (ref 22–29)
CREAT SERPL-MCNC: 0.7 MG/DL (ref 0.5–1)
D DIMER: 571 NG/ML DDU
D DIMER: 686 NG/ML DDU
DESCRIPTION BLOOD BANK: NORMAL
EOSINOPHILS ABSOLUTE: 0.02 E9/L (ref 0.05–0.5)
EOSINOPHILS RELATIVE PERCENT: 0.3 % (ref 0–6)
FERRITIN: 8 NG/ML
FIBRINOGEN: 524 MG/DL (ref 225–540)
GFR AFRICAN AMERICAN: >60
GFR NON-AFRICAN AMERICAN: >60 ML/MIN/1.73
GLUCOSE BLD-MCNC: 103 MG/DL (ref 74–99)
HCT VFR BLD CALC: 21.3 % (ref 34–48)
HCT VFR BLD CALC: 21.8 % (ref 34–48)
HCT VFR BLD CALC: 25.3 % (ref 34–48)
HCT VFR BLD CALC: 28.3 % (ref 34–48)
HEMOGLOBIN: 6.4 G/DL (ref 11.5–15.5)
HEMOGLOBIN: 6.6 G/DL (ref 11.5–15.5)
HEMOGLOBIN: 7.8 G/DL (ref 11.5–15.5)
HEMOGLOBIN: 8.5 G/DL (ref 11.5–15.5)
HYPOCHROMIA: ABNORMAL
IMMATURE GRANULOCYTES #: 0.02 E9/L
IMMATURE GRANULOCYTES %: 0.3 % (ref 0–5)
INR BLD: 1.2
LACTATE DEHYDROGENASE: 171 U/L (ref 135–214)
LACTIC ACID: 1 MMOL/L (ref 0.5–2.2)
LYMPHOCYTES ABSOLUTE: 1.07 E9/L (ref 1.5–4)
LYMPHOCYTES RELATIVE PERCENT: 18.3 % (ref 20–42)
MAGNESIUM: 2.2 MG/DL (ref 1.6–2.6)
MCH RBC QN AUTO: 23.2 PG (ref 26–35)
MCHC RBC AUTO-ENTMCNC: 30 % (ref 32–34.5)
MCV RBC AUTO: 77.2 FL (ref 80–99.9)
MONOCYTES ABSOLUTE: 0.39 E9/L (ref 0.1–0.95)
MONOCYTES RELATIVE PERCENT: 6.7 % (ref 2–12)
NEUTROPHILS ABSOLUTE: 4.35 E9/L (ref 1.8–7.3)
NEUTROPHILS RELATIVE PERCENT: 74.2 % (ref 43–80)
OVALOCYTES: ABNORMAL
PDW BLD-RTO: 22.6 FL (ref 11.5–15)
PLATELET # BLD: 308 E9/L (ref 130–450)
PMV BLD AUTO: 10.1 FL (ref 7–12)
POIKILOCYTES: ABNORMAL
POLYCHROMASIA: ABNORMAL
POTASSIUM REFLEX MAGNESIUM: 3.3 MMOL/L (ref 3.5–5)
PROCALCITONIN: 0.09 NG/ML (ref 0–0.08)
PROTHROMBIN TIME: 12.7 SEC (ref 9.3–12.4)
RBC # BLD: 2.76 E12/L (ref 3.5–5.5)
SODIUM BLD-SCNC: 135 MMOL/L (ref 132–146)
TOTAL PROTEIN: 6.5 G/DL (ref 6.4–8.3)
TROPONIN, HIGH SENSITIVITY: 7 NG/L (ref 0–9)
WBC # BLD: 5.9 E9/L (ref 4.5–11.5)

## 2022-01-01 PROCEDURE — P9016 RBC LEUKOCYTES REDUCED: HCPCS

## 2022-01-01 PROCEDURE — 2060000000 HC ICU INTERMEDIATE R&B

## 2022-01-01 PROCEDURE — 6370000000 HC RX 637 (ALT 250 FOR IP): Performed by: NURSE PRACTITIONER

## 2022-01-01 PROCEDURE — 85025 COMPLETE CBC W/AUTO DIFF WBC: CPT

## 2022-01-01 PROCEDURE — 2580000003 HC RX 258: Performed by: PHYSICIAN ASSISTANT

## 2022-01-01 PROCEDURE — 83735 ASSAY OF MAGNESIUM: CPT

## 2022-01-01 PROCEDURE — 6370000000 HC RX 637 (ALT 250 FOR IP): Performed by: PHYSICIAN ASSISTANT

## 2022-01-01 PROCEDURE — 86901 BLOOD TYPING SEROLOGIC RH(D): CPT

## 2022-01-01 PROCEDURE — 86850 RBC ANTIBODY SCREEN: CPT

## 2022-01-01 PROCEDURE — 86140 C-REACTIVE PROTEIN: CPT

## 2022-01-01 PROCEDURE — 85610 PROTHROMBIN TIME: CPT

## 2022-01-01 PROCEDURE — 6360000002 HC RX W HCPCS: Performed by: NURSE PRACTITIONER

## 2022-01-01 PROCEDURE — 6360000002 HC RX W HCPCS: Performed by: PHYSICIAN ASSISTANT

## 2022-01-01 PROCEDURE — 36430 TRANSFUSION BLD/BLD COMPNT: CPT

## 2022-01-01 PROCEDURE — 84484 ASSAY OF TROPONIN QUANT: CPT

## 2022-01-01 PROCEDURE — 36415 COLL VENOUS BLD VENIPUNCTURE: CPT

## 2022-01-01 PROCEDURE — 85378 FIBRIN DEGRADE SEMIQUANT: CPT

## 2022-01-01 PROCEDURE — 83605 ASSAY OF LACTIC ACID: CPT

## 2022-01-01 PROCEDURE — 86900 BLOOD TYPING SEROLOGIC ABO: CPT

## 2022-01-01 PROCEDURE — 80053 COMPREHEN METABOLIC PANEL: CPT

## 2022-01-01 PROCEDURE — 85018 HEMOGLOBIN: CPT

## 2022-01-01 PROCEDURE — 85014 HEMATOCRIT: CPT

## 2022-01-01 PROCEDURE — 86923 COMPATIBILITY TEST ELECTRIC: CPT

## 2022-01-01 PROCEDURE — 85730 THROMBOPLASTIN TIME PARTIAL: CPT

## 2022-01-01 PROCEDURE — 85384 FIBRINOGEN ACTIVITY: CPT

## 2022-01-01 RX ORDER — SODIUM CHLORIDE 9 MG/ML
INJECTION, SOLUTION INTRAVENOUS PRN
Status: DISCONTINUED | OUTPATIENT
Start: 2022-01-01 | End: 2022-01-03 | Stop reason: HOSPADM

## 2022-01-01 RX ORDER — LANOLIN ALCOHOL/MO/W.PET/CERES
6 CREAM (GRAM) TOPICAL NIGHTLY PRN
Status: DISCONTINUED | OUTPATIENT
Start: 2022-01-01 | End: 2022-01-03 | Stop reason: HOSPADM

## 2022-01-01 RX ORDER — LORAZEPAM 2 MG/ML
0.5 INJECTION INTRAMUSCULAR EVERY 6 HOURS PRN
Status: DISCONTINUED | OUTPATIENT
Start: 2022-01-01 | End: 2022-01-03 | Stop reason: HOSPADM

## 2022-01-01 RX ORDER — POTASSIUM CHLORIDE 20 MEQ/1
40 TABLET, EXTENDED RELEASE ORAL ONCE
Status: COMPLETED | OUTPATIENT
Start: 2022-01-01 | End: 2022-01-01

## 2022-01-01 RX ADMIN — ACETAMINOPHEN 650 MG: 325 TABLET ORAL at 09:09

## 2022-01-01 RX ADMIN — PANTOPRAZOLE SODIUM 40 MG: 40 TABLET, DELAYED RELEASE ORAL at 06:00

## 2022-01-01 RX ADMIN — Medication 6 MG: at 20:58

## 2022-01-01 RX ADMIN — SODIUM CHLORIDE: 9 INJECTION, SOLUTION INTRAVENOUS at 16:44

## 2022-01-01 RX ADMIN — LORAZEPAM 0.5 MG: 0.5 TABLET ORAL at 00:08

## 2022-01-01 RX ADMIN — ACETAMINOPHEN 650 MG: 325 TABLET ORAL at 18:23

## 2022-01-01 RX ADMIN — TRIMETHOBENZAMIDE HYDROCHLORIDE 200 MG: 100 INJECTION INTRAMUSCULAR at 06:00

## 2022-01-01 RX ADMIN — POTASSIUM CHLORIDE 40 MEQ: 1500 TABLET, EXTENDED RELEASE ORAL at 17:14

## 2022-01-01 RX ADMIN — IBUPROFEN 600 MG: 600 TABLET ORAL at 20:58

## 2022-01-01 RX ADMIN — Medication 10 ML: at 20:59

## 2022-01-01 RX ADMIN — LORAZEPAM 0.5 MG: 2 INJECTION INTRAMUSCULAR; INTRAVENOUS at 10:44

## 2022-01-01 RX ADMIN — LORAZEPAM 0.5 MG: 2 INJECTION INTRAMUSCULAR; INTRAVENOUS at 16:45

## 2022-01-01 RX ADMIN — GUAIFENESIN AND DEXTROMETHORPHAN 5 ML: 100; 10 SYRUP ORAL at 20:59

## 2022-01-01 ASSESSMENT — PAIN SCALES - GENERAL
PAINLEVEL_OUTOF10: 5
PAINLEVEL_OUTOF10: 9
PAINLEVEL_OUTOF10: 6
PAINLEVEL_OUTOF10: 0
PAINLEVEL_OUTOF10: 3
PAINLEVEL_OUTOF10: 6

## 2022-01-01 ASSESSMENT — PAIN DESCRIPTION - ORIENTATION: ORIENTATION: RIGHT;LEFT

## 2022-01-01 ASSESSMENT — PAIN DESCRIPTION - DESCRIPTORS: DESCRIPTORS: CRAMPING;ACHING

## 2022-01-01 ASSESSMENT — PAIN DESCRIPTION - ONSET: ONSET: ON-GOING

## 2022-01-01 ASSESSMENT — PAIN DESCRIPTION - PROGRESSION: CLINICAL_PROGRESSION: NOT CHANGED

## 2022-01-01 ASSESSMENT — PAIN DESCRIPTION - LOCATION: LOCATION: ABDOMEN

## 2022-01-01 ASSESSMENT — PAIN DESCRIPTION - FREQUENCY: FREQUENCY: CONTINUOUS

## 2022-01-01 ASSESSMENT — PAIN DESCRIPTION - PAIN TYPE: TYPE: ACUTE PAIN

## 2022-01-01 ASSESSMENT — PAIN - FUNCTIONAL ASSESSMENT: PAIN_FUNCTIONAL_ASSESSMENT: ACTIVITIES ARE NOT PREVENTED

## 2022-01-01 NOTE — PROGRESS NOTES
JOSE E Hickman, notified of patient still remaining NPO and has not eaten for a few days due to being at [facility] ER, See diet order.

## 2022-01-01 NOTE — H&P
[address] [facility]  History and Physical      CHIEF COMPLAINT:    No chief complaint on file. Patient of Rachel Hale MD presents with:  Vaginal bleeding    History of Present Illness:   Patient is a 72-year-old female with a past medical history of anemia, DVTs on Eliquis during pregnancy, anxiety, migraines who presents to the emergency room for cough and vaginal bleeding. Patient states that began bleeding about 3 weeks ago but progressively got worse over the last few days changing her pads 3-4x every hour. Patient states that she is on Eliquis that she was prescribed for after she developed a DVT during pregnancy. Patient states that she was recommended to get a hysterectomy but at that time was not ready for such a procedure . Patient also states that she has had acough with congestion and shortness of breath since ,  took 2 home tests and tested negative for COVID-19. Upon arrival to emergency room patient did test positive for COVID-19. CTA of the chest was completed which was negative. Upon arrival to emergency lab work was obtained patient was anemic at 7.5/25.1 she subsequently dropped to 6.4/21.3 requiring a blood transfusion. Patient denies CP, fever, chills, N/V/D. Patient admitted to intermediate for further work-up and treatment. REVIEW OF SYSTEMS:  Pertinent negatives are above in HPI. 10 point ROS otherwise negative.       Past Medical History:   Diagnosis Date    Anemia 2020    DVT (deep venous thrombosis) (HCC)     DVT of lower extremity (deep venous thrombosis) (HCC)     left lower    History of DVT of lower extremity     left leg     Migraine          Past Surgical History:   Procedure Laterality Date    DILATION AND CURETTAGE      INDUCED   10/12/12       Medications Prior to Admission:    Medications Prior to Admission: FLUoxetine (PROZAC) 20 MG capsule, Take 20 mg by mouth daily  ferrous sulfate (IRON 325) 325 (65 Fe) MG tablet, Take 1 tablet by mouth 2 times daily  apixaban (ELIQUIS) 5 MG TABS tablet, Take 10mg twice daily for seven days, Then take 5mg twice daily until discontinued by PCP or specialist.  omeprazole (PRILOSEC) 40 MG delayed release capsule, Take 40 mg by mouth daily  aspirin EC 81 MG EC tablet, Take 1 tablet by mouth daily  ibuprofen (ADVIL;MOTRIN) 600 MG tablet, Take 1 tablet by mouth 3 times daily as needed for Pain    Note that the patient's home medications were reviewed and the above list is accurate to the best of my knowledge at the time of the exam.    Allergies:    Latex    Social History:    reports that she has never smoked. She has never used smokeless tobacco. She reports that she does not drink alcohol and does not use drugs. Family History:   family history is not on file. PHYSICAL EXAM:    Vitals:  /63   Pulse 109   Temp 99.2 °F (37.3 °C) (Oral)   Resp 18   Ht 5' (1.524 m)   Wt 162 lb (73.5 kg)   LMP 12/03/2021   SpO2 100%   BMI 31.64 kg/m²       General appearance: NAD, conversant  Eyes: Sclerae anicteric, PERRLA  HEENT: AT/NC, MMM  Neck: FROM, supple, no thyromegaly  Lymph: No cervical / supraclavicular lymphadenopathy  Lungs: Clear to auscultation, WOB normal  CV: RRR, no MRGs, no lower extremity edema  Abdomen: Soft, non-tender; no masses or HSM, +BS, abdominal pain/cramping   Extremities: FROM without synovitis. No clubbing or cyanosis of the hands. Skin: no rash, induration, lesions, or ulcers  Psych: Calm and cooperative. Normal judgement and insight. Normal mood and affect. Neuro: Alert and interactive, face symmetric, speech fluent. LABS:  All labs reviewed.   Of note:  CBC:   Lab Results   Component Value Date    WBC 5.9 01/01/2022    RBC 2.76 01/01/2022    HGB 8.5 01/01/2022    HCT 28.3 01/01/2022    MCV 77.2 01/01/2022    MCH 23.2 01/01/2022    MCHC 30.0 01/01/2022    RDW 22.6 01/01/2022     01/01/2022    MPV 10.1 01/01/2022     CMP:    Lab Results Component Value Date     01/01/2022    K 3.3 01/01/2022    CL 99 01/01/2022    CO2 22 01/01/2022    BUN 8 01/01/2022    CREATININE 0.7 01/01/2022    GFRAA >60 01/01/2022    LABGLOM >60 01/01/2022    GLUCOSE 103 01/01/2022    GLUCOSE 96 02/16/2012    PROT 6.5 01/01/2022    LABALBU 3.7 01/01/2022    LABALBU 4.0 02/16/2012    CALCIUM 8.5 01/01/2022    BILITOT 0.3 01/01/2022    ALKPHOS 87 01/01/2022    AST 14 01/01/2022    ALT 7 01/01/2022       Imaging:  CXR: negative   CTA Chest: negative     EKG:  NSR    Telemetry:  Normal sinus Rhythm      ASSESSMENT/PLAN:  Active Problems:    Symptomatic anemia  Resolved Problems:    * No resolved hospital problems. *    Patient is a 24-year-old female admitted to [facility] for  Vaginal bleeding  -Monitor h/h 6.4/21.3 > 8.5/28.3, asymptomatic currently  -1 unit of RBC transfused  -OB/GYN consulted-awaiting input  -hx DVT during pregnancy prescribed Eliquis, currently on hold     COVID-19  -Monitor O2 saturations and supplement oxygen to keep saturations greater than 93%, wean as necessary, incentive spirometer, no nebulizers  -Droplet plus isolation  -Encourage ISP use  -Follow inflammatory markers  -Procal 0.09  -CRP 0.9  -D-Dimer 686 > 571    DVT prophylaxis PCD's  Discharge planning    Code status: Full  Requires Inpatient level of care  NOLAN Boyd CNP    12:19 PM  1/1/2022     Above note edited to reflect my thoughts     I personally saw, examined and provided care for the patient. Radiographs, labs and medication list were reviewed by me independently. The case was discussed in detail and plans for care were established. Review of MIKHAIL Boyd   , documentation was conducted and revisions were made as appropriate directly by me. I agree with the above documented exam, problem list, and plan of care.      Tito Davalos MD  3:28 PM  1/1/2022

## 2022-01-02 LAB
ANION GAP SERPL CALCULATED.3IONS-SCNC: 12 MMOL/L (ref 7–16)
BUN BLDV-MCNC: 4 MG/DL (ref 6–20)
C-REACTIVE PROTEIN: 3.1 MG/DL (ref 0–0.4)
CALCIUM SERPL-MCNC: 8.4 MG/DL (ref 8.6–10.2)
CHLORIDE BLD-SCNC: 102 MMOL/L (ref 98–107)
CO2: 22 MMOL/L (ref 22–29)
CREAT SERPL-MCNC: 0.7 MG/DL (ref 0.5–1)
D DIMER: 530 NG/ML DDU
GFR AFRICAN AMERICAN: >60
GFR NON-AFRICAN AMERICAN: >60 ML/MIN/1.73
GLUCOSE BLD-MCNC: 99 MG/DL (ref 74–99)
HCT VFR BLD CALC: 25.5 % (ref 34–48)
HCT VFR BLD CALC: 25.5 % (ref 34–48)
HEMOGLOBIN: 7.7 G/DL (ref 11.5–15.5)
HEMOGLOBIN: 7.8 G/DL (ref 11.5–15.5)
POTASSIUM SERPL-SCNC: 3.7 MMOL/L (ref 3.5–5)
SODIUM BLD-SCNC: 136 MMOL/L (ref 132–146)
URINE CULTURE, ROUTINE: NORMAL

## 2022-01-02 PROCEDURE — 80048 BASIC METABOLIC PNL TOTAL CA: CPT

## 2022-01-02 PROCEDURE — 86140 C-REACTIVE PROTEIN: CPT

## 2022-01-02 PROCEDURE — 85378 FIBRIN DEGRADE SEMIQUANT: CPT

## 2022-01-02 PROCEDURE — 85014 HEMATOCRIT: CPT

## 2022-01-02 PROCEDURE — 6370000000 HC RX 637 (ALT 250 FOR IP): Performed by: PHYSICIAN ASSISTANT

## 2022-01-02 PROCEDURE — 2060000000 HC ICU INTERMEDIATE R&B

## 2022-01-02 PROCEDURE — 85018 HEMOGLOBIN: CPT

## 2022-01-02 PROCEDURE — 6360000002 HC RX W HCPCS: Performed by: PHYSICIAN ASSISTANT

## 2022-01-02 PROCEDURE — 36415 COLL VENOUS BLD VENIPUNCTURE: CPT

## 2022-01-02 PROCEDURE — 2580000003 HC RX 258: Performed by: PHYSICIAN ASSISTANT

## 2022-01-02 RX ADMIN — GUAIFENESIN AND DEXTROMETHORPHAN 5 ML: 100; 10 SYRUP ORAL at 22:15

## 2022-01-02 RX ADMIN — LORAZEPAM 0.5 MG: 2 INJECTION INTRAMUSCULAR; INTRAVENOUS at 01:17

## 2022-01-02 RX ADMIN — LORAZEPAM 0.5 MG: 2 INJECTION INTRAMUSCULAR; INTRAVENOUS at 22:15

## 2022-01-02 RX ADMIN — GUAIFENESIN AND DEXTROMETHORPHAN 5 ML: 100; 10 SYRUP ORAL at 04:11

## 2022-01-02 RX ADMIN — SODIUM CHLORIDE: 9 INJECTION, SOLUTION INTRAVENOUS at 12:02

## 2022-01-02 RX ADMIN — IBUPROFEN 600 MG: 600 TABLET ORAL at 22:53

## 2022-01-02 RX ADMIN — SODIUM CHLORIDE: 9 INJECTION, SOLUTION INTRAVENOUS at 23:55

## 2022-01-02 RX ADMIN — Medication 10 ML: at 22:15

## 2022-01-02 RX ADMIN — PANTOPRAZOLE SODIUM 40 MG: 40 TABLET, DELAYED RELEASE ORAL at 05:23

## 2022-01-02 RX ADMIN — IBUPROFEN 600 MG: 600 TABLET ORAL at 12:26

## 2022-01-02 RX ADMIN — LORAZEPAM 0.5 MG: 2 INJECTION INTRAMUSCULAR; INTRAVENOUS at 07:39

## 2022-01-02 RX ADMIN — ACETAMINOPHEN 650 MG: 325 TABLET ORAL at 06:04

## 2022-01-02 RX ADMIN — SODIUM CHLORIDE: 9 INJECTION, SOLUTION INTRAVENOUS at 05:23

## 2022-01-02 ASSESSMENT — PAIN - FUNCTIONAL ASSESSMENT: PAIN_FUNCTIONAL_ASSESSMENT: ACTIVITIES ARE NOT PREVENTED

## 2022-01-02 ASSESSMENT — PAIN DESCRIPTION - DESCRIPTORS: DESCRIPTORS: CRAMPING

## 2022-01-02 ASSESSMENT — PAIN SCALES - GENERAL
PAINLEVEL_OUTOF10: 0
PAINLEVEL_OUTOF10: 6
PAINLEVEL_OUTOF10: 3
PAINLEVEL_OUTOF10: 8

## 2022-01-02 ASSESSMENT — PAIN DESCRIPTION - LOCATION: LOCATION: ABDOMEN

## 2022-01-02 ASSESSMENT — PAIN DESCRIPTION - PAIN TYPE: TYPE: ACUTE PAIN

## 2022-01-02 ASSESSMENT — PAIN DESCRIPTION - ORIENTATION: ORIENTATION: LOWER

## 2022-01-02 NOTE — PROGRESS NOTES
Subjective: The patient is awake and alert. Lying in bed without any complaints   No acute events overnight. Denies chest pain, angina, SOB   Quite frustrated by the fact that she has not yet been seen by GYN  Although her hemoglobin is stable at this point and I did explain this to her-no urgency to be seen    Objective:    BP (!) 121/56   Pulse 79   Temp 98.3 °F (36.8 °C) (Oral)   Resp 18   Ht 5' (1.524 m)   Wt 162 lb (73.5 kg)   LMP 12/03/2021   SpO2 99%   BMI 31.64 kg/m²     In: 789 [I.V.:789]  Out: -   In: 789   Out: -     General appearance: NAD, conversant, ill appearing  HEENT: AT/NC, MMM  Neck: FROM, supple  Lungs: Clear to auscultation  CV: RRR, no MRGs  Vasc: Radial pulses 2+  Abdomen: Soft, non-tender; no masses or HSM, abdominal cramping  Extremities: No peripheral edema or digital cyanosis  Skin: no rash, lesions or ulcers  Psych: Alert and oriented to person, place and time  Neuro: Alert and interactive     Recent Labs     12/30/21  1700 12/30/21  1700 12/31/21  1748 12/31/21  2332 01/01/22  0210 01/01/22  0210 01/01/22  0900 01/01/22  1635 01/02/22  0120   WBC 2.4*  --  4.1*  --  5.9  --   --   --   --    HGB 7.5*   < > 7.0*   < > 6.4*   < > 8.5* 7.8* 7.8*   HCT 25.1*   < > 23.1*   < > 21.3*   < > 28.3* 25.3* 25.5*     --  308  --  308  --   --   --   --     < > = values in this interval not displayed. Recent Labs     12/30/21  1700 01/01/22  0210    135   K 3.6 3.3*    99   CO2 20* 22   BUN 4* 8   CREATININE 0.7 0.7   CALCIUM 8.7 8.5*       Assessment:    Principal Problem:    Symptomatic anemia  Active Problems:    History of DVT (deep vein thrombosis)    Dysfunctional uterine bleeding    COVID-19  Resolved Problems:    * No resolved hospital problems.  *      Plan:  Patient is a 53-year-old female admitted to [facility] for  Vaginal bleeding  -Monitor h/h 6.4/21.3 > 8.5/28.3>>7.8/25.5, asymptomatic currently  -1 unit of RBC transfused  -OB/GYN consulted-awaiting input  -hx DVT during pregnancy prescribed Eliquis, currently on hold      COVID-19  -Monitor O2 saturations and supplement oxygen to keep saturations greater than 93%, wean as necessary, incentive spirometer, no nebulizer's -currently not requiring any O2 needs at this time  -Droplet plus isolation  -Encourage ISP use  -Follow inflammatory markers - ordered  -Procal 0.09  -CRP 0.9>>3.1  -D-Dimer 686 > 571>>530    Awaiting gynecology input-will be seen tomorrow a.m. Continue to monitor H&H which has remained stable over the past several checks close to 8  Patient indicates she still using about 6 pads in a few hours-this does not correlate with her hemoglobin remaining stable       DVT Prophylaxis   PT/OT  Discharge planning     NOLAN Oseguera CNP  10:17 AM  1/2/2022     Above note edited to reflect my thoughts     I personally saw, examined and provided care for the patient. Radiographs, labs and medication list were reviewed by me independently. The case was discussed in detail and plans for care were established. Review of Dayanna HOPKINS CNP, documentation was conducted and revisions were made as appropriate directly by me. I agree with the above documented exam, problem list, and plan of care.      Danielle Snow MD  2:07 PM  1/2/2022

## 2022-01-02 NOTE — PROGRESS NOTES
Spoke to Dr. eDnnis Meza. Dr. Dennis Meza is covering OB and cannot take floor consult. Dr. Mathew Null consult sent via Answering service. Dr. Ileana Arevalo covering for Dr. Mathew Null. Awaitng call back/ new orders.

## 2022-01-02 NOTE — PLAN OF CARE
Problem: Airway Clearance - Ineffective  Goal: Achieve or maintain patent airway  Outcome: Met This Shift     Problem: Gas Exchange - Impaired  Goal: Absence of hypoxia  Outcome: Met This Shift  Goal: Promote optimal lung function  Outcome: Met This Shift     Problem: Breathing Pattern - Ineffective  Goal: Ability to achieve and maintain a regular respiratory rate  Outcome: Met This Shift     Problem:  Body Temperature -  Risk of, Imbalanced  Goal: Ability to maintain a body temperature within defined limits  Outcome: Met This Shift  Goal: Will regain or maintain usual level of consciousness  Outcome: Met This Shift  Goal: Complications related to the disease process, condition or treatment will be avoided or minimized  Outcome: Met This Shift     Problem: Isolation Precautions - Risk of Spread of Infection  Goal: Prevent transmission of infection  Outcome: Met This Shift     Problem: Nutrition Deficits  Goal: Optimize nutritional status  Outcome: Met This Shift     Problem: Risk for Fluid Volume Deficit  Goal: Maintain normal heart rhythm  Outcome: Met This Shift  Goal: Maintain absence of muscle cramping  Outcome: Met This Shift  Goal: Maintain normal serum potassium, sodium, calcium, phosphorus, and pH  Outcome: Met This Shift     Problem: Loneliness or Risk for Loneliness  Goal: Demonstrate positive use of time alone when socialization is not possible  Outcome: Met This Shift     Problem: Fatigue  Goal: Verbalize increase energy and improved vitality  Outcome: Met This Shift     Problem: Patient Education: Go to Patient Education Activity  Goal: Patient/Family Education  Outcome: Met This Shift     Problem: Pain:  Goal: Pain level will decrease  Description: Pain level will decrease  Outcome: Met This Shift  Goal: Control of acute pain  Description: Control of acute pain  Outcome: Met This Shift  Goal: Control of chronic pain  Description: Control of chronic pain  Outcome: Met This Shift     Problem: Falls - Risk of:  Goal: Will remain free from falls  Description: Will remain free from falls  Outcome: Met This Shift  Goal: Absence of physical injury  Description: Absence of physical injury  Outcome: Met This Shift

## 2022-01-02 NOTE — PROGRESS NOTES
Spoke to Dr. Arturo Reich. Since patient is clinic patient, house OB that comes in at [address] [facility], will have to see patient. Notified I already spoke to Dr. Frank Downs earlier and was notified he cannot see patient. Dr. Arturo Reich cannot see patient, can be seen by house physician that is available at 7am tomorrow morning. [phone] [facility] notified.

## 2022-01-03 VITALS
DIASTOLIC BLOOD PRESSURE: 68 MMHG | HEART RATE: 68 BPM | TEMPERATURE: 97.9 F | HEIGHT: 60 IN | RESPIRATION RATE: 18 BRPM | OXYGEN SATURATION: 100 % | SYSTOLIC BLOOD PRESSURE: 109 MMHG | WEIGHT: 162 LBS | BODY MASS INDEX: 31.8 KG/M2

## 2022-01-03 LAB
ANION GAP SERPL CALCULATED.3IONS-SCNC: 12 MMOL/L (ref 7–16)
BUN BLDV-MCNC: 6 MG/DL (ref 6–20)
C-REACTIVE PROTEIN: 4.9 MG/DL (ref 0–0.4)
CALCIUM SERPL-MCNC: 8.5 MG/DL (ref 8.6–10.2)
CHLORIDE BLD-SCNC: 104 MMOL/L (ref 98–107)
CO2: 22 MMOL/L (ref 22–29)
CREAT SERPL-MCNC: 0.7 MG/DL (ref 0.5–1)
D DIMER: 405 NG/ML DDU
EKG ATRIAL RATE: 99 BPM
EKG P AXIS: 67 DEGREES
EKG P-R INTERVAL: 154 MS
EKG Q-T INTERVAL: 336 MS
EKG QRS DURATION: 64 MS
EKG QTC CALCULATION (BAZETT): 431 MS
EKG R AXIS: 43 DEGREES
EKG T AXIS: 61 DEGREES
EKG VENTRICULAR RATE: 99 BPM
GFR AFRICAN AMERICAN: >60
GFR NON-AFRICAN AMERICAN: >60 ML/MIN/1.73
GLUCOSE BLD-MCNC: 106 MG/DL (ref 74–99)
HCT VFR BLD CALC: 24.4 % (ref 34–48)
HCT VFR BLD CALC: 25.5 % (ref 34–48)
HEMOGLOBIN: 7.4 G/DL (ref 11.5–15.5)
HEMOGLOBIN: 7.7 G/DL (ref 11.5–15.5)
MCH RBC QN AUTO: 23.8 PG (ref 26–35)
MCHC RBC AUTO-ENTMCNC: 30.3 % (ref 32–34.5)
MCV RBC AUTO: 78.5 FL (ref 80–99.9)
PDW BLD-RTO: 21.9 FL (ref 11.5–15)
PLATELET # BLD: 272 E9/L (ref 130–450)
PMV BLD AUTO: 8.7 FL (ref 7–12)
POTASSIUM SERPL-SCNC: 3.8 MMOL/L (ref 3.5–5)
PROCALCITONIN: 0.08 NG/ML (ref 0–0.08)
RBC # BLD: 3.11 E12/L (ref 3.5–5.5)
SODIUM BLD-SCNC: 138 MMOL/L (ref 132–146)
WBC # BLD: 7.8 E9/L (ref 4.5–11.5)

## 2022-01-03 PROCEDURE — 6370000000 HC RX 637 (ALT 250 FOR IP): Performed by: FAMILY MEDICINE

## 2022-01-03 PROCEDURE — 86140 C-REACTIVE PROTEIN: CPT

## 2022-01-03 PROCEDURE — 85018 HEMOGLOBIN: CPT

## 2022-01-03 PROCEDURE — 36415 COLL VENOUS BLD VENIPUNCTURE: CPT

## 2022-01-03 PROCEDURE — 6370000000 HC RX 637 (ALT 250 FOR IP): Performed by: PHYSICIAN ASSISTANT

## 2022-01-03 PROCEDURE — 80048 BASIC METABOLIC PNL TOTAL CA: CPT

## 2022-01-03 PROCEDURE — 6370000000 HC RX 637 (ALT 250 FOR IP): Performed by: OBSTETRICS & GYNECOLOGY

## 2022-01-03 PROCEDURE — 85014 HEMATOCRIT: CPT

## 2022-01-03 PROCEDURE — 84145 PROCALCITONIN (PCT): CPT

## 2022-01-03 PROCEDURE — 6360000002 HC RX W HCPCS: Performed by: PHYSICIAN ASSISTANT

## 2022-01-03 PROCEDURE — 85027 COMPLETE CBC AUTOMATED: CPT

## 2022-01-03 PROCEDURE — 85378 FIBRIN DEGRADE SEMIQUANT: CPT

## 2022-01-03 RX ORDER — MEDROXYPROGESTERONE ACETATE 10 MG/1
10 TABLET ORAL DAILY
Status: DISCONTINUED | OUTPATIENT
Start: 2022-01-03 | End: 2022-01-03 | Stop reason: HOSPADM

## 2022-01-03 RX ORDER — MEDROXYPROGESTERONE ACETATE 10 MG/1
10 TABLET ORAL DAILY
Qty: 30 TABLET | Refills: 3 | Status: SHIPPED | OUTPATIENT
Start: 2022-01-03 | End: 2022-08-02

## 2022-01-03 RX ORDER — LORAZEPAM 0.5 MG/1
0.5 TABLET ORAL ONCE
Status: COMPLETED | OUTPATIENT
Start: 2022-01-03 | End: 2022-01-03

## 2022-01-03 RX ADMIN — IBUPROFEN 600 MG: 600 TABLET ORAL at 17:49

## 2022-01-03 RX ADMIN — MEDROXYPROGESTERONE ACETATE 10 MG: 10 TABLET ORAL at 20:32

## 2022-01-03 RX ADMIN — GUAIFENESIN AND DEXTROMETHORPHAN 5 ML: 100; 10 SYRUP ORAL at 13:30

## 2022-01-03 RX ADMIN — PANTOPRAZOLE SODIUM 40 MG: 40 TABLET, DELAYED RELEASE ORAL at 05:15

## 2022-01-03 RX ADMIN — LORAZEPAM 0.5 MG: 0.5 TABLET ORAL at 18:44

## 2022-01-03 RX ADMIN — LORAZEPAM 0.5 MG: 2 INJECTION INTRAMUSCULAR; INTRAVENOUS at 05:15

## 2022-01-03 ASSESSMENT — PAIN SCALES - GENERAL: PAINLEVEL_OUTOF10: 10

## 2022-01-03 NOTE — PLAN OF CARE
Problem: Airway Clearance - Ineffective  Goal: Achieve or maintain patent airway  1/3/2022 1116 by Janki Morgan RN  Outcome: Ongoing  1/3/2022 0655 by Yasmine Vyas RN  Outcome: Met This Shift     Problem: Gas Exchange - Impaired  Goal: Absence of hypoxia  1/3/2022 1116 by Janki Morgan RN  Outcome: Ongoing  1/3/2022 0655 by Yasmine Vyas RN  Outcome: Met This Shift  Goal: Promote optimal lung function  1/3/2022 0655 by Yasmine Vyas RN  Outcome: Met This Shift     Problem: Breathing Pattern - Ineffective  Goal: Ability to achieve and maintain a regular respiratory rate  1/3/2022 0655 by Yasmine Vyas RN  Outcome: Met This Shift       Problem: Falls - Risk of:  Goal: Will remain free from falls  Description: Will remain free from falls  1/3/2022 0655 by Yasmine Vyas RN  Outcome: Met This Shift  Goal: Absence of physical injury  Description: Absence of physical injury  1/3/2022 0655 by Yasmine Vyas RN  Outcome: Met This Shift   Problem: Risk for Fluid Volume Deficit  Goal: Maintain normal heart rhythm  1/3/2022 1116 by Janki Morgan RN  Outcome: Ongoing  1/3/2022 0655 by Yasmine Vyas RN  Outcome: Met This Shift  Goal: Maintain absence of muscle cramping  1/3/2022 1116 by Janki Morgan RN  Outcome: Ongoing  1/3/2022 0655 by Yasmine Vyas RN  Outcome: Met This Shift  Goal: Maintain normal serum potassium, sodium, calcium, phosphorus, and pH  1/3/2022 1116 by Janki Morgan RN  Outcome: Ongoing  1/3/2022 0655 by Yasmine Vyas RN  Outcome: Met This Shift   Problem: Isolation Precautions - Risk of Spread of Infection  Goal: Prevent transmission of infection  1/3/2022 1116 by Janki Morgan RN  Outcome: Ongoing  1/3/2022 0655 by Yasmine Vyas RN  Outcome: Met This Shift

## 2022-01-03 NOTE — PROGRESS NOTES
Physical Therapy    PT orders received. Per RN, pt independent with mobility. Will discharge orders. Please re-order as needed. Thank you.

## 2022-01-03 NOTE — PROGRESS NOTES
Occupational Therapy    OT eval and treat orders received. Per RN, pt is independent with no therapy needs at this time. Will discontinue order. Please re-order if status changes.      Hossein Henning, OTR/L

## 2022-01-03 NOTE — PROGRESS NOTES
Subjective: The patient is awake and alert. Lying in bed without any complaints   No acute events overnight. Denies chest pain, angina, SOB   OBGYN should see her today. Objective:    /68   Pulse 68   Temp 97.9 °F (36.6 °C) (Oral)   Resp 18   Ht 5' (1.524 m)   Wt 162 lb (73.5 kg)   SpO2 100%   BMI 31.64 kg/m²     No intake/output data recorded. No intake/output data recorded. General appearance: NAD, conversant,   HEENT: AT/NC, MMM  Neck: FROM, supple  Lungs: Clear to auscultation  CV: RRR, no MRGs  Vasc: Radial pulses 2+  Abdomen: Soft, non-tender; no masses or HSM, abdominal cramping improving  Extremities: No peripheral edema or digital cyanosis  Skin: no rash, lesions or ulcers  Psych: Alert and oriented to person, place and time  Neuro: Alert and interactive     Recent Labs     12/31/21  1748 12/31/21  2332 01/01/22  0210 01/01/22  0900 01/02/22  0120 01/02/22  1254 01/03/22  0510   WBC 4.1*  --  5.9  --   --   --  7.8   HGB 7.0*   < > 6.4*   < > 7.8* 7.7* 7.4*   HCT 23.1*   < > 21.3*   < > 25.5* 25.5* 24.4*     --  308  --   --   --  272    < > = values in this interval not displayed. Recent Labs     01/01/22  0210 01/02/22  1254 01/03/22  0510    136 138   K 3.3* 3.7 3.8   CL 99 102 104   CO2 22 22 22   BUN 8 4* 6   CREATININE 0.7 0.7 0.7   CALCIUM 8.5* 8.4* 8.5*       Assessment:    Principal Problem:    Symptomatic anemia  Active Problems:    History of DVT (deep vein thrombosis)    Dysfunctional uterine bleeding    COVID-19  Resolved Problems:    * No resolved hospital problems.  *      Plan:  Patient is a 72-year-old female admitted to [facility] for  Vaginal bleeding  -Monitor h/h 6.4/21.3 > 8.5/28.3>>7.8/25.5, asymptomatic currently  -1 unit of RBC transfused >>  -OB/GYN consulted-awaiting input  -hx DVT during pregnancy prescribed Eliquis, currently on hold      COVID-19  -Monitor O2 saturations and supplement oxygen to keep saturations greater than 93%, wean as necessary, incentive spirometer, no nebulizer's -currently not requiring any O2 needs at this time  -Droplet plus isolation  -Encourage ISP use  -Follow inflammatory markers - ordered  -Procal 0.09  -CRP 0.9>>3.1  -D-Dimer 686 > 571>>530    Awaiting gynecology input-once gynecology has seen patient and there is no intervention patient will be able to discharge home. Hgb stable. DVT Prophylaxis   PT/OT  Discharge planning     NOLAN Moser CNP  11:43 AM  1/3/2022     Above note edited to reflect my thoughts     Patient unfortunately has been waiting for a gynecology consult for the past 3 days since admission-nursing staff has reached out to multiple gynecologists. Patient has yet to be seen. At this point her hemoglobin is stable I will discontinue her Eliquis and she can be discharged. She may follow-up with her outpatient gynecologist within 1 week. I will also make a referral to hematology as patient is currently on Eliquis for the past 2 years for DVTs that she had during pregnancy. She does not need to be on ongoing anticoagulation at this point. She needs a proper work-up with hematology as outpatient. I personally saw, examined and provided care for the patient. Radiographs, labs and medication list were reviewed by me independently. The case was discussed in detail and plans for care were established. Review of Dayanna HOPKINS CNP, documentation was conducted and revisions were made as appropriate directly by me. I agree with the above documented exam, problem list, and plan of care.      Geetha Dsouza MD  6:29 PM  1/3/2022

## 2022-01-03 NOTE — PROGRESS NOTES
1830 Received a call from Dr. Cherie Hughes. MD states that because the pt's hemoglobin has been stable, the pt can be discharged and someone from Dr. Mitch Reyes office will contact the pt tomorrow 1/4. MD also wants to start the pt on a new medication. States if the pt can take it prior to discharge that will be fine, if not the pt can pick it up from the pharmacy on her way home this evening. Will update the pt.

## 2022-01-03 NOTE — PROGRESS NOTES
Spoke to Dr. Kerry Gilbert via telephone, on-call OB/GYN, states he does not cover Dr. Farzana Maddox' patients. Call left on Dr. Farzana Maddox' answering service re: if he will be seeing the pt today. Call returned from Dr. Farzana Maddox. States whoever was on call that day on 1/1/21 should see the patient. Called Dr. Kerry Gilbert back via telephone who is on call, states he is not on call for non-pregnant patients. Called [facility], stated they do not know who is on call for ED this week but provided me with the [facility] #[phone], called, no answer. Spoke to [address] re: issue. States Dr. Joann Ogden is on call. Voicemail left for Dr. Joann Ogden at [phone]. A second voicemail left for Dr. Joann Ogden at [phone]. Voicemail left for [facility] NP to update her on situation at [phone].

## 2022-01-03 NOTE — CARE COORDINATION
Social Work / Discharge Planning : COVID POSITIVE: Patient admitted with Anemia. Patient plan at discharge is HOME. Patient independent. Patient on RA at 100%. Patient has PCP and insurance. Anticipating no needs. AWait plan. SW to follow.  Electronically signed by ANN Richards on 1/3/22 at 11:38 AM EST

## 2022-01-04 ENCOUNTER — TELEPHONE (OUTPATIENT)
Dept: OBGYN | Age: 37
End: 2022-01-04

## 2022-01-04 ENCOUNTER — CARE COORDINATION (OUTPATIENT)
Dept: CARE COORDINATION | Age: 37
End: 2022-01-04

## 2022-01-04 NOTE — DISCHARGE SUMMARY
gynecology clinic on 1/4/2022. Patient discharged home in stable conditions with medications listed below. Patient instructed to follow up with all consultants and PCP within 3 weeks of discharge. Recent Labs     01/02/22  1254 01/03/22  0510 01/03/22  1445   WBC  --  7.8  --    HGB 7.7* 7.4* 7.7*   HCT 25.5* 24.4* 25.5*   PLT  --  272  --        Recent Labs     01/02/22  1254 01/03/22  0510    138   K 3.7 3.8    104   CO2 22 22   BUN 4* 6   CREATININE 0.7 0.7   CALCIUM 8.4* 8.5*       No results found. Discharge Exam:    HEENT: NCAT,  PERRLA, No JVD  Heart:  RRR, no murmurs, gallops, or rubs. Lungs:  CTA bilaterally, no wheeze, rales or rhonchi  Abd: bowel sounds present, nontender, nondistended, no masses  Extrem:  No clubbing, cyanosis, or edema    Disposition: home     Patient Condition at Discharge: Stable    Patient Instructions:      Medication List      START taking these medications    medroxyPROGESTERone 10 MG tablet  Commonly known as: PROVERA  Take 1 tablet by mouth daily        CONTINUE taking these medications    aspirin EC 81 MG EC tablet  Take 1 tablet by mouth daily     ferrous sulfate 325 (65 Fe) MG tablet  Commonly known as: IRON 325  Take 1 tablet by mouth 2 times daily     FLUoxetine 20 MG capsule  Commonly known as: PROZAC     omeprazole 40 MG delayed release capsule  Commonly known as: PRILOSEC        STOP taking these medications    apixaban 5 MG Tabs tablet  Commonly known as: Eliquis     ibuprofen 600 MG tablet  Commonly known as: ADVIL;MOTRIN           Where to Get Your Medications      These medications were sent to [address] [place] [address], [address] [facility] [address] [place] [address], [place] [address]    Phone: [phone]   · medroxyPROGESTERone 10 MG tablet       Activity: activity as tolerated  Diet: regular diet    Pt has been advised to: Follow-up with Vikram Aguillon MD in 1 week.   Follow-up with consultants as recommended by them    Note that over 30 minutes was spent in preparing discharge papers, discussing discharge with patient, medication review, etc.    Signed:  Chema Rg MD  1/4/2022  3:33 AM

## 2022-01-04 NOTE — PROGRESS NOTES
Received call tonight at 6pm regarding gyn consult. Apparently other providers contacted about consult that was placed 2 days ago but I was not aware of consult. Patient had been admitted 1/1/22 for symptomatic anemia due to vaginal bleeding. Patient received 1 unit of PRBC on 1/1/22. Since that time patient's H/H has been stable. She is still having some vaginal bleeding. Offered to see patient in the morning for consult. Patient would like to be discharged home. Patient can be discharged as she is stable. Will have office reach out to her tomorrow to set up a follow up appointment. Provera 10mg daily sent to the pharmacy to be started. Patient is COVID positive.      Anastasiia Holley MD

## 2022-01-04 NOTE — TELEPHONE ENCOUNTER
Spoke with patient and informed her to make sure she takes her Provera and to follow up with our office.

## 2022-01-04 NOTE — CARE COORDINATION
[facility]    Call within 2 business days of discharge: Yes    Patient: Milo Innocent Patient : 1985   MRN: <[id number]>  Reason for Admission: -1/3/22 Covid-19; Symptomatic Anemia  Discharge Date: 1/3/22 RARS: Readmission Risk Score: 9.8 ( )      Last Discharge [facility]       Complaint Diagnosis Description Type Department Provider    21  History of DVT (deep vein thrombosis) . .. Admission (Discharged) Naty Emanuel MD           Spoke with: Patient    Sushma Kenney reports continued to vaginal bleeding; changing her pads 4x an hour. Pt denies SOB, cough, congestions, fever, chills, or flu like symptoms. Pt states appetite is good and is drinking plenty of fluids. Discussed Covid Zone Tool Education. CTN will send Covid Zone Tool to Pt vis [employer] message. Pt aware. Medication Review completed. Pt to  Ferrous Sulfate and Provera today. Pt has OB/GYN Hosp F/U appt scheduled 2022. Pt denies Transportation, Home, or Medication needs. CTN information given, will continue to monitor. Facility: [facility]    Patient contacted regarding COVID-19 diagnosis. Discussed COVID-19 related testing which was available at this time. Test results were positive. Patient informed of results, if available? Yes. Care Transition Nurse contacted the patient by telephone to perform post discharge assessment. Call within 2 business days of discharge: Yes. Verified name and  with patient as identifiers. Provided introduction to self, and explanation of the CTN/ACM role, and reason for call due to risk factors for infection and/or exposure to COVID-19. Symptoms reviewed with patient who verbalized the following symptoms: Pt denies Covid Type symptoms. Due to No Covid Type symptoms encounter was not routed to provider for escalation. Discussed follow-up appointments. If no appointment was previously scheduled, appointment scheduling offered: Yes.   [facility] follow up appointment(s):   Future Appointments   Date Time Provider Ragini Rose   1/19/2022 10:30 AM Yuliet Casey MD [facility]     Non-[facility] follow up appointment(s):     Non-face-to-face services provided:  Obtained and reviewed discharge summary and/or continuity of care documents     Advance Care Planning:   Does patient have an Advance Directive:  reviewed and current. Educated patient about risk for severe COVID-19 due to risk factors according to CDC guidelines. CTN reviewed discharge instructions, medical action plan and red flag symptoms with the patient who verbalized understanding. Discussed COVID vaccination status: Yes. Education provided on COVID-19 vaccination as appropriate. Discussed exposure protocols and quarantine with CDC Guidelines. Patient was given an opportunity to verbalize any questions and concerns and agrees to contact CTN or health care provider for questions related to their healthcare. Reviewed and educated patient on any new and changed medications related to discharge diagnosis     Was patient discharged with a pulse oximeter? No Discussed and confirmed pulse oximeter discharge instructions and when to notify provider or seek emergency care. CTN provided contact information. Plan for follow-up call in 5-7 days based on severity of symptoms and risk factors.     Care Transitions 24 Hour Call    Schedule Follow Up Appointment with PCP: Completed  Do you have any ongoing symptoms?: Yes  Patient-reported symptoms: Other (Comment: vaginal bleeding)  Do you have a copy of your discharge instructions?: Yes  Do you have all of your prescriptions and are they filled?: No  Have you been contacted by a [employer] [address]?: No  Have you scheduled your follow up appointment?: Yes  How are you going to get to your appointment?: Car - drive self  Were you discharged with any Home Care or Post Acute Services: No  Do you feel like you have everything you need to keep you well at home?: Yes  Care Transitions Interventions  No Identified Needs         Follow Up  Future Appointments   Date Time Provider Ragini Rose   1/19/2022 10:30 AM Marylee Furnish, MD [address] [place], LPN

## 2022-01-10 NOTE — PROGRESS NOTES
Physician Progress Note      PATIENTSu Days  [facility] #:                  [id number]  :                       1985  ADMIT DATE:       2021 9:19 PM  [address] [place] DATE:        1/3/2022 8:30 PM  RESPONDING  PROVIDER #:        MAUREEN Esquivel MD          QUERY TEXT:    Pt admitted with vaginal bleeding and has anemia documented. If possible,   please document in progress notes and discharge summary further specificity   regarding the acuity and type of anemia:    The medical record reflects the following:  Risk Factors: on Eliquis for h/o DVTs during pregnancy  Clinical Indicators: h/h 9.6/31.9 (12/15/21) -> 7.0/23.1, per IM \". [employer] Monitor   h/h 6.4/21.3 > 8.5/28.3>>7.8/25.5, asymptomatic currently. [employer] Francisco Skates \"  Treatment: H/H monitoring, Eliquis on hold, pRBCs    Thank you,  nAat Hernandez RN, BSN, CDIS  Clinical Documentation Improvement  [email or web address]  Options provided:  -- Anemia due to acute blood loss  -- Anemia due to chronic blood loss  -- Anemia due to acute on chronic blood loss  -- Other - I will add my own diagnosis  -- Disagree - Not applicable / Not valid  -- Disagree - Clinically unable to determine / Unknown  -- Refer to Clinical Documentation Reviewer    PROVIDER RESPONSE TEXT:    This patient has acute blood loss anemia.     Query created by: Adam Hamilton on 1/3/2022 2:48 PM      Electronically signed by:  MAUREEN Esquivel MD 1/10/2022 1:31 PM

## 2022-01-14 ENCOUNTER — CARE COORDINATION (OUTPATIENT)
Dept: CARE COORDINATION | Age: 37
End: 2022-01-14

## 2022-01-14 NOTE — CARE COORDINATION
[facility] Transitions Follow Up Call    2022    Patient: Aleta North  Patient : 1985   MRN: <[id number]>  Reason for Admission: -1/3/22 Covid-19; Symptomatic Anemia  Discharge Date: 1/3/22 RARS: Readmission Risk Score: 9.8 ( )       You Patient resolved from the Care Transitions episode on 22  Discussed COVID-19 related testing which was available at this time. Test results were positive. Patient informed of results, if available? Yes    Patient/family has been provided the following resources and education related to COVID-19:                         Signs, symptoms and red flags related to COVID-19            CDC exposure and quarantine guidelines            [employer] exposure contact - [phone]            Contact for their local Department of Health                 Patient currently reports that the following symptoms have improved:  Pt denies Covid symptoms     No further outreach scheduled with this CTN/ACM. Episode of Care resolved. Patient has this CTN/ACM contact information if future needs arise. Care Transitions Subsequent and Final Call    Subsequent and Final Calls  Care Transitions Interventions  Other Interventions:            Follow Up  Future Appointments   Date Time Provider Ragini Rose   2022 10:30 AM Yolanda Veronica MD [address], [facility]

## 2022-01-19 ENCOUNTER — OFFICE VISIT (OUTPATIENT)
Dept: OBGYN | Age: 37
End: 2022-01-19
Payer: COMMERCIAL

## 2022-01-19 ENCOUNTER — HOSPITAL ENCOUNTER (OUTPATIENT)
Dept: ULTRASOUND IMAGING | Age: 37
Discharge: HOME | End: 2022-01-21
Payer: COMMERCIAL

## 2022-01-19 VITALS
RESPIRATION RATE: 16 BRPM | BODY MASS INDEX: 30.21 KG/M2 | SYSTOLIC BLOOD PRESSURE: 122 MMHG | HEIGHT: 61 IN | DIASTOLIC BLOOD PRESSURE: 74 MMHG | WEIGHT: 160 LBS | HEART RATE: 90 BPM

## 2022-01-19 DIAGNOSIS — D50.0 IRON DEFICIENCY ANEMIA DUE TO CHRONIC BLOOD LOSS: ICD-10-CM

## 2022-01-19 DIAGNOSIS — D21.9 FIBROIDS: ICD-10-CM

## 2022-01-19 DIAGNOSIS — D25.9 UTERINE LEIOMYOMA, UNSPECIFIED LOCATION: ICD-10-CM

## 2022-01-19 DIAGNOSIS — N85.2 UTERINE ENLARGEMENT: Primary | ICD-10-CM

## 2022-01-19 DIAGNOSIS — N85.2 UTERINE ENLARGEMENT: ICD-10-CM

## 2022-01-19 PROCEDURE — 1111F DSCHRG MED/CURRENT MED MERGE: CPT | Performed by: OBSTETRICS & GYNECOLOGY

## 2022-01-19 PROCEDURE — 99203 OFFICE O/P NEW LOW 30 MIN: CPT | Performed by: OBSTETRICS & GYNECOLOGY

## 2022-01-19 PROCEDURE — G8484 FLU IMMUNIZE NO ADMIN: HCPCS | Performed by: OBSTETRICS & GYNECOLOGY

## 2022-01-19 PROCEDURE — 76856 US EXAM PELVIC COMPLETE: CPT

## 2022-01-19 PROCEDURE — G8417 CALC BMI ABV UP PARAM F/U: HCPCS | Performed by: OBSTETRICS & GYNECOLOGY

## 2022-01-19 PROCEDURE — G8427 DOCREV CUR MEDS BY ELIG CLIN: HCPCS | Performed by: OBSTETRICS & GYNECOLOGY

## 2022-01-19 PROCEDURE — 99213 OFFICE O/P EST LOW 20 MIN: CPT | Performed by: OBSTETRICS & GYNECOLOGY

## 2022-01-19 PROCEDURE — 1036F TOBACCO NON-USER: CPT | Performed by: OBSTETRICS & GYNECOLOGY

## 2022-01-19 NOTE — PROGRESS NOTES
This is an ER follow up for heavy vaginal bleeding as well as anemia. New to us here. Has never been seen in the [address] [facility]. Had a blood transfusion in the ER in early January. Menses:  Very heavy for the past year, may sometimes use several pads per hour at the heaviest.  Currently on Iron for past several years. Currently on Provera from her ER visit. Pelvic: Vulva:Normal   Vagina:Clear, no bleeding today   Cx:Clear   Ut:Grossly enlarged to about 18 week size   Escobar:No masses were noted. IMP: Fibroid uterus, anemia, menorrhagia    PLAN:  Pelvic sonogram ASAP, continue iron  Back to see Dr. Luke Pedroza as soon as possible. Will neeh Hystertectomy. Continue the provera. Off eliquis for now.

## 2022-01-19 NOTE — PROGRESS NOTES
ER followup  Heavy cycles currently on Provera  No bleeding today  Pelvic sonogram ASAP  See back to see

## 2022-04-16 ENCOUNTER — HOSPITAL ENCOUNTER (EMERGENCY)
Age: 37
Discharge: HOME | End: 2022-04-16
Attending: EMERGENCY MEDICINE
Payer: COMMERCIAL

## 2022-04-16 VITALS
SYSTOLIC BLOOD PRESSURE: 113 MMHG | TEMPERATURE: 98.3 F | OXYGEN SATURATION: 96 % | HEART RATE: 98 BPM | RESPIRATION RATE: 16 BRPM | DIASTOLIC BLOOD PRESSURE: 75 MMHG

## 2022-04-16 DIAGNOSIS — K02.9 DENTAL CARIES: ICD-10-CM

## 2022-04-16 DIAGNOSIS — K08.89 TOOTHACHE: Primary | ICD-10-CM

## 2022-04-16 PROCEDURE — 99284 EMERGENCY DEPT VISIT MOD MDM: CPT

## 2022-04-16 PROCEDURE — 6370000000 HC RX 637 (ALT 250 FOR IP): Performed by: EMERGENCY MEDICINE

## 2022-04-16 RX ORDER — AMOXICILLIN AND CLAVULANATE POTASSIUM 875; 125 MG/1; MG/1
1 TABLET, FILM COATED ORAL 2 TIMES DAILY
Qty: 14 TABLET | Refills: 0 | Status: SHIPPED | OUTPATIENT
Start: 2022-04-16 | End: 2022-04-23

## 2022-04-16 RX ORDER — BUSPIRONE HYDROCHLORIDE 10 MG/1
10 TABLET ORAL 3 TIMES DAILY
COMMUNITY
End: 2022-10-04 | Stop reason: ALTCHOICE

## 2022-04-16 RX ORDER — ACETAMINOPHEN 500 MG
1000 TABLET ORAL ONCE
Status: DISCONTINUED | OUTPATIENT
Start: 2022-04-16 | End: 2022-04-16

## 2022-04-16 RX ORDER — HYDROCODONE BITARTRATE AND ACETAMINOPHEN 5; 325 MG/1; MG/1
1 TABLET ORAL EVERY 6 HOURS PRN
Qty: 10 TABLET | Refills: 0 | Status: SHIPPED | OUTPATIENT
Start: 2022-04-16 | End: 2022-04-19

## 2022-04-16 RX ORDER — AMOXICILLIN AND CLAVULANATE POTASSIUM 875; 125 MG/1; MG/1
1 TABLET, FILM COATED ORAL ONCE
Status: COMPLETED | OUTPATIENT
Start: 2022-04-16 | End: 2022-04-16

## 2022-04-16 RX ORDER — IBUPROFEN 600 MG/1
600 TABLET ORAL ONCE
Status: DISCONTINUED | OUTPATIENT
Start: 2022-04-16 | End: 2022-04-16

## 2022-04-16 RX ADMIN — AMOXICILLIN AND CLAVULANATE POTASSIUM 1 TABLET: 875; 125 TABLET, FILM COATED ORAL at 02:56

## 2022-04-16 NOTE — Clinical Note
Regina Bains was seen and treated in our emergency department on 4/16/2022. She may return to work on 04/19/2022. If you have any questions or concerns, please don't hesitate to call.       Catalina Giles MD

## 2022-04-16 NOTE — ED PROVIDER NOTES
HPI:  22, Time: 1:59 AM EDT        [id number] Lilliana Aquino is a 30 y.o. female presenting to the ED for ears and throat pain, beginning 1 day ago. The complaint has been persistent, moderate in severity, and worsened by nothing. Patient states she is in an appointment with her dentist due to known history of dental caries. She is not had any other complaints reported. Patient drove her self here for evaluation. Review of Systems:   A complete review of systems was performed and pertinent positives and negatives are stated within HPI, all other systems reviewed and are negative.    --------------------------------------------- PAST HISTORY ---------------------------------------------  Past Medical History:  has a past medical history of Anemia, DVT (deep venous thrombosis) ([facility] [address] DVT of lower extremity (deep venous thrombosis) ([facility] [address] History of DVT of lower extremity, and Migraine. Past Surgical History:  has a past surgical history that includes Induced  (10/12/12) and Dilation & curettage. Social History:  reports that she has never smoked. She has never used smokeless tobacco. She reports that she does not drink alcohol and does not use drugs. Family History: family history is not on file. The patients home medications have been reviewed. Allergies: Latex    -------------------------------------------------- RESULTS -------------------------------------------------  All laboratory and radiology results have been personally reviewed by myself   LABS:  No results found for this visit on 22. RADIOLOGY:  Interpreted by Radiologist.  No orders to display       ------------------------- NURSING NOTES AND VITALS REVIEWED ---------------------------    The nursing notes within the ED encounter and vital signs as below have been reviewed.    /75   Pulse 98   Temp 98.3 °F (36.8 °C) (Oral)   Resp 16   LMP 2022 (Exact Date)   SpO2 96%   Oxygen Saturation Interpretation: Normal    ---------------------------------------------------PHYSICAL EXAM--------------------------------------    Constitutional/General: Alert and oriented x3, well appearing, non toxic in moderate distress  Head: Normocephalic and atraumatic  Eyes: PERRL, EOMI  Mouth: Poor dentition in the region of tooth #31. With caries and also some gingival tenderness. No definite fluctuant masses  Neck: Supple, full ROM, no JVD. Trachea midline  Pulmonary: Lungs clear to auscultation bilaterally, no wheezes, rales, or rhonchi. Not in respiratory distress  Cardiovascular:  Regular rate and rhythm, no murmurs, gallops, or rubs. 2+ distal pulses  Abdomen: Soft, non tender, non distended, otherwise normal  Extremities: Moves all extremities x 4. Warm and well perfused  Skin: warm and dry without rash  Neurologic: GCS 15, renal nerves grossly intact with no focal deficits. Psych: Normal Affect    ------------------------------ ED COURSE/MEDICAL DECISION MAKING----------------------  Medications - No data to display    ED COURSE:     Medical Decision Making:   Patient does have evidence of mild gingivitis will be started on antibiotics. She cannot have antiinflammatory meds because she is on Eliquis which was initially listed in her med list.  The patient drove her self here so she cannot be dispensed narcotic analgesics but I will prescribe her hydrocodone for pain and advised her to see her dentist on Monday. Patient states she did not have any tooth ache pain yesterday, so this apparently was recent development. Counseling: The emergency provider has spoken with the patient and discussed todays results, in addition to providing specific details for the plan of care and counseling regarding the diagnosis and prognosis. Questions are answered at this time and they are agreeable with the plan.     Controlled Substance Monitoring:  Acute and Chronic Pain Monitoring:   RX Monitoring 4/16/2022   Periodic Controlled Substance Monitoring No signs of potential drug abuse or diversion identified.     --------------------------------- IMPRESSION AND DISPOSITION ---------------------------------    IMPRESSION  1. Toothache    2. Dental caries        DISPOSITION  Disposition: Discharge to home  Patient condition is stable      NOTE: This report was transcribed using voice recognition software.  Every effort was made to ensure accuracy; however, inadvertent computerized transcription errors may be present        Catalina Giles MD  04/16/22 7092 [facility] MD Far[place]  04/16/22 [id number]

## 2022-08-02 ENCOUNTER — HOSPITAL ENCOUNTER (EMERGENCY)
Age: 37
Discharge: HOME | End: 2022-08-03
Attending: EMERGENCY MEDICINE
Payer: COMMERCIAL

## 2022-08-02 ENCOUNTER — HOSPITAL ENCOUNTER (EMERGENCY)
Age: 37
Discharge: HOME | End: 2022-08-02
Attending: STUDENT IN AN ORGANIZED HEALTH CARE EDUCATION/TRAINING PROGRAM
Payer: COMMERCIAL

## 2022-08-02 ENCOUNTER — APPOINTMENT (OUTPATIENT)
Dept: CT IMAGING | Age: 37
End: 2022-08-02
Payer: COMMERCIAL

## 2022-08-02 VITALS
OXYGEN SATURATION: 99 % | RESPIRATION RATE: 18 BRPM | HEART RATE: 85 BPM | SYSTOLIC BLOOD PRESSURE: 113 MMHG | DIASTOLIC BLOOD PRESSURE: 65 MMHG | TEMPERATURE: 97.7 F

## 2022-08-02 VITALS
RESPIRATION RATE: 16 BRPM | TEMPERATURE: 97.9 F | HEART RATE: 93 BPM | OXYGEN SATURATION: 99 % | WEIGHT: 160 LBS | SYSTOLIC BLOOD PRESSURE: 120 MMHG | BODY MASS INDEX: 30.23 KG/M2 | DIASTOLIC BLOOD PRESSURE: 74 MMHG

## 2022-08-02 DIAGNOSIS — R10.30 LOWER ABDOMINAL PAIN: ICD-10-CM

## 2022-08-02 DIAGNOSIS — R10.2 PELVIC CRAMPING: Primary | ICD-10-CM

## 2022-08-02 DIAGNOSIS — N94.6 DYSMENORRHEA: ICD-10-CM

## 2022-08-02 DIAGNOSIS — R93.89 ABNORMAL ULTRASOUND OF PELVIS: ICD-10-CM

## 2022-08-02 LAB
ALBUMIN SERPL-MCNC: 4 G/DL (ref 3.5–5.2)
ALP BLD-CCNC: 90 U/L (ref 35–104)
ALT SERPL-CCNC: 10 U/L (ref 0–32)
ANION GAP SERPL CALCULATED.3IONS-SCNC: 10 MMOL/L (ref 7–16)
ANISOCYTOSIS: ABNORMAL
AST SERPL-CCNC: 15 U/L (ref 0–31)
BACTERIA: ABNORMAL /HPF
BASOPHILS ABSOLUTE: 0.03 E9/L (ref 0–0.2)
BASOPHILS RELATIVE PERCENT: 0.6 % (ref 0–2)
BILIRUB SERPL-MCNC: 0.3 MG/DL (ref 0–1.2)
BILIRUBIN URINE: ABNORMAL
BLOOD, URINE: ABNORMAL
BUN BLDV-MCNC: 4 MG/DL (ref 6–20)
CALCIUM SERPL-MCNC: 9.2 MG/DL (ref 8.6–10.2)
CHLORIDE BLD-SCNC: 106 MMOL/L (ref 98–107)
CLARITY: ABNORMAL
CO2: 23 MMOL/L (ref 22–29)
COLOR: ABNORMAL
CREAT SERPL-MCNC: 0.8 MG/DL (ref 0.5–1)
EOSINOPHILS ABSOLUTE: 0.04 E9/L (ref 0.05–0.5)
EOSINOPHILS RELATIVE PERCENT: 0.8 % (ref 0–6)
GFR AFRICAN AMERICAN: >60
GFR NON-AFRICAN AMERICAN: >60 ML/MIN/1.73
GLUCOSE BLD-MCNC: 103 MG/DL (ref 74–99)
GLUCOSE URINE: NEGATIVE MG/DL
HCG(URINE) PREGNANCY TEST: NEGATIVE
HCT VFR BLD CALC: 38.6 % (ref 34–48)
HEMOGLOBIN: 12.6 G/DL (ref 11.5–15.5)
IMMATURE GRANULOCYTES #: 0.01 E9/L
IMMATURE GRANULOCYTES %: 0.2 % (ref 0–5)
KETONES, URINE: NEGATIVE MG/DL
LACTIC ACID: 0.8 MMOL/L (ref 0.5–2.2)
LEUKOCYTE ESTERASE, URINE: ABNORMAL
LIPASE: 13 U/L (ref 13–60)
LYMPHOCYTES ABSOLUTE: 0.89 E9/L (ref 1.5–4)
LYMPHOCYTES RELATIVE PERCENT: 18 % (ref 20–42)
MCH RBC QN AUTO: 28.1 PG (ref 26–35)
MCHC RBC AUTO-ENTMCNC: 32.6 % (ref 32–34.5)
MCV RBC AUTO: 86 FL (ref 80–99.9)
MONOCYTES ABSOLUTE: 0.36 E9/L (ref 0.1–0.95)
MONOCYTES RELATIVE PERCENT: 7.3 % (ref 2–12)
NEUTROPHILS ABSOLUTE: 3.62 E9/L (ref 1.8–7.3)
NEUTROPHILS RELATIVE PERCENT: 73.1 % (ref 43–80)
NITRITE, URINE: NEGATIVE
PDW BLD-RTO: ABNORMAL FL (ref 11.5–15)
PH UA: 5.5 (ref 5–9)
PLATELET # BLD: 240 E9/L (ref 130–450)
PMV BLD AUTO: 9.8 FL (ref 7–12)
POTASSIUM REFLEX MAGNESIUM: 3.6 MMOL/L (ref 3.5–5)
PROTEIN UA: ABNORMAL MG/DL
RBC # BLD: 4.49 E12/L (ref 3.5–5.5)
RBC UA: >20 /HPF (ref 0–2)
SODIUM BLD-SCNC: 139 MMOL/L (ref 132–146)
SPECIFIC GRAVITY UA: 1.01 (ref 1–1.03)
SPHEROCYTES: ABNORMAL
TOTAL PROTEIN: 7.3 G/DL (ref 6.4–8.3)
UROBILINOGEN, URINE: 0.2 E.U./DL
WBC # BLD: 5 E9/L (ref 4.5–11.5)
WBC UA: ABNORMAL /HPF (ref 0–5)

## 2022-08-02 PROCEDURE — 81001 URINALYSIS AUTO W/SCOPE: CPT

## 2022-08-02 PROCEDURE — 99284 EMERGENCY DEPT VISIT MOD MDM: CPT

## 2022-08-02 PROCEDURE — 96374 THER/PROPH/DIAG INJ IV PUSH: CPT

## 2022-08-02 PROCEDURE — 2580000003 HC RX 258: Performed by: STUDENT IN AN ORGANIZED HEALTH CARE EDUCATION/TRAINING PROGRAM

## 2022-08-02 PROCEDURE — 81025 URINE PREGNANCY TEST: CPT

## 2022-08-02 PROCEDURE — 96375 TX/PRO/DX INJ NEW DRUG ADDON: CPT

## 2022-08-02 PROCEDURE — 6360000002 HC RX W HCPCS: Performed by: STUDENT IN AN ORGANIZED HEALTH CARE EDUCATION/TRAINING PROGRAM

## 2022-08-02 PROCEDURE — 85025 COMPLETE CBC W/AUTO DIFF WBC: CPT

## 2022-08-02 PROCEDURE — 80053 COMPREHEN METABOLIC PANEL: CPT

## 2022-08-02 PROCEDURE — 83690 ASSAY OF LIPASE: CPT

## 2022-08-02 PROCEDURE — 83605 ASSAY OF LACTIC ACID: CPT

## 2022-08-02 PROCEDURE — 36415 COLL VENOUS BLD VENIPUNCTURE: CPT

## 2022-08-02 PROCEDURE — 74176 CT ABD & PELVIS W/O CONTRAST: CPT

## 2022-08-02 RX ORDER — ONDANSETRON 2 MG/ML
4 INJECTION INTRAMUSCULAR; INTRAVENOUS ONCE
Status: COMPLETED | OUTPATIENT
Start: 2022-08-02 | End: 2022-08-02

## 2022-08-02 RX ORDER — KETOROLAC TROMETHAMINE 30 MG/ML
30 INJECTION, SOLUTION INTRAMUSCULAR; INTRAVENOUS ONCE
Status: COMPLETED | OUTPATIENT
Start: 2022-08-02 | End: 2022-08-03

## 2022-08-02 RX ORDER — 0.9 % SODIUM CHLORIDE 0.9 %
1000 INTRAVENOUS SOLUTION INTRAVENOUS ONCE
Status: COMPLETED | OUTPATIENT
Start: 2022-08-02 | End: 2022-08-02

## 2022-08-02 RX ORDER — NAPROXEN 500 MG/1
500 TABLET ORAL 2 TIMES DAILY
Qty: 20 TABLET | Refills: 0 | Status: SHIPPED | OUTPATIENT
Start: 2022-08-02 | End: 2022-09-30 | Stop reason: ALTCHOICE

## 2022-08-02 RX ORDER — HYDROCODONE BITARTRATE AND ACETAMINOPHEN 5; 325 MG/1; MG/1
1 TABLET ORAL EVERY 6 HOURS PRN
Qty: 12 TABLET | Refills: 0 | Status: SHIPPED | OUTPATIENT
Start: 2022-08-02 | End: 2022-08-05

## 2022-08-02 RX ORDER — KETOROLAC TROMETHAMINE 30 MG/ML
15 INJECTION, SOLUTION INTRAMUSCULAR; INTRAVENOUS ONCE
Status: COMPLETED | OUTPATIENT
Start: 2022-08-02 | End: 2022-08-02

## 2022-08-02 RX ORDER — DICYCLOMINE HYDROCHLORIDE 10 MG/1
20 CAPSULE ORAL 3 TIMES DAILY PRN
Qty: 20 CAPSULE | Refills: 0 | Status: SHIPPED | OUTPATIENT
Start: 2022-08-02 | End: 2022-10-04 | Stop reason: ALTCHOICE

## 2022-08-02 RX ADMIN — ONDANSETRON HYDROCHLORIDE 4 MG: 2 SOLUTION INTRAMUSCULAR; INTRAVENOUS at 09:01

## 2022-08-02 RX ADMIN — KETOROLAC TROMETHAMINE 15 MG: 30 INJECTION, SOLUTION INTRAMUSCULAR; INTRAVENOUS at 09:00

## 2022-08-02 RX ADMIN — SODIUM CHLORIDE 1000 ML: 9 INJECTION, SOLUTION INTRAVENOUS at 09:00

## 2022-08-02 ASSESSMENT — PAIN SCALES - GENERAL
PAINLEVEL_OUTOF10: 10

## 2022-08-02 ASSESSMENT — PAIN DESCRIPTION - ORIENTATION
ORIENTATION: LOWER;RIGHT;LEFT
ORIENTATION: LOWER

## 2022-08-02 ASSESSMENT — PAIN - FUNCTIONAL ASSESSMENT
PAIN_FUNCTIONAL_ASSESSMENT: 0-10
PAIN_FUNCTIONAL_ASSESSMENT: PREVENTS OR INTERFERES SOME ACTIVE ACTIVITIES AND ADLS
PAIN_FUNCTIONAL_ASSESSMENT: NONE - DENIES PAIN
PAIN_FUNCTIONAL_ASSESSMENT: 0-10

## 2022-08-02 ASSESSMENT — ENCOUNTER SYMPTOMS
VOMITING: 0
NAUSEA: 1
SORE THROAT: 0
DIARRHEA: 0
SHORTNESS OF BREATH: 0
SINUS PRESSURE: 0
COUGH: 0
EYE DISCHARGE: 0
BACK PAIN: 0
ABDOMINAL PAIN: 1
EYE PAIN: 0
EYE REDNESS: 0
ABDOMINAL DISTENTION: 0
WHEEZING: 0

## 2022-08-02 ASSESSMENT — PAIN DESCRIPTION - FREQUENCY: FREQUENCY: CONTINUOUS

## 2022-08-02 ASSESSMENT — PAIN DESCRIPTION - DESCRIPTORS
DESCRIPTORS: CRAMPING
DESCRIPTORS: STABBING

## 2022-08-02 ASSESSMENT — PAIN DESCRIPTION - LOCATION
LOCATION: ABDOMEN
LOCATION: ABDOMEN

## 2022-08-02 NOTE — ED NOTES
Pt given discharge summary with education on pelvic pain, pt aware to  her prescription at her assigned pharmacy and to follow up with her OB/GYN and primary care physician     Marcella Mortimer, RN  08/02/22 1016

## 2022-08-02 NOTE — Clinical Note
Carroll Thakur was seen and treated in our emergency department on 8/2/2022. She may return to work on 08/04/2022. If you have any questions or concerns, please don't hesitate to call.       Lam Finch, DO

## 2022-08-02 NOTE — DISCHARGE INSTRUCTIONS
Take all medication as prescribed  Follow-up with OB/GYN  Follow-up with primary care doctor  If you notice any new worrisome symptoms please return to emergency department for evaluation Statement Selected

## 2022-08-02 NOTE — ED PROVIDER NOTES
[facility] [address]  Department of Emergency Medicine     Written by: Janelle Sandoval DO  Patient Name: Orville Darden  Attending Provider: Olivia Hardy DO  Admit Date: 2022  8:15 AM  MRN: [id number]                   : 1985        Chief Complaint   Patient presents with    Abdominal Pain     Low abd pain (B/L) since Midnight. Is on period, feels like the worst period cramps. - Chief complaint    Patient is a 82-year-old female past medical history of migraines. Patient presents with chief complaint of abdominal pain. Patient states that symptoms began last night. Patient notes that she has had gradually worsening lower abdominal pain. She describes the pain as a sharp cramping sensation she currently rates pain 10 out of 10. Patient denies any exacerbating relieving factors. Stated symptoms have been constant onset. Patient notes that she did recently start her period yesterday and usually does have period cramps however this appeared to be more severe than usual.  Patient denies any dysuria or vaginal bleeding prior to starting her period. Patient does endorse some mild nausea. Patient denies any fevers, chills, chest pain, cough, constipation, diarrhea, headache, lightheadedness, numbness or tingling. The history is provided by the patient. No  was used. Review of Systems   Constitutional:  Negative for chills and fever. HENT:  Negative for ear pain, sinus pressure and sore throat. Eyes:  Negative for pain, discharge and redness. Respiratory:  Negative for cough, shortness of breath and wheezing. Cardiovascular:  Negative for chest pain. Gastrointestinal:  Positive for abdominal pain and nausea. Negative for abdominal distention, diarrhea and vomiting. Genitourinary:  Negative for dysuria and frequency. Musculoskeletal:  Negative for arthralgias and back pain. Skin:  Negative for rash and wound.    Neurological:  Negative for weakness and headaches. Hematological:  Negative for adenopathy. All other systems reviewed and are negative. Physical Exam  Vitals and nursing note reviewed. Constitutional:       General: She is not in acute distress. Appearance: Normal appearance. HENT:      Head: Normocephalic and atraumatic. Nose: No congestion or rhinorrhea. Mouth/Throat:      Mouth: Mucous membranes are moist.      Pharynx: Oropharynx is clear. Eyes:      Extraocular Movements: Extraocular movements intact. Pupils: Pupils are equal, round, and reactive to light. Cardiovascular:      Rate and Rhythm: Normal rate and regular rhythm. Heart sounds: No murmur heard. No gallop. Pulmonary:      Effort: Pulmonary effort is normal. No respiratory distress. Breath sounds: No wheezing, rhonchi or rales. Abdominal:      General: Abdomen is flat. Palpations: Abdomen is soft. There is no mass. Tenderness: There is abdominal tenderness (Mild lower quadrant abdominal pain, no rigidity rebound or guarding.) in the right lower quadrant and left lower quadrant. There is no guarding. Hernia: No hernia is present. Musculoskeletal:         General: No swelling, tenderness or signs of injury. Normal range of motion. Cervical back: Normal range of motion. No rigidity. No muscular tenderness. Skin:     General: Skin is warm and dry. Capillary Refill: Capillary refill takes less than 2 seconds. Neurological:      General: No focal deficit present. Mental Status: She is alert and oriented to person, place, and time. Mental status is at baseline. Psychiatric:         Mood and Affect: Mood normal.         Behavior: Behavior normal.        Procedures       MDM  Number of Diagnoses or Management Options  Pelvic cramping  Diagnosis management comments: Patient is a 33-year-old female past medical history migraines. Patient with chief complaint abdominal pain.   Vital signs stable presentation. On physical exam heart regular rate and rhythm, lungs clear to station bilaterally, abdomen soft with bilateral lower abdominal pain no rebound or guarding. Laboratory work obtained CBC unremarkable, CMP unremarkable, urinalysis with large amount of blood likely secondary to.,  Lactic acid 0.8, lipase 13, urine pregnancy test negative. Patient given IV fluids as well as Zofran and Toradol with improvement in symptoms. Findings insistent with pelvic cramping likely secondary to her periods. Patient is overall well-appearing she is in no acute distress. Patient given prescription for Naprosyn and instructed to follow-up with primary care doctor as well as her OB/GYN. In addition if patient notes any new worrisome symptoms she should return to the emergency department for evaluation. Plan of care discussed with patient including discharge, all questions were answered, patient was in agreement plan of care and discharged home in stable condition. Amount and/or Complexity of Data Reviewed  Clinical lab tests: ordered and reviewed  Tests in the radiology section of CPT®: ordered and reviewed    Risk of Complications, Morbidity, and/or Mortality  Presenting problems: moderate  Diagnostic procedures: moderate  Management options: moderate    Patient Progress  Patient progress: stable           --------------------------------------------- PAST HISTORY ---------------------------------------------  Past Medical History:  has a past medical history of Anemia, DVT (deep venous thrombosis) ([facility] [address] DVT of lower extremity (deep venous thrombosis) ([facility] [address] History of DVT of lower extremity, and Migraine. Past Surgical History:  has a past surgical history that includes Induced  (10/12/12) and Dilation & curettage. Social History:  reports that she has never smoked. She has never used smokeless tobacco. She reports that she does not drink alcohol and does not use drugs.     Family History: family history is not on file. The patients home medications have been reviewed.     Allergies: Latex    -------------------------------------------------- RESULTS -------------------------------------------------  Labs:  Results for orders placed or performed during the hospital encounter of 08/02/22   CBC with Auto Differential   Result Value Ref Range    WBC 5.0 4.5 - 11.5 E9/L    RBC 4.49 3.50 - 5.50 E12/L    Hemoglobin 12.6 11.5 - 15.5 g/dL    Hematocrit 38.6 34.0 - 48.0 %    MCV 86.0 80.0 - 99.9 fL    MCH 28.1 26.0 - 35.0 pg    MCHC 32.6 32.0 - 34.5 %    RDW NOT CALC 11.5 - 15.0 fL    Platelets 906 380 - 496 E9/L    MPV 9.8 7.0 - 12.0 fL    Neutrophils % 73.1 43.0 - 80.0 %    Immature Granulocytes % 0.2 0.0 - 5.0 %    Lymphocytes % 18.0 (L) 20.0 - 42.0 %    Monocytes % 7.3 2.0 - 12.0 %    Eosinophils % 0.8 0.0 - 6.0 %    Basophils % 0.6 0.0 - 2.0 %    Neutrophils Absolute 3.62 1.80 - 7.30 E9/L    Immature Granulocytes # 0.01 E9/L    Lymphocytes Absolute 0.89 (L) 1.50 - 4.00 E9/L    Monocytes Absolute 0.36 0.10 - 0.95 E9/L    Eosinophils Absolute 0.04 (L) 0.05 - 0.50 E9/L    Basophils Absolute 0.03 0.00 - 0.20 E9/L    Anisocytosis 1+     Spherocytes 1+    Comprehensive Metabolic Panel w/ Reflex to MG   Result Value Ref Range    Sodium 139 132 - 146 mmol/L    Potassium reflex Magnesium 3.6 3.5 - 5.0 mmol/L    Chloride 106 98 - 107 mmol/L    CO2 23 22 - 29 mmol/L    Anion Gap 10 7 - 16 mmol/L    Glucose 103 (H) 74 - 99 mg/dL    BUN 4 (L) 6 - 20 mg/dL    Creatinine 0.8 0.5 - 1.0 mg/dL    GFR Non-African American >60 >=60 mL/min/1.73    GFR African American >60     Calcium 9.2 8.6 - 10.2 mg/dL    Total Protein 7.3 6.4 - 8.3 g/dL    Albumin 4.0 3.5 - 5.2 g/dL    Total Bilirubin 0.3 0.0 - 1.2 mg/dL    Alkaline Phosphatase 90 35 - 104 U/L    ALT 10 0 - 32 U/L    AST 15 0 - 31 U/L   Lipase   Result Value Ref Range    Lipase 13 13 - 60 U/L   Urinalysis with Microscopic   Result Value Ref Range    Color, UA RED (A) Straw/Yellow    Clarity, UA SL CLOUDY Clear    Glucose, Ur Negative Negative mg/dL    Bilirubin Urine SMALL (A) Negative    Ketones, Urine Negative Negative mg/dL    Specific Gravity, UA 1.010 1.005 - 1.030    Blood, Urine LARGE (A) Negative    pH, UA 5.5 5.0 - 9.0    Protein, UA TRACE Negative mg/dL    Urobilinogen, Urine 0.2 <2.0 E.U./dL    Nitrite, Urine Negative Negative    Leukocyte Esterase, Urine SMALL (A) Negative    WBC, UA 2-5 0 - 5 /HPF    RBC, UA >20 0 - 2 /HPF    Bacteria, UA RARE (A) None Seen /HPF   Pregnancy, Urine   Result Value Ref Range    HCG(Urine) Pregnancy Test NEGATIVE NEGATIVE   Lactic Acid   Result Value Ref Range    Lactic Acid 0.8 0.5 - 2.2 mmol/L       Radiology:  No orders to display       ------------------------- NURSING NOTES AND VITALS REVIEWED ---------------------------  Date / Time Roomed:  8/2/2022  8:15 AM  ED Bed Assignment:  01/01    The nursing notes within the ED encounter and vital signs as below have been reviewed. /74   Pulse 93   Temp 97.9 °F (36.6 °C) (Infrared)   Resp 16   Wt 160 lb (72.6 kg)   LMP 08/01/2022   SpO2 99%   BMI 30.23 kg/m²   Oxygen Saturation Interpretation: Normal      ------------------------------------------ PROGRESS NOTES ------------------------------------------  10:14 AM EDT  I have spoken with the patient and discussed todays results, in addition to providing specific details for the plan of care and counseling regarding the diagnosis and prognosis. Their questions are answered at this time and they are agreeable with the plan. I discussed at length with them reasons for immediate return here for re evaluation. They will followup with their primary care physician by calling their office tomorrow. --------------------------------- ADDITIONAL PROVIDER NOTES ---------------------------------  At this time the patient is without objective evidence of an acute process requiring hospitalization or inpatient management.   They have remained hemodynamically stable throughout their entire ED visit and are stable for discharge with outpatient follow-up. The plan has been discussed in detail and they are aware of the specific conditions for emergent return, as well as the importance of follow-up. New Prescriptions    NAPROXEN (NAPROSYN) 500 MG TABLET    Take 1 tablet by mouth in the morning and 1 tablet before bedtime. Do all this for 10 days. Diagnosis:  1. Pelvic cramping        Disposition:  Patient's disposition: Discharge to home  Patient's condition is stable.       DO Zaki Johnson DO  Resident  08/02/22 2098

## 2022-08-03 PROCEDURE — 6360000002 HC RX W HCPCS: Performed by: EMERGENCY MEDICINE

## 2022-08-03 PROCEDURE — 96372 THER/PROPH/DIAG INJ SC/IM: CPT

## 2022-08-03 RX ADMIN — KETOROLAC TROMETHAMINE 30 MG: 30 INJECTION, SOLUTION INTRAMUSCULAR; INTRAVENOUS at 00:20

## 2022-08-03 ASSESSMENT — ENCOUNTER SYMPTOMS
SHORTNESS OF BREATH: 0
DIARRHEA: 0
CONSTIPATION: 0
VOMITING: 0
ABDOMINAL PAIN: 1
NAUSEA: 0
COUGH: 0

## 2022-08-03 ASSESSMENT — PAIN SCALES - GENERAL: PAINLEVEL_OUTOF10: 8

## 2022-08-03 NOTE — ED PROVIDER NOTES
Itz Ibrahim is a 30 y.o. female presenting to the ED for lower pelvic pain and cramping, beginning 2-3 days ago. The complaint has been intermittent, moderate in severity, and worsened by nothing. 13year-old female coming in for complaint of lower abdominal pain. She states been going on for the past 2 to 3 days but worse last night. She recently did start her period. She denies any vaginal itching or burning. She denies any risk factors for STDs. She denies any dysuria. She states her pain is 10 out of 10 she took naproxen that was prescribed to her earlier in the day and it did not relieve any of her symptoms so she is here for further evaluation and pain control. She denies any fevers chest pain cough constipation diarrhea headache flank pain back pain lightheadedness numbness or tingling. [facility] and has had issues with her periods and fibroids in the past.    Review of Systems:   Review of Systems   Constitutional:  Negative for diaphoresis, fever and unexpected weight change. HENT:  Negative for congestion. Eyes:  Negative for visual disturbance. Respiratory:  Negative for cough and shortness of breath. Cardiovascular:  Negative for chest pain, palpitations and leg swelling. Gastrointestinal:  Positive for abdominal pain. Negative for constipation, diarrhea, nausea and vomiting. Endocrine: Negative for polyphagia and polyuria. Genitourinary:  Positive for menstrual problem, pelvic pain and vaginal bleeding. Negative for difficulty urinating and flank pain. Skin: Negative. Allergic/Immunologic: Negative for food allergies and immunocompromised state. Neurological:  Negative for light-headedness and numbness. Hematological: Negative. Negative for adenopathy. Does not bruise/bleed easily.    Psychiatric/Behavioral:  Negative for agitation.              --------------------------------------------- PAST HISTORY ---------------------------------------------  Past Medical History:  has a past medical history of Anemia, [facility] DVT of lower extremity (deep venous thrombosis) ([facility] History of DVT of lower extremity, and Migraine. Past Surgical History:  has a past surgical history that includes Induced  (10/12/12) and Dilation & curettage. Social History:  reports that she has never smoked. She has never used smokeless tobacco. She reports that she does not drink alcohol and does not use drugs. Family History: family history is not on file. The patients home medications have been reviewed. Allergies: Latex    -------------------------------------------------- RESULTS -------------------------------------------------  All laboratory and radiology results have been personally reviewed by myself   LABS:  No results found for this visit on 22. RADIOLOGY:  Interpreted by Radiologist.  CT ABDOMEN PELVIS WO CONTRAST Additional Contrast? None   Final Result   No acute abdominopelvic abnormality.             ------------------------- NURSING NOTES AND VITALS REVIEWED ---------------------------   The nursing notes within the ED encounter and vital signs as below have been reviewed. /65   Pulse 85   Temp 97.7 °F (36.5 °C) (Temporal)   Resp 18   LMP 2022   SpO2 99%   Oxygen Saturation Interpretation: Normal      ---------------------------------------------------PHYSICAL EXAM--------------------------------------    Physical Exam  Vitals reviewed. Constitutional:       General: She is not in acute distress. Appearance: Normal appearance. She is not toxic-appearing. HENT:      Head: Normocephalic and atraumatic. Right Ear: External ear normal.      Left Ear: External ear normal.      Nose: Nose normal. No congestion. Mouth/Throat:      Mouth: Mucous membranes are moist.      Pharynx: Oropharynx is clear. No posterior oropharyngeal erythema. Eyes:      Extraocular Movements: Extraocular movements intact. Pupils: Pupils are equal, round, and reactive to light. Cardiovascular:      Rate and Rhythm: Normal rate and regular rhythm. Pulses: Normal pulses. Heart sounds: No murmur heard. Pulmonary:      Effort: Pulmonary effort is normal.      Breath sounds: No wheezing or rhonchi. Chest:      Chest wall: No tenderness. Abdominal:      General: Abdomen is flat. Bowel sounds are normal.      Palpations: Abdomen is soft. Tenderness: There is abdominal tenderness in the right lower quadrant, suprapubic area and left lower quadrant. There is no right CVA tenderness, left CVA tenderness or guarding. Hernia: No hernia is present. Musculoskeletal:         General: No swelling or deformity. Cervical back: Normal range of motion and neck supple. No muscular tenderness. Skin:     General: Skin is warm and dry. Capillary Refill: Capillary refill takes less than 2 seconds. Coloration: Skin is not mottled or pale. Neurological:      General: No focal deficit present. Mental Status: She is alert and oriented to person, place, and time. Cranial Nerves: No cranial nerve deficit. Motor: No weakness. Psychiatric:         Mood and Affect: Mood normal. Mood is not depressed. ------------------------------ ED COURSE/MEDICAL DECISION MAKING----------------------  Medications   ketorolac (TORADOL) injection 30 mg (has no administration in time range)       ED COURSE:       Medical Decision Making:    Patient presented for lower abdominal pain she is currently on her period. CAT scan showed no acute pathology. Ultrasound was abnormal and showed possible adenomyosis possible fibroids. Patient was instructed to call her OB/GYN tomorrow for further follow-up for this and for further evaluation. She is to have her doctor obtain copies of all testing she is also follow-up with her PCP.   She is to make good      NOTE: This report was transcribed using voice recognition software.  Every effort was made to ensure accuracy; however, inadvertent computerized transcription errors may be present       Treva Mahajan DO  08/03/22 0000

## 2022-08-03 NOTE — ED NOTES
Name: Heather Stiles  : 1985  MRN: [id number]    Date: 2022    Benefits of immediately proceeding with Radiology exam outweigh the risks and therefore the following is being waived:      [x] Pregnancy test    [] Protocol for Iodine allergy    [] MRI questionnaire    [] BUN/Creatinine        DO Africa Garrett DO  22 0157

## 2022-08-31 ENCOUNTER — HOSPITAL ENCOUNTER (OUTPATIENT)
Dept: ULTRASOUND IMAGING | Age: 37
Discharge: HOME | End: 2022-09-02
Payer: COMMERCIAL

## 2022-08-31 ENCOUNTER — HOSPITAL ENCOUNTER (OUTPATIENT)
Age: 37
Discharge: HOME | End: 2022-09-02
Payer: COMMERCIAL

## 2022-08-31 ENCOUNTER — HOSPITAL ENCOUNTER (EMERGENCY)
Age: 37
Discharge: HOME | End: 2022-08-31
Attending: EMERGENCY MEDICINE
Payer: COMMERCIAL

## 2022-08-31 ENCOUNTER — TELEPHONE (OUTPATIENT)
Dept: ADMINISTRATIVE | Age: 37
End: 2022-08-31

## 2022-08-31 ENCOUNTER — APPOINTMENT (OUTPATIENT)
Dept: CT IMAGING | Age: 37
End: 2022-08-31
Payer: COMMERCIAL

## 2022-08-31 VITALS
BODY MASS INDEX: 30.23 KG/M2 | HEART RATE: 96 BPM | TEMPERATURE: 98 F | RESPIRATION RATE: 16 BRPM | DIASTOLIC BLOOD PRESSURE: 88 MMHG | SYSTOLIC BLOOD PRESSURE: 129 MMHG | WEIGHT: 160 LBS | OXYGEN SATURATION: 98 %

## 2022-08-31 DIAGNOSIS — R10.30 LOWER ABDOMINAL PAIN: ICD-10-CM

## 2022-08-31 DIAGNOSIS — R18.8 FREE FLUID IN PELVIS: ICD-10-CM

## 2022-08-31 DIAGNOSIS — R10.30 LOWER ABDOMINAL PAIN: Primary | ICD-10-CM

## 2022-08-31 LAB
ALBUMIN SERPL-MCNC: 4.5 G/DL (ref 3.5–5.2)
ALP BLD-CCNC: 104 U/L (ref 35–104)
ALT SERPL-CCNC: 20 U/L (ref 0–32)
ANION GAP SERPL CALCULATED.3IONS-SCNC: 13 MMOL/L (ref 7–16)
AST SERPL-CCNC: 27 U/L (ref 0–31)
BACTERIA: ABNORMAL /HPF
BASOPHILS ABSOLUTE: 0.02 E9/L (ref 0–0.2)
BASOPHILS RELATIVE PERCENT: 0.3 % (ref 0–2)
BILIRUB SERPL-MCNC: 0.4 MG/DL (ref 0–1.2)
BILIRUBIN URINE: NEGATIVE
BLOOD, URINE: ABNORMAL
BUN BLDV-MCNC: 5 MG/DL (ref 6–20)
CALCIUM SERPL-MCNC: 9.9 MG/DL (ref 8.6–10.2)
CHLORIDE BLD-SCNC: 100 MMOL/L (ref 98–107)
CLARITY: CLEAR
CO2: 25 MMOL/L (ref 22–29)
COLOR: YELLOW
CREAT SERPL-MCNC: 0.7 MG/DL (ref 0.5–1)
EOSINOPHILS ABSOLUTE: 0.01 E9/L (ref 0.05–0.5)
EOSINOPHILS RELATIVE PERCENT: 0.2 % (ref 0–6)
EPITHELIAL CELLS, UA: ABNORMAL /HPF
GFR AFRICAN AMERICAN: >60
GFR NON-AFRICAN AMERICAN: >60 ML/MIN/1.73
GLUCOSE BLD-MCNC: 93 MG/DL (ref 74–99)
GLUCOSE URINE: NEGATIVE MG/DL
HCG(URINE) PREGNANCY TEST: NEGATIVE
HCT VFR BLD CALC: 42.1 % (ref 34–48)
HEMOGLOBIN: 14.5 G/DL (ref 11.5–15.5)
IMMATURE GRANULOCYTES #: 0.01 E9/L
IMMATURE GRANULOCYTES %: 0.2 % (ref 0–5)
KETONES, URINE: ABNORMAL MG/DL
LEUKOCYTE ESTERASE, URINE: ABNORMAL
LIPASE: 17 U/L (ref 13–60)
LYMPHOCYTES ABSOLUTE: 1.11 E9/L (ref 1.5–4)
LYMPHOCYTES RELATIVE PERCENT: 19.3 % (ref 20–42)
MCH RBC QN AUTO: 31.3 PG (ref 26–35)
MCHC RBC AUTO-ENTMCNC: 34.4 % (ref 32–34.5)
MCV RBC AUTO: 90.7 FL (ref 80–99.9)
MONOCYTES ABSOLUTE: 0.37 E9/L (ref 0.1–0.95)
MONOCYTES RELATIVE PERCENT: 6.4 % (ref 2–12)
NEUTROPHILS ABSOLUTE: 4.24 E9/L (ref 1.8–7.3)
NEUTROPHILS RELATIVE PERCENT: 73.6 % (ref 43–80)
NITRITE, URINE: NEGATIVE
PDW BLD-RTO: 18.9 FL (ref 11.5–15)
PH UA: 6 (ref 5–9)
PLATELET # BLD: 240 E9/L (ref 130–450)
PMV BLD AUTO: 9.4 FL (ref 7–12)
POTASSIUM SERPL-SCNC: 4.5 MMOL/L (ref 3.5–5)
PROTEIN UA: NEGATIVE MG/DL
RBC # BLD: 4.64 E12/L (ref 3.5–5.5)
RBC UA: >20 /HPF (ref 0–2)
SODIUM BLD-SCNC: 138 MMOL/L (ref 132–146)
SPECIFIC GRAVITY UA: <=1.005 (ref 1–1.03)
TOTAL PROTEIN: 8.2 G/DL (ref 6.4–8.3)
UROBILINOGEN, URINE: 0.2 E.U./DL
WBC # BLD: 5.8 E9/L (ref 4.5–11.5)
WBC UA: ABNORMAL /HPF (ref 0–5)

## 2022-08-31 PROCEDURE — 6360000002 HC RX W HCPCS: Performed by: STUDENT IN AN ORGANIZED HEALTH CARE EDUCATION/TRAINING PROGRAM

## 2022-08-31 PROCEDURE — 99285 EMERGENCY DEPT VISIT HI MDM: CPT

## 2022-08-31 PROCEDURE — 85025 COMPLETE CBC W/AUTO DIFF WBC: CPT

## 2022-08-31 PROCEDURE — 74176 CT ABD & PELVIS W/O CONTRAST: CPT

## 2022-08-31 PROCEDURE — 93975 VASCULAR STUDY: CPT

## 2022-08-31 PROCEDURE — 6370000000 HC RX 637 (ALT 250 FOR IP): Performed by: STUDENT IN AN ORGANIZED HEALTH CARE EDUCATION/TRAINING PROGRAM

## 2022-08-31 PROCEDURE — 36415 COLL VENOUS BLD VENIPUNCTURE: CPT

## 2022-08-31 PROCEDURE — 76856 US EXAM PELVIC COMPLETE: CPT

## 2022-08-31 PROCEDURE — 6360000004 HC RX CONTRAST MEDICATION: Performed by: RADIOLOGY

## 2022-08-31 PROCEDURE — 80053 COMPREHEN METABOLIC PANEL: CPT

## 2022-08-31 PROCEDURE — 81001 URINALYSIS AUTO W/SCOPE: CPT

## 2022-08-31 PROCEDURE — 83690 ASSAY OF LIPASE: CPT

## 2022-08-31 PROCEDURE — 96372 THER/PROPH/DIAG INJ SC/IM: CPT

## 2022-08-31 PROCEDURE — 81025 URINE PREGNANCY TEST: CPT

## 2022-08-31 RX ORDER — KETOROLAC TROMETHAMINE 30 MG/ML
30 INJECTION, SOLUTION INTRAMUSCULAR; INTRAVENOUS ONCE
Status: COMPLETED | OUTPATIENT
Start: 2022-08-31 | End: 2022-08-31

## 2022-08-31 RX ORDER — KETOROLAC TROMETHAMINE 30 MG/ML
15 INJECTION, SOLUTION INTRAMUSCULAR; INTRAVENOUS ONCE
Status: DISCONTINUED | OUTPATIENT
Start: 2022-08-31 | End: 2022-08-31

## 2022-08-31 RX ORDER — ONDANSETRON 4 MG/1
4 TABLET, ORALLY DISINTEGRATING ORAL ONCE
Status: COMPLETED | OUTPATIENT
Start: 2022-08-31 | End: 2022-08-31

## 2022-08-31 RX ORDER — 0.9 % SODIUM CHLORIDE 0.9 %
1000 INTRAVENOUS SOLUTION INTRAVENOUS ONCE
Status: DISCONTINUED | OUTPATIENT
Start: 2022-08-31 | End: 2022-08-31 | Stop reason: HOSPADM

## 2022-08-31 RX ORDER — ONDANSETRON 2 MG/ML
4 INJECTION INTRAMUSCULAR; INTRAVENOUS ONCE
Status: DISCONTINUED | OUTPATIENT
Start: 2022-08-31 | End: 2022-08-31

## 2022-08-31 RX ADMIN — ONDANSETRON 4 MG: 4 TABLET, ORALLY DISINTEGRATING ORAL at 21:13

## 2022-08-31 RX ADMIN — IOPAMIDOL 75 ML: 755 INJECTION, SOLUTION INTRAVENOUS at 18:25

## 2022-08-31 RX ADMIN — KETOROLAC TROMETHAMINE 30 MG: 30 INJECTION, SOLUTION INTRAMUSCULAR at 21:14

## 2022-08-31 ASSESSMENT — PAIN DESCRIPTION - PAIN TYPE: TYPE: ACUTE PAIN

## 2022-08-31 ASSESSMENT — PAIN DESCRIPTION - ORIENTATION: ORIENTATION: LOWER

## 2022-08-31 ASSESSMENT — ENCOUNTER SYMPTOMS
NAUSEA: 1
COLOR CHANGE: 0
ABDOMINAL PAIN: 1
BACK PAIN: 0
DIARRHEA: 0
COUGH: 0
RHINORRHEA: 0
BLOOD IN STOOL: 0
SHORTNESS OF BREATH: 0
VOMITING: 1

## 2022-08-31 ASSESSMENT — PAIN DESCRIPTION - DESCRIPTORS: DESCRIPTORS: DISCOMFORT

## 2022-08-31 ASSESSMENT — PAIN DESCRIPTION - FREQUENCY: FREQUENCY: CONTINUOUS

## 2022-08-31 ASSESSMENT — PAIN - FUNCTIONAL ASSESSMENT: PAIN_FUNCTIONAL_ASSESSMENT: 0-10

## 2022-08-31 ASSESSMENT — PAIN DESCRIPTION - LOCATION: LOCATION: ABDOMEN

## 2022-08-31 ASSESSMENT — PAIN SCALES - GENERAL: PAINLEVEL_OUTOF10: 9

## 2022-08-31 NOTE — ED PROVIDER NOTES
ED PROVIDER NOTE    Chief Complaint   Patient presents with    Abdominal Pain     Lower abd and pelvic pain, started last night       HPI:  8/31/22,   Time: 4:34 PM EDT       [id number] Lilliana Aquino is a 30 y.o. female presenting to the ED for abdominal pain. Acute onset at midnight, woke her up from sleep, constant since onset, moderate in severity. Diffuse lower abdominal cramping pain. Has been on her menstrual period for the past 3 days, initially only had light spotting, today had increased amount of vaginal bleeding. Cramping feels more severe than her normal period. Pain is nonradiating, no aggravating or alleviating factors. Associated nausea and vomiting. No fevers, chills, cough, diarrhea, chest pain, shortness of breath. Decreased appetite. Normal urine output. No vaginal discharge. No history of abdominal surgery. Chart review: hx of DVT, anemia, migraine    Review of Systems:     Review of Systems   Constitutional:  Positive for appetite change. Negative for chills and fever. HENT:  Negative for congestion and rhinorrhea. Eyes:  Negative for visual disturbance. Respiratory:  Negative for cough and shortness of breath. Cardiovascular:  Negative for chest pain. Gastrointestinal:  Positive for abdominal pain, nausea and vomiting. Negative for blood in stool and diarrhea. Genitourinary:  Positive for pelvic pain and vaginal bleeding. Negative for decreased urine volume, difficulty urinating, dysuria, flank pain, frequency, hematuria and vaginal discharge. Musculoskeletal:  Negative for back pain and neck pain. Skin:  Negative for color change.    Neurological:  Negative for dizziness, syncope, weakness, light-headedness, numbness and headaches.       --------------------------------------------- PAST HISTORY ---------------------------------------------  Past Medical History:   Past Medical History:   Diagnosis Date    Anemia 7/8/2020    DVT (deep venous thrombosis) (HCC)     DVT of lower extremity (deep venous thrombosis) (HCC)     left lower    History of DVT of lower extremity 2014    left leg 2014    Migraine        Past Surgical History:   Past Surgical History:   Procedure Laterality Date    DILATION AND CURETTAGE      INDUCED   10/12/12       Social History:   Social History     Socioeconomic History    Marital status: Single     Spouse name: None    Number of children: None    Years of education: None    Highest education level: None   Tobacco Use    Smoking status: Never    Smokeless tobacco: Never   Vaping Use    Vaping Use: Never used   Substance and Sexual Activity    Alcohol use: No    Drug use: No    Sexual activity: Yes     Partners: Male       Family History:   History reviewed. No pertinent family history. The patients home medications have been reviewed. Allergies: Allergies   Allergen Reactions    Latex            ---------------------------------------------------PHYSICAL EXAM--------------------------------------    /88   Pulse 96   Temp 98 °F (36.7 °C)   Resp 16   Wt 160 lb (72.6 kg)   LMP 2022   SpO2 98%   BMI 30.23 kg/m²     Physical Exam  Vitals and nursing note reviewed. Constitutional:       General: She is not in acute distress. Appearance: She is not toxic-appearing. HENT:      Mouth/Throat:      Mouth: Mucous membranes are moist.   Eyes:      General: No scleral icterus. Extraocular Movements: Extraocular movements intact. Pupils: Pupils are equal, round, and reactive to light. Cardiovascular:      Rate and Rhythm: Normal rate and regular rhythm. Pulses: Normal pulses. Heart sounds: Normal heart sounds. No murmur heard. Pulmonary:      Effort: Pulmonary effort is normal. No respiratory distress. Breath sounds: Normal breath sounds. No wheezing or rales. Abdominal:      General: There is no distension. Palpations: Abdomen is soft. Tenderness:  There is abdominal tenderness (RUQ, RLQ, suprapubic). There is no right CVA tenderness, left CVA tenderness, guarding or rebound. Musculoskeletal:         General: No swelling or tenderness. Normal range of motion. Cervical back: Normal range of motion and neck supple. Skin:     General: Skin is warm and dry. Neurological:      Mental Status: She is alert and oriented to person, place, and time. Comments: Moves all extremities with appropriate strength. Sensation grossly intact in all extremities. -------------------------------------------------- RESULTS -------------------------------------------------  I have personally reviewed all laboratory and imaging results for this patient. Results are listed below. LABS:  Labs Reviewed   PREGNANCY, URINE   URINALYSIS WITH MICROSCOPIC   CBC WITH AUTO DIFFERENTIAL   COMPREHENSIVE METABOLIC PANEL   LIPASE       RADIOLOGY:  Interpreted personally and by Radiologist.  CT ABDOMEN PELVIS W IV CONTRAST Additional Contrast? None    (Results Pending)       ------------------------- NURSING NOTES AND VITALS REVIEWED ---------------------------   The nursing notes within the ED encounter and vital signs as below have been reviewed by myself. /88   Pulse 96   Temp 98 °F (36.7 °C)   Resp 16   Wt 160 lb (72.6 kg)   LMP 08/01/2022   SpO2 98%   BMI 30.23 kg/m²   Oxygen Saturation Interpretation: Normal    The patients available past medical records and past encounters were reviewed. ------------------------------ ED COURSE/MEDICAL DECISION MAKING----------------------  Medications   0.9 % sodium chloride bolus (has no administration in time range)   ketorolac (TORADOL) injection 15 mg (has no administration in time range)   ondansetron (ZOFRAN) injection 4 mg (has no administration in time range)     Counseling:    The emergency provider has spoken with the patient and discussed todays results, in addition to providing specific details for the plan of care and counseling regarding the diagnosis and prognosis. Questions are answered at this time and they are agreeable with the plan. ED Course/Medical Decision Makin y.o. female here with lower abdominal pain. Non-toxic appearing, afebrile, hemodynamically stable, and in no acute distress. No surgical abdomen but diffuse lower abdominal ttp. Treated w/ NSAIDs, fluids, zofran. Labs and imaging pending at time of sign out to oncoming physician. Recommend pelvic US if labs and CT unrevealing of etiology. --------------------------------- IMPRESSION AND DISPOSITION ---------------------------------    IMPRESSION  1. Lower abdominal pain        DISPOSITION  Disposition: pending labs and imaging  Patient condition is stable    NOTE: This report was transcribed using voice recognition software.  Every effort was made to ensure accuracy; however, inadvertent computerized transcription errors may be present    Isaias Beach MD  Attending Emergency Physician        Isaias Beach MD  [phone]

## 2022-08-31 NOTE — TELEPHONE ENCOUNTER
Patient called and is having some issues and wants to schedule with a female provider at [facility]. She sees Dr Gallo Rajput.  Please call patient and advise

## 2022-08-31 NOTE — ED PROVIDER NOTES
Department of Emergency Medicine    ED  Provider Note - Sign out / Oncoming Provider   Admit Date/RoomTime: 8/31/2022  3:54 PM  6:19 PM EDT      I received this patient at sign out from Dr. Rosi Purdy. I have discussed the patient's initial exam, treatment and plan of care with the out going physician. I have introduced my self to the patient / family and have answered their questions to this point. I have examined the patient myself and reviewed ordered tests / medications and  reviewed any available results to this point. If a resident is involved in the Emergency Department care, I have discussed my findings and plan with them as well. Labs demonstrating trace leukocytes with rare bacteria and rare epithelial cells, less likely UTI, no leukocytosis, elevated lipase. CT Abdo pelvis was performed without contrast secondary to difficulty obtaining IV access. CT abdomen pelvis demonstrating no renal calculus or renal bowel obstruction, uterine lodgment and pelvic free fluid. Mild enlargement of the mid appendix is similar to previous study, if further concern for appendicitis recommend IV and enteric contrast exam.  Patient was given oral medication with some improvement in her symptoms. As the study is similar compared to prior, patient notes this has been ongoing for quite some time, appendicitis thought to be less likely. She was sent for an outpatient ultrasound which was negative for ovarian torsion and did demonstrate uterine enlargement, Endometrial thickening and masslike echogenicity possibly adenomyosis or fibroids. Recommended that she follow-up with her dermatologist.  MDM:     I, Dr. Nyla Oh am the primary provider of record    Patient signed out from Dr. Rosi Purdy pending labs and imaging. Unfortunate unable to get IV access on the patient, 2 attempts were tried with ultrasound, after that the patient refused any additional attempts. This discomfort and pain intermittent for quite some time now. --------------------------------- IMPRESSION AND DISPOSITION ---------------------------------    IMPRESSION  1. Lower abdominal pain    2.  Free fluid in pelvis        DISPOSITION  Disposition: Discharge to home (first to [facility] for [address])  Patient condition is stable         Risa Grissom DO  09/03/22 1225

## 2022-09-29 ENCOUNTER — APPOINTMENT (OUTPATIENT)
Dept: CT IMAGING | Age: 37
End: 2022-09-29
Payer: COMMERCIAL

## 2022-09-29 ENCOUNTER — APPOINTMENT (OUTPATIENT)
Dept: ULTRASOUND IMAGING | Age: 37
End: 2022-09-29
Payer: COMMERCIAL

## 2022-09-29 ENCOUNTER — HOSPITAL ENCOUNTER (EMERGENCY)
Age: 37
Discharge: HOME | End: 2022-09-30
Attending: EMERGENCY MEDICINE
Payer: COMMERCIAL

## 2022-09-29 ENCOUNTER — TELEPHONE (OUTPATIENT)
Dept: ADMINISTRATIVE | Age: 37
End: 2022-09-29

## 2022-09-29 DIAGNOSIS — R10.30 LOWER ABDOMINAL PAIN: Primary | ICD-10-CM

## 2022-09-29 DIAGNOSIS — R11.2 NAUSEA VOMITING AND DIARRHEA: ICD-10-CM

## 2022-09-29 DIAGNOSIS — R19.7 NAUSEA VOMITING AND DIARRHEA: ICD-10-CM

## 2022-09-29 DIAGNOSIS — N85.2 ENLARGED UTERUS: ICD-10-CM

## 2022-09-29 LAB
ALBUMIN SERPL-MCNC: 4.1 G/DL (ref 3.5–5.2)
ALP BLD-CCNC: 101 U/L (ref 35–104)
ALT SERPL-CCNC: 12 U/L (ref 0–32)
ANION GAP SERPL CALCULATED.3IONS-SCNC: 10 MMOL/L (ref 7–16)
AST SERPL-CCNC: 15 U/L (ref 0–31)
BACTERIA: ABNORMAL /HPF
BASOPHILS ABSOLUTE: 0.02 E9/L (ref 0–0.2)
BASOPHILS RELATIVE PERCENT: 0.3 % (ref 0–2)
BILIRUB SERPL-MCNC: 0.2 MG/DL (ref 0–1.2)
BILIRUBIN URINE: NEGATIVE
BLOOD, URINE: ABNORMAL
BUN BLDV-MCNC: 6 MG/DL (ref 6–20)
CALCIUM SERPL-MCNC: 9.3 MG/DL (ref 8.6–10.2)
CHLORIDE BLD-SCNC: 102 MMOL/L (ref 98–107)
CLARITY: CLEAR
CO2: 25 MMOL/L (ref 22–29)
COLOR: YELLOW
CREAT SERPL-MCNC: 0.7 MG/DL (ref 0.5–1)
EOSINOPHILS ABSOLUTE: 0.02 E9/L (ref 0.05–0.5)
EOSINOPHILS RELATIVE PERCENT: 0.3 % (ref 0–6)
EPITHELIAL CELLS, UA: ABNORMAL /HPF
GFR AFRICAN AMERICAN: >60
GFR NON-AFRICAN AMERICAN: >60 ML/MIN/1.73
GLUCOSE BLD-MCNC: 104 MG/DL (ref 74–99)
GLUCOSE URINE: NEGATIVE MG/DL
HCG, URINE, POC: NEGATIVE
HCT VFR BLD CALC: 39.6 % (ref 34–48)
HEMOGLOBIN: 13.3 G/DL (ref 11.5–15.5)
IMMATURE GRANULOCYTES #: 0.02 E9/L
IMMATURE GRANULOCYTES %: 0.3 % (ref 0–5)
KETONES, URINE: NEGATIVE MG/DL
LACTIC ACID: 0.9 MMOL/L (ref 0.5–2.2)
LEUKOCYTE ESTERASE, URINE: ABNORMAL
LIPASE: 16 U/L (ref 13–60)
LYMPHOCYTES ABSOLUTE: 1.25 E9/L (ref 1.5–4)
LYMPHOCYTES RELATIVE PERCENT: 15.9 % (ref 20–42)
Lab: NORMAL
MCH RBC QN AUTO: 32.6 PG (ref 26–35)
MCHC RBC AUTO-ENTMCNC: 33.6 % (ref 32–34.5)
MCV RBC AUTO: 97.1 FL (ref 80–99.9)
MONOCYTES ABSOLUTE: 0.47 E9/L (ref 0.1–0.95)
MONOCYTES RELATIVE PERCENT: 6 % (ref 2–12)
NEGATIVE QC PASS/FAIL: NORMAL
NEUTROPHILS ABSOLUTE: 6.07 E9/L (ref 1.8–7.3)
NEUTROPHILS RELATIVE PERCENT: 77.2 % (ref 43–80)
NITRITE, URINE: NEGATIVE
PDW BLD-RTO: 13.6 FL (ref 11.5–15)
PH UA: 6 (ref 5–9)
PLATELET # BLD: 255 E9/L (ref 130–450)
PMV BLD AUTO: 9.5 FL (ref 7–12)
POSITIVE QC PASS/FAIL: NORMAL
POTASSIUM SERPL-SCNC: 3.6 MMOL/L (ref 3.5–5)
PROTEIN UA: NEGATIVE MG/DL
RBC # BLD: 4.08 E12/L (ref 3.5–5.5)
RBC UA: ABNORMAL /HPF (ref 0–2)
SODIUM BLD-SCNC: 137 MMOL/L (ref 132–146)
SPECIFIC GRAVITY UA: <=1.005 (ref 1–1.03)
TOTAL PROTEIN: 7.4 G/DL (ref 6.4–8.3)
UROBILINOGEN, URINE: 0.2 E.U./DL
WBC # BLD: 7.9 E9/L (ref 4.5–11.5)
WBC UA: ABNORMAL /HPF (ref 0–5)

## 2022-09-29 PROCEDURE — 96374 THER/PROPH/DIAG INJ IV PUSH: CPT

## 2022-09-29 PROCEDURE — 2580000003 HC RX 258: Performed by: NURSE PRACTITIONER

## 2022-09-29 PROCEDURE — 83690 ASSAY OF LIPASE: CPT

## 2022-09-29 PROCEDURE — 36415 COLL VENOUS BLD VENIPUNCTURE: CPT

## 2022-09-29 PROCEDURE — 80053 COMPREHEN METABOLIC PANEL: CPT

## 2022-09-29 PROCEDURE — 81001 URINALYSIS AUTO W/SCOPE: CPT

## 2022-09-29 PROCEDURE — 6360000002 HC RX W HCPCS: Performed by: NURSE PRACTITIONER

## 2022-09-29 PROCEDURE — 99285 EMERGENCY DEPT VISIT HI MDM: CPT

## 2022-09-29 PROCEDURE — 83605 ASSAY OF LACTIC ACID: CPT

## 2022-09-29 PROCEDURE — 93975 VASCULAR STUDY: CPT

## 2022-09-29 PROCEDURE — 96372 THER/PROPH/DIAG INJ SC/IM: CPT

## 2022-09-29 PROCEDURE — 76856 US EXAM PELVIC COMPLETE: CPT

## 2022-09-29 PROCEDURE — 96361 HYDRATE IV INFUSION ADD-ON: CPT

## 2022-09-29 PROCEDURE — 85025 COMPLETE CBC W/AUTO DIFF WBC: CPT

## 2022-09-29 RX ORDER — KETOROLAC TROMETHAMINE 30 MG/ML
15 INJECTION, SOLUTION INTRAMUSCULAR; INTRAVENOUS ONCE
Status: COMPLETED | OUTPATIENT
Start: 2022-09-29 | End: 2022-09-29

## 2022-09-29 RX ORDER — ONDANSETRON 2 MG/ML
4 INJECTION INTRAMUSCULAR; INTRAVENOUS ONCE
Status: COMPLETED | OUTPATIENT
Start: 2022-09-29 | End: 2022-09-29

## 2022-09-29 RX ORDER — 0.9 % SODIUM CHLORIDE 0.9 %
1000 INTRAVENOUS SOLUTION INTRAVENOUS ONCE
Status: COMPLETED | OUTPATIENT
Start: 2022-09-29 | End: 2022-09-29

## 2022-09-29 RX ADMIN — SODIUM CHLORIDE 1000 ML: 9 INJECTION, SOLUTION INTRAVENOUS at 22:11

## 2022-09-29 RX ADMIN — KETOROLAC TROMETHAMINE 15 MG: 30 INJECTION, SOLUTION INTRAMUSCULAR at 22:11

## 2022-09-29 RX ADMIN — ONDANSETRON 4 MG: 2 INJECTION INTRAMUSCULAR; INTRAVENOUS at 22:20

## 2022-09-29 ASSESSMENT — PAIN DESCRIPTION - LOCATION: LOCATION: ABDOMEN

## 2022-09-29 ASSESSMENT — PAIN DESCRIPTION - FREQUENCY: FREQUENCY: CONTINUOUS

## 2022-09-29 ASSESSMENT — PAIN DESCRIPTION - ORIENTATION: ORIENTATION: RIGHT;LEFT;LOWER

## 2022-09-29 ASSESSMENT — PAIN DESCRIPTION - PAIN TYPE: TYPE: ACUTE PAIN

## 2022-09-29 ASSESSMENT — PAIN DESCRIPTION - DESCRIPTORS: DESCRIPTORS: CRAMPING;DISCOMFORT

## 2022-09-29 ASSESSMENT — PAIN - FUNCTIONAL ASSESSMENT: PAIN_FUNCTIONAL_ASSESSMENT: 0-10

## 2022-09-29 ASSESSMENT — PAIN DESCRIPTION - ONSET: ONSET: ON-GOING

## 2022-09-29 ASSESSMENT — PAIN SCALES - GENERAL: PAINLEVEL_OUTOF10: 8

## 2022-09-29 NOTE — TELEPHONE ENCOUNTER
Patient has been having some issues with bleeding and was in the ER. She wants to see a female provider at [facility]. She would like a call back. Please call patient and advise. THank you!

## 2022-09-29 NOTE — Clinical Note
Gretchen Ee was seen and treated in our emergency department on 9/29/2022. She may return to work on 10/03/2022. If you have any questions or concerns, please don't hesitate to call.       Yeimi Espino, APRN - CNP

## 2022-09-30 ENCOUNTER — APPOINTMENT (OUTPATIENT)
Dept: CT IMAGING | Age: 37
End: 2022-09-30
Payer: COMMERCIAL

## 2022-09-30 VITALS
SYSTOLIC BLOOD PRESSURE: 107 MMHG | HEIGHT: 61 IN | TEMPERATURE: 98.8 F | RESPIRATION RATE: 16 BRPM | BODY MASS INDEX: 30.21 KG/M2 | WEIGHT: 160 LBS | OXYGEN SATURATION: 99 % | HEART RATE: 78 BPM | DIASTOLIC BLOOD PRESSURE: 72 MMHG

## 2022-09-30 PROCEDURE — 74177 CT ABD & PELVIS W/CONTRAST: CPT

## 2022-09-30 PROCEDURE — 6360000004 HC RX CONTRAST MEDICATION: Performed by: RADIOLOGY

## 2022-09-30 RX ORDER — IBUPROFEN 600 MG/1
600 TABLET ORAL EVERY 6 HOURS PRN
Qty: 20 TABLET | Refills: 0 | Status: ON HOLD | OUTPATIENT
Start: 2022-09-30 | End: 2022-10-11 | Stop reason: HOSPADM

## 2022-09-30 RX ADMIN — IOPAMIDOL 75 ML: 755 INJECTION, SOLUTION INTRAVENOUS at 00:06

## 2022-09-30 NOTE — ED PROVIDER NOTES
ED Attending shared visit  [facility]  Department of [facility]   ED  Encounter Note  Admit Date/RoomTime: 2022  9:43 PM  ED Room:     NAME: Kaila Hearn  : 1985  MRN: [id number]     Chief Complaint:  Abdominal Pain (It started at 4 am.  Pain lower quads. Emesis, diarrhea.)    History of Present Illness       Kaila Hearn is a 30 y.o. old female who presents to the emergency department by private vehicle, for sudden onset sharp pain in the RLQ without radiation which began 4 AM prior to arrival.   There has been similar episodes in the past and states that she has been told she had uterine fibroids but this time it is worse than last and she did take ibuprofen in the past.  She reports the pain is 8/10 and crampy in nature with 3 watery brown diarrhea stools today. Since onset the symptoms have been persistent and gradually worsening. The pain is associated with nausea and vomiting bile x 1. Denies any hematemesis. The pain is aggravated by nothing in particular and relieved by nothing. There has been NO back pain, chest pain, shortness of breath, fever, chills, sweating, anorexia, weight loss, constipation, dark/black stools, blood in stool, blood in emesis, cloudy urine, urinary frequency, dysuria, hematuria, urinary urgency, urinary incontinence, vaginal discharge, vaginal itching, or vaginal spotting. Last Menstrual Cycle 22. . No prior history of sexually transmitted disease. She denies any concern for STI's. She denies any alcohol use. ROS   Pertinent positives and negatives are stated within HPI, all other systems reviewed and are negative. Past Medical History:  has a past medical history of Anemia, DVT (deep venous thrombosis) ([address] DVT of lower extremity (deep venous thrombosis) ([address] History of DVT of lower extremity, and Migraine.     Surgical History has a past surgical history that includes Induced  (10/12/12) and Dilation & curettage. Social History:  reports that she has never smoked. She has never used smokeless tobacco. She reports that she does not drink alcohol and does not use drugs. Family History: family history is not on file. Allergies: Latex    Physical Exam   Oxygen Saturation Interpretation: Normal on room air analysis. ED Triage Vitals [09/29/22 1943]   BP Temp Temp Source Heart Rate Resp SpO2 Height Weight   (!) 142/73 98.8 °F (37.1 °C) Oral 100 16 100 % 5' 1\" (1.549 m) 160 lb (72.6 kg)        Physical Exam  General Appearance/Constitutional:  Alert, development consistent with age. HEENT:  NC/NT. PERRLA. Airway patent. Neck:  Supple. No lymphadenopathy. Respiratory:  No retractions. Lungs Clear to auscultation and breath sounds equal.  CV:  Regular rate and rhythm. GI:  normal appearing, non-distended with no visible hernias. Bowel sounds: normal bowel sounds. Tenderness: There is moderate tenderness present - located in the RLQ., There is no rebound tenderness. , There is no guarding. , There is no distension. , There is no pulsatile mass. .        Liver: non-tender and non-palpable. Spleen:  non-tender and non-palpable. Back: CVA Tenderness: No CVA tenderness. : /Pelvic examination deferred / declined. Integument:  Normal turgor. Warm, dry, without visible rash, unless noted elsewhere. Lymphatics: No edema, cap.refill <3sec. Neurological:  Orientation age-appropriate. Motor functions intact. Cranial nerves II-XII grossly intact.      Lab / Imaging Results   (All laboratory and radiology results have been personally reviewed by myself)  Labs:  Results for orders placed or performed during the hospital encounter of 09/29/22   Urinalysis   Result Value Ref Range    Color, UA Yellow Straw/Yellow    Clarity, UA Clear Clear    Glucose, Ur Negative Negative mg/dL    Bilirubin Urine Negative Negative    Ketones, Urine Negative Negative mg/dL    Specific Gravity, UA <=1.005 1.005 - 1.030    Blood, Urine LARGE (A) Negative    pH, UA 6.0 5.0 - 9.0    Protein, UA Negative Negative mg/dL    Urobilinogen, Urine 0.2 <2.0 E.U./dL    Nitrite, Urine Negative Negative    Leukocyte Esterase, Urine TRACE (A) Negative   CBC with Auto Differential   Result Value Ref Range    WBC 7.9 4.5 - 11.5 E9/L    RBC 4.08 3.50 - 5.50 E12/L    Hemoglobin 13.3 11.5 - 15.5 g/dL    Hematocrit 39.6 34.0 - 48.0 %    MCV 97.1 80.0 - 99.9 fL    MCH 32.6 26.0 - 35.0 pg    MCHC 33.6 32.0 - 34.5 %    RDW 13.6 11.5 - 15.0 fL    Platelets 065 205 - 086 E9/L    MPV 9.5 7.0 - 12.0 fL    Neutrophils % 77.2 43.0 - 80.0 %    Immature Granulocytes % 0.3 0.0 - 5.0 %    Lymphocytes % 15.9 (L) 20.0 - 42.0 %    Monocytes % 6.0 2.0 - 12.0 %    Eosinophils % 0.3 0.0 - 6.0 %    Basophils % 0.3 0.0 - 2.0 %    Neutrophils Absolute 6.07 1.80 - 7.30 E9/L    Immature Granulocytes # 0.02 E9/L    Lymphocytes Absolute 1.25 (L) 1.50 - 4.00 E9/L    Monocytes Absolute 0.47 0.10 - 0.95 E9/L    Eosinophils Absolute 0.02 (L) 0.05 - 0.50 E9/L    Basophils Absolute 0.02 0.00 - 0.20 E9/L   Comprehensive Metabolic Panel   Result Value Ref Range    Sodium 137 132 - 146 mmol/L    Potassium 3.6 3.5 - 5.0 mmol/L    Chloride 102 98 - 107 mmol/L    CO2 25 22 - 29 mmol/L    Anion Gap 10 7 - 16 mmol/L    Glucose 104 (H) 74 - 99 mg/dL    BUN 6 6 - 20 mg/dL    Creatinine 0.7 0.5 - 1.0 mg/dL    GFR Non-African American >60 >=60 mL/min/1.73    GFR African American >60     Calcium 9.3 8.6 - 10.2 mg/dL    Total Protein 7.4 6.4 - 8.3 g/dL    Albumin 4.1 3.5 - 5.2 g/dL    Total Bilirubin 0.2 0.0 - 1.2 mg/dL    Alkaline Phosphatase 101 35 - 104 U/L    ALT 12 0 - 32 U/L    AST 15 0 - 31 U/L   Lactic Acid   Result Value Ref Range    Lactic Acid 0.9 0.5 - 2.2 mmol/L   Lipase   Result Value Ref Range    Lipase 16 13 - 60 U/L   Microscopic Urinalysis   Result Value Ref Range    WBC, UA 0-1 0 - 5 /HPF    RBC, UA 5-10 (A) 0 - 2 /HPF    Epithelial Cells, UA RARE /HPF    Bacteria, UA NONE SEEN None Seen /HPF   POC Pregnancy Urine   Result Value Ref Range    HCG, Urine, POC Negative Negative    Lot Number [id number]     Positive QC Pass/Fail Pass     Negative QC Pass/Fail Pass      Imaging: All Radiology results interpreted by Radiologist unless otherwise noted. CT ABDOMEN PELVIS W IV CONTRAST Additional Contrast? None   Final Result   1. No acute disease. Normal appendix visible in the right lower quadrant. RECOMMENDATIONS:   Careful clinical correlation and follow up recommended. US DUP ABD PEL RETRO SCROT COMPLETE   Final Result   No ovarian torsion is seen. US PELVIS COMPLETE   Final Result   1. No acute ovarian abnormality is identified   2. Enlarged heterogeneous uterus as described previously      RECOMMENDATIONS:   Clinical correlation and consider MRI evaluation           ED Course / Medical Decision Making     Medications   0.9 % sodium chloride bolus (0 mLs IntraVENous Stopped 9/29/22 2311)   ketorolac (TORADOL) injection 15 mg (15 mg IntraMUSCular Given 9/29/22 2211)   ondansetron (ZOFRAN) injection 4 mg (4 mg IntraVENous Given 9/29/22 2220)   iopamidol (ISOVUE-370) 76 % injection 75 mL (75 mLs IntraVENous Given 9/30/22 0006)          Re-Evaluations:  9/29/22      Time: 2212 Discussed US and still having cramping pain and small emesis 50 cc    1230 Dr. Tre Hobbs gave patient gingerale. Patients condition is improving after treatment and symptoms resolved. 1350 Discussed results of CT with patient and pain free and will send a prescription in for motrin and does not need zofran. Advised on close follow up with Dr. Elijah Shen. Consultations:             None    Procedures:   none    MDM:  This is a 82-year-old female patient who arrives today with a sudden onset at 4 AM of right lower quadrant pain will obtain a ultrasound to rule out torsion.   Patient reports that she has had similar episodes with uterine fibroids. She will be medicated with Toradol and Zofran for nausea. Patient reports she had 3 diarrhea nonbloody stools prior to arrival today. She denies any sick contacts. She was seen this past August on 2 occasions for similar episodes and she did not follow-up with gynecology as recommended for enlarged uterus. She denies any concern for STIs at this time. Urinalysis was negative for pregnancy or UTI. Ultrasound ultrasound reveals no torsion but does show an enlarged heterogenous uterus and recommends clinical correlation and consideration of  MRI and will have patient follow-up with gynecology since this has been ongoing. Patient had improvement of symptoms in complete resolution of nausea was able to drink ginger ale in the ED with no episodes of diarrhea. Lab work was reassuring. Patient is afebrile, nontoxic in appearance, hemodynamically stable and in no acute distress. Repeat abdominal examination benign. CT of the abdomen reveals normal appendix and no acute findings. Patient advised on signs and symptoms warranting immediate return to the ED for reevaluation at any time. Plan of Care/Counseling:  NOLAN Lobato CNP and EM Attending Physician reviewed today's visit with the patient in addition to providing specific details for the plan of care and counseling regarding the diagnosis and prognosis. Questions are answered at this time and are agreeable with the plan. Assessment      1. Lower abdominal pain    2. Enlarged uterus    3. Nausea vomiting and diarrhea      This patient's ED course included: a personal history and physicial examination, re-evaluation prior to disposition, multiple bedside re-evaluations, IV medications, and complex medical decision making and emergency management  This patient has remained hemodynamically stable, improved, and been closely monitored during their ED course. Plan   Discharged home  Patient condition is good.     New Medications     New Prescriptions    IBUPROFEN (ADVIL;MOTRIN) 600 MG TABLET    Take 1 tablet by mouth every 6 hours as needed for Pain     Electronically signed by NOLAN Rm CNP   DD: 9/29/22  **This report was transcribed using voice recognition software. Every effort was made to ensure accuracy; however, inadvertent computerized transcription errors may be present.   END OF PROVIDER NOTE       NOLAN Rm CNP  09/30/22 020

## 2022-10-04 ENCOUNTER — APPOINTMENT (OUTPATIENT)
Dept: CT IMAGING | Age: 37
DRG: 249 | End: 2022-10-04
Payer: COMMERCIAL

## 2022-10-04 ENCOUNTER — HOSPITAL ENCOUNTER (INPATIENT)
Age: 37
LOS: 4 days | Discharge: HOME | DRG: 249 | End: 2022-10-11
Attending: EMERGENCY MEDICINE | Admitting: FAMILY MEDICINE
Payer: COMMERCIAL

## 2022-10-04 ENCOUNTER — APPOINTMENT (OUTPATIENT)
Dept: ULTRASOUND IMAGING | Age: 37
DRG: 249 | End: 2022-10-04
Payer: COMMERCIAL

## 2022-10-04 DIAGNOSIS — R10.9 INTRACTABLE ABDOMINAL PAIN: Primary | ICD-10-CM

## 2022-10-04 PROBLEM — K21.9 GASTROESOPHAGEAL REFLUX DISEASE WITHOUT ESOPHAGITIS: Status: ACTIVE | Noted: 2022-10-04

## 2022-10-04 LAB
ALBUMIN SERPL-MCNC: 4.1 G/DL (ref 3.5–5.2)
ALP BLD-CCNC: 104 U/L (ref 35–104)
ALT SERPL-CCNC: 9 U/L (ref 0–32)
ANION GAP SERPL CALCULATED.3IONS-SCNC: 12 MMOL/L (ref 7–16)
ANISOCYTOSIS: ABNORMAL
AST SERPL-CCNC: 19 U/L (ref 0–31)
BACTERIA: ABNORMAL /HPF
BASOPHILS ABSOLUTE: 0.02 E9/L (ref 0–0.2)
BASOPHILS RELATIVE PERCENT: 0.2 % (ref 0–2)
BILIRUB SERPL-MCNC: 0.5 MG/DL (ref 0–1.2)
BILIRUBIN URINE: NEGATIVE
BLOOD, URINE: NEGATIVE
BUN BLDV-MCNC: 6 MG/DL (ref 6–20)
CALCIUM SERPL-MCNC: 9.5 MG/DL (ref 8.6–10.2)
CHLORIDE BLD-SCNC: 99 MMOL/L (ref 98–107)
CLARITY: CLEAR
CO2: 24 MMOL/L (ref 22–29)
COLOR: YELLOW
CREAT SERPL-MCNC: 0.6 MG/DL (ref 0.5–1)
EKG ATRIAL RATE: 117 BPM
EKG P AXIS: 58 DEGREES
EKG P-R INTERVAL: 154 MS
EKG Q-T INTERVAL: 318 MS
EKG QRS DURATION: 72 MS
EKG QTC CALCULATION (BAZETT): 443 MS
EKG R AXIS: 13 DEGREES
EKG T AXIS: 38 DEGREES
EKG VENTRICULAR RATE: 117 BPM
EOSINOPHILS ABSOLUTE: 0 E9/L (ref 0.05–0.5)
EOSINOPHILS RELATIVE PERCENT: 0 % (ref 0–6)
EPITHELIAL CELLS, UA: ABNORMAL /HPF
GFR AFRICAN AMERICAN: >60
GFR NON-AFRICAN AMERICAN: >60 ML/MIN/1.73
GLUCOSE BLD-MCNC: 116 MG/DL (ref 74–99)
GLUCOSE URINE: NEGATIVE MG/DL
HCG, URINE, POC: NEGATIVE
HCT VFR BLD CALC: 39.4 % (ref 34–48)
HEMOGLOBIN: 13.4 G/DL (ref 11.5–15.5)
IMMATURE GRANULOCYTES #: 0.05 E9/L
IMMATURE GRANULOCYTES %: 0.5 % (ref 0–5)
KETONES, URINE: 40 MG/DL
LACTIC ACID, SEPSIS: 1.5 MMOL/L (ref 0.5–1.9)
LEUKOCYTE ESTERASE, URINE: ABNORMAL
LIPASE: 14 U/L (ref 13–60)
LYMPHOCYTES ABSOLUTE: 0.4 E9/L (ref 1.5–4)
LYMPHOCYTES RELATIVE PERCENT: 3.6 % (ref 20–42)
Lab: NORMAL
MCH RBC QN AUTO: 32.7 PG (ref 26–35)
MCHC RBC AUTO-ENTMCNC: 34 % (ref 32–34.5)
MCV RBC AUTO: 96.1 FL (ref 80–99.9)
MONOCYTES ABSOLUTE: 0.38 E9/L (ref 0.1–0.95)
MONOCYTES RELATIVE PERCENT: 3.4 % (ref 2–12)
NEGATIVE QC PASS/FAIL: NORMAL
NEUTROPHILS ABSOLUTE: 10.21 E9/L (ref 1.8–7.3)
NEUTROPHILS RELATIVE PERCENT: 92.3 % (ref 43–80)
NITRITE, URINE: NEGATIVE
PDW BLD-RTO: 13 FL (ref 11.5–15)
PH UA: 8.5 (ref 5–9)
PLATELET # BLD: 242 E9/L (ref 130–450)
PMV BLD AUTO: 9.7 FL (ref 7–12)
POSITIVE QC PASS/FAIL: NORMAL
POTASSIUM SERPL-SCNC: 3.8 MMOL/L (ref 3.5–5)
PROTEIN UA: NEGATIVE MG/DL
RBC # BLD: 4.1 E12/L (ref 3.5–5.5)
RBC UA: ABNORMAL /HPF (ref 0–2)
SODIUM BLD-SCNC: 135 MMOL/L (ref 132–146)
SPECIFIC GRAVITY UA: 1.02 (ref 1–1.03)
TOTAL PROTEIN: 7.8 G/DL (ref 6.4–8.3)
TROPONIN, HIGH SENSITIVITY: <6 NG/L (ref 0–9)
UROBILINOGEN, URINE: 0.2 E.U./DL
WBC # BLD: 11.1 E9/L (ref 4.5–11.5)
WBC UA: ABNORMAL /HPF (ref 0–5)

## 2022-10-04 PROCEDURE — 2580000003 HC RX 258: Performed by: STUDENT IN AN ORGANIZED HEALTH CARE EDUCATION/TRAINING PROGRAM

## 2022-10-04 PROCEDURE — 96375 TX/PRO/DX INJ NEW DRUG ADDON: CPT

## 2022-10-04 PROCEDURE — A4216 STERILE WATER/SALINE, 10 ML: HCPCS | Performed by: FAMILY MEDICINE

## 2022-10-04 PROCEDURE — 2580000003 HC RX 258: Performed by: EMERGENCY MEDICINE

## 2022-10-04 PROCEDURE — A4216 STERILE WATER/SALINE, 10 ML: HCPCS | Performed by: STUDENT IN AN ORGANIZED HEALTH CARE EDUCATION/TRAINING PROGRAM

## 2022-10-04 PROCEDURE — 6360000004 HC RX CONTRAST MEDICATION: Performed by: RADIOLOGY

## 2022-10-04 PROCEDURE — 81001 URINALYSIS AUTO W/SCOPE: CPT

## 2022-10-04 PROCEDURE — 96374 THER/PROPH/DIAG INJ IV PUSH: CPT

## 2022-10-04 PROCEDURE — 93975 VASCULAR STUDY: CPT

## 2022-10-04 PROCEDURE — 84484 ASSAY OF TROPONIN QUANT: CPT

## 2022-10-04 PROCEDURE — 2500000003 HC RX 250 WO HCPCS: Performed by: STUDENT IN AN ORGANIZED HEALTH CARE EDUCATION/TRAINING PROGRAM

## 2022-10-04 PROCEDURE — 99220 PR INITIAL OBSERVATION CARE/DAY 70 MINUTES: CPT | Performed by: FAMILY MEDICINE

## 2022-10-04 PROCEDURE — 6370000000 HC RX 637 (ALT 250 FOR IP): Performed by: STUDENT IN AN ORGANIZED HEALTH CARE EDUCATION/TRAINING PROGRAM

## 2022-10-04 PROCEDURE — 6360000002 HC RX W HCPCS: Performed by: EMERGENCY MEDICINE

## 2022-10-04 PROCEDURE — 96376 TX/PRO/DX INJ SAME DRUG ADON: CPT

## 2022-10-04 PROCEDURE — 96361 HYDRATE IV INFUSION ADD-ON: CPT

## 2022-10-04 PROCEDURE — 74177 CT ABD & PELVIS W/CONTRAST: CPT

## 2022-10-04 PROCEDURE — 93005 ELECTROCARDIOGRAM TRACING: CPT | Performed by: STUDENT IN AN ORGANIZED HEALTH CARE EDUCATION/TRAINING PROGRAM

## 2022-10-04 PROCEDURE — 0DJ08ZZ INSPECTION OF UPPER INTESTINAL TRACT, VIA NATURAL OR ARTIFICIAL OPENING ENDOSCOPIC: ICD-10-PCS | Performed by: INTERNAL MEDICINE

## 2022-10-04 PROCEDURE — 76856 US EXAM PELVIC COMPLETE: CPT

## 2022-10-04 PROCEDURE — G0378 HOSPITAL OBSERVATION PER HR: HCPCS

## 2022-10-04 PROCEDURE — 83605 ASSAY OF LACTIC ACID: CPT

## 2022-10-04 PROCEDURE — 2580000003 HC RX 258: Performed by: FAMILY MEDICINE

## 2022-10-04 PROCEDURE — C9113 INJ PANTOPRAZOLE SODIUM, VIA: HCPCS | Performed by: FAMILY MEDICINE

## 2022-10-04 PROCEDURE — 80053 COMPREHEN METABOLIC PANEL: CPT

## 2022-10-04 PROCEDURE — 36415 COLL VENOUS BLD VENIPUNCTURE: CPT

## 2022-10-04 PROCEDURE — 99285 EMERGENCY DEPT VISIT HI MDM: CPT

## 2022-10-04 PROCEDURE — 6360000002 HC RX W HCPCS: Performed by: INTERNAL MEDICINE

## 2022-10-04 PROCEDURE — 85025 COMPLETE CBC W/AUTO DIFF WBC: CPT

## 2022-10-04 PROCEDURE — 83690 ASSAY OF LIPASE: CPT

## 2022-10-04 PROCEDURE — 6360000002 HC RX W HCPCS: Performed by: FAMILY MEDICINE

## 2022-10-04 RX ORDER — MORPHINE SULFATE 2 MG/ML
2 INJECTION, SOLUTION INTRAMUSCULAR; INTRAVENOUS ONCE
Status: COMPLETED | OUTPATIENT
Start: 2022-10-04 | End: 2022-10-04

## 2022-10-04 RX ORDER — ONDANSETRON 2 MG/ML
4 INJECTION INTRAMUSCULAR; INTRAVENOUS ONCE
Status: COMPLETED | OUTPATIENT
Start: 2022-10-04 | End: 2022-10-04

## 2022-10-04 RX ORDER — DICYCLOMINE HYDROCHLORIDE 10 MG/1
10 CAPSULE ORAL ONCE
Status: COMPLETED | OUTPATIENT
Start: 2022-10-04 | End: 2022-10-04

## 2022-10-04 RX ORDER — IBUPROFEN 800 MG/1
800 TABLET ORAL ONCE
Status: COMPLETED | OUTPATIENT
Start: 2022-10-04 | End: 2022-10-04

## 2022-10-04 RX ORDER — SODIUM CHLORIDE 0.9 % (FLUSH) 0.9 %
5-40 SYRINGE (ML) INJECTION PRN
Status: DISCONTINUED | OUTPATIENT
Start: 2022-10-04 | End: 2022-10-11 | Stop reason: HOSPADM

## 2022-10-04 RX ORDER — POLYETHYLENE GLYCOL 3350 17 G/17G
17 POWDER, FOR SOLUTION ORAL DAILY PRN
Status: DISCONTINUED | OUTPATIENT
Start: 2022-10-04 | End: 2022-10-11 | Stop reason: HOSPADM

## 2022-10-04 RX ORDER — ONDANSETRON 4 MG/1
4 TABLET, ORALLY DISINTEGRATING ORAL EVERY 8 HOURS PRN
Status: DISCONTINUED | OUTPATIENT
Start: 2022-10-04 | End: 2022-10-11 | Stop reason: HOSPADM

## 2022-10-04 RX ORDER — 0.9 % SODIUM CHLORIDE 0.9 %
1000 INTRAVENOUS SOLUTION INTRAVENOUS ONCE
Status: COMPLETED | OUTPATIENT
Start: 2022-10-04 | End: 2022-10-04

## 2022-10-04 RX ORDER — LANOLIN ALCOHOL/MO/W.PET/CERES
3 CREAM (GRAM) TOPICAL NIGHTLY PRN
Status: DISCONTINUED | OUTPATIENT
Start: 2022-10-04 | End: 2022-10-11 | Stop reason: HOSPADM

## 2022-10-04 RX ORDER — SODIUM CHLORIDE 9 MG/ML
INJECTION, SOLUTION INTRAVENOUS PRN
Status: DISCONTINUED | OUTPATIENT
Start: 2022-10-04 | End: 2022-10-11 | Stop reason: HOSPADM

## 2022-10-04 RX ORDER — ACETAMINOPHEN 325 MG/1
650 TABLET ORAL EVERY 6 HOURS PRN
Status: DISCONTINUED | OUTPATIENT
Start: 2022-10-04 | End: 2022-10-11 | Stop reason: HOSPADM

## 2022-10-04 RX ORDER — FENTANYL CITRATE 50 UG/ML
25 INJECTION, SOLUTION INTRAMUSCULAR; INTRAVENOUS ONCE
Status: COMPLETED | OUTPATIENT
Start: 2022-10-04 | End: 2022-10-04

## 2022-10-04 RX ORDER — ACETAMINOPHEN 650 MG/1
650 SUPPOSITORY RECTAL EVERY 6 HOURS PRN
Status: DISCONTINUED | OUTPATIENT
Start: 2022-10-04 | End: 2022-10-11 | Stop reason: HOSPADM

## 2022-10-04 RX ORDER — ONDANSETRON 2 MG/ML
4 INJECTION INTRAMUSCULAR; INTRAVENOUS EVERY 6 HOURS PRN
Status: DISCONTINUED | OUTPATIENT
Start: 2022-10-04 | End: 2022-10-11 | Stop reason: HOSPADM

## 2022-10-04 RX ORDER — KETOROLAC TROMETHAMINE 30 MG/ML
15 INJECTION, SOLUTION INTRAMUSCULAR; INTRAVENOUS ONCE
Status: COMPLETED | OUTPATIENT
Start: 2022-10-04 | End: 2022-10-04

## 2022-10-04 RX ORDER — FERROUS SULFATE 325(65) MG
325 TABLET ORAL EVERY MORNING
COMMUNITY

## 2022-10-04 RX ORDER — SODIUM CHLORIDE 0.9 % (FLUSH) 0.9 %
5-40 SYRINGE (ML) INJECTION EVERY 12 HOURS SCHEDULED
Status: DISCONTINUED | OUTPATIENT
Start: 2022-10-04 | End: 2022-10-11 | Stop reason: HOSPADM

## 2022-10-04 RX ADMIN — FAMOTIDINE 20 MG: 10 INJECTION, SOLUTION INTRAVENOUS at 09:54

## 2022-10-04 RX ADMIN — SODIUM CHLORIDE, PRESERVATIVE FREE 40 MG: 5 INJECTION INTRAVENOUS at 16:05

## 2022-10-04 RX ADMIN — SODIUM CHLORIDE 1000 ML: 9 INJECTION, SOLUTION INTRAVENOUS at 03:22

## 2022-10-04 RX ADMIN — MORPHINE SULFATE 2 MG: 2 INJECTION, SOLUTION INTRAMUSCULAR; INTRAVENOUS at 03:23

## 2022-10-04 RX ADMIN — SODIUM CHLORIDE, PRESERVATIVE FREE 40 MG: 5 INJECTION INTRAVENOUS at 23:50

## 2022-10-04 RX ADMIN — HYDROMORPHONE HYDROCHLORIDE 1 MG: 1 INJECTION, SOLUTION INTRAMUSCULAR; INTRAVENOUS; SUBCUTANEOUS at 19:06

## 2022-10-04 RX ADMIN — ONDANSETRON 4 MG: 2 INJECTION INTRAMUSCULAR; INTRAVENOUS at 03:23

## 2022-10-04 RX ADMIN — HYDROMORPHONE HYDROCHLORIDE 1 MG: 1 INJECTION, SOLUTION INTRAMUSCULAR; INTRAVENOUS; SUBCUTANEOUS at 23:44

## 2022-10-04 RX ADMIN — KETOROLAC TROMETHAMINE 15 MG: 30 INJECTION, SOLUTION INTRAMUSCULAR; INTRAVENOUS at 03:23

## 2022-10-04 RX ADMIN — FENTANYL CITRATE 25 MCG: 50 INJECTION, SOLUTION INTRAMUSCULAR; INTRAVENOUS at 16:11

## 2022-10-04 RX ADMIN — SODIUM CHLORIDE 1000 ML: 9 INJECTION, SOLUTION INTRAVENOUS at 10:42

## 2022-10-04 RX ADMIN — MORPHINE SULFATE 2 MG: 2 INJECTION, SOLUTION INTRAMUSCULAR; INTRAVENOUS at 06:37

## 2022-10-04 RX ADMIN — IOPAMIDOL 65 ML: 755 INJECTION, SOLUTION INTRAVENOUS at 03:59

## 2022-10-04 RX ADMIN — IBUPROFEN 800 MG: 800 TABLET, FILM COATED ORAL at 08:56

## 2022-10-04 RX ADMIN — DICYCLOMINE HYDROCHLORIDE 10 MG: 10 CAPSULE ORAL at 10:44

## 2022-10-04 RX ADMIN — LIDOCAINE HYDROCHLORIDE: 20 SOLUTION ORAL; TOPICAL at 09:51

## 2022-10-04 ASSESSMENT — PAIN DESCRIPTION - ORIENTATION
ORIENTATION: RIGHT;LEFT;LOWER
ORIENTATION: RIGHT;LEFT;MID;LOWER
ORIENTATION: RIGHT;LEFT;LOWER;UPPER
ORIENTATION: LEFT;LOWER
ORIENTATION: RIGHT;LEFT;LOWER

## 2022-10-04 ASSESSMENT — PAIN DESCRIPTION - LOCATION
LOCATION: ABDOMEN

## 2022-10-04 ASSESSMENT — PAIN DESCRIPTION - ONSET
ONSET: ON-GOING
ONSET: ON-GOING
ONSET: GRADUAL

## 2022-10-04 ASSESSMENT — PAIN SCALES - GENERAL
PAINLEVEL_OUTOF10: 7
PAINLEVEL_OUTOF10: 10
PAINLEVEL_OUTOF10: 7
PAINLEVEL_OUTOF10: 10
PAINLEVEL_OUTOF10: 8
PAINLEVEL_OUTOF10: 4
PAINLEVEL_OUTOF10: 9

## 2022-10-04 ASSESSMENT — PAIN DESCRIPTION - DESCRIPTORS
DESCRIPTORS: DULL;DISCOMFORT
DESCRIPTORS: SHOOTING;SHARP
DESCRIPTORS: DULL;DISCOMFORT
DESCRIPTORS: ACHING
DESCRIPTORS: ACHING;DULL;DISCOMFORT

## 2022-10-04 ASSESSMENT — PAIN DESCRIPTION - PAIN TYPE
TYPE: ACUTE PAIN

## 2022-10-04 ASSESSMENT — PAIN - FUNCTIONAL ASSESSMENT
PAIN_FUNCTIONAL_ASSESSMENT: ACTIVITIES ARE NOT PREVENTED
PAIN_FUNCTIONAL_ASSESSMENT: 0-10

## 2022-10-04 ASSESSMENT — PAIN DESCRIPTION - FREQUENCY
FREQUENCY: CONTINUOUS

## 2022-10-04 NOTE — ED PROVIDER NOTES
HPI:  10/4/22,   Time: 2:17 AM EDT       [id number] Lilliana Aquino is a 30 y.o. female presenting to the ED for generalizeed cramping mid abdominal pain, beginning 5 hours ago. The complaint has been persistent, moderate in severity, and worsened by nothing. Patient 30 old female is seen here in the emergency department on  which was just last week. Came in for similar complaints with diffuse severe abdominal pain she had CT imaging that showed no acute abnormality. She had a normal appendix. At that time she was complaining of right lower quadrant pain so she had a pelvic ultrasound that showed an enlarged uterus but no ovarian pathology no cyst no torsion. Patient was supposed to follow-up outpatient with OB/GYN, Dr. Prashant Alvarez. Patient states she has not followed up at this time. She states she is continue to have daily pain but the pain got worse last night so she came in for further evaluation she is now saying is mostly periumbilical just diffuse mid abdomen pain denies pelvic pain denies urinary complaints. She states she is on her menstrual cycle. Denies any vaginal discharge. Denies any flank pain. No dysuria urgency or frequency. She denies any nausea vomiting or fever. No diarrhea. Review of Systems:   Pertinent positives and negatives are stated within HPI, all other systems reviewed and are negative.    --------------------------------------------- PAST HISTORY ---------------------------------------------  Past Medical History:  has a past medical history of Anemia, DVT (deep venous thrombosis) ([facility] [address] DVT of lower extremity (deep venous thrombosis) ([facility] [address] History of DVT of lower extremity, and Migraine. Past Surgical History:  has a past surgical history that includes Induced  (10/12/12) and Dilation & curettage. Social History:  reports that she has never smoked.  She has never used smokeless tobacco. She reports that she does not drink alcohol and does not use drugs. Family History: family history is not on file. The patients home medications have been reviewed. Allergies: Latex    ---------------------------------------------------PHYSICAL EXAM--------------------------------------    Constitutional/General: Alert and oriented x3, well appearing, non toxic in NAD  Head: Normocephalic and atraumatic  Eyes: PERRL, EOMI, conjunctive normal, sclera non icteric  Mouth: Oropharynx clear, handling secretions, no trismus, no asymmetry of the posterior oropharynx or uvular edema  Neck: Supple, full ROM, non tender to palpation in the midline, no stridor, no crepitus, no meningeal signs  Respiratory: Lungs clear to auscultation bilaterally, no wheezes, rales, or rhonchi. Not in respiratory distress  Cardiovascular:  Regular rate. Regular rhythm. No murmurs, gallops, or rubs. 2+ distal pulses  Chest: No chest wall tenderness  GI:  Abdomen Soft, Non tender, Non distended. +BS. No organomegaly, no palpable masses,  No rebound, guarding, or rigidity. Musculoskeletal: Moves all extremities x 4. Warm and well perfused, no clubbing, cyanosis, or edema. Capillary refill <3 seconds  Integument: skin warm and dry. No rashes. Lymphatic: no lymphadenopathy noted  Neurologic: GCS 15, no focal deficits, symmetric strength 5/5 in the upper and lower extremities bilaterally  Psychiatric: Normal Affect    -------------------------------------------------- RESULTS -------------------------------------------------  I have personally reviewed all laboratory and imaging results for this patient. Results are listed below.      LABS:  Results for orders placed or performed during the hospital encounter of 10/04/22   Comprehensive Metabolic Panel   Result Value Ref Range    Sodium 135 132 - 146 mmol/L    Potassium 3.8 3.5 - 5.0 mmol/L    Chloride 99 98 - 107 mmol/L    CO2 24 22 - 29 mmol/L    Anion Gap 12 7 - 16 mmol/L    Glucose 116 (H) 74 - 99 mg/dL    BUN 6 6 - 20 mg/dL    Creatinine 0.6 0.5 - 1.0 mg/dL    GFR Non-African American >60 >=60 mL/min/1.73    GFR African American >60     Calcium 9.5 8.6 - 10.2 mg/dL    Total Protein 7.8 6.4 - 8.3 g/dL    Albumin 4.1 3.5 - 5.2 g/dL    Total Bilirubin 0.5 0.0 - 1.2 mg/dL    Alkaline Phosphatase 104 35 - 104 U/L    ALT 9 0 - 32 U/L    AST 19 0 - 31 U/L   CBC with Auto Differential   Result Value Ref Range    WBC 11.1 4.5 - 11.5 E9/L    RBC 4.10 3.50 - 5.50 E12/L    Hemoglobin 13.4 11.5 - 15.5 g/dL    Hematocrit 39.4 34.0 - 48.0 %    MCV 96.1 80.0 - 99.9 fL    MCH 32.7 26.0 - 35.0 pg    MCHC 34.0 32.0 - 34.5 %    RDW 13.0 11.5 - 15.0 fL    Platelets 522 758 - 434 E9/L    MPV 9.7 7.0 - 12.0 fL    Neutrophils % 92.3 (H) 43.0 - 80.0 %    Immature Granulocytes % 0.5 0.0 - 5.0 %    Lymphocytes % 3.6 (L) 20.0 - 42.0 %    Monocytes % 3.4 2.0 - 12.0 %    Eosinophils % 0.0 0.0 - 6.0 %    Basophils % 0.2 0.0 - 2.0 %    Neutrophils Absolute 10.21 (H) 1.80 - 7.30 E9/L    Immature Granulocytes # 0.05 E9/L    Lymphocytes Absolute 0.40 (L) 1.50 - 4.00 E9/L    Monocytes Absolute 0.38 0.10 - 0.95 E9/L    Eosinophils Absolute 0.00 (L) 0.05 - 0.50 E9/L    Basophils Absolute 0.02 0.00 - 0.20 E9/L    Anisocytosis 1+    Lipase   Result Value Ref Range    Lipase 14 13 - 60 U/L   Urinalysis   Result Value Ref Range    Color, UA Yellow Straw/Yellow    Clarity, UA Clear Clear    Glucose, Ur Negative Negative mg/dL    Bilirubin Urine Negative Negative    Ketones, Urine 40 (A) Negative mg/dL    Specific Gravity, UA 1.020 1.005 - 1.030    Blood, Urine Negative Negative    pH, UA 8.5 5.0 - 9.0    Protein, UA Negative Negative mg/dL    Urobilinogen, Urine 0.2 <2.0 E.U./dL    Nitrite, Urine Negative Negative    Leukocyte Esterase, Urine TRACE (A) Negative   Lactate, Sepsis   Result Value Ref Range    Lactic Acid, Sepsis 1.5 0.5 - 1.9 mmol/L   Microscopic Urinalysis   Result Value Ref Range    WBC, UA 2-5 0 - 5 /HPF    RBC, UA 2-5 0 - 2 /HPF    Epithelial Cells, UA RARE /HPF Bacteria, UA RARE (A) None Seen /HPF   Troponin   Result Value Ref Range    Troponin, High Sensitivity <6 0 - 9 ng/L   Comprehensive Metabolic Panel w/ Reflex to MG   Result Value Ref Range    Sodium 136 132 - 146 mmol/L    Potassium reflex Magnesium 3.3 (L) 3.5 - 5.0 mmol/L    Chloride 97 (L) 98 - 107 mmol/L    CO2 27 22 - 29 mmol/L    Anion Gap 12 7 - 16 mmol/L    Glucose 93 74 - 99 mg/dL    BUN 11 6 - 20 mg/dL    Creatinine 0.8 0.5 - 1.0 mg/dL    GFR Non-African American >60 >=60 mL/min/1.73    GFR African American >60     Calcium 9.2 8.6 - 10.2 mg/dL    Total Protein 7.1 6.4 - 8.3 g/dL    Albumin 3.4 (L) 3.5 - 5.2 g/dL    Total Bilirubin 0.7 0.0 - 1.2 mg/dL    Alkaline Phosphatase 108 (H) 35 - 104 U/L    ALT 12 0 - 32 U/L    AST 25 0 - 31 U/L   CBC with Auto Differential   Result Value Ref Range    WBC 16.6 (H) 4.5 - 11.5 E9/L    RBC 3.74 3.50 - 5.50 E12/L    Hemoglobin 12.4 11.5 - 15.5 g/dL    Hematocrit 35.7 34.0 - 48.0 %    MCV 95.5 80.0 - 99.9 fL    MCH 33.2 26.0 - 35.0 pg    MCHC 34.7 (H) 32.0 - 34.5 %    RDW 13.2 11.5 - 15.0 fL    Platelets 045 645 - 842 E9/L    MPV 10.0 7.0 - 12.0 fL    Neutrophils % 92.2 (H) 43.0 - 80.0 %    Immature Granulocytes % 3.2 0.0 - 5.0 %    Lymphocytes % 2.6 (L) 20.0 - 42.0 %    Monocytes % 0.8 (L) 2.0 - 12.0 %    Eosinophils % 0.8 0.0 - 6.0 %    Basophils % 0.4 0.0 - 2.0 %    Neutrophils Absolute 15.30 (H) 1.80 - 7.30 E9/L    Immature Granulocytes # 0.54 E9/L    Lymphocytes Absolute 0.44 (L) 1.50 - 4.00 E9/L    Monocytes Absolute 0.14 0.10 - 0.95 E9/L    Eosinophils Absolute 0.14 0.05 - 0.50 E9/L    Basophils Absolute 0.07 0.00 - 0.20 E9/L    Anisocytosis 1+     Poikilocytes 1+     Ovalocytes 1+    Protime-INR   Result Value Ref Range    Protime 20.3 (H) 9.3 - 12.4 sec    INR 1.8    Magnesium   Result Value Ref Range    Magnesium 1.8 1.6 - 2.6 mg/dL   POC Pregnancy Urine Qual   Result Value Ref Range    HCG, Urine, POC Negative Negative    Lot Number [id number]     Positive QC Pass/Fail Pass     Negative QC Pass/Fail Pass    EKG 12 Lead   Result Value Ref Range    Ventricular Rate 117 BPM    Atrial Rate 117 BPM    P-R Interval 154 ms    QRS Duration 72 ms    Q-T Interval 318 ms    QTc Calculation (Bazett) 443 ms    P Axis 58 degrees    R Axis 13 degrees    T Axis 38 degrees       RADIOLOGY:  Interpreted by Radiologist.  [address] [facility] [address] DUP ABD PEL RETRO SCROT COMPLETE   Final Result   Enlarged uterus demonstrating heterogeneous echogenicity, potentially related   to leiomyomatous change. Normal endometrial stripe thickness and small amount of fluid in the   endometrial canal.      No visible adnexal abnormality. No free fluid. US PELVIS COMPLETE   Final Result   Enlarged uterus demonstrating heterogeneous echogenicity, potentially related   to leiomyomatous change. Normal endometrial stripe thickness and small amount of fluid in the   endometrial canal.      No visible adnexal abnormality. No free fluid. CT ABDOMEN PELVIS W IV CONTRAST Additional Contrast? None   Final Result   No acute abdominopelvic abnormality. Small amount of fluid within the endometrial cavity, nonspecific.                 ------------------------- NURSING NOTES AND VITALS REVIEWED ---------------------------   The nursing notes within the ED encounter and vital signs as below have been reviewed by myself. BP (!) 96/52   Pulse (!) 115   Temp 98.1 °F (36.7 °C) (Oral)   Resp 16   Ht 5' 1\" (1.549 m)   Wt 174 lb 2.6 oz (79 kg)   SpO2 96%   BMI 32.91 kg/m²   Oxygen Saturation Interpretation: Normal    The patients available past medical records and past encounters were reviewed.         ------------------------------ ED COURSE/MEDICAL DECISION MAKING----------------------  Medications   pantoprazole (PROTONIX) 40 mg in sodium chloride (PF) 10 mL injection (40 mg IntraVENous Given 10/4/22 0055)   sodium chloride flush 0.9 % injection 5-40 mL (10 mLs IntraVENous Given 10/5/22 3435) sodium chloride flush 0.9 % injection 5-40 mL (has no administration in time range)   0.9 % sodium chloride infusion (has no administration in time range)   ondansetron (ZOFRAN-ODT) disintegrating tablet 4 mg ( Oral See Alternative 10/5/22 1021)     Or   ondansetron (ZOFRAN) injection 4 mg (4 mg IntraVENous Given 10/5/22 1021)   polyethylene glycol (GLYCOLAX) packet 17 g (has no administration in time range)   acetaminophen (TYLENOL) tablet 650 mg (has no administration in time range)     Or   acetaminophen (TYLENOL) suppository 650 mg (has no administration in time range)   melatonin tablet 3 mg (has no administration in time range)   HYDROmorphone (DILAUDID) injection 1 mg (1 mg IntraVENous Given 10/5/22 1021)   potassium chloride (KLOR-CON M) extended release tablet 40 mEq (has no administration in time range)   0.9 % sodium chloride bolus (0 mLs IntraVENous Stopped 10/4/22 0413)   ondansetron (ZOFRAN) injection 4 mg (4 mg IntraVENous Given 10/4/22 0323)   ketorolac (TORADOL) injection 15 mg (15 mg IntraVENous Given 10/4/22 0323)   morphine (PF) injection 2 mg (2 mg IntraVENous Given 10/4/22 0323)   iopamidol (ISOVUE-370) 76 % injection 65 mL (65 mLs IntraVENous Given 10/4/22 0359)   morphine (PF) injection 2 mg (2 mg IntraVENous Given 10/4/22 0637)   ibuprofen (ADVIL;MOTRIN) tablet 800 mg (800 mg Oral Given 10/4/22 0856)   famotidine (PEPCID) 20 mg in sodium chloride (PF) 10 mL injection (20 mg IntraVENous Given 10/4/22 0954)   aluminum & magnesium hydroxide-simethicone (MAALOX) 30 mL, lidocaine viscous hcl (XYLOCAINE) 5 mL (GI COCKTAIL) ( Oral Given 10/4/22 0951)   0.9 % sodium chloride bolus (0 mLs IntraVENous Stopped 10/4/22 1230)   dicyclomine (BENTYL) capsule 10 mg (10 mg Oral Given 10/4/22 1044)   fentaNYL (SUBLIMAZE) injection 25 mcg (25 mcg IntraVENous Given 10/4/22 1611)         ED COURSE:  ED Course as of 10/05/22 1324   Tue Oct 04, 2022   2118 Patient reassessed I spoke with her further.   She states the pain still about a 5 out of 10 and she looks markedly more comfortable than initially CT imaging shows no acute abnormality. She did have pelvic ultrasound earlier in the week. She is declining pelvic exam stating she is still on her menstrual cycle and she has not had any vaginal charge and no complaints or concerns for STDs. She initially declined pelvic ReSound today as they had done one previously on her last visit earlier this week. She is now consenting to order the pelvic ultrasound. I told her we should reevaluate the ovaries to make sure that her worsening pain last night is not secondary to new ovarian discomfort. Patient is now agreeable to the pelvic ultrasound. Patient will be signed out to my colleague Dr. Radha Garcia awaiting ultrasound results. Randall Gonzales   [id number] I received this patient at sign out from Dr. Roberth Gama. I have discussed the patient's initial exam, treatment and plan of care with the out going physician. Patient pending ultrasound at this time [BB]   6490 Patient reevaluated, states she is having some generalized lower abdominal discomfort. CT abdomen pelvis was negative, ultrasound showed enlarged uterus which she has history of. States that she does not want anything strong for pain medication that make her lethargic. Ibuprofen will be ordered at this time. She does follow with Dr. Alan Kenney. She is in no acute distress. [BB]   [id number] Informed of the patient is not complaining of chest discomfort. Going to reevaluate the patient, she has multiple ginger ale cans and x-ray she has been actively drinking fluids. Notes that it is in the upper abdomen, epigastric region. States it is sharp in nature. Will order EKG troponin and treat for dyspepsia. [BB]   1032 Patient reevaluated states she is  Chest discomfort. She is having abdominal discomfort. Will order Bentyl, she notes it was cramping in nature. [BB]   5102 Spoke with Dr. Keren Clements, discussed the patient.  He will admit the patient. [BB]      ED Course User Index  [BB] Stew Jones DO  [KK] Dona Burciaga MD       Medical Decision Making:    Patient 57-year-old female comes in with acute on neck abdominal pain. Sudden worsening of the pain with periumbilical pain beginning last night no vomiting no diarrhea no pelvic pain no urinary complaints no fevers or chills. She was seen earlier in the week for the same with negative work-up for CT and ultrasound only showing enlarged uterus she was post to follow-up with OB/GYN but has not. Patient be given pain medicine repeat CT    Differential diagnosis is ileus small bowel obstruction appendicitis fibroids endometriosis          This patient has remained hemodynamically stable during their ED course. Patient urine points the past was negative. She was given IV fluids Toradol and Zofran. She had a CBC that was normal chemistry that was normal as well. LFTs were normal.  Lipase was normal urinalysis was negative for acute infection. Lipase was normal.She had a CT abdomen pelvis that showed no acute abnormality. Normal appendix. Normal gallbladder. Patient had improvement of her pain from initial evaluation after medications were given but it is not fully resolved. She states it was a 5 out of 10. She is markedly much more comfortable in the emergency department. Patient declines any pelvic symptoms. She just recently had an ultrasound this week and without any change in symptoms she declined pelvic exam or pelvic ultrasound. I did convince her that we should wait to take another look at the ovaries to make sure that her worsening of the pains did not involve her ovaries. She did consent to this ultrasound. Patient signed out to my colleague Dr. Aidan Fong awaiting further evaluation pelvic results. Ultrasound just shows the enlarged uterus possibly fibroid change. No adnexal abnormality. Normal flow to both ovaries.   Patient then began began to complain of more epigastric pain she was given GI medications Pepcid GI cocktail. She had an EKG that shows sinus tachycardia at 117 bpm no ST elevation. This was reviewed by Dr. Letty Hanley. QTC was 443. Patient was given Bentyl had some improvement of symptoms but lesser pain at this point seem to be epigastric in nature. Given that the pain was not fully resolved, decision was made to admit to the hospitalist for further epigastric GI evaluation. Patient stable on admission. Re-Evaluations:             Re-evaluation. Patients symptoms are improving        Counseling: The emergency provider has spoken with the patient and discussed todays results, in addition to providing specific details for the plan of care and counseling regarding the diagnosis and prognosis. Questions are answered at this time and they are agreeable with the plan.       --------------------------------- IMPRESSION AND DISPOSITION ---------------------------------    IMPRESSION  1. Intractable abdominal pain        DISPOSITION  Disposition: Admit to med/surg floor  Patient condition is fair    NOTE: This report was transcribed using voice recognition software.  Every effort was made to ensure accuracy; however, inadvertent computerized transcription errors may be present        Debbie Helms MD  10/05/22 4154

## 2022-10-04 NOTE — H&P
[facility] History and Physical      CHIEF COMPLAINT: Severe abdominal pain    History of Present Illness: Patient is a 77-year-old female who last night around 9PM developed sharp stomach pain that felt like her stomach was in knots. She then experienced a similar sensation in her legs. She reports some chest discomfort and acid reflux for which she takes omeprazole over-the-counter. She is not a coffee drinker. She drinks 2-3 sprites per day. She is a non-smoker nondrinker denies any illicit drug use or vaping. Is unemployed. Lives at home with her 4 kids. There are no pets. She has not been vaccinated against COVID-19 or the flu. Her last menstrual period was 2022. She does not use any contraceptives. Family history is noncontributory of any major illnesses. Patient reports a rash with latex. She presented to the emergency room she had a CT of the abdomen and pelvis as well as a pelvic ultrasound which was negative for any conditions responsible for her pain. Her laboratory evaluation was negative. Her pregnancy test was negative. LFTs, CMP, CBC, urinalysis and EKG were all unremarkable. It was difficult controlling her pain. She is being admitted for pain control. She denies fever, chills, URI symptoms, GI, , rash, headache, myalgias or arthralgias.     Informant(s) for H&P: Self    REVIEW OF SYSTEMS:  A comprehensive review of systems was negative except for: what is in the HPI    PMH:  Past Medical History:   Diagnosis Date    Anemia 2020    DVT (deep venous thrombosis) ([address]     DVT of lower extremity (deep venous thrombosis) ([facility])     left lower    History of DVT of lower extremity 2014    left leg 2014    Migraine        Surgical History:  Past Surgical History:   Procedure Laterality Date    DILATION AND CURETTAGE      INDUCED   10/12/12       Medications Prior to Admission:    Prior to Admission medications    Medication Sig Start Date End Date Taking? Authorizing Provider   ibuprofen (ADVIL;MOTRIN) 600 MG tablet Take 1 tablet by mouth every 6 hours as needed for Pain 9/30/22 10/5/22  Peggye Antis, APRN - CNP   ferrous sulfate (IRON 325) 325 (65 Fe) MG tablet Take 1 tablet by mouth 2 times daily 6/3/21   Zaire Ast, DO   omeprazole (PRILOSEC) 40 MG delayed release capsule Take 40 mg by mouth daily    Historical Provider, MD       Allergies:    Latex    Social History:    reports that she has never smoked. She has never used smokeless tobacco. She reports that she does not drink alcohol and does not use drugs. Family History:   family history is not on file.        PHYSICAL EXAM:  Vitals:  BP (!) 91/58   Pulse (!) 116   Temp 98.1 °F (36.7 °C) (Oral)   Resp 16   Ht 5' 1\" (1.549 m)   Wt 160 lb (72.6 kg)   SpO2 98%   BMI 30.23 kg/m²     General Appearance: alert and oriented to person, place and time and in mild acute distress  Skin: warm and dry  Head: normocephalic and atraumatic  Eyes: pupils equal, round, and reactive to light, extraocular eye movements intact, conjunctivae normal  Neck: neck supple and non tender without mass   Pulmonary/Chest: clear to auscultation bilaterally- no wheezes, rales or rhonchi, normal air movement, no respiratory distress  Cardiovascular: normal rate, normal S1 and S2 and no carotid bruits  Abdomen: soft, tender to light touch, non-distended, normal bowel sounds, no masses or organomegaly  Extremities: no cyanosis, no clubbing and no edema  Neurologic: no cranial nerve deficit and speech normal    LABS:  Recent Labs     10/04/22  0244      K 3.8   CL 99   CO2 24   BUN 6   CREATININE 0.6   GLUCOSE 116*   CALCIUM 9.5       Recent Labs     10/04/22  0244   WBC 11.1   RBC 4.10   HGB 13.4   HCT 39.4   MCV 96.1   MCH 32.7   MCHC 34.0   RDW 13.0      MPV 9.7       Radiology:   US DUP ABD PEL RETRO SCROT COMPLETE   Final Result   Enlarged uterus demonstrating heterogeneous echogenicity, potentially related   to leiomyomatous change. Normal endometrial stripe thickness and small amount of fluid in the   endometrial canal.      No visible adnexal abnormality. No free fluid. US PELVIS COMPLETE   Final Result   Enlarged uterus demonstrating heterogeneous echogenicity, potentially related   to leiomyomatous change. Normal endometrial stripe thickness and small amount of fluid in the   endometrial canal.      No visible adnexal abnormality. No free fluid. CT ABDOMEN PELVIS W IV CONTRAST Additional Contrast? None   Final Result   No acute abdominopelvic abnormality. Small amount of fluid within the endometrial cavity, nonspecific. EKG:  Sinus tachycardia  Nonspecific ST abnormality  Abnormal ECG  When compared with ECG of 15-NOV-2021 02:13,  No significant change was found    ASSESSMENT:      Principal Problem:    Intractable abdominal pain  Active Problems:    Gastroesophageal reflux disease without esophagitis  Resolved Problems:    * No resolved hospital problems. *    PLAN:    1. Severe abdominal pain -most likely GERD as all of her imaging studies and blood work are normal.  Pregnancy test is normal pelvic ultrasound is normal.  Does see a Dr. Mathew Cancino, OB/GYN. Last menstrual period 9/25/2022. Protonix 40 mg IV every 12 hours. GI consult. Code Status: Full  DVT prophylaxis: SCDs    NOTE: This report was transcribed using voice recognition software. Every effort was made to ensure accuracy; however, inadvertent computerized transcription errors may be present.     Electronically signed by Tami Rg MD on 10/4/2022 at 1:31 PM

## 2022-10-04 NOTE — ED NOTES
Pt remains off the floor in ultrasound.       Laurann Duverney, RN  10/04/22 0740     Hand off received from gideon ricardo at [address] [place] [address]  10/04/22 2829

## 2022-10-04 NOTE — PROGRESS NOTES
Database initiated pharmacy and medications verified with the patient. She is A&O independent from home with her children. States he uses no assistive devices and is RA at baseline.

## 2022-10-04 NOTE — LETTER
[address] [place] [id number]  Phone: [phone]    No name on file. October 6, 2022     Nhi Hope MD  [address] [place] [address]    Patient: Hayes Matos   MR Number: [id number]   YOB: 1985   Date of Visit: 10/4/2022       To whom it may concern:     Sigrid Maddox was hospitalized on 10/4/22 - 10/6/22. If you have questions, please do not hesitate to call me.     Sincerely,  Dr Jam Espinal

## 2022-10-05 ENCOUNTER — ANESTHESIA (OUTPATIENT)
Dept: ENDOSCOPY | Age: 37
DRG: 249 | End: 2022-10-05
Payer: COMMERCIAL

## 2022-10-05 ENCOUNTER — ANESTHESIA EVENT (OUTPATIENT)
Dept: ENDOSCOPY | Age: 37
DRG: 249 | End: 2022-10-05
Payer: COMMERCIAL

## 2022-10-05 LAB
ALBUMIN SERPL-MCNC: 3.4 G/DL (ref 3.5–5.2)
ALP BLD-CCNC: 108 U/L (ref 35–104)
ALT SERPL-CCNC: 12 U/L (ref 0–32)
ANION GAP SERPL CALCULATED.3IONS-SCNC: 12 MMOL/L (ref 7–16)
ANISOCYTOSIS: ABNORMAL
AST SERPL-CCNC: 25 U/L (ref 0–31)
BASOPHILS ABSOLUTE: 0.07 E9/L (ref 0–0.2)
BASOPHILS RELATIVE PERCENT: 0.4 % (ref 0–2)
BILIRUB SERPL-MCNC: 0.7 MG/DL (ref 0–1.2)
BUN BLDV-MCNC: 11 MG/DL (ref 6–20)
CALCIUM SERPL-MCNC: 9.2 MG/DL (ref 8.6–10.2)
CHLORIDE BLD-SCNC: 97 MMOL/L (ref 98–107)
CO2: 27 MMOL/L (ref 22–29)
CREAT SERPL-MCNC: 0.8 MG/DL (ref 0.5–1)
EOSINOPHILS ABSOLUTE: 0.14 E9/L (ref 0.05–0.5)
EOSINOPHILS RELATIVE PERCENT: 0.8 % (ref 0–6)
GFR AFRICAN AMERICAN: >60
GFR NON-AFRICAN AMERICAN: >60 ML/MIN/1.73
GLUCOSE BLD-MCNC: 93 MG/DL (ref 74–99)
HCT VFR BLD CALC: 35.7 % (ref 34–48)
HEMOGLOBIN: 12.4 G/DL (ref 11.5–15.5)
IMMATURE GRANULOCYTES #: 0.54 E9/L
IMMATURE GRANULOCYTES %: 3.2 % (ref 0–5)
INR BLD: 1.8
LYMPHOCYTES ABSOLUTE: 0.44 E9/L (ref 1.5–4)
LYMPHOCYTES RELATIVE PERCENT: 2.6 % (ref 20–42)
MAGNESIUM: 1.8 MG/DL (ref 1.6–2.6)
MCH RBC QN AUTO: 33.2 PG (ref 26–35)
MCHC RBC AUTO-ENTMCNC: 34.7 % (ref 32–34.5)
MCV RBC AUTO: 95.5 FL (ref 80–99.9)
MONOCYTES ABSOLUTE: 0.14 E9/L (ref 0.1–0.95)
MONOCYTES RELATIVE PERCENT: 0.8 % (ref 2–12)
NEUTROPHILS ABSOLUTE: 15.3 E9/L (ref 1.8–7.3)
NEUTROPHILS RELATIVE PERCENT: 92.2 % (ref 43–80)
OVALOCYTES: ABNORMAL
PDW BLD-RTO: 13.2 FL (ref 11.5–15)
PLATELET # BLD: 238 E9/L (ref 130–450)
PMV BLD AUTO: 10 FL (ref 7–12)
POIKILOCYTES: ABNORMAL
POTASSIUM REFLEX MAGNESIUM: 3.3 MMOL/L (ref 3.5–5)
PROTHROMBIN TIME: 20.3 SEC (ref 9.3–12.4)
RBC # BLD: 3.74 E12/L (ref 3.5–5.5)
SODIUM BLD-SCNC: 136 MMOL/L (ref 132–146)
TOTAL PROTEIN: 7.1 G/DL (ref 6.4–8.3)
WBC # BLD: 16.6 E9/L (ref 4.5–11.5)

## 2022-10-05 PROCEDURE — 6360000002 HC RX W HCPCS: Performed by: NURSE ANESTHETIST, CERTIFIED REGISTERED

## 2022-10-05 PROCEDURE — 99226 PR SBSQ OBSERVATION CARE/DAY 35 MINUTES: CPT | Performed by: FAMILY MEDICINE

## 2022-10-05 PROCEDURE — 36415 COLL VENOUS BLD VENIPUNCTURE: CPT

## 2022-10-05 PROCEDURE — 3700000000 HC ANESTHESIA ATTENDED CARE: Performed by: INTERNAL MEDICINE

## 2022-10-05 PROCEDURE — 2580000003 HC RX 258: Performed by: NURSE ANESTHETIST, CERTIFIED REGISTERED

## 2022-10-05 PROCEDURE — 2580000003 HC RX 258: Performed by: INTERNAL MEDICINE

## 2022-10-05 PROCEDURE — 2580000003 HC RX 258: Performed by: FAMILY MEDICINE

## 2022-10-05 PROCEDURE — 7100000011 HC PHASE II RECOVERY - ADDTL 15 MIN: Performed by: INTERNAL MEDICINE

## 2022-10-05 PROCEDURE — 85025 COMPLETE CBC W/AUTO DIFF WBC: CPT

## 2022-10-05 PROCEDURE — 7100000010 HC PHASE II RECOVERY - FIRST 15 MIN: Performed by: INTERNAL MEDICINE

## 2022-10-05 PROCEDURE — G0378 HOSPITAL OBSERVATION PER HR: HCPCS

## 2022-10-05 PROCEDURE — 96376 TX/PRO/DX INJ SAME DRUG ADON: CPT

## 2022-10-05 PROCEDURE — 3609017100 HC EGD: Performed by: INTERNAL MEDICINE

## 2022-10-05 PROCEDURE — 6370000000 HC RX 637 (ALT 250 FOR IP): Performed by: INTERNAL MEDICINE

## 2022-10-05 PROCEDURE — C9113 INJ PANTOPRAZOLE SODIUM, VIA: HCPCS | Performed by: INTERNAL MEDICINE

## 2022-10-05 PROCEDURE — 6360000002 HC RX W HCPCS: Performed by: FAMILY MEDICINE

## 2022-10-05 PROCEDURE — 80053 COMPREHEN METABOLIC PANEL: CPT

## 2022-10-05 PROCEDURE — 83735 ASSAY OF MAGNESIUM: CPT

## 2022-10-05 PROCEDURE — 85610 PROTHROMBIN TIME: CPT

## 2022-10-05 PROCEDURE — 2709999900 HC NON-CHARGEABLE SUPPLY: Performed by: INTERNAL MEDICINE

## 2022-10-05 PROCEDURE — 6360000002 HC RX W HCPCS: Performed by: INTERNAL MEDICINE

## 2022-10-05 RX ORDER — SODIUM CHLORIDE 9 MG/ML
INJECTION, SOLUTION INTRAVENOUS CONTINUOUS PRN
Status: DISCONTINUED | OUTPATIENT
Start: 2022-10-05 | End: 2022-10-05 | Stop reason: SDUPTHER

## 2022-10-05 RX ORDER — PROPOFOL 10 MG/ML
INJECTION, EMULSION INTRAVENOUS PRN
Status: DISCONTINUED | OUTPATIENT
Start: 2022-10-05 | End: 2022-10-05 | Stop reason: SDUPTHER

## 2022-10-05 RX ORDER — POTASSIUM CHLORIDE 20 MEQ/1
40 TABLET, EXTENDED RELEASE ORAL ONCE
Status: COMPLETED | OUTPATIENT
Start: 2022-10-05 | End: 2022-10-05

## 2022-10-05 RX ADMIN — PROPOFOL 50 MG: 10 INJECTION, EMULSION INTRAVENOUS at 14:34

## 2022-10-05 RX ADMIN — POLYETHYLENE GLYCOL 3350 17 G: 17 POWDER, FOR SOLUTION ORAL at 16:12

## 2022-10-05 RX ADMIN — ONDANSETRON 4 MG: 2 INJECTION INTRAMUSCULAR; INTRAVENOUS at 10:21

## 2022-10-05 RX ADMIN — PROPOFOL 25 MG: 10 INJECTION, EMULSION INTRAVENOUS at 14:37

## 2022-10-05 RX ADMIN — HYDROMORPHONE HYDROCHLORIDE 1 MG: 1 INJECTION, SOLUTION INTRAMUSCULAR; INTRAVENOUS; SUBCUTANEOUS at 23:49

## 2022-10-05 RX ADMIN — SODIUM CHLORIDE: 9 INJECTION, SOLUTION INTRAVENOUS at 13:48

## 2022-10-05 RX ADMIN — HYDROMORPHONE HYDROCHLORIDE 1 MG: 1 INJECTION, SOLUTION INTRAMUSCULAR; INTRAVENOUS; SUBCUTANEOUS at 10:21

## 2022-10-05 RX ADMIN — PROPOFOL 50 MG: 10 INJECTION, EMULSION INTRAVENOUS at 14:36

## 2022-10-05 RX ADMIN — Medication 10 ML: at 09:25

## 2022-10-05 RX ADMIN — PROPOFOL 25 MG: 10 INJECTION, EMULSION INTRAVENOUS at 14:35

## 2022-10-05 RX ADMIN — HYDROMORPHONE HYDROCHLORIDE 1 MG: 1 INJECTION, SOLUTION INTRAMUSCULAR; INTRAVENOUS; SUBCUTANEOUS at 18:30

## 2022-10-05 RX ADMIN — PROPOFOL 25 MG: 10 INJECTION, EMULSION INTRAVENOUS at 14:38

## 2022-10-05 RX ADMIN — POTASSIUM CHLORIDE 40 MEQ: 1500 TABLET, EXTENDED RELEASE ORAL at 16:12

## 2022-10-05 RX ADMIN — Medication 10 ML: at 21:00

## 2022-10-05 RX ADMIN — SODIUM CHLORIDE, PRESERVATIVE FREE 40 MG: 5 INJECTION INTRAVENOUS at 16:12

## 2022-10-05 ASSESSMENT — PAIN SCALES - GENERAL
PAINLEVEL_OUTOF10: 8
PAINLEVEL_OUTOF10: 0
PAINLEVEL_OUTOF10: 7
PAINLEVEL_OUTOF10: 8

## 2022-10-05 ASSESSMENT — PAIN DESCRIPTION - ORIENTATION: ORIENTATION: MID

## 2022-10-05 ASSESSMENT — PAIN - FUNCTIONAL ASSESSMENT: PAIN_FUNCTIONAL_ASSESSMENT: PREVENTS OR INTERFERES SOME ACTIVE ACTIVITIES AND ADLS

## 2022-10-05 ASSESSMENT — PAIN DESCRIPTION - LOCATION: LOCATION: ABDOMEN

## 2022-10-05 ASSESSMENT — PAIN DESCRIPTION - DESCRIPTORS: DESCRIPTORS: ACHING;SHARP;SPASM

## 2022-10-05 NOTE — PROGRESS NOTES
[facility] Progress Note    Admitting Date and Time: 10/4/2022  1:43 AM  Admit Dx: Gastroesophageal reflux disease without esophagitis [K21.9]  Intractable abdominal pain [R10.9]    Subjective:  Patient is being followed for Gastroesophageal reflux disease without esophagitis [K21.9]  Intractable abdominal pain [R10.9]   Pt feels had a good night. Nursing reports no requesting of pain meds for awhile. Per RN: GI to see. Await recommendations. If no plans for procedures, can dc today to home. Awaiting lab results - CBC back and with 16.6K wbc; no signs of infection. Stress related from vomiting; last episode of emesis last evening. ROS: denies fever, chills, cp, sob, n/v, HA unless stated above.       pantoprazole (PROTONIX) 40 mg injection  40 mg IntraVENous Q12H    sodium chloride flush  5-40 mL IntraVENous 2 times per day     sodium chloride flush, 5-40 mL, PRN  sodium chloride, , PRN  ondansetron, 4 mg, Q8H PRN   Or  ondansetron, 4 mg, Q6H PRN  polyethylene glycol, 17 g, Daily PRN  acetaminophen, 650 mg, Q6H PRN   Or  acetaminophen, 650 mg, Q6H PRN  melatonin, 3 mg, Nightly PRN  HYDROmorphone, 1 mg, Q4H PRN    Objective:    BP (!) 96/52   Pulse (!) 115   Temp 98.1 °F (36.7 °C) (Oral)   Resp 16   Ht 5' 1\" (1.549 m)   Wt 174 lb 2.6 oz (79 kg)   SpO2 96%   BMI 32.91 kg/m²     General Appearance: alert and oriented to person, place and time and in no acute distress  Skin: warm and dry  Head: normocephalic and atraumatic  Eyes: pupils equal, round, and reactive to light, extraocular eye movements intact, conjunctivae normal  Neck: neck supple and non tender without mass   Pulmonary/Chest: clear to auscultation bilaterally- no wheezes, rales or rhonchi, normal air movement, no respiratory distress  Cardiovascular: normal rate, normal S1 and S2 and no carotid bruits  Abdomen: soft, non-tender, non-distended, normal bowel sounds, no masses or organomegaly  Extremities: no cyanosis, no clubbing and no edema  Neurologic: no cranial nerve deficit and speech normal    Recent Labs     10/04/22  0244      K 3.8   CL 99   CO2 24   BUN 6   CREATININE 0.6   GLUCOSE 116*   CALCIUM 9.5       Recent Labs     10/04/22  0244   WBC 11.1   RBC 4.10   HGB 13.4   HCT 39.4   MCV 96.1   MCH 32.7   MCHC 34.0   RDW 13.0      MPV 9.7     Radiology:   US PELVIS COMPLETE    Result Date: 10/4/2022  EXAMINATION: PELVIC ULTRASOUND; DOPPLER EVALUATION OF THE PELVIS 10/4/2022 TECHNIQUE: Transabdominal sonographic evaluation of the pelvis was performed. Endovaginal sonography could not be performed due to patient immobility. COMPARISON: None HISTORY: ORDERING SYSTEM PROVIDED HISTORY: pain; r/o torsion TECHNOLOGIST PROVIDED HISTORY: Reason for exam:->pain; r/o torsion What reading provider will be dictating this exam?->CRC FINDINGS: The uterus measures 12.4 x 8.3 x 9.6 cm in longitudinal, AP, and transverse dimensions. The uterus demonstrates heterogeneous echogenicity. Endometrial stripe thickness measures 7 mm. There is a small amount of fluid in the endometrial canal.  The right and left ovaries measure 4.2 x 1.8 x 2.8 cm and 3.5 x 1.6 x 2.6 cm, respectively. No solid or cystic adnexal mass is identified. Color Doppler and spectral waveform analysis reveal bilateral ovarian perfusion. No free fluid is noted in the pelvis. Enlarged uterus demonstrating heterogeneous echogenicity, potentially related to leiomyomatous change. Normal endometrial stripe thickness and small amount of fluid in the endometrial canal. No visible adnexal abnormality. No free fluid. CT ABDOMEN PELVIS W IV CONTRAST Additional Contrast? None    Result Date: 10/4/2022  EXAMINATION: CT OF THE ABDOMEN AND PELVIS WITH CONTRAST 10/4/2022 3:51 am TECHNIQUE: CT of the abdomen and pelvis was performed with the administration of intravenous contrast. Multiplanar reformatted images are provided for review.  Automated exposure control, iterative reconstruction, and/or weight based adjustment of the mA/kV was utilized to reduce the radiation dose to as low as reasonably achievable. COMPARISON: 09/30/2022 HISTORY: ORDERING SYSTEM PROVIDED HISTORY: diffuse abdominal pain TECHNOLOGIST PROVIDED HISTORY: Additional Contrast?->None Reason for exam:->diffuse abdominal pain Decision Support Exception - unselect if not a suspected or confirmed emergency medical condition->Emergency Medical Condition (MA) FINDINGS: Lower Chest:  Visualized portion of the lower chest demonstrates no acute abnormality. Organs: The liver, gallbladder, spleen, pancreas, adrenals, and kidneys are unremarkable. GI/Bowel: There is no evidence of bowel obstruction. No evidence of abnormal bowel wall thickening or distension. The appendix is normal. Pelvis: The urinary bladder is partially filled. The uterus is mildly enlarged. There is a small amount of fluid within the endometrial cavity. Peritoneum/Retroperitoneum: Trace free fluid layering within the pelvis. No extraluminal gas. No evidence of lymphadenopathy. Aorta is normal in caliber. Bones/Soft Tissues:  No acute abnormality of the visualized osseous structures. No acute abdominopelvic abnormality. Small amount of fluid within the endometrial cavity, nonspecific. US DUP ABD PEL RETRO SCROT COMPLETE    Result Date: 10/4/2022  EXAMINATION: PELVIC ULTRASOUND; DOPPLER EVALUATION OF THE PELVIS 10/4/2022 TECHNIQUE: Transabdominal sonographic evaluation of the pelvis was performed. Endovaginal sonography could not be performed due to patient immobility. COMPARISON: None HISTORY: ORDERING SYSTEM PROVIDED HISTORY: pain; r/o torsion TECHNOLOGIST PROVIDED HISTORY: Reason for exam:->pain; r/o torsion What reading provider will be dictating this exam?->CRC FINDINGS: The uterus measures 12.4 x 8.3 x 9.6 cm in longitudinal, AP, and transverse dimensions. The uterus demonstrates heterogeneous echogenicity.   Endometrial stripe thickness measures 7 mm. There is a small amount of fluid in the endometrial canal.  The right and left ovaries measure 4.2 x 1.8 x 2.8 cm and 3.5 x 1.6 x 2.6 cm, respectively. No solid or cystic adnexal mass is identified. Color Doppler and spectral waveform analysis reveal bilateral ovarian perfusion. No free fluid is noted in the pelvis. Enlarged uterus demonstrating heterogeneous echogenicity, potentially related to leiomyomatous change. Normal endometrial stripe thickness and small amount of fluid in the endometrial canal. No visible adnexal abnormality. No free fluid. Assessment:    Principal Problem:    Intractable abdominal pain  Active Problems:    Gastroesophageal reflux disease without esophagitis  Resolved Problems:    * No resolved hospital problems. *    Plan:  1. Severe Abdominal pain - most likely 2/2 GERD. GI consulted. Await recommendations. DC later today if no procedures planned. Awaiting CMP results. Total care time:  15 minutes    NOTE: This report was transcribed using voice recognition software. Every effort was made to ensure accuracy; however, inadvertent computerized transcription errors may be present.     Electronically signed by Madison Sharpe MD on 10/5/2022 at 9:15 AM

## 2022-10-05 NOTE — ANESTHESIA POSTPROCEDURE EVALUATION
Department of Anesthesiology  Postprocedure Note    Patient: Remigio Golden  MRN: [id number]  YOB: 1985  Date of evaluation: 10/5/2022      Procedure Summary     Date: 10/05/22 Room / Location: [facility] / [facility]    Anesthesia Start: 9273 Anesthesia Stop: 1443    Procedure: EGD DIAGNOSTIC ONLY Diagnosis:       Generalized abdominal pain      Gastroesophageal reflux disease, unspecified whether esophagitis present      (Generalized abdominal pain [R10.84])      (Gastroesophageal reflux disease, unspecified whether esophagitis present [K21.9])    Surgeons: Zina Saleh MD Responsible Provider: Suzette Ontiveros MD    Anesthesia Type: Not recorded ASA Status: Not recorded          Anesthesia Type: No value filed.     Jimbo Phase I:      Jimbo Phase II:        Anesthesia Post Evaluation    Patient location during evaluation: bedside  Patient participation: complete - patient participated  Level of consciousness: awake and alert  Pain score: 0  Airway patency: patent  Nausea & Vomiting: no vomiting and no nausea  Complications: no  Cardiovascular status: hemodynamically stable  Respiratory status: acceptable and spontaneous ventilation  Hydration status: stable

## 2022-10-05 NOTE — DISCHARGE INSTR - COC
Continuity of Care Form    Patient Name: Mallorie Calderon   :  1985  MRN:  [id number]    Admit date:  10/4/2022  Discharge date:  ***    Code Status Order: Full Code   Advance Directives:     Admitting Physician:  Deepa Maciel MD  PCP: Halina Mena MD    Discharging Nurse: [facility] Unit/Room#: 9205/2207-D  Discharging Unit Phone Number: ***    Emergency Contact:   Extended Emergency Contact Information  Primary Emergency Contact: Anamaria Monsalve  Address: [address] Phone: [phone]  Mobile Phone: [phone]  Relation: Parent   needed? No  Secondary Emergency Contact: [address] [place] [address] Road Phone: [phone]  Mobile Phone: [phone]  Relation: Other Relative    Past Surgical History:  Past Surgical History:   Procedure Laterality Date    DILATION AND CURETTAGE      INDUCED   10/12/12       Immunization History:   Immunization History   Administered Date(s) Administered    Td, unspecified formulation 2013    Tdap (Boostrix, Adacel) 2021       Active Problems:  Patient Active Problem List   Diagnosis Code    Anemia D64.9    Obesity (BMI 30-39. 9) E66.9    History of DVT (deep vein thrombosis) Z86.718    Dysfunctional uterine bleeding N93.8    COVID-19 U07.1    Symptomatic anemia D64.9    Intractable abdominal pain R10.9    Gastroesophageal reflux disease without esophagitis K21.9       Isolation/Infection:   Isolation            No Isolation          Patient Infection Status       Infection Onset Added Last Indicated Last Indicated By Review Planned Expiration Resolved Resolved By    None active    Resolved    COVID-19 21 COVID-19, Rapid   22     COVID-19 (Rule Out) 21 COVID-19, Rapid (Ordered)   21 Rule-Out Test Resulted    COVID-19 20 Covid-19 Ambulatory   20     COVID-19 (Rule Out) 20 Covid-19 Ambulatory (Ordered)   20 Rule-Out Test Resulted    INFLUENZA 02/09/20 02/10/20 02/09/20 Rapid influenza A/B antigens   03/10/20             Nurse Assessment:  Last Vital Signs: BP (!) 96/52   Pulse (!) 115   Temp 98.1 °F (36.7 °C) (Oral)   Resp 16   Ht 5' 1\" (1.549 m)   Wt 174 lb 2.6 oz (79 kg)   SpO2 96%   BMI 32.91 kg/m²     Last documented pain score (0-10 scale): Pain Level: 7  Last Weight:   Wt Readings from Last 1 Encounters:   10/05/22 174 lb 2.6 oz (79 kg)     Mental Status:  {IP PT MENTAL STATUS:}    IV Access:  { PROSPER IV ACCESS:106374065}    Nursing Mobility/ADLs:  Walking   {P DME XJCX:080464229}  Transfer  {[facility] DME EHMQ:215223512}  Bathing  {P DME QGEL:526635399}  Dressing  {[facility] DME SHK}  Toileting  {P DME OQY}  Feeding  {[facility] DME EEXL:102462364}  Med Admin  {[facility] DME XENS:383001341}  Med Delivery   { PROSPER MED Delivery:160776077}    Wound Care Documentation and Therapy:        Elimination:  Continence: Bowel: {YES / XI:55533}  Bladder: {YES / WI:45106}  Urinary Catheter: {Urinary Catheter:787449988}   Colostomy/Ileostomy/Ileal Conduit: {YES / NO:10181}       Date of Last BM: ***    Intake/Output Summary (Last 24 hours) at 10/5/2022 1322  Last data filed at 10/5/2022 0215  Gross per 24 hour   Intake --   Output 150 ml   Net -150 ml     I/O last 3 completed shifts:   In: 1000 [IV Piggyback:1000]  Out: 150 [Emesis/NG output:150]    Safety Concerns:     [employer] Safety Concerns:155395537}    Impairments/Disabilities:      [employer] Impairments/Disabilities:332224156}    Nutrition Therapy:  Current Nutrition Therapy:   [address] PROSPER Diet List:179603506}    Routes of Feeding: {CHP DME Other Feedings:481311137}  Liquids: {Slp liquid thickness:91203}  Daily Fluid Restriction: {CHP DME Yes amt example:085681628}  Last Modified Barium Swallow with Video (Video Swallowing Test): {Done Not Done KJAS:154205381}    Treatments at the Time of Hospital Discharge:   Respiratory Treatments: ***  Oxygen Therapy:  {Therapy; copd oxygen:07400}  Ventilator:    {[facility] Vent KPWZ:875170652}    Rehab Therapies: {THERAPEUTIC INTERVENTION:2063944266}  Weight Bearing Status/Restrictions: {[facility] Weight Bearin}  Other Medical Equipment (for information only, NOT a DME order):  {EQUIPMENT:080062351}  Other Treatments: ***    Patient's personal belongings (please select all that are sent with patient):  {[facility] DME Belongings:951363000}    RN SIGNATURE:  {Esignature:903112828}    CASE MANAGEMENT/SOCIAL WORK SECTION    Inpatient Status Date: ***    Readmission Risk Assessment Score:  Readmission Risk              Risk of Unplanned Readmission:  0           Discharging to Facility/ Agency   Name:   Address:  Phone:  Fax:    Dialysis Facility (if applicable)   Name:  Address:  Dialysis Schedule:  Phone:  Fax:    / signature: {Esignature:288379081}    PHYSICIAN SECTION    Prognosis: {Prognosis:1066468193}    Condition at Discharge: [address] [place] [address] Patient Condition:479944010}    Rehab Potential (if transferring to Rehab): {Prognosis:8239257286}    Recommended Labs or Other Treatments After Discharge: ***    Physician Certification: I certify the above information and transfer of [employer]  is necessary for the continuing treatment of the diagnosis listed and that she requires {Admit to Appropriate Level of Care:73414} for {GREATER/LESS:591724830} 30 days.      Update Admission H&P: {[facility] DME Changes in RCQXK:838650109}    PHYSICIAN SIGNATURE:  {Esignature:144724155}

## 2022-10-05 NOTE — BRIEF OP NOTE
Brief Postoperative Note      Patient: Ayde Corona  YOB: 1985  MRN: [id number]    Date of Procedure: 10/5/2022    Pre-Op Diagnosis: Generalized abdominal pain [R10.84]  Gastroesophageal reflux disease, unspecified whether esophagitis present [K21.9]    Post-Op Diagnosis: very normal       Procedure(s):  EGD DIAGNOSTIC ONLY    Surgeon(s):  Toyin Betancourt MD    Assistant:  * No surgical staff found *    Anesthesia: Monitor Anesthesia Care    Estimated Blood Loss (mL): 0    Complications: None    Specimens:   * No specimens in log *    Implants:  * No implants in log *      Drains: * No LDAs found *    Findings: this is normal    Electronically signed by Toyin Betancourt MD on 10/5/2022 at 2:53 PM

## 2022-10-05 NOTE — ANESTHESIA PRE PROCEDURE
Department of Anesthesiology  Preprocedure Note       Name:  Vitor Brown   Age:  30 y.o.  :  1985                                          MRN:  [id number]         Date:  10/5/2022      Surgeon: Evelyne Terry):  Tanika Alan MD    Procedure: Procedure(s):  EGD DIAGNOSTIC ONLY    Medications prior to admission:   Prior to Admission medications    Medication Sig Start Date End Date Taking?  Authorizing Provider   ferrous sulfate (IRON 325) 325 (65 Fe) MG tablet Take 325 mg by mouth every morning   Yes Historical Provider, MD   ibuprofen (ADVIL;MOTRIN) 600 MG tablet Take 1 tablet by mouth every 6 hours as needed for Pain 9/30/22 10/5/22  NOLAN Preciado - CNP   omeprazole (PRILOSEC) 40 MG delayed release capsule Take 40 mg by mouth every morning    Historical Provider, MD       Current medications:    Current Facility-Administered Medications   Medication Dose Route Frequency Provider Last Rate Last Admin    potassium chloride (KLOR-CON M) extended release tablet 40 mEq  40 mEq Oral Once Rocael Martinez MD        pantoprazole (PROTONIX) 40 mg in sodium chloride (PF) 10 mL injection  40 mg IntraVENous Q12H Josef Millan MD   40 mg at 10/04/22 2350    sodium chloride flush 0.9 % injection 5-40 mL  5-40 mL IntraVENous 2 times per day Rocael Martinez MD   10 mL at 10/05/22 0925    sodium chloride flush 0.9 % injection 5-40 mL  5-40 mL IntraVENous PRN Rocael Martinez MD        0.9 % sodium chloride infusion   IntraVENous PRN Rocael Martinez MD        ondansetron (ZOFRAN-ODT) disintegrating tablet 4 mg  4 mg Oral Q8H PRN Rocael Martinez MD        Or    ondansetron [facility]) injection 4 mg  4 mg IntraVENous Q6H PRN Rocael Martinez MD   4 mg at 10/05/22 1021    polyethylene glycol (GLYCOLAX) packet 17 g  17 g Oral Daily PRN Rocael Martinez MD        acetaminophen (TYLENOL) tablet 650 mg  650 mg Oral Q6H PRN Rocael Martinez MD        Or    acetaminophen (TYLENOL) suppository 650 mg  650 mg Rectal Q6H PRN Jeffery Washington MD        melatonin tablet 3 mg  3 mg Oral Nightly PRN Jeffery Washington MD        HYDROmorphone (DILAUDID) injection 1 mg  1 mg IntraVENous Q4H PRN Jeffery Washington MD   1 mg at 10/05/22 1021       Allergies: Allergies   Allergen Reactions    Latex Rash       Problem List:    Patient Active Problem List   Diagnosis Code    Anemia D64.9    Obesity (BMI 30-39. 9) E66.9    History of DVT (deep vein thrombosis) Z86.718    Dysfunctional uterine bleeding N93.8    COVID-19 U07.1    Symptomatic anemia D64.9    Intractable abdominal pain R10.9    Gastroesophageal reflux disease without esophagitis K21.9       Past Medical History:        Diagnosis Date    Anemia 2020    DVT (deep venous thrombosis) (HCC)     DVT of lower extremity (deep venous thrombosis) (HCC)     left lower    History of DVT of lower extremity 2014    left leg 2014    Migraine        Past Surgical History:        Procedure Laterality Date    DILATION AND CURETTAGE      INDUCED   10/12/12       Social History:    Social History     Tobacco Use    Smoking status: Never    Smokeless tobacco: Never   Substance Use Topics    Alcohol use: No                                Counseling given: Not Answered      Vital Signs (Current):   Vitals:    10/05/22 0014 10/05/22 0150 10/05/22 0718 10/05/22 1051   BP:   (!) 96/52    Pulse: (!) 110  (!) 115    Resp: 14  16 16   Temp:   36.7 °C (98.1 °F)    TempSrc:   Oral    SpO2: 95%  96%    Weight:  174 lb 2.6 oz (79 kg)     Height:                                                  BP Readings from Last 3 Encounters:   10/05/22 (!) 96/52   22 107/72   22 129/88       NPO Status:                                                                                 BMI:   Wt Readings from Last 3 Encounters:   10/05/22 174 lb 2.6 oz (79 kg)   22 160 lb (72.6 kg)   22 160 lb (72.6 kg)     Body mass index is 32.91 kg/m².     CBC:   Lab Results Component Value Date/Time    WBC 16.6 10/05/2022 08:16 AM    RBC 3.74 10/05/2022 08:16 AM    HGB 12.4 10/05/2022 08:16 AM    HCT 35.7 10/05/2022 08:16 AM    MCV 95.5 10/05/2022 08:16 AM    RDW 13.2 10/05/2022 08:16 AM     10/05/2022 08:16 AM       CMP:   Lab Results   Component Value Date/Time     10/05/2022 08:16 AM    K 3.3 10/05/2022 08:16 AM    CL 97 10/05/2022 08:16 AM    CO2 27 10/05/2022 08:16 AM    BUN 11 10/05/2022 08:16 AM    CREATININE 0.8 10/05/2022 08:16 AM    GFRAA >60 10/05/2022 08:16 AM    LABGLOM >60 10/05/2022 08:16 AM    GLUCOSE 93 10/05/2022 08:16 AM    GLUCOSE 96 02/16/2012 11:25 PM    PROT 7.1 10/05/2022 08:16 AM    CALCIUM 9.2 10/05/2022 08:16 AM    BILITOT 0.7 10/05/2022 08:16 AM    ALKPHOS 108 10/05/2022 08:16 AM    AST 25 10/05/2022 08:16 AM    ALT 12 10/05/2022 08:16 AM       POC Tests: No results for input(s): POCGLU, POCNA, POCK, POCCL, POCBUN, POCHEMO, POCHCT in the last 72 hours.     Coags:   Lab Results   Component Value Date/Time    PROTIME 20.3 10/05/2022 08:16 AM    INR 1.8 10/05/2022 08:16 AM    APTT 22.5 01/01/2022 02:10 AM       HCG (If Applicable):   Lab Results   Component Value Date    PREGTESTUR NEGATIVE 08/31/2022    PREGSERUM NEGATIVE 01/14/2011    HCG 682069.9 (H) 07/18/2013        ABGs: No results found for: PHART, PO2ART, ZSD2NWF, NLX5MSK, BEART, G5MKDNPY     Type & Screen (If Applicable):  No results found for: LABABO, LABRH    Drug/Infectious Status (If Applicable):  No results found for: HIV, HEPCAB    COVID-19 Screening (If Applicable):   Lab Results   Component Value Date/Time    COVID19 DETECTED 12/30/2021 05:20 PM    COVID19 Detected 12/06/2020 06:53 PM           Anesthesia Evaluation     Anesthesia Plan        NOLAN Pendleton CRNA   10/5/2022

## 2022-10-05 NOTE — PROGRESS NOTES
Patient report called to 5th floor RN; family waiting room notified of patient transport. Octavia Mares RN.

## 2022-10-05 NOTE — ANESTHESIA PRE PROCEDURE
Department of Anesthesiology  Preprocedure Note       Name:  Alli Allen   Age:  30 y.o.  :  1985                                          MRN:  [id number]         Date:  10/5/2022      Surgeon: Asya Day):  Emy Olivares MD    Procedure: Procedure(s):  EGD DIAGNOSTIC ONLY    Medications prior to admission:   Prior to Admission medications    Medication Sig Start Date End Date Taking?  Authorizing Provider   ferrous sulfate (IRON 325) 325 (65 Fe) MG tablet Take 325 mg by mouth every morning   Yes Historical Provider, MD   ibuprofen (ADVIL;MOTRIN) 600 MG tablet Take 1 tablet by mouth every 6 hours as needed for Pain 9/30/22 10/5/22  NOLAN Tran - CNP   omeprazole (PRILOSEC) 40 MG delayed release capsule Take 40 mg by mouth every morning    Historical Provider, MD       Current medications:    Current Facility-Administered Medications   Medication Dose Route Frequency Provider Last Rate Last Admin    potassium chloride (KLOR-CON M) extended release tablet 40 mEq  40 mEq Oral Once Murray Patiño MD        pantoprazole (PROTONIX) 40 mg in sodium chloride (PF) 10 mL injection  40 mg IntraVENous Q12H Paola Millan MD   40 mg at 10/04/22 2350    sodium chloride flush 0.9 % injection 5-40 mL  5-40 mL IntraVENous 2 times per day Murray Patiño MD   10 mL at 10/05/22 0925    sodium chloride flush 0.9 % injection 5-40 mL  5-40 mL IntraVENous PRN Murray Patiño MD        0.9 % sodium chloride infusion   IntraVENous BORA Patiño MD        ondansetron (ZOFRAN-ODT) disintegrating tablet 4 mg  4 mg Oral Q8H PRN Murray Patiño MD        Or    ondansetron [facility]) injection 4 mg  4 mg IntraVENous Q6H PRЮЛИЯ Patiño MD   4 mg at 10/05/22 1021    polyethylene glycol (GLYCOLAX) packet 17 g  17 g Oral Daily PRN Murray Patiño MD        acetaminophen (TYLENOL) tablet 650 mg  650 mg Oral Q6H PRN Murray Patiño MD        Or    acetaminophen (TYLENOL) suppository 650 mg  650 mg Rectal Q6H PRN Vicki Roldan MD        melatonin tablet 3 mg  3 mg Oral Nightly PRN Vicki Roldan MD        HYDROmorphone (DILAUDID) injection 1 mg  1 mg IntraVENous Q4H PRN Vicki Roldan MD   1 mg at 10/05/22 1021       Allergies: Allergies   Allergen Reactions    Latex Rash       Problem List:    Patient Active Problem List   Diagnosis Code    Anemia D64.9    Obesity (BMI 30-39. 9) E66.9    History of DVT (deep vein thrombosis) Z86.718    Dysfunctional uterine bleeding N93.8    COVID-19 U07.1    Symptomatic anemia D64.9    Intractable abdominal pain R10.9    Gastroesophageal reflux disease without esophagitis K21.9       Past Medical History:        Diagnosis Date    Anemia 2020    DVT (deep venous thrombosis) (HCC)     DVT of lower extremity (deep venous thrombosis) (HCC)     left lower    History of DVT of lower extremity 2014    left leg 2014    Migraine        Past Surgical History:        Procedure Laterality Date    DILATION AND CURETTAGE      INDUCED   10/12/12       Social History:    Social History     Tobacco Use    Smoking status: Never    Smokeless tobacco: Never   Substance Use Topics    Alcohol use: No                                Counseling given: Not Answered      Vital Signs (Current):   Vitals:    10/05/22 0014 10/05/22 0150 10/05/22 0718 10/05/22 1051   BP:   (!) 96/52    Pulse: (!) 110  (!) 115    Resp: 14  16 16   Temp:   36.7 °C (98.1 °F)    TempSrc:   Oral    SpO2: 95%  96%    Weight:  174 lb 2.6 oz (79 kg)     Height:                                                  BP Readings from Last 3 Encounters:   10/05/22 (!) 96/52   22 107/72   22 129/88       NPO Status:                                                                                 BMI:   Wt Readings from Last 3 Encounters:   10/05/22 174 lb 2.6 oz (79 kg)   22 160 lb (72.6 kg)   22 160 lb (72.6 kg)     Body mass index is 32.91 kg/m².     CBC:   Lab Results Component Value Date/Time    WBC 16.6 10/05/2022 08:16 AM    RBC 3.74 10/05/2022 08:16 AM    HGB 12.4 10/05/2022 08:16 AM    HCT 35.7 10/05/2022 08:16 AM    MCV 95.5 10/05/2022 08:16 AM    RDW 13.2 10/05/2022 08:16 AM     10/05/2022 08:16 AM       CMP:   Lab Results   Component Value Date/Time     10/05/2022 08:16 AM    K 3.3 10/05/2022 08:16 AM    CL 97 10/05/2022 08:16 AM    CO2 27 10/05/2022 08:16 AM    BUN 11 10/05/2022 08:16 AM    CREATININE 0.8 10/05/2022 08:16 AM    GFRAA >60 10/05/2022 08:16 AM    LABGLOM >60 10/05/2022 08:16 AM    GLUCOSE 93 10/05/2022 08:16 AM    GLUCOSE 96 02/16/2012 11:25 PM    PROT 7.1 10/05/2022 08:16 AM    CALCIUM 9.2 10/05/2022 08:16 AM    BILITOT 0.7 10/05/2022 08:16 AM    ALKPHOS 108 10/05/2022 08:16 AM    AST 25 10/05/2022 08:16 AM    ALT 12 10/05/2022 08:16 AM       POC Tests: No results for input(s): POCGLU, POCNA, POCK, POCCL, POCBUN, POCHEMO, POCHCT in the last 72 hours.     Coags:   Lab Results   Component Value Date/Time    PROTIME 20.3 10/05/2022 08:16 AM    INR 1.8 10/05/2022 08:16 AM    APTT 22.5 01/01/2022 02:10 AM       HCG (If Applicable):   Lab Results   Component Value Date    PREGTESTUR NEGATIVE 08/31/2022    PREGSERUM NEGATIVE 01/14/2011    HCG 389665.9 (H) 07/18/2013        ABGs: No results found for: PHART, PO2ART, YIK7SPY, WSA8TYP, BEART, X0VCBDEB     Type & Screen (If Applicable):  No results found for: LABABO, LABRH    Drug/Infectious Status (If Applicable):  No results found for: HIV, HEPCAB    COVID-19 Screening (If Applicable):   Lab Results   Component Value Date/Time    COVID19 DETECTED 12/30/2021 05:20 PM    COVID19 Detected 12/06/2020 06:53 PM           Anesthesia Evaluation  Patient summary reviewed  Airway: Mallampati: II  TM distance: >3 FB   Neck ROM: full  Mouth opening: > = 3 FB   Dental: normal exam         Pulmonary:Negative Pulmonary ROS and normal exam                               Cardiovascular:Negative CV ROS            Rhythm: regular  Rate: normal                    Neuro/Psych:   (+) headaches:,             GI/Hepatic/Renal:   (+) GERD:,           Endo/Other: Negative Endo/Other ROS                    Abdominal:             Vascular:           Other Findings:           Anesthesia Plan      MAC     ASA 2                               NPO>MN    NOLAN Willis - CRNA   10/5/2022

## 2022-10-05 NOTE — CONSULTS
Gastroenterology Consult Note   KALIN Montes with Florina Sahu M.D. Consult Note        Date of Service: 10/5/2022  Reason for Consult: acid reflux  Requesting Physician: Sanjeev Martin    CHIEF COMPLAINT:  abdominal pain    History Obtained From:  patient, electronic medical record    HISTORY OF PRESENT ILLNESS:       Troy Harry is a 30 y.o. female with significant past medical history of anemia, DVT- LLE, and migraines admitted via ED for abdominal pain. Pt reports to ongoing abdominal pain over the last several days, worsening over the last 24 hours. States the pain is in her upper-mid abdomen described as \"tight, contractions\" rated at a 10/10. Denies any radiation of the pain, or relieving factors. Using a heating pad for pain relief, no change. States movement and any activity worsens the pain. Denies any fever and/or chills. With nausea and vomiting x3 episodes. Describes the emesis as \"clear liquid\". Denies any black/blood appearance. No emesis since admission. Does take Omeprazole daily, uncertain of dose. Has been having diarrhea at times, not uncommon for her. States her bowels do fluctuate from constipation to diarrhea. Last BM was on Monday described as brown water. Denies any black/blood appearance. Up until this point was in her normal state of health, denies any recent weight loss. With a good appetite recently. Also denies any prior episodes of such. No prior EGD or colon too date. Denies any FMHx of colon cancer. Admission labs . ABD US:  Enlarged uterus demonstrating heterogeneous echogenicity, potentially related to leiomyomatous change. Normal endometrial stripe thickness and small amount of fluid in the endometrial canal. No visible adnexal abnormality. No free fluid. CT ABD/Pelvis:  No acute abdominopelvic abnormality. Small amount of fluid within the endometrial cavity, nonspecific.  Pelvis US: Enlarged uterus demonstrating heterogeneous echogenicity, potentially related to leiomyomatous change. Normal endometrial stripe thickness and small amount of fluid in the endometrial canal. No visible adnexal abnormality. No free fluid. Consultation for acid reflux. Currently, pt reports to improved abdominal pain, but still present. Patient going for EGD today, procedure explained to the patient. Labs today K 3.3, chloride 97, albumin 3.4, , WBC 16.6, MCHC 34.7. Past Medical History:        Diagnosis Date    Anemia 2020    DVT (deep venous thrombosis) ([facility])     DVT of lower extremity (deep venous thrombosis) ([facility] [address]     left lower    History of DVT of lower extremity     left leg     Migraine      Past Surgical History:        Procedure Laterality Date    DILATION AND CURETTAGE      INDUCED   10/12/12     Current Medications:    Current Facility-Administered Medications: pantoprazole (PROTONIX) 40 mg in sodium chloride (PF) 10 mL injection, 40 mg, IntraVENous, Q12H  sodium chloride flush 0.9 % injection 5-40 mL, 5-40 mL, IntraVENous, 2 times per day  sodium chloride flush 0.9 % injection 5-40 mL, 5-40 mL, IntraVENous, PRN  0.9 % sodium chloride infusion, , IntraVENous, PRN  ondansetron (ZOFRAN-ODT) disintegrating tablet 4 mg, 4 mg, Oral, Q8H PRN **OR** ondansetron (ZOFRAN) injection 4 mg, 4 mg, IntraVENous, Q6H PRN  polyethylene glycol (GLYCOLAX) packet 17 g, 17 g, Oral, Daily PRN  acetaminophen (TYLENOL) tablet 650 mg, 650 mg, Oral, Q6H PRN **OR** acetaminophen (TYLENOL) suppository 650 mg, 650 mg, Rectal, Q6H PRN  melatonin tablet 3 mg, 3 mg, Oral, Nightly PRN  HYDROmorphone (DILAUDID) injection 1 mg, 1 mg, IntraVENous, Q4H PRN    Allergies:  Latex    Social History: Denies any tobacco, ETOH, or drug use      Family History:   No family history on file. REVIEW OF SYSTEMS:    Aside from what was mentioned in the PMH and HPI, essentially unremarkable, all others negative.     PHYSICAL EXAM:      Vitals:    BP (!) 96/52   Pulse (!) 115   Temp 98.1 °F (36.7 °C) (Oral)   Resp 16   Ht 5' 1\" (1.549 m)   Wt 174 lb 2.6 oz (79 kg)   SpO2 96%   BMI 32.91 kg/m²       CONSTITUTIONAL:  awake, alert, cooperative, no apparent distress, and appears stated age  EYES:  pupils equal, round and reactive to light, sclera anicteric and conjunctiva normal  ENT:  normocephalic, oral pharynx with moist mucous membranes  LUNGS:  clear to auscultation bilaterally.   CARDIOVASCULAR:  Normal apical impulse, regular rate and rhythm, no murmur noted; 2+ pulses; no edema  ABDOMEN:  normal bowel sounds, soft, non-distended, tenderness to palpitation, diffuse, guarding, EDDIE rebound d/t pain  MUSCULOSKELETAL:  full range of motion noted  motor strength is 5 out of 5 all extremities bilaterally  NEUROLOGIC:  Mental Status Exam:  Level of Alertness:   awake  Orientation:   person, place, time  Motor Exam:  Motor exam is symmetrical 5 out of 5 all extremities bilaterally  SKIN:  AA normal skin color, texture, turgor    DATA:    CBC with Differential:    Lab Results   Component Value Date/Time    WBC 16.6 10/05/2022 08:16 AM    RBC 3.74 10/05/2022 08:16 AM    HGB 12.4 10/05/2022 08:16 AM    HCT 35.7 10/05/2022 08:16 AM     10/05/2022 08:16 AM    MCV 95.5 10/05/2022 08:16 AM    MCH 33.2 10/05/2022 08:16 AM    MCHC 34.7 10/05/2022 08:16 AM    RDW 13.2 10/05/2022 08:16 AM    NRBC 0.0 08/27/2020 09:53 PM    SEGSPCT 71 07/18/2013 01:02 AM    LYMPHOPCT 3.6 10/04/2022 02:44 AM    MONOPCT 3.4 10/04/2022 02:44 AM    BASOPCT 0.2 10/04/2022 02:44 AM    MONOSABS 0.38 10/04/2022 02:44 AM    LYMPHSABS 0.40 10/04/2022 02:44 AM    EOSABS 0.00 10/04/2022 02:44 AM    BASOSABS 0.02 10/04/2022 02:44 AM     CMP:    Lab Results   Component Value Date/Time     10/05/2022 08:16 AM    K 3.3 10/05/2022 08:16 AM    CL 97 10/05/2022 08:16 AM    CO2 27 10/05/2022 08:16 AM    BUN 11 10/05/2022 08:16 AM    CREATININE 0.8 10/05/2022 08:16 AM    GFRAA >60 10/05/2022 08:16 AM    LABGLOM >60 10/05/2022 08:16 AM    GLUCOSE 93 10/05/2022 08:16 AM    GLUCOSE 96 02/16/2012 11:25 PM    PROT 7.1 10/05/2022 08:16 AM    LABALBU 3.4 10/05/2022 08:16 AM    LABALBU 4.0 02/16/2012 11:25 PM    CALCIUM 9.2 10/05/2022 08:16 AM    BILITOT 0.7 10/05/2022 08:16 AM    ALKPHOS 108 10/05/2022 08:16 AM    AST 25 10/05/2022 08:16 AM    ALT 12 10/05/2022 08:16 AM     Hepatic Function Panel:    Lab Results   Component Value Date/Time    ALKPHOS 108 10/05/2022 08:16 AM    ALT 12 10/05/2022 08:16 AM    AST 25 10/05/2022 08:16 AM    PROT 7.1 10/05/2022 08:16 AM    BILITOT 0.7 10/05/2022 08:16 AM    BILIDIR <0.2 06/03/2021 06:11 AM    IBILI see below 06/03/2021 06:11 AM    LABALBU 3.4 10/05/2022 08:16 AM    LABALBU 4.0 02/16/2012 11:25 PM     PT/INR:    Lab Results   Component Value Date/Time    PROTIME 20.3 10/05/2022 08:16 AM    INR 1.8 10/05/2022 08:16 AM     PTT:    Lab Results   Component Value Date/Time    APTT 22.5 01/01/2022 02:10 AM   [APTT}  Last 3 Troponin:    Lab Results   Component Value Date/Time    TROPONINI <0.01 08/27/2020 08:55 PM    TROPONINI <0.01 06/09/2019 04:09 AM     TSH:    Lab Results   Component Value Date/Time    TSH 0.267 12/30/2021 05:00 PM     VITAMIN B12:   Lab Results   Component Value Date/Time    DVUWLVBR12 324 07/08/2020 08:59 PM     FOLATE:    Lab Results   Component Value Date/Time    FOLATE 3.7 07/08/2020 08:59 PM     IRON:    Lab Results   Component Value Date/Time    IRON 70 07/09/2020 05:05 AM     Iron Saturation:    Lab Results   Component Value Date/Time    LABIRON 20 07/09/2020 05:05 AM     TIBC:    Lab Results   Component Value Date/Time    TIBC 354 07/09/2020 05:05 AM     FERRITIN:    Lab Results   Component Value Date/Time    FERRITIN 8 12/31/2021 11:32 PM     HIV:  No results found for: HIV  VERNA:    Lab Results   Component Value Date/Time    VERNA NEGATIVE 06/28/2014 10:25 AM       US PELVIS COMPLETE    Result Date: 10/4/2022  EXAMINATION: PELVIC ULTRASOUND; DOPPLER EVALUATION OF THE PELVIS 10/4/2022 TECHNIQUE: Transabdominal sonographic evaluation of the pelvis was performed. Endovaginal sonography could not be performed due to patient immobility. COMPARISON: None HISTORY: ORDERING SYSTEM PROVIDED HISTORY: pain; r/o torsion TECHNOLOGIST PROVIDED HISTORY: Reason for exam:->pain; r/o torsion What reading provider will be dictating this exam?->CRC FINDINGS: The uterus measures 12.4 x 8.3 x 9.6 cm in longitudinal, AP, and transverse dimensions. The uterus demonstrates heterogeneous echogenicity. Endometrial stripe thickness measures 7 mm. There is a small amount of fluid in the endometrial canal.  The right and left ovaries measure 4.2 x 1.8 x 2.8 cm and 3.5 x 1.6 x 2.6 cm, respectively. No solid or cystic adnexal mass is identified. Color Doppler and spectral waveform analysis reveal bilateral ovarian perfusion. No free fluid is noted in the pelvis. Enlarged uterus demonstrating heterogeneous echogenicity, potentially related to leiomyomatous change. Normal endometrial stripe thickness and small amount of fluid in the endometrial canal. No visible adnexal abnormality. No free fluid. CT ABDOMEN PELVIS W IV CONTRAST Additional Contrast? None    Result Date: 10/4/2022  EXAMINATION: CT OF THE ABDOMEN AND PELVIS WITH CONTRAST 10/4/2022 3:51 am TECHNIQUE: CT of the abdomen and pelvis was performed with the administration of intravenous contrast. Multiplanar reformatted images are provided for review. Automated exposure control, iterative reconstruction, and/or weight based adjustment of the mA/kV was utilized to reduce the radiation dose to as low as reasonably achievable.  COMPARISON: 09/30/2022 HISTORY: ORDERING SYSTEM PROVIDED HISTORY: diffuse abdominal pain TECHNOLOGIST PROVIDED HISTORY: Additional Contrast?->None Reason for exam:->diffuse abdominal pain Decision Support Exception - unselect if not a suspected or confirmed emergency medical condition->Emergency Medical Condition (MA) FINDINGS: Lower Chest: Visualized portion of the lower chest demonstrates no acute abnormality. Organs: The liver, gallbladder, spleen, pancreas, adrenals, and kidneys are unremarkable. GI/Bowel: There is no evidence of bowel obstruction. No evidence of abnormal bowel wall thickening or distension. The appendix is normal. Pelvis: The urinary bladder is partially filled. The uterus is mildly enlarged. There is a small amount of fluid within the endometrial cavity. Peritoneum/Retroperitoneum: Trace free fluid layering within the pelvis. No extraluminal gas. No evidence of lymphadenopathy. Aorta is normal in caliber. Bones/Soft Tissues:  No acute abnormality of the visualized osseous structures. No acute abdominopelvic abnormality. Small amount of fluid within the endometrial cavity, nonspecific. US DUP ABD PEL RETRO SCROT COMPLETE    Result Date: 10/4/2022  EXAMINATION: PELVIC ULTRASOUND; DOPPLER EVALUATION OF THE PELVIS 10/4/2022 TECHNIQUE: Transabdominal sonographic evaluation of the pelvis was performed. Endovaginal sonography could not be performed due to patient immobility. COMPARISON: None HISTORY: ORDERING SYSTEM PROVIDED HISTORY: pain; r/o torsion TECHNOLOGIST PROVIDED HISTORY: Reason for exam:->pain; r/o torsion What reading provider will be dictating this exam?->CRC FINDINGS: The uterus measures 12.4 x 8.3 x 9.6 cm in longitudinal, AP, and transverse dimensions. The uterus demonstrates heterogeneous echogenicity. Endometrial stripe thickness measures 7 mm. There is a small amount of fluid in the endometrial canal.  The right and left ovaries measure 4.2 x 1.8 x 2.8 cm and 3.5 x 1.6 x 2.6 cm, respectively. No solid or cystic adnexal mass is identified. Color Doppler and spectral waveform analysis reveal bilateral ovarian perfusion. No free fluid is noted in the pelvis. Enlarged uterus demonstrating heterogeneous echogenicity, potentially related to leiomyomatous change.  Normal endometrial stripe thickness and small amount of fluid in the endometrial canal. No visible adnexal abnormality. No free fluid. IMPRESSION:    Abdominal pain. Mid- to epigastric  Nausea with vomiting  Diarrhea- last BM Monday  HX of acid reflux  Hypokalemia- defer    RECOMMENDATIONS:      NPO  EGD today with Dr. Bernadette Cutler. Procedure details for EGD discussed in detail. Complications including but not limited to, perforation, bleeding and infection were discussed in great detail. Risks, benefits, and alternatives explained. Pt has understood the information and has agreed to proceed. EGD orders have been placed  Continue Protonix BID  Monitor stools  Supportive care  Will follow    Thank you very much for your consultation. We will follow closely with you.      Dr. Bernadette Cutler covering in Dr. Charity martinez thru 10/5/22     Discussed with Dr. Alonso Camacho developed by Dr. Moon Hernandez, NP-C 10/5/2022 9:45 AM for Dr. Bernadette Cutler

## 2022-10-06 ENCOUNTER — APPOINTMENT (OUTPATIENT)
Dept: GENERAL RADIOLOGY | Age: 37
DRG: 249 | End: 2022-10-06
Payer: COMMERCIAL

## 2022-10-06 LAB
ALBUMIN SERPL-MCNC: 2.9 G/DL (ref 3.5–5.2)
ALP BLD-CCNC: 111 U/L (ref 35–104)
ALT SERPL-CCNC: 8 U/L (ref 0–32)
ANION GAP SERPL CALCULATED.3IONS-SCNC: 9 MMOL/L (ref 7–16)
AST SERPL-CCNC: 16 U/L (ref 0–31)
BASOPHILS ABSOLUTE: 0 E9/L (ref 0–0.2)
BASOPHILS RELATIVE PERCENT: 0 % (ref 0–2)
BILIRUB SERPL-MCNC: 0.4 MG/DL (ref 0–1.2)
BUN BLDV-MCNC: 10 MG/DL (ref 6–20)
C-REACTIVE PROTEIN: 38.3 MG/DL (ref 0–0.4)
CALCIUM SERPL-MCNC: 9.4 MG/DL (ref 8.6–10.2)
CHLORIDE BLD-SCNC: 98 MMOL/L (ref 98–107)
CO2: 28 MMOL/L (ref 22–29)
CREAT SERPL-MCNC: 0.6 MG/DL (ref 0.5–1)
EOSINOPHILS ABSOLUTE: 0 E9/L (ref 0.05–0.5)
EOSINOPHILS RELATIVE PERCENT: 0 % (ref 0–6)
GFR AFRICAN AMERICAN: >60
GFR NON-AFRICAN AMERICAN: >60 ML/MIN/1.73
GLUCOSE BLD-MCNC: 90 MG/DL (ref 74–99)
HCT VFR BLD CALC: 32.1 % (ref 34–48)
HEMOGLOBIN: 11.1 G/DL (ref 11.5–15.5)
LIPASE: 14 U/L (ref 13–60)
LYMPHOCYTES ABSOLUTE: 0.62 E9/L (ref 1.5–4)
LYMPHOCYTES RELATIVE PERCENT: 4.4 % (ref 20–42)
MAGNESIUM: 2.3 MG/DL (ref 1.6–2.6)
MCH RBC QN AUTO: 33 PG (ref 26–35)
MCHC RBC AUTO-ENTMCNC: 34.6 % (ref 32–34.5)
MCV RBC AUTO: 95.5 FL (ref 80–99.9)
MONOCYTES ABSOLUTE: 0.46 E9/L (ref 0.1–0.95)
MONOCYTES RELATIVE PERCENT: 2.6 % (ref 2–12)
NEUTROPHILS ABSOLUTE: 14.42 E9/L (ref 1.8–7.3)
NEUTROPHILS RELATIVE PERCENT: 93 % (ref 43–80)
NUCLEATED RED BLOOD CELLS: 0 /100 WBC
OVALOCYTES: ABNORMAL
PDW BLD-RTO: 13.2 FL (ref 11.5–15)
PLATELET # BLD: 233 E9/L (ref 130–450)
PMV BLD AUTO: 9.7 FL (ref 7–12)
POIKILOCYTES: ABNORMAL
POTASSIUM REFLEX MAGNESIUM: 3.3 MMOL/L (ref 3.5–5)
RBC # BLD: 3.36 E12/L (ref 3.5–5.5)
SEDIMENTATION RATE, ERYTHROCYTE: 124 MM/HR (ref 0–20)
SODIUM BLD-SCNC: 135 MMOL/L (ref 132–146)
TOTAL PROTEIN: 6.7 G/DL (ref 6.4–8.3)
WBC # BLD: 15.5 E9/L (ref 4.5–11.5)

## 2022-10-06 PROCEDURE — 85025 COMPLETE CBC W/AUTO DIFF WBC: CPT

## 2022-10-06 PROCEDURE — 83690 ASSAY OF LIPASE: CPT

## 2022-10-06 PROCEDURE — 80053 COMPREHEN METABOLIC PANEL: CPT

## 2022-10-06 PROCEDURE — 36415 COLL VENOUS BLD VENIPUNCTURE: CPT

## 2022-10-06 PROCEDURE — 6360000002 HC RX W HCPCS: Performed by: INTERNAL MEDICINE

## 2022-10-06 PROCEDURE — 83735 ASSAY OF MAGNESIUM: CPT

## 2022-10-06 PROCEDURE — 85651 RBC SED RATE NONAUTOMATED: CPT

## 2022-10-06 PROCEDURE — 86140 C-REACTIVE PROTEIN: CPT

## 2022-10-06 PROCEDURE — 2580000003 HC RX 258: Performed by: INTERNAL MEDICINE

## 2022-10-06 PROCEDURE — C9113 INJ PANTOPRAZOLE SODIUM, VIA: HCPCS | Performed by: INTERNAL MEDICINE

## 2022-10-06 PROCEDURE — A4216 STERILE WATER/SALINE, 10 ML: HCPCS | Performed by: INTERNAL MEDICINE

## 2022-10-06 PROCEDURE — 6370000000 HC RX 637 (ALT 250 FOR IP): Performed by: FAMILY MEDICINE

## 2022-10-06 PROCEDURE — 6370000000 HC RX 637 (ALT 250 FOR IP): Performed by: NURSE PRACTITIONER

## 2022-10-06 PROCEDURE — 96376 TX/PRO/DX INJ SAME DRUG ADON: CPT

## 2022-10-06 PROCEDURE — G0378 HOSPITAL OBSERVATION PER HR: HCPCS

## 2022-10-06 PROCEDURE — 74022 RADEX COMPL AQT ABD SERIES: CPT

## 2022-10-06 PROCEDURE — 99226 PR SBSQ OBSERVATION CARE/DAY 35 MINUTES: CPT | Performed by: FAMILY MEDICINE

## 2022-10-06 RX ORDER — POTASSIUM CHLORIDE 20 MEQ/1
40 TABLET, EXTENDED RELEASE ORAL ONCE
Status: COMPLETED | OUTPATIENT
Start: 2022-10-06 | End: 2022-10-06

## 2022-10-06 RX ADMIN — ONDANSETRON 4 MG: 2 INJECTION INTRAMUSCULAR; INTRAVENOUS at 21:15

## 2022-10-06 RX ADMIN — SODIUM CHLORIDE, PRESERVATIVE FREE 40 MG: 5 INJECTION INTRAVENOUS at 17:15

## 2022-10-06 RX ADMIN — BISACODYL 5 MG: 5 TABLET, COATED ORAL at 10:20

## 2022-10-06 RX ADMIN — HYDROMORPHONE HYDROCHLORIDE 1 MG: 1 INJECTION, SOLUTION INTRAMUSCULAR; INTRAVENOUS; SUBCUTANEOUS at 17:15

## 2022-10-06 RX ADMIN — SODIUM CHLORIDE, PRESERVATIVE FREE 40 MG: 5 INJECTION INTRAVENOUS at 06:11

## 2022-10-06 RX ADMIN — HYDROMORPHONE HYDROCHLORIDE 1 MG: 1 INJECTION, SOLUTION INTRAMUSCULAR; INTRAVENOUS; SUBCUTANEOUS at 06:15

## 2022-10-06 RX ADMIN — HYDROMORPHONE HYDROCHLORIDE 1 MG: 1 INJECTION, SOLUTION INTRAMUSCULAR; INTRAVENOUS; SUBCUTANEOUS at 12:08

## 2022-10-06 RX ADMIN — POTASSIUM CHLORIDE 40 MEQ: 1500 TABLET, EXTENDED RELEASE ORAL at 17:14

## 2022-10-06 RX ADMIN — ONDANSETRON 4 MG: 2 INJECTION INTRAMUSCULAR; INTRAVENOUS at 12:08

## 2022-10-06 RX ADMIN — Medication 10 ML: at 10:20

## 2022-10-06 RX ADMIN — HYDROMORPHONE HYDROCHLORIDE 1 MG: 1 INJECTION, SOLUTION INTRAMUSCULAR; INTRAVENOUS; SUBCUTANEOUS at 21:15

## 2022-10-06 ASSESSMENT — PAIN DESCRIPTION - LOCATION
LOCATION: ABDOMEN

## 2022-10-06 ASSESSMENT — PAIN DESCRIPTION - ORIENTATION
ORIENTATION: MID
ORIENTATION: LOWER
ORIENTATION: RIGHT;LEFT
ORIENTATION: RIGHT;LEFT
ORIENTATION: RIGHT;LEFT;LOWER

## 2022-10-06 ASSESSMENT — PAIN SCALES - GENERAL
PAINLEVEL_OUTOF10: 8
PAINLEVEL_OUTOF10: 7
PAINLEVEL_OUTOF10: 0
PAINLEVEL_OUTOF10: 2
PAINLEVEL_OUTOF10: 8
PAINLEVEL_OUTOF10: 2

## 2022-10-06 ASSESSMENT — PAIN DESCRIPTION - DESCRIPTORS
DESCRIPTORS: ACHING;DISCOMFORT
DESCRIPTORS: DISCOMFORT
DESCRIPTORS: ACHING;DISCOMFORT;SORE
DESCRIPTORS: DISCOMFORT

## 2022-10-06 ASSESSMENT — PAIN - FUNCTIONAL ASSESSMENT
PAIN_FUNCTIONAL_ASSESSMENT: PREVENTS OR INTERFERES SOME ACTIVE ACTIVITIES AND ADLS

## 2022-10-06 ASSESSMENT — PAIN DESCRIPTION - ONSET: ONSET: ON-GOING

## 2022-10-06 ASSESSMENT — PAIN DESCRIPTION - FREQUENCY: FREQUENCY: CONTINUOUS

## 2022-10-06 ASSESSMENT — PAIN DESCRIPTION - PAIN TYPE: TYPE: ACUTE PAIN

## 2022-10-06 NOTE — PROGRESS NOTES
[facility] Progress Note    Admitting Date and Time: 10/4/2022  1:43 AM  Admit Dx: Gastroesophageal reflux disease without esophagitis [K21.9]  Intractable abdominal pain [R10.9]    Subjective:  Patient is being followed for Gastroesophageal reflux disease without esophagitis [K21.9]  Intractable abdominal pain [R10.9]   Pt feels abdominal pain is still present. Just had an emesis. Receiving pain med and nausea med via IV. Has not had a BM. Given oral Dulcolax. Per RN: As above. ROS: denies fever, chills, cp, sob, n/v, HA unless stated above.       pantoprazole (PROTONIX) 40 mg injection  40 mg IntraVENous Q12H    sodium chloride flush  5-40 mL IntraVENous 2 times per day     sodium chloride flush, 5-40 mL, PRN  sodium chloride, , PRN  ondansetron, 4 mg, Q8H PRN   Or  ondansetron, 4 mg, Q6H PRN  polyethylene glycol, 17 g, Daily PRN  acetaminophen, 650 mg, Q6H PRN   Or  acetaminophen, 650 mg, Q6H PRN  melatonin, 3 mg, Nightly PRN  HYDROmorphone, 1 mg, Q4H PRN    Objective:    /62   Pulse 95   Temp 98.2 °F (36.8 °C) (Oral)   Resp 16   Ht 5' 1\" (1.549 m)   Wt 169 lb 1.5 oz (76.7 kg)   SpO2 93%   BMI 31.95 kg/m²     General Appearance: alert and oriented to person, place and time and in acute distress  Skin: warm and dry  Head: normocephalic and atraumatic  Eyes: pupils equal, round, and reactive to light, extraocular eye movements intact, conjunctivae normal  Neck: neck supple and non tender without mass   Pulmonary/Chest: clear to auscultation bilaterally- no wheezes, rales or rhonchi, normal air movement, no respiratory distress  Cardiovascular: normal rate, normal S1 and S2 and no carotid bruits  Abdomen: soft, significant tenderness to light touch, distended, normal bowel sounds, no masses or organomegaly  Extremities: no cyanosis, no clubbing and no edema  Neurologic: no cranial nerve deficit and speech normal    Recent Labs     10/04/22  0244 10/05/22  0816    136 K 3.8 3.3*   CL 99 97*   CO2 24 27   BUN 6 11   CREATININE 0.6 0.8   GLUCOSE 116* 93   CALCIUM 9.5 9.2       Recent Labs     10/04/22  0244 10/05/22  0816   WBC 11.1 16.6*   RBC 4.10 3.74   HGB 13.4 12.4   HCT 39.4 35.7   MCV 96.1 95.5   MCH 32.7 33.2   MCHC 34.0 34.7*   RDW 13.0 13.2    238   MPV 9.7 10.0     Radiology:   No results found. Acute abdomen series pending    Assessment:    Principal Problem:    Intractable abdominal pain  Active Problems:    Gastroesophageal reflux disease without esophagitis  Resolved Problems:    * No resolved hospital problems. *    Plan:  1. Severe Abdominal pain - EGD done yesterday was negative. Pain so severe. Acute abdomen series ordered. Repeat labs ordered. Total care time:  20 minutes    NOTE: This report was transcribed using voice recognition software. Every effort was made to ensure accuracy; however, inadvertent computerized transcription errors may be present.     Electronically signed by Saul Street MD on 10/6/2022 at 12:18 PM

## 2022-10-06 NOTE — PATIENT CARE CONFERENCE
P Quality Flow/Interdisciplinary Rounds Progress Note        Quality Flow Rounds held on October 6, 2022    Disciplines Attending:  Bedside Nurse, , , and Nursing Unit [address] [facility] [place] was admitted on 10/4/2022  1:43 AM    Anticipated Discharge Date:       Disposition:    Gregor Score:  Gregor Scale Score: 21    Readmission Risk              Risk of Unplanned Readmission:  0           Discussed patient goal for the day, patient clinical progression, and barriers to discharge.   The following Goal(s) of the Day/Commitment(s) have been identified:  Diagnostics - Report Results and Labs - Report Results      Abhishek Kohli RN  October 6, 2022

## 2022-10-06 NOTE — PROGRESS NOTES
PROGRESS NOTE        Patient Presents with/Seen in Consultation For      *Reason for Consult: acid reflux  CHIEF COMPLAINT:  abdominal pain  Subjective:     Patient seen Shreya Reed in bed states she has continued diffuse to mid abdominal pain worse with movement. Tolerating diet, no nausea or vomiting. No BM. Denies worsening pain post prandial. EGD reviewed with patient, all additional questions and concerns addressed. POC d/w pt. Review of Systems  Aside from what was mentioned in the PMH and HPI, essentially unremarkable, all others negative. Objective:     BP (!) 97/52   Pulse 97   Temp 98.5 °F (36.9 °C) (Oral)   Resp 16   Ht 5' 1\" (1.549 m)   Wt 169 lb 1.5 oz (76.7 kg)   SpO2 95%   BMI 31.95 kg/m²     General appearance: alert, awake, laying in bed, and cooperative  Eyes: conjunctiva pale, sclera anicteric. PERRL.   Lungs: clear to auscultation bilaterally  Heart: regular rate and rhythm, no murmur, 2+ pulses; no edema  Abdomen: soft, tender to palpation without guarding or rebound; bowel sounds normal; no masses,  no organomegaly  Extremities: extremities without edema  Pulses: 2+ and symmetric  Skin: Skin color, texture, turgor normal.   Neurologic: Grossly normal    bisacodyl (DULCOLAX) EC tablet 5 mg, Once  pantoprazole (PROTONIX) 40 mg in sodium chloride (PF) 10 mL injection, Q12H  sodium chloride flush 0.9 % injection 5-40 mL, 2 times per day  sodium chloride flush 0.9 % injection 5-40 mL, PRN  0.9 % sodium chloride infusion, PRN  ondansetron (ZOFRAN-ODT) disintegrating tablet 4 mg, Q8H PRN   Or  ondansetron (ZOFRAN) injection 4 mg, Q6H PRN  polyethylene glycol (GLYCOLAX) packet 17 g, Daily PRN  acetaminophen (TYLENOL) tablet 650 mg, Q6H PRN   Or  acetaminophen (TYLENOL) suppository 650 mg, Q6H PRN  melatonin tablet 3 mg, Nightly PRN  HYDROmorphone (DILAUDID) injection 1 mg, Q4H PRN       Data Review  CBC:   Lab Results   Component Value Date/Time    WBC 16.6 10/05/2022 08:16 AM    RBC 3.74 10/05/2022 08:16 AM    HGB 12.4 10/05/2022 08:16 AM    HCT 35.7 10/05/2022 08:16 AM    MCV 95.5 10/05/2022 08:16 AM    MCH 33.2 10/05/2022 08:16 AM    MCHC 34.7 10/05/2022 08:16 AM    RDW 13.2 10/05/2022 08:16 AM     10/05/2022 08:16 AM    MPV 10.0 10/05/2022 08:16 AM     CMP:    Lab Results   Component Value Date/Time     10/05/2022 08:16 AM    K 3.3 10/05/2022 08:16 AM    CL 97 10/05/2022 08:16 AM    CO2 27 10/05/2022 08:16 AM    BUN 11 10/05/2022 08:16 AM    CREATININE 0.8 10/05/2022 08:16 AM    GFRAA >60 10/05/2022 08:16 AM    LABGLOM >60 10/05/2022 08:16 AM    GLUCOSE 93 10/05/2022 08:16 AM    GLUCOSE 96 02/16/2012 11:25 PM    PROT 7.1 10/05/2022 08:16 AM    LABALBU 3.4 10/05/2022 08:16 AM    LABALBU 4.0 02/16/2012 11:25 PM    CALCIUM 9.2 10/05/2022 08:16 AM    BILITOT 0.7 10/05/2022 08:16 AM    ALKPHOS 108 10/05/2022 08:16 AM    AST 25 10/05/2022 08:16 AM    ALT 12 10/05/2022 08:16 AM     Hepatic Function Panel:    Lab Results   Component Value Date/Time    ALKPHOS 108 10/05/2022 08:16 AM    ALT 12 10/05/2022 08:16 AM    AST 25 10/05/2022 08:16 AM    PROT 7.1 10/05/2022 08:16 AM    BILITOT 0.7 10/05/2022 08:16 AM    BILIDIR <0.2 06/03/2021 06:11 AM    IBILI see below 06/03/2021 06:11 AM    LABALBU 3.4 10/05/2022 08:16 AM    LABALBU 4.0 02/16/2012 11:25 PM       PT/INR:    Lab Results   Component Value Date/Time    PROTIME 20.3 10/05/2022 08:16 AM    INR 1.8 10/05/2022 08:16 AM     IRON:    Lab Results   Component Value Date/Time    IRON 70 07/09/2020 05:05 AM     Iron Saturation:  No components found for: PERCENTFE  FERRITIN:    Lab Results   Component Value Date/Time    FERRITIN 8 12/31/2021 11:32 PM         Assessment:     Active Problems:  Abdominal pain.  Mid- to epigastric  Nausea with vomiting- resolved per pt  Diarrhea, intermittent- last BM Monday  HX of acid reflux  Hypokalemia- defer  EGD 10/5/22 with Dr. Vitaly Avila- normal    Plan:   Protonix  40 mg BID  Dulcolax oral once  Medical management per PCP  Diet as tolerated   Supportive care  Discharge planning       Note: This report was completed utilizing computer voice recognition software. Every effort has been made to ensure accuracy, however; inadvertent computerized transcription errors may be present.      Discussed with Dr. Charley Fortune per Dr. Stormy FRANCISCO-NP-C 10/6/2022  9:06 AM For Dr. Ken Contreras

## 2022-10-06 NOTE — OP NOTE
[id number] [address]                                OPERATIVE REPORT    PATIENT NAME: Naty Danielson                     :        1985  MED REC NO:   [id number]                            ROOM:       9374  ACCOUNT NO:   [de-identified]                           ADMIT DATE: 10/04/2022  PROVIDER:     Marcell Ceron MD    DATE OF PROCEDURE:  10/05/2022    PROCEDURE PERFORMED:  Esophagogastroduodenoscopy. PREOPERATIVE DIAGNOSIS:  Abdominal pain with negative imaging. POSTOPERATIVE DIAGNOSIS:  Normal esophagus, stomach, and duodenum. INDICATIONS:  A 79-year-old woman presenting to the hospital with  abdominal pain, nausea, and vomiting and elevated white count with  normal lab work other than the white count, which was not elevated on  admission. Lipase is normal.  Localized tenderness, epigastrium. Preop is monitored anesthesia care. DESCRIPTION OF PROCEDURE:  With she in the left lateral decubitus  position, sedation was given. Bite block was in place. The Olympus  GIF-H190 video endoscope was guided first into the oropharynx, then  gently into the cervical esophagus. Esophagus was normal.  There was no  evidence of esophagitis. No hiatal hernia. Empty stomach. Antrum,  body, fundus, and cardia, pylorus, bulb, and postbulbar duodenum were  normal.  The endoscope was withdrawn. Procedure was terminated and was  well tolerated. EBL: 0    IMPRESSION:  Normal EGD.   Source of her pain is uncertain, but may be  viral.        Malou Mahan MD    D: 10/05/2022 15:25:08       T: 10/05/2022 15:28:51     [id number]  Job#: [id number]     Doc#: [id number]    CC:

## 2022-10-07 ENCOUNTER — APPOINTMENT (OUTPATIENT)
Dept: GENERAL RADIOLOGY | Age: 37
DRG: 249 | End: 2022-10-07
Payer: COMMERCIAL

## 2022-10-07 ENCOUNTER — APPOINTMENT (OUTPATIENT)
Dept: ULTRASOUND IMAGING | Age: 37
DRG: 249 | End: 2022-10-07
Payer: COMMERCIAL

## 2022-10-07 ENCOUNTER — APPOINTMENT (OUTPATIENT)
Dept: CT IMAGING | Age: 37
DRG: 249 | End: 2022-10-07
Payer: COMMERCIAL

## 2022-10-07 LAB
ALBUMIN SERPL-MCNC: 3 G/DL (ref 3.5–5.2)
ALP BLD-CCNC: 87 U/L (ref 35–104)
ALT SERPL-CCNC: 9 U/L (ref 0–32)
ANION GAP SERPL CALCULATED.3IONS-SCNC: 10 MMOL/L (ref 7–16)
AST SERPL-CCNC: 14 U/L (ref 0–31)
BASOPHILS ABSOLUTE: 0.05 E9/L (ref 0–0.2)
BASOPHILS RELATIVE PERCENT: 0.4 % (ref 0–2)
BILIRUB SERPL-MCNC: 0.4 MG/DL (ref 0–1.2)
BILIRUBIN DIRECT: <0.2 MG/DL (ref 0–0.3)
BILIRUBIN, INDIRECT: ABNORMAL MG/DL (ref 0–1)
BUN BLDV-MCNC: 9 MG/DL (ref 6–20)
CALCIUM SERPL-MCNC: 9.2 MG/DL (ref 8.6–10.2)
CHLORIDE BLD-SCNC: 100 MMOL/L (ref 98–107)
CO2: 27 MMOL/L (ref 22–29)
CREAT SERPL-MCNC: 0.6 MG/DL (ref 0.5–1)
EOSINOPHILS ABSOLUTE: 0.1 E9/L (ref 0.05–0.5)
EOSINOPHILS RELATIVE PERCENT: 0.8 % (ref 0–6)
GFR AFRICAN AMERICAN: >60
GFR NON-AFRICAN AMERICAN: >60 ML/MIN/1.73
GLUCOSE BLD-MCNC: 89 MG/DL (ref 74–99)
HCT VFR BLD CALC: 33.2 % (ref 34–48)
HEMOGLOBIN: 11.2 G/DL (ref 11.5–15.5)
IMMATURE GRANULOCYTES #: 0.09 E9/L
IMMATURE GRANULOCYTES %: 0.7 % (ref 0–5)
LIPASE: 13 U/L (ref 13–60)
LYMPHOCYTES ABSOLUTE: 0.71 E9/L (ref 1.5–4)
LYMPHOCYTES RELATIVE PERCENT: 5.7 % (ref 20–42)
MCH RBC QN AUTO: 32.2 PG (ref 26–35)
MCHC RBC AUTO-ENTMCNC: 33.7 % (ref 32–34.5)
MCV RBC AUTO: 95.4 FL (ref 80–99.9)
MONOCYTES ABSOLUTE: 0.66 E9/L (ref 0.1–0.95)
MONOCYTES RELATIVE PERCENT: 5.3 % (ref 2–12)
NEUTROPHILS ABSOLUTE: 10.85 E9/L (ref 1.8–7.3)
NEUTROPHILS RELATIVE PERCENT: 87.1 % (ref 43–80)
PDW BLD-RTO: 13.4 FL (ref 11.5–15)
PLATELET # BLD: 240 E9/L (ref 130–450)
PMV BLD AUTO: 9.7 FL (ref 7–12)
POTASSIUM REFLEX MAGNESIUM: 3.6 MMOL/L (ref 3.5–5)
RBC # BLD: 3.48 E12/L (ref 3.5–5.5)
SEDIMENTATION RATE, ERYTHROCYTE: 120 MM/HR (ref 0–20)
SODIUM BLD-SCNC: 137 MMOL/L (ref 132–146)
TOTAL PROTEIN: 6.6 G/DL (ref 6.4–8.3)
WBC # BLD: 12.5 E9/L (ref 4.5–11.5)

## 2022-10-07 PROCEDURE — 36415 COLL VENOUS BLD VENIPUNCTURE: CPT

## 2022-10-07 PROCEDURE — 96361 HYDRATE IV INFUSION ADD-ON: CPT

## 2022-10-07 PROCEDURE — 76705 ECHO EXAM OF ABDOMEN: CPT

## 2022-10-07 PROCEDURE — 85651 RBC SED RATE NONAUTOMATED: CPT

## 2022-10-07 PROCEDURE — 2580000003 HC RX 258: Performed by: INTERNAL MEDICINE

## 2022-10-07 PROCEDURE — 80076 HEPATIC FUNCTION PANEL: CPT

## 2022-10-07 PROCEDURE — 1200000000 HC SEMI PRIVATE

## 2022-10-07 PROCEDURE — G0378 HOSPITAL OBSERVATION PER HR: HCPCS

## 2022-10-07 PROCEDURE — 6360000004 HC RX CONTRAST MEDICATION: Performed by: RADIOLOGY

## 2022-10-07 PROCEDURE — 85025 COMPLETE CBC W/AUTO DIFF WBC: CPT

## 2022-10-07 PROCEDURE — 6360000002 HC RX W HCPCS: Performed by: INTERNAL MEDICINE

## 2022-10-07 PROCEDURE — 96376 TX/PRO/DX INJ SAME DRUG ADON: CPT

## 2022-10-07 PROCEDURE — 74018 RADEX ABDOMEN 1 VIEW: CPT

## 2022-10-07 PROCEDURE — A4216 STERILE WATER/SALINE, 10 ML: HCPCS | Performed by: INTERNAL MEDICINE

## 2022-10-07 PROCEDURE — C9113 INJ PANTOPRAZOLE SODIUM, VIA: HCPCS | Performed by: INTERNAL MEDICINE

## 2022-10-07 PROCEDURE — 2580000003 HC RX 258: Performed by: RADIOLOGY

## 2022-10-07 PROCEDURE — 2580000003 HC RX 258: Performed by: FAMILY MEDICINE

## 2022-10-07 PROCEDURE — 83690 ASSAY OF LIPASE: CPT

## 2022-10-07 PROCEDURE — 99233 SBSQ HOSP IP/OBS HIGH 50: CPT | Performed by: FAMILY MEDICINE

## 2022-10-07 PROCEDURE — 80048 BASIC METABOLIC PNL TOTAL CA: CPT

## 2022-10-07 PROCEDURE — 74178 CT ABD&PLV WO CNTR FLWD CNTR: CPT

## 2022-10-07 RX ORDER — SODIUM CHLORIDE 0.9 % (FLUSH) 0.9 %
10 SYRINGE (ML) INJECTION PRN
Status: COMPLETED | OUTPATIENT
Start: 2022-10-07 | End: 2022-10-07

## 2022-10-07 RX ORDER — SODIUM CHLORIDE, SODIUM LACTATE, POTASSIUM CHLORIDE, CALCIUM CHLORIDE 600; 310; 30; 20 MG/100ML; MG/100ML; MG/100ML; MG/100ML
INJECTION, SOLUTION INTRAVENOUS CONTINUOUS
Status: DISCONTINUED | OUTPATIENT
Start: 2022-10-07 | End: 2022-10-11 | Stop reason: HOSPADM

## 2022-10-07 RX ADMIN — HYDROMORPHONE HYDROCHLORIDE 1 MG: 1 INJECTION, SOLUTION INTRAMUSCULAR; INTRAVENOUS; SUBCUTANEOUS at 11:18

## 2022-10-07 RX ADMIN — HYDROMORPHONE HYDROCHLORIDE 1 MG: 1 INJECTION, SOLUTION INTRAMUSCULAR; INTRAVENOUS; SUBCUTANEOUS at 01:05

## 2022-10-07 RX ADMIN — HYDROMORPHONE HYDROCHLORIDE 1 MG: 1 INJECTION, SOLUTION INTRAMUSCULAR; INTRAVENOUS; SUBCUTANEOUS at 19:39

## 2022-10-07 RX ADMIN — SODIUM CHLORIDE, POTASSIUM CHLORIDE, SODIUM LACTATE AND CALCIUM CHLORIDE: 600; 310; 30; 20 INJECTION, SOLUTION INTRAVENOUS at 12:18

## 2022-10-07 RX ADMIN — SODIUM CHLORIDE, PRESERVATIVE FREE 40 MG: 5 INJECTION INTRAVENOUS at 18:28

## 2022-10-07 RX ADMIN — HYDROMORPHONE HYDROCHLORIDE 1 MG: 1 INJECTION, SOLUTION INTRAMUSCULAR; INTRAVENOUS; SUBCUTANEOUS at 15:09

## 2022-10-07 RX ADMIN — Medication 10 ML: at 09:17

## 2022-10-07 RX ADMIN — SODIUM CHLORIDE, PRESERVATIVE FREE 10 ML: 5 INJECTION INTRAVENOUS at 15:11

## 2022-10-07 RX ADMIN — SODIUM CHLORIDE, PRESERVATIVE FREE 10 ML: 5 INJECTION INTRAVENOUS at 11:20

## 2022-10-07 RX ADMIN — SODIUM CHLORIDE, PRESERVATIVE FREE 10 ML: 5 INJECTION INTRAVENOUS at 10:08

## 2022-10-07 RX ADMIN — IOPAMIDOL 50 ML: 755 INJECTION, SOLUTION INTRAVENOUS at 10:12

## 2022-10-07 RX ADMIN — SODIUM CHLORIDE, PRESERVATIVE FREE 40 MG: 5 INJECTION INTRAVENOUS at 05:07

## 2022-10-07 RX ADMIN — HYDROMORPHONE HYDROCHLORIDE 1 MG: 1 INJECTION, SOLUTION INTRAMUSCULAR; INTRAVENOUS; SUBCUTANEOUS at 23:46

## 2022-10-07 RX ADMIN — SODIUM CHLORIDE, PRESERVATIVE FREE 10 ML: 5 INJECTION INTRAVENOUS at 15:15

## 2022-10-07 RX ADMIN — Medication 10 ML: at 01:05

## 2022-10-07 RX ADMIN — HYDROMORPHONE HYDROCHLORIDE 1 MG: 1 INJECTION, SOLUTION INTRAMUSCULAR; INTRAVENOUS; SUBCUTANEOUS at 05:07

## 2022-10-07 ASSESSMENT — PAIN SCALES - GENERAL
PAINLEVEL_OUTOF10: 8
PAINLEVEL_OUTOF10: 7
PAINLEVEL_OUTOF10: 9
PAINLEVEL_OUTOF10: 8
PAINLEVEL_OUTOF10: 7

## 2022-10-07 ASSESSMENT — PAIN DESCRIPTION - ORIENTATION
ORIENTATION: LEFT;RIGHT;LOWER;UPPER;MID
ORIENTATION: RIGHT

## 2022-10-07 ASSESSMENT — PAIN DESCRIPTION - DESCRIPTORS
DESCRIPTORS: CRAMPING
DESCRIPTORS: ACHING;TENDER
DESCRIPTORS: SHARP;SHOOTING;SORE
DESCRIPTORS: ACHING;SORE;TENDER
DESCRIPTORS: ACHING;DISCOMFORT

## 2022-10-07 ASSESSMENT — PAIN DESCRIPTION - LOCATION
LOCATION: ABDOMEN

## 2022-10-07 ASSESSMENT — PAIN - FUNCTIONAL ASSESSMENT
PAIN_FUNCTIONAL_ASSESSMENT: ACTIVITIES ARE NOT PREVENTED
PAIN_FUNCTIONAL_ASSESSMENT: PREVENTS OR INTERFERES SOME ACTIVE ACTIVITIES AND ADLS

## 2022-10-07 NOTE — PROGRESS NOTES
PROGRESS NOTE        Patient Presents with/Seen in Consultation For      *Reason for Consult: acid reflux  CHIEF COMPLAINT:  abdominal pain    Subjective:     Patient seen Oleg Lovell in bed states having continued abdominal pain <right lower. Had a bout of emesis post prandial yesterday evening. No BM. BS hypoactive. POC d/w pt and primary service. Review of Systems  Aside from what was mentioned in the PMH and HPI, essentially unremarkable, all others negative. Objective:     /69   Pulse 95   Temp 98.7 °F (37.1 °C) (Oral)   Resp 16   Ht 5' 1\" (1.549 m)   Wt 169 lb 12.1 oz (77 kg)   SpO2 95%   BMI 32.07 kg/m²     General appearance: alert, awake, laying in bed, and cooperative  Eyes: conjunctiva pale, sclera anicteric. PERRL.   Lungs: clear to auscultation bilaterally  Heart: regular rate and rhythm, no murmur, 2+ pulses; no edema  Abdomen: softly-distended, tender to palpation without guarding or rebound; bowel sounds hypoactive; no masses,  no organomegaly  Extremities: extremities without edema  Pulses: 2+ and symmetric  Skin: Skin color, texture, turgor normal.   Neurologic: Grossly normal    pantoprazole (PROTONIX) 40 mg in sodium chloride (PF) 10 mL injection, Q12H  sodium chloride flush 0.9 % injection 5-40 mL, 2 times per day  sodium chloride flush 0.9 % injection 5-40 mL, PRN  0.9 % sodium chloride infusion, PRN  ondansetron (ZOFRAN-ODT) disintegrating tablet 4 mg, Q8H PRN   Or  ondansetron (ZOFRAN) injection 4 mg, Q6H PRN  polyethylene glycol (GLYCOLAX) packet 17 g, Daily PRN  acetaminophen (TYLENOL) tablet 650 mg, Q6H PRN   Or  acetaminophen (TYLENOL) suppository 650 mg, Q6H PRN  melatonin tablet 3 mg, Nightly PRN  HYDROmorphone (DILAUDID) injection 1 mg, Q4H PRN       Data Review  CBC:   Lab Results   Component Value Date/Time    WBC 15.5 10/06/2022 02:00 PM    RBC 3.36 10/06/2022 02:00 PM    HGB 11.1 10/06/2022 02:00 PM    HCT 32.1 10/06/2022 02:00 PM    MCV 95.5 10/06/2022 02:00 PM MCH 33.0 10/06/2022 02:00 PM    MCHC 34.6 10/06/2022 02:00 PM    RDW 13.2 10/06/2022 02:00 PM     10/06/2022 02:00 PM    MPV 9.7 10/06/2022 02:00 PM     CMP:    Lab Results   Component Value Date/Time     10/06/2022 02:00 PM    K 3.3 10/06/2022 02:00 PM    CL 98 10/06/2022 02:00 PM    CO2 28 10/06/2022 02:00 PM    BUN 10 10/06/2022 02:00 PM    CREATININE 0.6 10/06/2022 02:00 PM    GFRAA >60 10/06/2022 02:00 PM    LABGLOM >60 10/06/2022 02:00 PM    GLUCOSE 90 10/06/2022 02:00 PM    GLUCOSE 96 02/16/2012 11:25 PM    PROT 6.7 10/06/2022 02:00 PM    LABALBU 2.9 10/06/2022 02:00 PM    LABALBU 4.0 02/16/2012 11:25 PM    CALCIUM 9.4 10/06/2022 02:00 PM    BILITOT 0.4 10/06/2022 02:00 PM    ALKPHOS 111 10/06/2022 02:00 PM    AST 16 10/06/2022 02:00 PM    ALT 8 10/06/2022 02:00 PM     Hepatic Function Panel:    Lab Results   Component Value Date/Time    ALKPHOS 111 10/06/2022 02:00 PM    ALT 8 10/06/2022 02:00 PM    AST 16 10/06/2022 02:00 PM    PROT 6.7 10/06/2022 02:00 PM    BILITOT 0.4 10/06/2022 02:00 PM    BILIDIR <0.2 06/03/2021 06:11 AM    IBILI see below 06/03/2021 06:11 AM    LABALBU 2.9 10/06/2022 02:00 PM    LABALBU 4.0 02/16/2012 11:25 PM       PT/INR:    Lab Results   Component Value Date/Time    PROTIME 20.3 10/05/2022 08:16 AM    INR 1.8 10/05/2022 08:16 AM     IRON:    Lab Results   Component Value Date/Time    IRON 70 07/09/2020 05:05 AM     Iron Saturation:  No components found for: PERCENTFE  FERRITIN:    Lab Results   Component Value Date/Time    FERRITIN 8 12/31/2021 11:32 PM         Assessment:     Active Problems:  Abdominal pain.  Mid to right lower  Nausea with vomiting  HX of acid reflux  Hypokalemia- defer  EGD 10/5/22 with Dr. Rupali Manzano- normal  Constipation   Leukocytosis  Elevated CRP 38.3  Elevated sed rate 124    Plan:   ABD series still pending, radiology results called- no read available at this time  STAT CT abdomen ordered  Protonix  40 mg BID  Medical management per PCP  NPO Surgery consulted per primary   POC d/w primary service   Supportive care  Continue to monitor       Note: This report was completed utilizing computer voice recognition software. Every effort has been made to ensure accuracy, however; inadvertent computerized transcription errors may be present. Discussed with Dr. Jonny Araujo per Dr. Coleman FRANCISCO-NP-C 10/7/2022  9:22 AM For Dr. Osiel Connor. I HAD A FACE TO FACE ENCOUNTER WITH THE PATIENT. AGREE WITH THE EXAM, ASSESSMENT, AND PLAN AS OUTLINED ABOVE. ADDITION AND CORRECTIONS WERE DONE AS DEEMED APPROPRIATE. MY EXAM AND PLAN INCLUDE: MARKETED ABDOMINAL TENDERNESS. ALTHOUGH ABDOMEN SOFT. CT RESULTS SEEN. WILL PLACE AN NG TUBE WITH POSSIBLE SBFT STUDIES. AWAITING SURGICAL INPUT. LIKELY ILEUS.

## 2022-10-07 NOTE — CONSULTS
GENERAL SURGERY  CONSULT NOTE  10/7/2022    Physician Consulted: Dr. Oscar Grigsby  Reason for Consult: Abdominal pain with N/V  Referring Physician: Dr. Gloria Laguna is a 30 y.o. female with history of GERD and uterine fibroids who initially presented on 10/4 secondary to worsening epigastric abdominal pain. Patient states that this was a sharp/stabbing pain. She states since then she has had intermittent abdominal pains that come in waves. She states she initially had multiple episodes of nausea with vomiting on 10/4. She says she had approximately 6-7 episodes of vomiting just gastric contents and food that she had eaten. She denies any hematemesis. She states her last bowel movement was 3 to 4 days ago and semisolid in consistency. She states she is only had 1 additional episode of vomiting since being admitted to the hospital she states this episode of vomiting associated with eating greasy foods. Initial work-up in the ED, lab work was largely unremarkable with exception of leukocytosis of 16.6. Normal lactic acid, normal LFTs initially. Patient underwent CT abdomen pelvis in the ED which was negative for acute abnormality. Patient also underwent pelvic ultrasound which was negative for acute abnormality but did demonstrate an enlarged uterus consistent with prior history of leiomyoma. Patient is evaluated by GI who started the patient on PPI twice daily and she underwent EGD 10/5 which was largely unremarkable. Patient underwent acute abdominal series which demonstrated distended air-fluid filled loops of bowel with air-fluid levels. Patient underwent CT of the abdomen pelvis with and without contrast which demonstrated nonspecific areas of edema and fat stranding, again demonstrated a heterogenous uterus consistent with ligamentous disease. General surgery was consulted secondary to persistent abdominal pain. She denies any prior abdominal surgeries.   Patient denies any prior bowel obstructions. Patient does have a history of prior intermittent abdominal pain associated with her uterine fibroids. She was evaluated by OB/GYN as an outpatient with plans for conservative management. Past Medical History:   Diagnosis Date    Anemia 2020    DVT (deep venous thrombosis) ([facility])     DVT of lower extremity (deep venous thrombosis) ([address]     left lower    History of DVT of lower extremity     left leg     Migraine        Past Surgical History:   Procedure Laterality Date    DILATION AND CURETTAGE      INDUCED   10/12/12    UPPER GASTROINTESTINAL ENDOSCOPY N/A 10/5/2022    EGD DIAGNOSTIC ONLY performed by Zina Saleh MD at [facility] ENDOSCOPY       Medications Prior to Admission:    Prior to Admission medications    Medication Sig Start Date End Date Taking? Authorizing Provider   ferrous sulfate (IRON 325) 325 (65 Fe) MG tablet Take 325 mg by mouth every morning   Yes Historical Provider, MD   ibuprofen (ADVIL;MOTRIN) 600 MG tablet Take 1 tablet by mouth every 6 hours as needed for Pain 9/30/22 10/5/22  Charlyne Setting, APRN - CNP   omeprazole (PRILOSEC) 40 MG delayed release capsule Take 40 mg by mouth every morning    Historical Provider, MD       Allergies   Allergen Reactions    Latex Rash       No family history on file. Social History     Tobacco Use    Smoking status: Never    Smokeless tobacco: Never   Vaping Use    Vaping Use: Never used   Substance Use Topics    Alcohol use: No    Drug use: No         Review of Systems negative unless otherwise noted in HPI      PHYSICAL EXAM:    Vitals:    10/07/22 1148   BP:    Pulse:    Resp: 18   Temp:    SpO2:        General Appearance:  awake, alert, oriented, in no acute distress  Skin:  Skin color, texture, turgor normal. No rashes or lesions. Head/face:  NCAT  Eyes:  No gross abnormalities. , PERRL, EOMI, and Sclera nonicteric  Lungs:  Normal expansion. Clear to auscultation. No rales, rhonchi, or wheezing.   Heart: Heart regular rate and rhythm  Abdomen: Soft, mildly distended, mild diffuse abdominal tenderness to palpation with moderate right upper quadrant tenderness noted without rebound/guarding/rigidity. .  Extremities: Extremities warm to touch, pink, with no edema. LABS:    CBC  Recent Labs     10/07/22  0928   WBC 12.5*   HGB 11.2*   HCT 33.2*        BMP  Recent Labs     10/07/22  0928      K 3.6      CO2 27   BUN 9   CREATININE 0.6   CALCIUM 9.2     Liver Function  Recent Labs     10/06/22  1400   LIPASE 14   BILITOT 0.4   AST 16   ALT 8   ALKPHOS 111*   PROT 6.7   LABALBU 2.9*     No results for input(s): LACTATE in the last 72 hours. Recent Labs     10/05/22  0816   INR 1.8       RADIOLOGY    US PELVIS COMPLETE    Result Date: 10/4/2022  EXAMINATION: PELVIC ULTRASOUND; DOPPLER EVALUATION OF THE PELVIS 10/4/2022 TECHNIQUE: Transabdominal sonographic evaluation of the pelvis was performed. Endovaginal sonography could not be performed due to patient immobility. COMPARISON: None HISTORY: ORDERING SYSTEM PROVIDED HISTORY: pain; r/o torsion TECHNOLOGIST PROVIDED HISTORY: Reason for exam:->pain; r/o torsion What reading provider will be dictating this exam?->CRC FINDINGS: The uterus measures 12.4 x 8.3 x 9.6 cm in longitudinal, AP, and transverse dimensions. The uterus demonstrates heterogeneous echogenicity. Endometrial stripe thickness measures 7 mm. There is a small amount of fluid in the endometrial canal.  The right and left ovaries measure 4.2 x 1.8 x 2.8 cm and 3.5 x 1.6 x 2.6 cm, respectively. No solid or cystic adnexal mass is identified. Color Doppler and spectral waveform analysis reveal bilateral ovarian perfusion. No free fluid is noted in the pelvis. Enlarged uterus demonstrating heterogeneous echogenicity, potentially related to leiomyomatous change. Normal endometrial stripe thickness and small amount of fluid in the endometrial canal. No visible adnexal abnormality.  No free fluid. CT ABDOMEN PELVIS W IV CONTRAST Additional Contrast? None    Result Date: 10/4/2022  EXAMINATION: CT OF THE ABDOMEN AND PELVIS WITH CONTRAST 10/4/2022 3:51 am TECHNIQUE: CT of the abdomen and pelvis was performed with the administration of intravenous contrast. Multiplanar reformatted images are provided for review. Automated exposure control, iterative reconstruction, and/or weight based adjustment of the mA/kV was utilized to reduce the radiation dose to as low as reasonably achievable. COMPARISON: 09/30/2022 HISTORY: ORDERING SYSTEM PROVIDED HISTORY: diffuse abdominal pain TECHNOLOGIST PROVIDED HISTORY: Additional Contrast?->None Reason for exam:->diffuse abdominal pain Decision Support Exception - unselect if not a suspected or confirmed emergency medical condition->Emergency Medical Condition (MA) FINDINGS: Lower Chest:  Visualized portion of the lower chest demonstrates no acute abnormality. Organs: The liver, gallbladder, spleen, pancreas, adrenals, and kidneys are unremarkable. GI/Bowel: There is no evidence of bowel obstruction. No evidence of abnormal bowel wall thickening or distension. The appendix is normal. Pelvis: The urinary bladder is partially filled. The uterus is mildly enlarged. There is a small amount of fluid within the endometrial cavity. Peritoneum/Retroperitoneum: Trace free fluid layering within the pelvis. No extraluminal gas. No evidence of lymphadenopathy. Aorta is normal in caliber. Bones/Soft Tissues:  No acute abnormality of the visualized osseous structures. No acute abdominopelvic abnormality. Small amount of fluid within the endometrial cavity, nonspecific. US DUP ABD PEL RETRO SCROT COMPLETE    Result Date: 10/4/2022  EXAMINATION: PELVIC ULTRASOUND; DOPPLER EVALUATION OF THE PELVIS 10/4/2022 TECHNIQUE: Transabdominal sonographic evaluation of the pelvis was performed. Endovaginal sonography could not be performed due to patient immobility. COMPARISON: None HISTORY: ORDERING SYSTEM PROVIDED HISTORY: pain; r/o torsion TECHNOLOGIST PROVIDED HISTORY: Reason for exam:->pain; r/o torsion What reading provider will be dictating this exam?->CRC FINDINGS: The uterus measures 12.4 x 8.3 x 9.6 cm in longitudinal, AP, and transverse dimensions. The uterus demonstrates heterogeneous echogenicity. Endometrial stripe thickness measures 7 mm. There is a small amount of fluid in the endometrial canal.  The right and left ovaries measure 4.2 x 1.8 x 2.8 cm and 3.5 x 1.6 x 2.6 cm, respectively. No solid or cystic adnexal mass is identified. Color Doppler and spectral waveform analysis reveal bilateral ovarian perfusion. No free fluid is noted in the pelvis. Enlarged uterus demonstrating heterogeneous echogenicity, potentially related to leiomyomatous change. Normal endometrial stripe thickness and small amount of fluid in the endometrial canal. No visible adnexal abnormality. No free fluid. ASSESSMENT:  30 y.o. female with history of GERD currently admitted with abdominal pain with nausea/vomiting.     PLAN:  -Patient did have significant right upper quadrant pain on exam.  Order right upper quadrant ultrasound and follow for results  -Continue to monitor abdominal exam  -As needed pain control nausea control per primary  -Continue to monitor LFTs which are currently normal.  -Okay for clear liquid diet as tolerated as patient did not have significant amount of gastric/small bowel distention on CT abdomen pelvis.  -Discussed with Dr. Miriam Silva    Electronically signed by Pedro Cadena DO on 10/7/22 at 1:53 PM EDT

## 2022-10-07 NOTE — PATIENT CARE CONFERENCE
[facility] Quality Flow/Interdisciplinary Rounds Progress Note        Quality Flow Rounds held on October 7, 2022    Disciplines Attending:  Bedside Nurse, , , and Nursing Unit [address] [facility] [place] was admitted on 10/4/2022  1:43 AM    Anticipated Discharge Date:  Expected Discharge Date: 10/08/22    Disposition:    Gregor Score:  Gregor Scale Score: 19    Readmission Risk              Risk of Unplanned Readmission:  0           Discussed patient goal for the day, patient clinical progression, and barriers to discharge.   The following Goal(s) of the Day/Commitment(s) have been identified:  Diagnostics - Report Results      Kelli Raya RN  October 7, 2022

## 2022-10-07 NOTE — PLAN OF CARE
Problem: Pain  Goal: Verbalizes/displays adequate comfort level or baseline comfort level  10/7/2022 0927 by Yudelka Cesar RN  Outcome: Progressing  10/6/2022 2343 by Brian Gamez RN  Outcome: Progressing     Problem: ABCDS Injury Assessment  Goal: Absence of physical injury  10/7/2022 0927 by Yudelka Cesar RN  Outcome: Progressing  10/6/2022 2343 by Brian Gamez RN  Outcome: Progressing

## 2022-10-07 NOTE — PLAN OF CARE
Problem: Pain  Goal: Verbalizes/displays adequate comfort level or baseline comfort level  10/6/2022 2343 by Jacklyn Avitia RN  Outcome: Progressing  10/6/2022 1624 by Murphy Santos RN  Outcome: Progressing     Problem: ABCDS Injury Assessment  Goal: Absence of physical injury  10/6/2022 2343 by Jacklyn Avitia RN  Outcome: Progressing  10/6/2022 1624 by Murphy Santos RN  Outcome: Progressing

## 2022-10-07 NOTE — PROGRESS NOTES
[facility] Progress Note    Admitting Date and Time: 10/4/2022  1:43 AM  Admit Dx: Gastroesophageal reflux disease without esophagitis [K21.9]  Intractable abdominal pain [R10.9]    Subjective:  Patient is being followed for Gastroesophageal reflux disease without esophagitis [K21.9]  Intractable abdominal pain [R10.9]   Pt off floor; down in xray. Concern for SBO. Back from Xray; given a tray but took away and did not eat anything. Per RN: Consult with General Surgery placed. CTAP demonstrating an early complete SBO.  NPO. WBC 12.5K today. Sed rate high at 100. Await recommendations from General Surgery. ROS: denies fever, chills, cp, sob, n/v, HA unless stated above.       pantoprazole (PROTONIX) 40 mg injection  40 mg IntraVENous Q12H    sodium chloride flush  5-40 mL IntraVENous 2 times per day     sodium chloride flush, 5-40 mL, PRN  sodium chloride, , PRN  ondansetron, 4 mg, Q8H PRN   Or  ondansetron, 4 mg, Q6H PRN  polyethylene glycol, 17 g, Daily PRN  acetaminophen, 650 mg, Q6H PRN   Or  acetaminophen, 650 mg, Q6H PRN  melatonin, 3 mg, Nightly PRN  HYDROmorphone, 1 mg, Q4H PRN    Objective:    /69   Pulse 95   Temp 98.7 °F (37.1 °C) (Oral)   Resp 16   Ht 5' 1\" (1.549 m)   Wt 169 lb 12.1 oz (77 kg)   SpO2 95%   BMI 32.07 kg/m²     General Appearance: alert and oriented to person, place and time and in no acute distress  Skin: warm and dry  Head: normocephalic and atraumatic  Eyes: pupils equal, round, and reactive to light, extraocular eye movements intact, conjunctivae normal  Neck: neck supple and non tender without mass   Pulmonary/Chest: clear to auscultation bilaterally- no wheezes, rales or rhonchi, normal air movement, no respiratory distress  Cardiovascular: normal rate, normal S1 and S2 and no carotid bruits  Abdomen: soft, tender to light touch, distended, no bowel sounds, no masses or organomegaly  Extremities: no cyanosis, no clubbing and no edema  Neurologic: no cranial nerve deficit and speech normal    Recent Labs     10/05/22  0816 10/06/22  1400 10/07/22  0928    135 137   K 3.3* 3.3* 3.6   CL 97* 98 100   CO2 27 28 27   BUN 11 10 9   CREATININE 0.8 0.6 0.6   GLUCOSE 93 90 89   CALCIUM 9.2 9.4 9.2       Recent Labs     10/05/22  0816 10/06/22  1400 10/07/22  0928   WBC 16.6* 15.5* 12.5*   RBC 3.74 3.36* 3.48*   HGB 12.4 11.1* 11.2*   HCT 35.7 32.1* 33.2*   MCV 95.5 95.5 95.4   MCH 33.2 33.0 32.2   MCHC 34.7* 34.6* 33.7   RDW 13.2 13.2 13.4    233 240   MPV 10.0 9.7 9.7     Radiology: CT ABDOMEN PELVIS W WO CONTRAST Additional Contrast? None    Result Date: 10/7/2022  EXAMINATION: CT OF THE ABDOMEN AND PELVIS WITH AND WITHOUT CONTRAST 10/7/2022 10:08 am TECHNIQUE: CT of the abdomen and pelvis was performed with and without the administration of intravenous contrast.  Multiplanar reformatted images are provided for review. Automated exposure control, iterative reconstruction, and/or weight based adjustment of the mA/kV was utilized to reduce the radiation dose to as low as reasonably achievable. COMPARISON: The previous CT scan of the abdomen and pelvis performed 10/04/2022. In near correlation is made with abdominal radiographs performed the same days today and 10/06/2022. HISTORY: ORDERING SYSTEM PROVIDED HISTORY: abdomial pain, distention, constipation, nausea and vomiting- rule out bowel obstruction TECHNOLOGIST PROVIDED HISTORY: Reason for exam:->abdomial pain, distention, constipation, nausea and vomiting- rule out bowel obstruction Additional Contrast?->None FINDINGS: Lower Chest: Thick, linear opacities are identified in the lower lobes and to a lesser degree the right middle lobe and lingula. This most likely represents atelectasis. A small left pleural effusion is noted. There has been a minimal increase in the small pericardial effusion. Organs:  The liver, spleen, biliary tree, pancreas, adrenal glands, and kidneys are unremarkable. Intraluminal gallbladder sludge is noted. GI/Bowel: The stomach is suboptimally distended, but grossly unremarkable. There are dilated loops of small bowel seen in the left mid to upper abdomen containing air in fluid. There is suggestion of a transition point in the central posterior small bowel mesentery (image 90 on series 4). The remainder of the small bowel is collapsed. There is a considerable amount of air noted within the colon. The findings indicate either a partial or early complete small bowel obstruction. The appendix is not definitely visualized. Pelvis: The urinary bladder is suboptimally distended, but grossly unremarkable. A bulky, enlarged, heterogeneous uterus is again noted. There is again distortion of the endometrium and there is again noted to be a moderate amount of fluid within the endometrial cavity. Soft tissue density again cannot be excluded within the endometrial cavity. The findings again indicate a leiomyomatous or adenomyomatous uterus. It should be noted that the overall attenuation of the uterus is lower on the postcontrast images than seen on the prior study and may be secondary to some differences in the bolus contrast timing. The presence of red/carneous degeneration of a leiomyoma cannot be excluded. There has been an interval increase in fluid density noted in the region of the endocervix, along with a small amount of air. This could be secondary to prior examination/instrumentation. The presence of blood cannot be excluded. Further evaluation needs to be based on clinical grounds. Pelvic MRI is recommended for further evaluation. Both ovaries are normal in appearance. Peritoneum/Retroperitoneum: No retroperitoneal or pelvic lymphadenopathy is identified. A small amount of free fluid is identified in the abdomen and pelvis. There is a small amount of what appears to be loculated fluid in the left parametrial region.   There is generalized induration/edema of the fat planes of the abdomen and pelvis. The amount of fluid and edema present is slightly out of proportion to the degree of small-bowel obstruction present. Peritonitis cannot be excluded, although there is no evidence of perforation/free air no abdominal or pelvic soft tissue mass is noted. The abdominal aorta is normal in caliber. A retroaortic left renal vein is again identified. Bones/Soft Tissues: A small umbilical hernia is again noted, which contains fat. The visualized osseous structures are without significant interval change. 1.  Findings indicating either a partial or early complete small bowel obstruction, as described above. 2.  Small amount of free fluid in the abdomen and pelvis and a small amount of what appears to be loculated fluid in the left parametrial region. Generalized induration/edema of the fat planes of the abdomen and pelvis. The amount of fluid and edema present is slightly out of proportion to the degree of small-bowel obstruction present. Peritonitis cannot be excluded, although there is no evidence of perforation/free air. 3.  Leiomyomatous/adenomyomatous uterus again noted, when compared to the previous study performed 10/04/2022. Overall decreased attenuation of the uterus on the postcontrast images than seen on the prior study and could be secondary to some differences in bolus contrast timing. Red/carneous degeneration of a leiomyoma cannot be excluded. Pelvic MRI is recommended. 4. Interval increase in fluid density in the region of the endocervix, along with a small amount of air. This could be secondary to prior examination/instrumentation. Blood cannot be excluded. Further evaluation needs to be based on clinical grounds. Pelvic MRI is again recommended. 5.  Gallbladder sludge. 6.  Small pleural effusion. 7.  Minimal interval increase in a small pericardial effusion. 8.  Probable bilateral lung atelectasis.  The findings were discussed with Dr. Steffanie Hancock of surgery at 12:10 p.m. XR ABDOMEN (KUB) (SINGLE AP VIEW)    Result Date: 10/7/2022  EXAMINATION: ONE SUPINE XRAY VIEW(S) OF THE ABDOMEN 10/7/2022 9:55 am COMPARISON: 10/06/2022. HISTORY: ORDERING SYSTEM PROVIDED HISTORY: Ileus vs SBO TECHNOLOGIST PROVIDED HISTORY: Reason for exam:->Ileus vs SBO FINDINGS: There is redemonstration of gaseous small and large intestinal distension consistent with ileus versus obstruction. No obvious soft tissue mass or suspicious calcification is seen. No bony abnormality is visualized. Redemonstration of gaseous small and large intestinal distension consistent with ileus versus obstruction. XR ACUTE ABD SERIES CHEST 1 VW    Result Date: 10/7/2022  EXAMINATION: TWO XRAY VIEWS OF THE ABDOMEN AND SINGLE  XRAY VIEW OF THE CHEST 10/6/2022 12:47 pm COMPARISON: Correlation is made with CT scan of the abdomen and pelvis performed 10/04/2022. HISTORY: ORDERING SYSTEM PROVIDED HISTORY: acute abdominal pain and vomiting TECHNOLOGIST PROVIDED HISTORY: Reason for exam:->acute abdominal pain and vomiting FINDINGS: There are distended air and fluid-filled loops of small bowel seen centered within the abdomen. There is air identified within the colon. There are air-fluid levels identified within loops of small bowel. Rule out partial or early complete small bowel obstruction. CT scan of the abdomen and pelvis is recommended for further evaluation. No free air is identified. No abnormal soft tissue or calcific density is appreciated. The lungs are hypoinflated and linear, streaky densities are seen at the lung bases, indicating atelectasis. Distended air and fluid-filled loops of small bowel with air-fluid levels. Rule out partial or early complete small bowel obstruction. CT scan of the abdomen and pelvis is recommended. Bibasilar lung atelectasis.  RECOMMENDATION: The findings were sent to the Radiology Results [address] at 9:53 a.m. on 10/07/2022 to be communicated to a licensed caregiver. Assessment:    Principal Problem:    Intractable abdominal pain  Active Problems:    Gastroesophageal reflux disease without esophagitis  Resolved Problems:    * No resolved hospital problems. *    Plan:  1. SBO - consult to General Surgery. NPO. LR at 125 ml/hr started. CTAP showing an early SBO. Await general surgery recommendations. Overview for patient of the conditioni. 2.  GERD - continue PPI. Total care time:  20 minutes. NOTE: This report was transcribed using voice recognition software. Every effort was made to ensure accuracy; however, inadvertent computerized transcription errors may be present.     Electronically signed by Lavon Callas, MD on 10/7/2022 at 10:17 AM

## 2022-10-07 NOTE — CARE COORDINATION
Introduced my self and provided explanation of CM role to patient. Patient is awake, alert, and aware of current diagnosis and treatment plan including gen surgical consult, abdominal imaging. She voices she resides at home with her children and her sister and completes her adl's with independence. Patient is established with a pcp and denies any issue with retail pharmaceutical coverage. She plans on a return to home. She denies post discharge needs at this time. Will follow along with  and assist with discharge planning as necessary. Karen Martin.  Yoana, MSN RN  [facility] Case Management  [phone]

## 2022-10-08 LAB
ALBUMIN SERPL-MCNC: 2.9 G/DL (ref 3.5–5.2)
ALP BLD-CCNC: 90 U/L (ref 35–104)
ALT SERPL-CCNC: 7 U/L (ref 0–32)
ANION GAP SERPL CALCULATED.3IONS-SCNC: 12 MMOL/L (ref 7–16)
AST SERPL-CCNC: 12 U/L (ref 0–31)
BASOPHILS ABSOLUTE: 0.04 E9/L (ref 0–0.2)
BASOPHILS RELATIVE PERCENT: 0.4 % (ref 0–2)
BILIRUB SERPL-MCNC: 0.3 MG/DL (ref 0–1.2)
BUN BLDV-MCNC: 6 MG/DL (ref 6–20)
C-REACTIVE PROTEIN: 20.4 MG/DL (ref 0–0.4)
CALCIUM SERPL-MCNC: 9.1 MG/DL (ref 8.6–10.2)
CHLORIDE BLD-SCNC: 99 MMOL/L (ref 98–107)
CO2: 26 MMOL/L (ref 22–29)
CREAT SERPL-MCNC: 0.5 MG/DL (ref 0.5–1)
EOSINOPHILS ABSOLUTE: 0.1 E9/L (ref 0.05–0.5)
EOSINOPHILS RELATIVE PERCENT: 1 % (ref 0–6)
GFR AFRICAN AMERICAN: >60
GFR NON-AFRICAN AMERICAN: >60 ML/MIN/1.73
GLUCOSE BLD-MCNC: 93 MG/DL (ref 74–99)
HCT VFR BLD CALC: 32.5 % (ref 34–48)
HEMOGLOBIN: 11.1 G/DL (ref 11.5–15.5)
IMMATURE GRANULOCYTES #: 0.17 E9/L
IMMATURE GRANULOCYTES %: 1.6 % (ref 0–5)
LIPASE: 31 U/L (ref 13–60)
LYMPHOCYTES ABSOLUTE: 0.88 E9/L (ref 1.5–4)
LYMPHOCYTES RELATIVE PERCENT: 8.4 % (ref 20–42)
MAGNESIUM: 1.8 MG/DL (ref 1.6–2.6)
MCH RBC QN AUTO: 32.5 PG (ref 26–35)
MCHC RBC AUTO-ENTMCNC: 34.2 % (ref 32–34.5)
MCV RBC AUTO: 95 FL (ref 80–99.9)
MONOCYTES ABSOLUTE: 1.1 E9/L (ref 0.1–0.95)
MONOCYTES RELATIVE PERCENT: 10.5 % (ref 2–12)
NEUTROPHILS ABSOLUTE: 8.18 E9/L (ref 1.8–7.3)
NEUTROPHILS RELATIVE PERCENT: 78.1 % (ref 43–80)
PDW BLD-RTO: 13.4 FL (ref 11.5–15)
PLATELET # BLD: 247 E9/L (ref 130–450)
PMV BLD AUTO: 9.9 FL (ref 7–12)
POTASSIUM REFLEX MAGNESIUM: 3.4 MMOL/L (ref 3.5–5)
RBC # BLD: 3.42 E12/L (ref 3.5–5.5)
SEDIMENTATION RATE, ERYTHROCYTE: 114 MM/HR (ref 0–20)
SODIUM BLD-SCNC: 137 MMOL/L (ref 132–146)
TOTAL PROTEIN: 6.4 G/DL (ref 6.4–8.3)
WBC # BLD: 10.5 E9/L (ref 4.5–11.5)

## 2022-10-08 PROCEDURE — 6360000002 HC RX W HCPCS: Performed by: INTERNAL MEDICINE

## 2022-10-08 PROCEDURE — 2580000003 HC RX 258: Performed by: INTERNAL MEDICINE

## 2022-10-08 PROCEDURE — 85651 RBC SED RATE NONAUTOMATED: CPT

## 2022-10-08 PROCEDURE — 83735 ASSAY OF MAGNESIUM: CPT

## 2022-10-08 PROCEDURE — C9113 INJ PANTOPRAZOLE SODIUM, VIA: HCPCS | Performed by: INTERNAL MEDICINE

## 2022-10-08 PROCEDURE — 1200000000 HC SEMI PRIVATE

## 2022-10-08 PROCEDURE — 99233 SBSQ HOSP IP/OBS HIGH 50: CPT | Performed by: FAMILY MEDICINE

## 2022-10-08 PROCEDURE — 83690 ASSAY OF LIPASE: CPT

## 2022-10-08 PROCEDURE — A4216 STERILE WATER/SALINE, 10 ML: HCPCS | Performed by: INTERNAL MEDICINE

## 2022-10-08 PROCEDURE — 6370000000 HC RX 637 (ALT 250 FOR IP): Performed by: FAMILY MEDICINE

## 2022-10-08 PROCEDURE — 86140 C-REACTIVE PROTEIN: CPT

## 2022-10-08 PROCEDURE — 6370000000 HC RX 637 (ALT 250 FOR IP): Performed by: NURSE PRACTITIONER

## 2022-10-08 PROCEDURE — 80053 COMPREHEN METABOLIC PANEL: CPT

## 2022-10-08 PROCEDURE — 36415 COLL VENOUS BLD VENIPUNCTURE: CPT

## 2022-10-08 PROCEDURE — 6360000002 HC RX W HCPCS: Performed by: NURSE PRACTITIONER

## 2022-10-08 PROCEDURE — 85025 COMPLETE CBC W/AUTO DIFF WBC: CPT

## 2022-10-08 PROCEDURE — 6370000000 HC RX 637 (ALT 250 FOR IP): Performed by: INTERNAL MEDICINE

## 2022-10-08 PROCEDURE — 2580000003 HC RX 258: Performed by: FAMILY MEDICINE

## 2022-10-08 RX ORDER — POTASSIUM CHLORIDE 20 MEQ/1
40 TABLET, EXTENDED RELEASE ORAL ONCE
Status: COMPLETED | OUTPATIENT
Start: 2022-10-08 | End: 2022-10-08

## 2022-10-08 RX ORDER — METOCLOPRAMIDE HYDROCHLORIDE 5 MG/ML
5 INJECTION INTRAMUSCULAR; INTRAVENOUS EVERY 6 HOURS
Status: DISCONTINUED | OUTPATIENT
Start: 2022-10-08 | End: 2022-10-11

## 2022-10-08 RX ORDER — DOCUSATE SODIUM 100 MG/1
200 CAPSULE, LIQUID FILLED ORAL DAILY
Status: DISCONTINUED | OUTPATIENT
Start: 2022-10-08 | End: 2022-10-11 | Stop reason: HOSPADM

## 2022-10-08 RX ADMIN — SODIUM CHLORIDE, PRESERVATIVE FREE 10 ML: 5 INJECTION INTRAVENOUS at 09:42

## 2022-10-08 RX ADMIN — SODIUM CHLORIDE, PRESERVATIVE FREE 40 MG: 5 INJECTION INTRAVENOUS at 17:39

## 2022-10-08 RX ADMIN — DOCUSATE SODIUM 200 MG: 100 CAPSULE, LIQUID FILLED ORAL at 09:43

## 2022-10-08 RX ADMIN — HYDROMORPHONE HYDROCHLORIDE 1 MG: 1 INJECTION, SOLUTION INTRAMUSCULAR; INTRAVENOUS; SUBCUTANEOUS at 04:14

## 2022-10-08 RX ADMIN — METOCLOPRAMIDE 5 MG: 5 INJECTION, SOLUTION INTRAMUSCULAR; INTRAVENOUS at 15:29

## 2022-10-08 RX ADMIN — SODIUM CHLORIDE, POTASSIUM CHLORIDE, SODIUM LACTATE AND CALCIUM CHLORIDE: 600; 310; 30; 20 INJECTION, SOLUTION INTRAVENOUS at 20:43

## 2022-10-08 RX ADMIN — HYDROMORPHONE HYDROCHLORIDE 1 MG: 1 INJECTION, SOLUTION INTRAMUSCULAR; INTRAVENOUS; SUBCUTANEOUS at 21:54

## 2022-10-08 RX ADMIN — Medication 10 ML: at 21:57

## 2022-10-08 RX ADMIN — HYDROMORPHONE HYDROCHLORIDE 1 MG: 1 INJECTION, SOLUTION INTRAMUSCULAR; INTRAVENOUS; SUBCUTANEOUS at 09:42

## 2022-10-08 RX ADMIN — METOCLOPRAMIDE 5 MG: 5 INJECTION, SOLUTION INTRAMUSCULAR; INTRAVENOUS at 21:56

## 2022-10-08 RX ADMIN — POTASSIUM CHLORIDE 40 MEQ: 1500 TABLET, EXTENDED RELEASE ORAL at 14:04

## 2022-10-08 RX ADMIN — POLYETHYLENE GLYCOL 3350 17 G: 17 POWDER, FOR SOLUTION ORAL at 04:23

## 2022-10-08 RX ADMIN — SODIUM CHLORIDE, PRESERVATIVE FREE 40 MG: 5 INJECTION INTRAVENOUS at 05:57

## 2022-10-08 RX ADMIN — Medication 10 ML: at 09:43

## 2022-10-08 RX ADMIN — METOCLOPRAMIDE 5 MG: 5 INJECTION, SOLUTION INTRAMUSCULAR; INTRAVENOUS at 09:43

## 2022-10-08 RX ADMIN — HYDROMORPHONE HYDROCHLORIDE 1 MG: 1 INJECTION, SOLUTION INTRAMUSCULAR; INTRAVENOUS; SUBCUTANEOUS at 17:39

## 2022-10-08 RX ADMIN — HYDROMORPHONE HYDROCHLORIDE 1 MG: 1 INJECTION, SOLUTION INTRAMUSCULAR; INTRAVENOUS; SUBCUTANEOUS at 14:04

## 2022-10-08 ASSESSMENT — PAIN DESCRIPTION - LOCATION
LOCATION: ABDOMEN
LOCATION: ABDOMEN

## 2022-10-08 ASSESSMENT — PAIN SCALES - GENERAL
PAINLEVEL_OUTOF10: 8
PAINLEVEL_OUTOF10: 8
PAINLEVEL_OUTOF10: 9
PAINLEVEL_OUTOF10: 8

## 2022-10-08 ASSESSMENT — PAIN DESCRIPTION - ORIENTATION
ORIENTATION: RIGHT
ORIENTATION: RIGHT

## 2022-10-08 ASSESSMENT — PAIN DESCRIPTION - DESCRIPTORS
DESCRIPTORS: DISCOMFORT;SHARP
DESCRIPTORS: ACHING;SORE;TENDER

## 2022-10-08 ASSESSMENT — PAIN - FUNCTIONAL ASSESSMENT: PAIN_FUNCTIONAL_ASSESSMENT: ACTIVITIES ARE NOT PREVENTED

## 2022-10-08 NOTE — PROGRESS NOTES
Seen/examined  See consult  Still complaining of diffuse abdominal pains, mostly on the right side  Asking for food  Asking for pain medicine  Afebrile  Abdomen diffusely tender but very subjective;  very soft   Imaging reviewed  Gallbladder ultrasound not impressive  CT with some hazy mesenteric stranding and free fluid  Findings most consistent with enteritis/ileus  No concern for bowel obstruction  Supportive care only- I can't find an indication for laparoscopy at this time; suspect this will resolve on its own  Ok to advance diet  Karson

## 2022-10-08 NOTE — PROGRESS NOTES
[facility] Progress Note    Admitting Date and Time: 10/4/2022  1:43 AM  Admit Dx: Gastroesophageal reflux disease without esophagitis [K21.9]  Intractable abdominal pain [R10.9]    Subjective:  Patient is being followed for Gastroesophageal reflux disease without esophagitis [K21.9]  Intractable abdominal pain [R10.9]   Pt feels still with belly pain. General Surgery note appreciated. RUQ U/S unrevealing. No surgical intervention. Feel patient has an enteritis/ileus. No stool as of yet. To send stool studies when has a movement. Per RN:   As above. Labs just got drawn. Sending down for testing.      ROS: denies fever, chills, cp, sob, n/v, HA unless stated above.      metoclopramide  5 mg IntraVENous Q6H    docusate sodium  200 mg Oral Daily    pantoprazole (PROTONIX) 40 mg injection  40 mg IntraVENous Q12H    sodium chloride flush  5-40 mL IntraVENous 2 times per day     sodium chloride flush, 5-40 mL, PRN  sodium chloride, , PRN  ondansetron, 4 mg, Q8H PRN   Or  ondansetron, 4 mg, Q6H PRN  polyethylene glycol, 17 g, Daily PRN  acetaminophen, 650 mg, Q6H PRN   Or  acetaminophen, 650 mg, Q6H PRN  melatonin, 3 mg, Nightly PRN  HYDROmorphone, 1 mg, Q4H PRN    Objective:    /75   Pulse (!) 118   Temp 98.6 °F (37 °C) (Oral)   Resp 18   Ht 5' 1\" (1.549 m)   Wt 171 lb (77.6 kg)   SpO2 96%   BMI 32.31 kg/m²     General Appearance: alert and oriented to person, place and time and in no acute distress  Skin: warm and dry  Head: normocephalic and atraumatic  Eyes: pupils equal, round, and reactive to light, extraocular eye movements intact, conjunctivae normal  Neck: neck supple and non tender without mass   Pulmonary/Chest: clear to auscultation bilaterally- no wheezes, rales or rhonchi, normal air movement, no respiratory distress  Cardiovascular: normal rate, normal S1 and S2 and no carotid bruits  Abdomen: soft, tender to light touch all over, distended, normal bowel sounds, no masses or organomegaly  Extremities: no cyanosis, no clubbing and no edema  Neurologic: no cranial nerve deficit and speech normal    Recent Labs     10/06/22  1400 10/07/22  0928    137   K 3.3* 3.6   CL 98 100   CO2 28 27   BUN 10 9   CREATININE 0.6 0.6   GLUCOSE 90 89   CALCIUM 9.4 9.2       Recent Labs     10/06/22  1400 10/07/22  0928   WBC 15.5* 12.5*   RBC 3.36* 3.48*   HGB 11.1* 11.2*   HCT 32.1* 33.2*   MCV 95.5 95.4   MCH 33.0 32.2   MCHC 34.6* 33.7   RDW 13.2 13.4    240   MPV 9.7 9.7     Radiology: CT ABDOMEN PELVIS W WO CONTRAST Additional Contrast? None    Result Date: 10/7/2022  EXAMINATION: CT OF THE ABDOMEN AND PELVIS WITH AND WITHOUT CONTRAST 10/7/2022 10:08 am TECHNIQUE: CT of the abdomen and pelvis was performed with and without the administration of intravenous contrast.  Multiplanar reformatted images are provided for review. Automated exposure control, iterative reconstruction, and/or weight based adjustment of the mA/kV was utilized to reduce the radiation dose to as low as reasonably achievable. COMPARISON: The previous CT scan of the abdomen and pelvis performed 10/04/2022. In near correlation is made with abdominal radiographs performed the same days today and 10/06/2022. HISTORY: ORDERING SYSTEM PROVIDED HISTORY: abdomial pain, distention, constipation, nausea and vomiting- rule out bowel obstruction TECHNOLOGIST PROVIDED HISTORY: Reason for exam:->abdomial pain, distention, constipation, nausea and vomiting- rule out bowel obstruction Additional Contrast?->None FINDINGS: Lower Chest: Thick, linear opacities are identified in the lower lobes and to a lesser degree the right middle lobe and lingula. This most likely represents atelectasis. A small left pleural effusion is noted. There has been a minimal increase in the small pericardial effusion. Organs: The liver, spleen, biliary tree, pancreas, adrenal glands, and kidneys are unremarkable.   Intraluminal gallbladder sludge is noted. GI/Bowel: The stomach is suboptimally distended, but grossly unremarkable. There are dilated loops of small bowel seen in the left mid to upper abdomen containing air in fluid. There is suggestion of a transition point in the central posterior small bowel mesentery (image 90 on series 4). The remainder of the small bowel is collapsed. There is a considerable amount of air noted within the colon. The findings indicate either a partial or early complete small bowel obstruction. The appendix is not definitely visualized. Pelvis: The urinary bladder is suboptimally distended, but grossly unremarkable. A bulky, enlarged, heterogeneous uterus is again noted. There is again distortion of the endometrium and there is again noted to be a moderate amount of fluid within the endometrial cavity. Soft tissue density again cannot be excluded within the endometrial cavity. The findings again indicate a leiomyomatous or adenomyomatous uterus. It should be noted that the overall attenuation of the uterus is lower on the postcontrast images than seen on the prior study and may be secondary to some differences in the bolus contrast timing. The presence of red/carneous degeneration of a leiomyoma cannot be excluded. There has been an interval increase in fluid density noted in the region of the endocervix, along with a small amount of air. This could be secondary to prior examination/instrumentation. The presence of blood cannot be excluded. Further evaluation needs to be based on clinical grounds. Pelvic MRI is recommended for further evaluation. Both ovaries are normal in appearance. Peritoneum/Retroperitoneum: No retroperitoneal or pelvic lymphadenopathy is identified. A small amount of free fluid is identified in the abdomen and pelvis. There is a small amount of what appears to be loculated fluid in the left parametrial region.   There is generalized induration/edema of the fat planes of the abdomen and pelvis. The amount of fluid and edema present is slightly out of proportion to the degree of small-bowel obstruction present. Peritonitis cannot be excluded, although there is no evidence of perforation/free air no abdominal or pelvic soft tissue mass is noted. The abdominal aorta is normal in caliber. A retroaortic left renal vein is again identified. Bones/Soft Tissues: A small umbilical hernia is again noted, which contains fat. The visualized osseous structures are without significant interval change. 1.  Findings indicating either a partial or early complete small bowel obstruction, as described above. 2.  Small amount of free fluid in the abdomen and pelvis and a small amount of what appears to be loculated fluid in the left parametrial region. Generalized induration/edema of the fat planes of the abdomen and pelvis. The amount of fluid and edema present is slightly out of proportion to the degree of small-bowel obstruction present. Peritonitis cannot be excluded, although there is no evidence of perforation/free air. 3.  Leiomyomatous/adenomyomatous uterus again noted, when compared to the previous study performed 10/04/2022. Overall decreased attenuation of the uterus on the postcontrast images than seen on the prior study and could be secondary to some differences in bolus contrast timing. Red/carneous degeneration of a leiomyoma cannot be excluded. Pelvic MRI is recommended. 4. Interval increase in fluid density in the region of the endocervix, along with a small amount of air. This could be secondary to prior examination/instrumentation. Blood cannot be excluded. Further evaluation needs to be based on clinical grounds. Pelvic MRI is again recommended. 5.  Gallbladder sludge. 6.  Small pleural effusion. 7.  Minimal interval increase in a small pericardial effusion. 8.  Probable bilateral lung atelectasis. The findings were discussed with Dr. Kayli Curran of surgery at 12:10 p.m. XR ABDOMEN (KUB) (SINGLE AP VIEW)    Result Date: 10/7/2022  EXAMINATION: ONE SUPINE XRAY VIEW(S) OF THE ABDOMEN 10/7/2022 9:55 am COMPARISON: 10/06/2022. HISTORY: ORDERING SYSTEM PROVIDED HISTORY: Ileus vs SBO TECHNOLOGIST PROVIDED HISTORY: Reason for exam:->Ileus vs SBO FINDINGS: There is redemonstration of gaseous small and large intestinal distension consistent with ileus versus obstruction. No obvious soft tissue mass or suspicious calcification is seen. No bony abnormality is visualized. Redemonstration of gaseous small and large intestinal distension consistent with ileus versus obstruction. XR ACUTE ABD SERIES CHEST 1 VW    Result Date: 10/7/2022  EXAMINATION: TWO XRAY VIEWS OF THE ABDOMEN AND SINGLE  XRAY VIEW OF THE CHEST 10/6/2022 12:47 pm COMPARISON: Correlation is made with CT scan of the abdomen and pelvis performed 10/04/2022. HISTORY: ORDERING SYSTEM PROVIDED HISTORY: acute abdominal pain and vomiting TECHNOLOGIST PROVIDED HISTORY: Reason for exam:->acute abdominal pain and vomiting FINDINGS: There are distended air and fluid-filled loops of small bowel seen centered within the abdomen. There is air identified within the colon. There are air-fluid levels identified within loops of small bowel. Rule out partial or early complete small bowel obstruction. CT scan of the abdomen and pelvis is recommended for further evaluation. No free air is identified. No abnormal soft tissue or calcific density is appreciated. The lungs are hypoinflated and linear, streaky densities are seen at the lung bases, indicating atelectasis. Distended air and fluid-filled loops of small bowel with air-fluid levels. Rule out partial or early complete small bowel obstruction. CT scan of the abdomen and pelvis is recommended. Bibasilar lung atelectasis.  RECOMMENDATION: The findings were sent to the Radiology Results [address] at 9:53 a.m. on 10/07/2022 to be communicated to a licensed caregiver. US GALLBLADDER RUQ    Result Date: 10/7/2022  EXAMINATION: RIGHT UPPER QUADRANT ULTRASOUND 10/7/2022 5:00 pm COMPARISON: 10/07/2022 10:09 a.m. abdomen/pelvis CT. HISTORY: ORDERING SYSTEM PROVIDED HISTORY: eval gb and associated structures TECHNOLOGIST PROVIDED HISTORY: Reason for exam:->eval gb and associated structures What reading provider will be dictating this exam?->CRC FINDINGS: The current examination is limited due to obscuring bowel gas and limited patient positioning due to pain. LIVER: The liver demonstrates normal echogenicity without evidence of intrahepatic biliary ductal dilatation. BILIARY SYSTEM:  The gallbladder is normal size and wall thickness. No pericholecystic fluid or gallstones. There is a small volume of gallbladder sludge. Positive Segura sign. Common bile duct is within normal limits measuring 3.2 mm. RIGHT KIDNEY: The right kidney is grossly unremarkable without evidence of hydronephrosis. PANCREAS:  The pancreas is not well visualized due to obscuring bowel gas. OTHER: No evidence of right upper quadrant ascites. Gallbladder sludge but no convincing sonographic evidence of acute cholecystitis. Assessment:    Principal Problem:    Intractable abdominal pain  Active Problems:    Gastroesophageal reflux disease without esophagitis  Resolved Problems:    * No resolved hospital problems. *    Plan:  1. Abdominal pain - General Surgery feels an ileus/enteritis. Ok to advance diet. Continue IV fluids. Pain meds. Reglan IV added. Continue PPI. 2.  GERD - continue PPI. Total care time:  20 minutes    NOTE: This report was transcribed using voice recognition software. Every effort was made to ensure accuracy; however, inadvertent computerized transcription errors may be present.     Electronically signed by Deepa Maciel MD on 10/8/2022 at 9:31 AM

## 2022-10-08 NOTE — PROGRESS NOTES
PROGRESS NOTE        Patient Presents with/Seen in Consultation For      *Reason for Consult: acid reflux  CHIEF COMPLAINT:  abdominal pain    Subjective:     Patient seen Sondra Dai in bed still with right lower abdominal tenderness. Tolerated clears no nausea or vomiting. No BM. POC d/w pt. Review of Systems  Aside from what was mentioned in the PMH and HPI, essentially unremarkable, all others negative. Objective:     /75   Pulse (!) 118   Temp 98.6 °F (37 °C) (Oral)   Resp 18   Ht 5' 1\" (1.549 m)   Wt 171 lb (77.6 kg)   SpO2 96%   BMI 32.31 kg/m²     General appearance: alert, awake, laying in bed, and cooperative  Eyes: conjunctiva pale, sclera anicteric. PERRL.   Lungs: clear to auscultation bilaterally  Heart: regular rate and rhythm, no murmur, 2+ pulses; no edema  Abdomen: softly-distended, tender to palpation without guarding or rebound; bowel sounds hypoactive; no masses,  no organomegaly  Extremities: extremities without edema  Pulses: 2+ and symmetric  Skin: Skin color, texture, turgor normal.   Neurologic: Grossly normal    lactated ringers infusion, Continuous  pantoprazole (PROTONIX) 40 mg in sodium chloride (PF) 10 mL injection, Q12H  sodium chloride flush 0.9 % injection 5-40 mL, 2 times per day  sodium chloride flush 0.9 % injection 5-40 mL, PRN  0.9 % sodium chloride infusion, PRN  ondansetron (ZOFRAN-ODT) disintegrating tablet 4 mg, Q8H PRN   Or  ondansetron (ZOFRAN) injection 4 mg, Q6H PRN  polyethylene glycol (GLYCOLAX) packet 17 g, Daily PRN  acetaminophen (TYLENOL) tablet 650 mg, Q6H PRN   Or  acetaminophen (TYLENOL) suppository 650 mg, Q6H PRN  melatonin tablet 3 mg, Nightly PRN  HYDROmorphone (DILAUDID) injection 1 mg, Q4H PRN         Data Review  CBC:   Lab Results   Component Value Date/Time    WBC 12.5 10/07/2022 09:28 AM    RBC 3.48 10/07/2022 09:28 AM    HGB 11.2 10/07/2022 09:28 AM    HCT 33.2 10/07/2022 09:28 AM    MCV 95.4 10/07/2022 09:28 AM    MCH 32.2 10/07/2022 09:28 AM    MCHC 33.7 10/07/2022 09:28 AM    RDW 13.4 10/07/2022 09:28 AM     10/07/2022 09:28 AM    MPV 9.7 10/07/2022 09:28 AM     CMP:    Lab Results   Component Value Date/Time     10/07/2022 09:28 AM    K 3.6 10/07/2022 09:28 AM     10/07/2022 09:28 AM    CO2 27 10/07/2022 09:28 AM    BUN 9 10/07/2022 09:28 AM    CREATININE 0.6 10/07/2022 09:28 AM    GFRAA >60 10/07/2022 09:28 AM    LABGLOM >60 10/07/2022 09:28 AM    GLUCOSE 89 10/07/2022 09:28 AM    GLUCOSE 96 02/16/2012 11:25 PM    PROT 6.6 10/07/2022 09:28 AM    LABALBU 3.0 10/07/2022 09:28 AM    LABALBU 4.0 02/16/2012 11:25 PM    CALCIUM 9.2 10/07/2022 09:28 AM    BILITOT 0.4 10/07/2022 09:28 AM    ALKPHOS 87 10/07/2022 09:28 AM    AST 14 10/07/2022 09:28 AM    ALT 9 10/07/2022 09:28 AM     Hepatic Function Panel:    Lab Results   Component Value Date/Time    ALKPHOS 87 10/07/2022 09:28 AM    ALT 9 10/07/2022 09:28 AM    AST 14 10/07/2022 09:28 AM    PROT 6.6 10/07/2022 09:28 AM    BILITOT 0.4 10/07/2022 09:28 AM    BILIDIR <0.2 10/07/2022 09:28 AM    IBILI see below 10/07/2022 09:28 AM    LABALBU 3.0 10/07/2022 09:28 AM    LABALBU 4.0 02/16/2012 11:25 PM     No components found for: CHLPL  No results found for: TRIG  No results found for: HDL  No results found for: LDLCALC  No results found for: LABVLDL   PT/INR:    Lab Results   Component Value Date/Time    PROTIME 20.3 10/05/2022 08:16 AM    INR 1.8 10/05/2022 08:16 AM     IRON:    Lab Results   Component Value Date/Time    IRON 70 07/09/2020 05:05 AM     Iron Saturation:  No components found for: PERCENTFE  FERRITIN:    Lab Results   Component Value Date/Time    FERRITIN 8 12/31/2021 11:32 PM         Assessment:     Active Problems:  Abdominal pain.  Mid to right lower  Nausea with vomiting  HX of acid reflux  Hypokalemia- defer  EGD 10/5/22 with Dr. Kassie Archuleta- normal  Constipation   Leukocytosis  Elevated CRP 38.3  Elevated sed rate 124  SBO vs ileus- surgery following     Plan:   Reglan 5 mg q6  Protonix  40 mg BID  Colace 200 mg daily   Medical management per PCP  Diet per surgery   Surgery following, appreciate input   Supportive care  Continue to monitor       Note: This report was completed utilizing computer voice recognition software. Every effort has been made to ensure accuracy, however; inadvertent computerized transcription errors may be present.      Discussed with Dr. Kaitlin Duarte per Dr. Leia FRANCISCO-NP-SANJU 10/8/2022  8:22 AM For Dr. Richard Dove

## 2022-10-08 NOTE — PLAN OF CARE
Problem: Pain  Goal: Verbalizes/displays adequate comfort level or baseline comfort level  10/7/2022 2135 by Marlin Villanueva RN  Outcome: Progressing     Problem: Gastrointestinal - Adult  Goal: Minimal or absence of nausea and vomiting  Outcome: Progressing     Problem: Gastrointestinal - Adult  Goal: Maintains or returns to baseline bowel function  Outcome: Progressing     Problem: Gastrointestinal - Adult  Goal: Maintains adequate nutritional intake  Outcome: Progressing

## 2022-10-09 ENCOUNTER — APPOINTMENT (OUTPATIENT)
Dept: GENERAL RADIOLOGY | Age: 37
DRG: 249 | End: 2022-10-09
Payer: COMMERCIAL

## 2022-10-09 LAB
ALBUMIN SERPL-MCNC: 2.9 G/DL (ref 3.5–5.2)
ALP BLD-CCNC: 105 U/L (ref 35–104)
ALT SERPL-CCNC: 7 U/L (ref 0–32)
ANION GAP SERPL CALCULATED.3IONS-SCNC: 13 MMOL/L (ref 7–16)
ANISOCYTOSIS: ABNORMAL
AST SERPL-CCNC: 13 U/L (ref 0–31)
BASOPHILS ABSOLUTE: 0.13 E9/L (ref 0–0.2)
BASOPHILS RELATIVE PERCENT: 0.9 % (ref 0–2)
BILIRUB SERPL-MCNC: 0.4 MG/DL (ref 0–1.2)
BUN BLDV-MCNC: 5 MG/DL (ref 6–20)
C-REACTIVE PROTEIN: 18.1 MG/DL (ref 0–0.4)
CALCIUM SERPL-MCNC: 9 MG/DL (ref 8.6–10.2)
CHLORIDE BLD-SCNC: 99 MMOL/L (ref 98–107)
CO2: 23 MMOL/L (ref 22–29)
CREAT SERPL-MCNC: 0.6 MG/DL (ref 0.5–1)
EOSINOPHILS ABSOLUTE: 0 E9/L (ref 0.05–0.5)
EOSINOPHILS RELATIVE PERCENT: 0 % (ref 0–6)
GFR AFRICAN AMERICAN: >60
GFR NON-AFRICAN AMERICAN: >60 ML/MIN/1.73
GLUCOSE BLD-MCNC: 98 MG/DL (ref 74–99)
HCT VFR BLD CALC: 32 % (ref 34–48)
HEMOGLOBIN: 10.8 G/DL (ref 11.5–15.5)
LIPASE: 35 U/L (ref 13–60)
LYMPHOCYTES ABSOLUTE: 1.04 E9/L (ref 1.5–4)
LYMPHOCYTES RELATIVE PERCENT: 7 % (ref 20–42)
MCH RBC QN AUTO: 32.2 PG (ref 26–35)
MCHC RBC AUTO-ENTMCNC: 33.8 % (ref 32–34.5)
MCV RBC AUTO: 95.5 FL (ref 80–99.9)
MONOCYTES ABSOLUTE: 1.18 E9/L (ref 0.1–0.95)
MONOCYTES RELATIVE PERCENT: 7.8 % (ref 2–12)
NEUTROPHILS ABSOLUTE: 12.43 E9/L (ref 1.8–7.3)
NEUTROPHILS RELATIVE PERCENT: 84.3 % (ref 43–80)
NUCLEATED RED BLOOD CELLS: 0 /100 WBC
OVALOCYTES: ABNORMAL
PDW BLD-RTO: 13.3 FL (ref 11.5–15)
PLATELET # BLD: 253 E9/L (ref 130–450)
PMV BLD AUTO: 10 FL (ref 7–12)
POLYCHROMASIA: ABNORMAL
POTASSIUM REFLEX MAGNESIUM: 3.9 MMOL/L (ref 3.5–5)
RBC # BLD: 3.35 E12/L (ref 3.5–5.5)
SEDIMENTATION RATE, ERYTHROCYTE: 104 MM/HR (ref 0–20)
SODIUM BLD-SCNC: 135 MMOL/L (ref 132–146)
TARGET CELLS: ABNORMAL
TOTAL PROTEIN: 6.6 G/DL (ref 6.4–8.3)
WBC # BLD: 14.8 E9/L (ref 4.5–11.5)

## 2022-10-09 PROCEDURE — 2580000003 HC RX 258: Performed by: INTERNAL MEDICINE

## 2022-10-09 PROCEDURE — 83993 ASSAY FOR CALPROTECTIN FECAL: CPT

## 2022-10-09 PROCEDURE — 6360000002 HC RX W HCPCS: Performed by: FAMILY MEDICINE

## 2022-10-09 PROCEDURE — 36415 COLL VENOUS BLD VENIPUNCTURE: CPT

## 2022-10-09 PROCEDURE — 87045 FECES CULTURE AEROBIC BACT: CPT

## 2022-10-09 PROCEDURE — 1200000000 HC SEMI PRIVATE

## 2022-10-09 PROCEDURE — 85025 COMPLETE CBC W/AUTO DIFF WBC: CPT

## 2022-10-09 PROCEDURE — C9113 INJ PANTOPRAZOLE SODIUM, VIA: HCPCS | Performed by: INTERNAL MEDICINE

## 2022-10-09 PROCEDURE — 99233 SBSQ HOSP IP/OBS HIGH 50: CPT | Performed by: FAMILY MEDICINE

## 2022-10-09 PROCEDURE — 85651 RBC SED RATE NONAUTOMATED: CPT

## 2022-10-09 PROCEDURE — 74022 RADEX COMPL AQT ABD SERIES: CPT

## 2022-10-09 PROCEDURE — 6360000002 HC RX W HCPCS: Performed by: NURSE PRACTITIONER

## 2022-10-09 PROCEDURE — 6370000000 HC RX 637 (ALT 250 FOR IP): Performed by: FAMILY MEDICINE

## 2022-10-09 PROCEDURE — 2580000003 HC RX 258: Performed by: FAMILY MEDICINE

## 2022-10-09 PROCEDURE — A4216 STERILE WATER/SALINE, 10 ML: HCPCS | Performed by: INTERNAL MEDICINE

## 2022-10-09 PROCEDURE — 6370000000 HC RX 637 (ALT 250 FOR IP): Performed by: NURSE PRACTITIONER

## 2022-10-09 PROCEDURE — 6360000002 HC RX W HCPCS: Performed by: INTERNAL MEDICINE

## 2022-10-09 PROCEDURE — 80053 COMPREHEN METABOLIC PANEL: CPT

## 2022-10-09 PROCEDURE — 83690 ASSAY OF LIPASE: CPT

## 2022-10-09 PROCEDURE — 86140 C-REACTIVE PROTEIN: CPT

## 2022-10-09 PROCEDURE — 89055 LEUKOCYTE ASSESSMENT FECAL: CPT

## 2022-10-09 RX ORDER — LACTULOSE 10 G/15ML
20 SOLUTION ORAL 2 TIMES DAILY
Status: DISCONTINUED | OUTPATIENT
Start: 2022-10-09 | End: 2022-10-10

## 2022-10-09 RX ORDER — BISACODYL 10 MG
10 SUPPOSITORY, RECTAL RECTAL ONCE
Status: COMPLETED | OUTPATIENT
Start: 2022-10-09 | End: 2022-10-09

## 2022-10-09 RX ORDER — KETOROLAC TROMETHAMINE 30 MG/ML
30 INJECTION, SOLUTION INTRAMUSCULAR; INTRAVENOUS EVERY 6 HOURS PRN
Status: DISCONTINUED | OUTPATIENT
Start: 2022-10-09 | End: 2022-10-11 | Stop reason: HOSPADM

## 2022-10-09 RX ADMIN — SODIUM CHLORIDE, PRESERVATIVE FREE 40 MG: 5 INJECTION INTRAVENOUS at 18:14

## 2022-10-09 RX ADMIN — DOCUSATE SODIUM 200 MG: 100 CAPSULE, LIQUID FILLED ORAL at 09:30

## 2022-10-09 RX ADMIN — SODIUM CHLORIDE, POTASSIUM CHLORIDE, SODIUM LACTATE AND CALCIUM CHLORIDE: 600; 310; 30; 20 INJECTION, SOLUTION INTRAVENOUS at 22:15

## 2022-10-09 RX ADMIN — BISACODYL 10 MG: 10 SUPPOSITORY RECTAL at 09:51

## 2022-10-09 RX ADMIN — SODIUM CHLORIDE, PRESERVATIVE FREE 10 ML: 5 INJECTION INTRAVENOUS at 22:16

## 2022-10-09 RX ADMIN — HYDROMORPHONE HYDROCHLORIDE 1 MG: 1 INJECTION, SOLUTION INTRAMUSCULAR; INTRAVENOUS; SUBCUTANEOUS at 01:56

## 2022-10-09 RX ADMIN — HYDROMORPHONE HYDROCHLORIDE 1 MG: 1 INJECTION, SOLUTION INTRAMUSCULAR; INTRAVENOUS; SUBCUTANEOUS at 09:51

## 2022-10-09 RX ADMIN — HYDROMORPHONE HYDROCHLORIDE 1 MG: 1 INJECTION, SOLUTION INTRAMUSCULAR; INTRAVENOUS; SUBCUTANEOUS at 05:56

## 2022-10-09 RX ADMIN — METOCLOPRAMIDE 5 MG: 5 INJECTION, SOLUTION INTRAMUSCULAR; INTRAVENOUS at 05:11

## 2022-10-09 RX ADMIN — METOCLOPRAMIDE 5 MG: 5 INJECTION, SOLUTION INTRAMUSCULAR; INTRAVENOUS at 09:30

## 2022-10-09 RX ADMIN — Medication 10 ML: at 09:30

## 2022-10-09 RX ADMIN — LACTULOSE 20 G: 20 SOLUTION ORAL at 09:51

## 2022-10-09 RX ADMIN — SODIUM CHLORIDE, PRESERVATIVE FREE 40 MG: 5 INJECTION INTRAVENOUS at 05:17

## 2022-10-09 RX ADMIN — METOCLOPRAMIDE 5 MG: 5 INJECTION, SOLUTION INTRAMUSCULAR; INTRAVENOUS at 15:54

## 2022-10-09 RX ADMIN — KETOROLAC TROMETHAMINE 30 MG: 30 INJECTION, SOLUTION INTRAMUSCULAR at 20:58

## 2022-10-09 RX ADMIN — LACTULOSE 20 G: 20 SOLUTION ORAL at 22:16

## 2022-10-09 RX ADMIN — HYDROMORPHONE HYDROCHLORIDE 1 MG: 1 INJECTION, SOLUTION INTRAMUSCULAR; INTRAVENOUS; SUBCUTANEOUS at 22:31

## 2022-10-09 RX ADMIN — SODIUM CHLORIDE, POTASSIUM CHLORIDE, SODIUM LACTATE AND CALCIUM CHLORIDE: 600; 310; 30; 20 INJECTION, SOLUTION INTRAVENOUS at 12:45

## 2022-10-09 RX ADMIN — Medication 10 ML: at 20:59

## 2022-10-09 RX ADMIN — HYDROMORPHONE HYDROCHLORIDE 1 MG: 1 INJECTION, SOLUTION INTRAMUSCULAR; INTRAVENOUS; SUBCUTANEOUS at 14:14

## 2022-10-09 RX ADMIN — KETOROLAC TROMETHAMINE 30 MG: 30 INJECTION, SOLUTION INTRAMUSCULAR at 12:40

## 2022-10-09 RX ADMIN — METOCLOPRAMIDE 5 MG: 5 INJECTION, SOLUTION INTRAMUSCULAR; INTRAVENOUS at 22:16

## 2022-10-09 ASSESSMENT — PAIN DESCRIPTION - DESCRIPTORS
DESCRIPTORS: ACHING;SHARP
DESCRIPTORS: ACHING;CRAMPING
DESCRIPTORS: CRAMPING;DISCOMFORT
DESCRIPTORS: CRAMPING;DISCOMFORT
DESCRIPTORS: CRAMPING

## 2022-10-09 ASSESSMENT — PAIN - FUNCTIONAL ASSESSMENT
PAIN_FUNCTIONAL_ASSESSMENT: ACTIVITIES ARE NOT PREVENTED

## 2022-10-09 ASSESSMENT — PAIN DESCRIPTION - ONSET
ONSET: ON-GOING
ONSET: ON-GOING

## 2022-10-09 ASSESSMENT — PAIN DESCRIPTION - LOCATION
LOCATION: ABDOMEN

## 2022-10-09 ASSESSMENT — PAIN SCALES - GENERAL
PAINLEVEL_OUTOF10: 8
PAINLEVEL_OUTOF10: 7
PAINLEVEL_OUTOF10: 7
PAINLEVEL_OUTOF10: 10
PAINLEVEL_OUTOF10: 7
PAINLEVEL_OUTOF10: 4
PAINLEVEL_OUTOF10: 9
PAINLEVEL_OUTOF10: 6

## 2022-10-09 ASSESSMENT — PAIN DESCRIPTION - ORIENTATION
ORIENTATION: RIGHT

## 2022-10-09 ASSESSMENT — PAIN DESCRIPTION - PAIN TYPE
TYPE: ACUTE PAIN
TYPE: ACUTE PAIN

## 2022-10-09 ASSESSMENT — PAIN DESCRIPTION - FREQUENCY
FREQUENCY: CONTINUOUS
FREQUENCY: CONTINUOUS

## 2022-10-09 NOTE — PROGRESS NOTES
[facility] Progress Note    Admitting Date and Time: 10/4/2022  1:43 AM  Admit Dx: Gastroesophageal reflux disease without esophagitis [K21.9]  Intractable abdominal pain [R10.9]    Subjective:  Patient is being followed for Gastroesophageal reflux disease without esophagitis [K21.9]  Intractable abdominal pain [R10.9]   Pt still in a lot of pain. HOB elevated in lithotomy position. No emesis. No gas or bowel movement. Lot of abdominal distension. NGT would assist decompression. ? Rectal tube. Discussed with GI.  ? SB follow through  Per RN:   Concerned for IV Dilaudid and no bowel movements. Await General Surgeon's recommendations. ROS: denies fever, chills, cp, sob, n/v, HA unless stated above.       lactulose  20 g Oral BID    metoclopramide  5 mg IntraVENous Q6H    docusate sodium  200 mg Oral Daily    pantoprazole (PROTONIX) 40 mg injection  40 mg IntraVENous Q12H    sodium chloride flush  5-40 mL IntraVENous 2 times per day     sodium chloride flush, 5-40 mL, PRN  sodium chloride, , PRN  ondansetron, 4 mg, Q8H PRN   Or  ondansetron, 4 mg, Q6H PRN  polyethylene glycol, 17 g, Daily PRN  acetaminophen, 650 mg, Q6H PRN   Or  acetaminophen, 650 mg, Q6H PRN  melatonin, 3 mg, Nightly PRN  HYDROmorphone, 1 mg, Q4H PRN    Objective:    /78   Pulse (!) 106   Temp 99.1 °F (37.3 °C) (Oral)   Resp 16   Ht 5' 1\" (1.549 m)   Wt 171 lb (77.6 kg)   SpO2 95%   BMI 32.31 kg/m²     General Appearance: in acute distress  Skin: warm and dry  Head: normocephalic and atraumatic  Eyes: pupils equal, round, and reactive to light, extraocular eye movements intact, conjunctivae normal  Neck: neck supple and non tender without mass   Pulmonary/Chest: clear to auscultation bilaterally- no wheezes, rales or rhonchi, normal air movement, no respiratory distress  Cardiovascular: normal rate, normal S1 and S2 and no carotid bruits  Abdomen: soft, non-tender, non-distended, normal bowel sounds, no masses or organomegaly  Extremities: no cyanosis, no clubbing and no edema  Neurologic: no cranial nerve deficit and speech normal    Recent Labs     10/06/22  1400 10/07/22  0928 10/08/22  1000    137 137   K 3.3* 3.6 3.4*   CL 98 100 99   CO2 28 27 26   BUN 10 9 6   CREATININE 0.6 0.6 0.5   GLUCOSE 90 89 93   CALCIUM 9.4 9.2 9.1       Recent Labs     10/06/22  1400 10/07/22  0928 10/08/22  1011   WBC 15.5* 12.5* 10.5   RBC 3.36* 3.48* 3.42*   HGB 11.1* 11.2* 11.1*   HCT 32.1* 33.2* 32.5*   MCV 95.5 95.4 95.0   MCH 33.0 32.2 32.5   MCHC 34.6* 33.7 34.2   RDW 13.2 13.4 13.4    240 247   MPV 9.7 9.7 9.9     Radiology:   CT ABDOMEN PELVIS W WO CONTRAST Additional Contrast? None    Result Date: 10/7/2022  EXAMINATION: CT OF THE ABDOMEN AND PELVIS WITH AND WITHOUT CONTRAST 10/7/2022 10:08 am TECHNIQUE: CT of the abdomen and pelvis was performed with and without the administration of intravenous contrast.  Multiplanar reformatted images are provided for review. Automated exposure control, iterative reconstruction, and/or weight based adjustment of the mA/kV was utilized to reduce the radiation dose to as low as reasonably achievable. COMPARISON: The previous CT scan of the abdomen and pelvis performed 10/04/2022. In near correlation is made with abdominal radiographs performed the same days today and 10/06/2022. HISTORY: ORDERING SYSTEM PROVIDED HISTORY: abdomial pain, distention, constipation, nausea and vomiting- rule out bowel obstruction TECHNOLOGIST PROVIDED HISTORY: Reason for exam:->abdomial pain, distention, constipation, nausea and vomiting- rule out bowel obstruction Additional Contrast?->None FINDINGS: Lower Chest: Thick, linear opacities are identified in the lower lobes and to a lesser degree the right middle lobe and lingula. This most likely represents atelectasis. A small left pleural effusion is noted. There has been a minimal increase in the small pericardial effusion. Organs:  The liver, spleen, biliary tree, pancreas, adrenal glands, and kidneys are unremarkable. Intraluminal gallbladder sludge is noted. GI/Bowel: The stomach is suboptimally distended, but grossly unremarkable. There are dilated loops of small bowel seen in the left mid to upper abdomen containing air in fluid. There is suggestion of a transition point in the central posterior small bowel mesentery (image 90 on series 4). The remainder of the small bowel is collapsed. There is a considerable amount of air noted within the colon. The findings indicate either a partial or early complete small bowel obstruction. The appendix is not definitely visualized. Pelvis: The urinary bladder is suboptimally distended, but grossly unremarkable. A bulky, enlarged, heterogeneous uterus is again noted. There is again distortion of the endometrium and there is again noted to be a moderate amount of fluid within the endometrial cavity. Soft tissue density again cannot be excluded within the endometrial cavity. The findings again indicate a leiomyomatous or adenomyomatous uterus. It should be noted that the overall attenuation of the uterus is lower on the postcontrast images than seen on the prior study and may be secondary to some differences in the bolus contrast timing. The presence of red/carneous degeneration of a leiomyoma cannot be excluded. There has been an interval increase in fluid density noted in the region of the endocervix, along with a small amount of air. This could be secondary to prior examination/instrumentation. The presence of blood cannot be excluded. Further evaluation needs to be based on clinical grounds. Pelvic MRI is recommended for further evaluation. Both ovaries are normal in appearance. Peritoneum/Retroperitoneum: No retroperitoneal or pelvic lymphadenopathy is identified. A small amount of free fluid is identified in the abdomen and pelvis.   There is a small amount of what appears to be loculated fluid in the left parametrial region. There is generalized induration/edema of the fat planes of the abdomen and pelvis. The amount of fluid and edema present is slightly out of proportion to the degree of small-bowel obstruction present. Peritonitis cannot be excluded, although there is no evidence of perforation/free air no abdominal or pelvic soft tissue mass is noted. The abdominal aorta is normal in caliber. A retroaortic left renal vein is again identified. Bones/Soft Tissues: A small umbilical hernia is again noted, which contains fat. The visualized osseous structures are without significant interval change. 1.  Findings indicating either a partial or early complete small bowel obstruction, as described above. 2.  Small amount of free fluid in the abdomen and pelvis and a small amount of what appears to be loculated fluid in the left parametrial region. Generalized induration/edema of the fat planes of the abdomen and pelvis. The amount of fluid and edema present is slightly out of proportion to the degree of small-bowel obstruction present. Peritonitis cannot be excluded, although there is no evidence of perforation/free air. 3.  Leiomyomatous/adenomyomatous uterus again noted, when compared to the previous study performed 10/04/2022. Overall decreased attenuation of the uterus on the postcontrast images than seen on the prior study and could be secondary to some differences in bolus contrast timing. Red/carneous degeneration of a leiomyoma cannot be excluded. Pelvic MRI is recommended. 4. Interval increase in fluid density in the region of the endocervix, along with a small amount of air. This could be secondary to prior examination/instrumentation. Blood cannot be excluded. Further evaluation needs to be based on clinical grounds. Pelvic MRI is again recommended. 5.  Gallbladder sludge. 6.  Small pleural effusion. 7.  Minimal interval increase in a small pericardial effusion. 8. Probable bilateral lung atelectasis. The findings were discussed with Dr. Shakira Starks of surgery at 12:10 p.m. XR ABDOMEN (KUB) (SINGLE AP VIEW)    Result Date: 10/7/2022  EXAMINATION: ONE SUPINE XRAY VIEW(S) OF THE ABDOMEN 10/7/2022 9:55 am COMPARISON: 10/06/2022. HISTORY: ORDERING SYSTEM PROVIDED HISTORY: Ileus vs SBO TECHNOLOGIST PROVIDED HISTORY: Reason for exam:->Ileus vs SBO FINDINGS: There is redemonstration of gaseous small and large intestinal distension consistent with ileus versus obstruction. No obvious soft tissue mass or suspicious calcification is seen. No bony abnormality is visualized. Redemonstration of gaseous small and large intestinal distension consistent with ileus versus obstruction. US GALLBLADDER RUQ    Result Date: 10/7/2022  EXAMINATION: RIGHT UPPER QUADRANT ULTRASOUND 10/7/2022 5:00 pm COMPARISON: 10/07/2022 10:09 a.m. abdomen/pelvis CT. HISTORY: ORDERING SYSTEM PROVIDED HISTORY: eval gb and associated structures TECHNOLOGIST PROVIDED HISTORY: Reason for exam:->eval gb and associated structures What reading provider will be dictating this exam?->CRC FINDINGS: The current examination is limited due to obscuring bowel gas and limited patient positioning due to pain. LIVER: The liver demonstrates normal echogenicity without evidence of intrahepatic biliary ductal dilatation. BILIARY SYSTEM:  The gallbladder is normal size and wall thickness. No pericholecystic fluid or gallstones. There is a small volume of gallbladder sludge. Positive Segura sign. Common bile duct is within normal limits measuring 3.2 mm. RIGHT KIDNEY: The right kidney is grossly unremarkable without evidence of hydronephrosis. PANCREAS:  The pancreas is not well visualized due to obscuring bowel gas. OTHER: No evidence of right upper quadrant ascites. Gallbladder sludge but no convincing sonographic evidence of acute cholecystitis.       Assessment:    Principal Problem:    Intractable abdominal pain  Active Problems:    Gastroesophageal reflux disease without esophagitis  Resolved Problems:    * No resolved hospital problems. *    Plan:  1. Ileus? SBO - Reglan has eliminated vomiting. Still will significant amount of pain and distension. CRP high but reduced. WBC up to 14.8K. General surgery on case. Await recommendations. NGT for decompression? SB follow through? Barium enema? Trial of Glycerin or Dulcolax suppository. Recheck acute abdominal series xray. 2.  GERD - continue PPI. Total care time:  20 minutes    NOTE: This report was transcribed using voice recognition software. Every effort was made to ensure accuracy; however, inadvertent computerized transcription errors may be present.     Electronically signed by Sia Cantu MD on 10/9/2022 at 10:02 AM

## 2022-10-09 NOTE — PROGRESS NOTES
PROGRESS NOTE        Patient Presents with/Seen in Consultation For      *Reason for Consult: acid reflux  CHIEF COMPLAINT:  abdominal pain    Subjective:     Patient seen laying in bed uncomfortable complains of worsening abdominal pain and distention. No nausea or vomiting. No BM no flatus. Plan of care discussed with primary, nursing and patient. Review of Systems  Aside from what was mentioned in the PMH and HPI, essentially unremarkable, all others negative. Objective:     /78   Pulse (!) 106   Temp 99.1 °F (37.3 °C) (Oral)   Resp 16   Ht 5' 1\" (1.549 m)   Wt 171 lb (77.6 kg)   SpO2 95%   BMI 32.31 kg/m²     General appearance: alert, awake, laying in bed, appears uncomfortable, and cooperative  Eyes: conjunctiva pale, sclera anicteric. PERRL.   Lungs: clear to auscultation bilaterally  Heart: regular rate and rhythm, no murmur, 2+ pulses; no edema  Abdomen: softly-distended, tender to palpation guarding no rebound; bowel sounds hypoactive; no masses,  no organomegaly  Extremities: extremities without edema  Pulses: 2+ and symmetric  Skin: Skin color, texture, turgor normal.   Neurologic: Grossly normal    lactulose (CHRONULAC) 10 GM/15ML solution 20 g, BID  metoclopramide (REGLAN) injection 5 mg, Q6H  docusate sodium (COLACE) capsule 200 mg, Daily  lactated ringers infusion, Continuous  pantoprazole (PROTONIX) 40 mg in sodium chloride (PF) 10 mL injection, Q12H  sodium chloride flush 0.9 % injection 5-40 mL, 2 times per day  sodium chloride flush 0.9 % injection 5-40 mL, PRN  0.9 % sodium chloride infusion, PRN  ondansetron (ZOFRAN-ODT) disintegrating tablet 4 mg, Q8H PRN   Or  ondansetron (ZOFRAN) injection 4 mg, Q6H PRN  polyethylene glycol (GLYCOLAX) packet 17 g, Daily PRN  acetaminophen (TYLENOL) tablet 650 mg, Q6H PRN   Or  acetaminophen (TYLENOL) suppository 650 mg, Q6H PRN  melatonin tablet 3 mg, Nightly PRN  HYDROmorphone (DILAUDID) injection 1 mg, Q4H PRN       Data Review  CBC: Lab Results   Component Value Date/Time    WBC 14.8 10/09/2022 09:40 AM    RBC 3.35 10/09/2022 09:40 AM    HGB 10.8 10/09/2022 09:40 AM    HCT 32.0 10/09/2022 09:40 AM    MCV 95.5 10/09/2022 09:40 AM    MCH 32.2 10/09/2022 09:40 AM    MCHC 33.8 10/09/2022 09:40 AM    RDW 13.3 10/09/2022 09:40 AM     10/09/2022 09:40 AM    MPV 10.0 10/09/2022 09:40 AM     CMP:    Lab Results   Component Value Date/Time     10/08/2022 10:00 AM    K 3.4 10/08/2022 10:00 AM    CL 99 10/08/2022 10:00 AM    CO2 26 10/08/2022 10:00 AM    BUN 6 10/08/2022 10:00 AM    CREATININE 0.5 10/08/2022 10:00 AM    GFRAA >60 10/08/2022 10:00 AM    LABGLOM >60 10/08/2022 10:00 AM    GLUCOSE 93 10/08/2022 10:00 AM    GLUCOSE 96 02/16/2012 11:25 PM    PROT 6.4 10/08/2022 10:00 AM    LABALBU 2.9 10/08/2022 10:00 AM    LABALBU 4.0 02/16/2012 11:25 PM    CALCIUM 9.1 10/08/2022 10:00 AM    BILITOT 0.3 10/08/2022 10:00 AM    ALKPHOS 90 10/08/2022 10:00 AM    AST 12 10/08/2022 10:00 AM    ALT 7 10/08/2022 10:00 AM     Hepatic Function Panel:    Lab Results   Component Value Date/Time    ALKPHOS 90 10/08/2022 10:00 AM    ALT 7 10/08/2022 10:00 AM    AST 12 10/08/2022 10:00 AM    PROT 6.4 10/08/2022 10:00 AM    BILITOT 0.3 10/08/2022 10:00 AM    BILIDIR <0.2 10/07/2022 09:28 AM    IBILI see below 10/07/2022 09:28 AM    LABALBU 2.9 10/08/2022 10:00 AM    LABALBU 4.0 02/16/2012 11:25 PM       PT/INR:    Lab Results   Component Value Date/Time    PROTIME 20.3 10/05/2022 08:16 AM    INR 1.8 10/05/2022 08:16 AM     IRON:    Lab Results   Component Value Date/Time    IRON 70 07/09/2020 05:05 AM     Iron Saturation:  No components found for: PERCENTFE  FERRITIN:    Lab Results   Component Value Date/Time    FERRITIN 8 12/31/2021 11:32 PM         Assessment:     Active Problems:  Abdominal pain-diffuse  Nausea with vomiting  HX of acid reflux  Hypokalemia- defer  EGD 10/5/22 with Dr. Remigio Asif- normal  Constipation   Leukocytosis  Elevated CRP 38.3  Elevated sed rate 124  SBO vs ileus- surgery following     Plan:   Reglan 5 mg q6  Suppository once  Lactulose 20 twice daily  Protonix  40 mg BID  Colace 200 mg daily   Possible SBFT/NG- will defer to surgery  Medical management per PCP  Diet per surgery   Surgery following, appreciate input   Supportive care  Continue to monitor      Note: This report was completed utilizing computer voice recognition software. Every effort has been made to ensure accuracy, however; inadvertent computerized transcription errors may be present.      Discussed with Dr. Jesus Crenshaw per Dr. Emerson Bosworth APRN-NP-C 10/9/2022  10:26 AM For Dr. Blane Prakash

## 2022-10-09 NOTE — CONSULTS
Department of Gynecology  Attending Consult Note      Reason for Consult: Symptomatic fibroid uterus  Requesting Physician: Dr. Celestin Breeding:   Abdominal pain    History obtained from patient, electronic medical record    HISTORY OF PRESENT ILLNESS:                   The patient is a 30 y.o. female who was seen and the chart was reviewed. Patient has findings that are consistent with a symptomatic fibroid uterus. Patient was seen in the GYN clinic in January and a hysterectomy was recommended at that time. Not much follow-up was made after that. Patient is desirous of hysterectomy. She was encouraged to make arrangements with Dr. Gabriel Landers. Most likely an endometrial biopsy preauthorization and surgical consent forms will need to be signed prior. Past Medical History:        Diagnosis Date    Anemia 2020    DVT (deep venous thrombosis) (HCC)     DVT of lower extremity (deep venous thrombosis) (HCC)     left lower    History of DVT of lower extremity     left leg     Migraine      Past Surgical History:        Procedure Laterality Date    DILATION AND CURETTAGE      INDUCED   10/12/12    UPPER GASTROINTESTINAL ENDOSCOPY N/A 10/5/2022    EGD DIAGNOSTIC ONLY performed by Ceci Alarcon MD at [address] [place] [address]         OB History    Para Term  AB Living   5 3   3 1 4   SAB IAB Ectopic Molar Multiple Live Births     1       3      # Outcome Date GA Lbr Jesse/2nd Weight Sex Delivery Anes PTL Lv   5  12 36w0d  4 lb 6 oz (1.984 kg) F Vag-Spont EPI N TAMIR      Birth Comments: srom   4 IAB               Birth Comments: System Generated. Please review and update pregnancy details.    3  08 36w0d  4 lb 12 oz (2.155 kg) F Vag-Spont None N TAMIR      Birth Comments: srom   2  10/28/04 36w0d  5 lb 6 oz (2.438 kg) M Vag-Spont EPI N TAMIR      Birth Comments: srom   1                 Medications Prior to Admission: Medications Prior to Admission: ferrous sulfate (IRON 325) 325 (65 Fe) MG tablet, Take 325 mg by mouth every morning  ibuprofen (ADVIL;MOTRIN) 600 MG tablet, Take 1 tablet by mouth every 6 hours as needed for Pain  omeprazole (PRILOSEC) 40 MG delayed release capsule, Take 40 mg by mouth every morning    Allergies:  Latex     Social History:  TOBACCO:   reports that she has never smoked. She has never used smokeless tobacco.  ETOH:   reports no history of alcohol use. DRUGS:   reports no history of drug use. Family History:   No family history on file.       PHYSICAL EXAM:    Vitals:  /78   Pulse (!) 106   Temp 99.1 °F (37.3 °C) (Oral)   Resp 16   Ht 5' 1\" (1.549 m)   Wt 171 lb (77.6 kg)   SpO2 95%   BMI 32.31 kg/m²     IMPRESSION/RECOMMENDATIONS:      Symptomatic fibroid uterus  Patient previously seen in the [address] [place] [address]  Patient desirous of hysterectomy and was recommended by Dr. Sindy Sotelo      No acute process occurring and would recommend patient be discharged and will follow-up in the GYN clinic for hysterectomy with Dr. Kimber Ruth MD  10/9/2022

## 2022-10-09 NOTE — PROGRESS NOTES
Call placed to Dr. Val Santana per Dr. Betty Medina request regarding XR result. Notified Dr. Val Santana of XR result. He agrees with Dr. Gordon Antonino note from yesterday - Dr. Hung Alfred to see pt tomorrow. No new orders.     Electronically signed by Amelia Patel RN on 10/9/2022 at 5:24 PM'

## 2022-10-09 NOTE — PROGRESS NOTES
Patient does not want an Ng tube at this time. Is requesting ginger ale and crackers. Pain better at a 4.

## 2022-10-10 LAB
ALBUMIN SERPL-MCNC: 2.6 G/DL (ref 3.5–5.2)
ALP BLD-CCNC: 94 U/L (ref 35–104)
ALT SERPL-CCNC: 6 U/L (ref 0–32)
ANION GAP SERPL CALCULATED.3IONS-SCNC: 9 MMOL/L (ref 7–16)
ANISOCYTOSIS: ABNORMAL
AST SERPL-CCNC: 11 U/L (ref 0–31)
BASOPHILS ABSOLUTE: 0 E9/L (ref 0–0.2)
BASOPHILS RELATIVE PERCENT: 0 % (ref 0–2)
BILIRUB SERPL-MCNC: 0.3 MG/DL (ref 0–1.2)
BUN BLDV-MCNC: 3 MG/DL (ref 6–20)
C-REACTIVE PROTEIN: 20.8 MG/DL (ref 0–0.4)
CALCIUM SERPL-MCNC: 8.6 MG/DL (ref 8.6–10.2)
CHLORIDE BLD-SCNC: 98 MMOL/L (ref 98–107)
CO2: 26 MMOL/L (ref 22–29)
CREAT SERPL-MCNC: 0.6 MG/DL (ref 0.5–1)
EOSINOPHILS ABSOLUTE: 0 E9/L (ref 0.05–0.5)
EOSINOPHILS RELATIVE PERCENT: 0 % (ref 0–6)
GFR AFRICAN AMERICAN: >60
GFR NON-AFRICAN AMERICAN: >60 ML/MIN/1.73
GLUCOSE BLD-MCNC: 96 MG/DL (ref 74–99)
HCT VFR BLD CALC: 29.4 % (ref 34–48)
HEMOGLOBIN: 9.8 G/DL (ref 11.5–15.5)
LIPASE: 25 U/L (ref 13–60)
LYMPHOCYTES ABSOLUTE: 0.64 E9/L (ref 1.5–4)
LYMPHOCYTES RELATIVE PERCENT: 5.2 % (ref 20–42)
MCH RBC QN AUTO: 31.7 PG (ref 26–35)
MCHC RBC AUTO-ENTMCNC: 33.3 % (ref 32–34.5)
MCV RBC AUTO: 95.1 FL (ref 80–99.9)
METAMYELOCYTES RELATIVE PERCENT: 1.7 % (ref 0–1)
MONOCYTES ABSOLUTE: 0.25 E9/L (ref 0.1–0.95)
MONOCYTES RELATIVE PERCENT: 1.8 % (ref 2–12)
NEUTROPHILS ABSOLUTE: 11.81 E9/L (ref 1.8–7.3)
NEUTROPHILS RELATIVE PERCENT: 91.3 % (ref 43–80)
NUCLEATED RED BLOOD CELLS: 0.9 /100 WBC
PDW BLD-RTO: 13.5 FL (ref 11.5–15)
PLATELET # BLD: 249 E9/L (ref 130–450)
PMV BLD AUTO: 9.6 FL (ref 7–12)
POLYCHROMASIA: ABNORMAL
POTASSIUM REFLEX MAGNESIUM: 3.7 MMOL/L (ref 3.5–5)
RBC # BLD: 3.09 E12/L (ref 3.5–5.5)
SEDIMENTATION RATE, ERYTHROCYTE: 104 MM/HR (ref 0–20)
SODIUM BLD-SCNC: 133 MMOL/L (ref 132–146)
TOTAL PROTEIN: 5.9 G/DL (ref 6.4–8.3)
WBC # BLD: 12.7 E9/L (ref 4.5–11.5)
WHITE BLOOD CELLS (WBC), STOOL: NORMAL

## 2022-10-10 PROCEDURE — 86140 C-REACTIVE PROTEIN: CPT

## 2022-10-10 PROCEDURE — 80053 COMPREHEN METABOLIC PANEL: CPT

## 2022-10-10 PROCEDURE — 36415 COLL VENOUS BLD VENIPUNCTURE: CPT

## 2022-10-10 PROCEDURE — 85651 RBC SED RATE NONAUTOMATED: CPT

## 2022-10-10 PROCEDURE — 6360000002 HC RX W HCPCS: Performed by: NURSE PRACTITIONER

## 2022-10-10 PROCEDURE — 6370000000 HC RX 637 (ALT 250 FOR IP): Performed by: INTERNAL MEDICINE

## 2022-10-10 PROCEDURE — 99232 SBSQ HOSP IP/OBS MODERATE 35: CPT | Performed by: NURSE PRACTITIONER

## 2022-10-10 PROCEDURE — C9113 INJ PANTOPRAZOLE SODIUM, VIA: HCPCS | Performed by: INTERNAL MEDICINE

## 2022-10-10 PROCEDURE — 6370000000 HC RX 637 (ALT 250 FOR IP): Performed by: NURSE PRACTITIONER

## 2022-10-10 PROCEDURE — 85025 COMPLETE CBC W/AUTO DIFF WBC: CPT

## 2022-10-10 PROCEDURE — 2580000003 HC RX 258: Performed by: INTERNAL MEDICINE

## 2022-10-10 PROCEDURE — 87040 BLOOD CULTURE FOR BACTERIA: CPT

## 2022-10-10 PROCEDURE — 1200000000 HC SEMI PRIVATE

## 2022-10-10 PROCEDURE — 6360000002 HC RX W HCPCS: Performed by: INTERNAL MEDICINE

## 2022-10-10 PROCEDURE — A4216 STERILE WATER/SALINE, 10 ML: HCPCS | Performed by: INTERNAL MEDICINE

## 2022-10-10 PROCEDURE — 6360000002 HC RX W HCPCS: Performed by: FAMILY MEDICINE

## 2022-10-10 PROCEDURE — 83690 ASSAY OF LIPASE: CPT

## 2022-10-10 PROCEDURE — 2580000003 HC RX 258: Performed by: FAMILY MEDICINE

## 2022-10-10 RX ORDER — HYDROXYZINE PAMOATE 25 MG/1
25 CAPSULE ORAL 3 TIMES DAILY PRN
Status: DISCONTINUED | OUTPATIENT
Start: 2022-10-10 | End: 2022-10-11 | Stop reason: HOSPADM

## 2022-10-10 RX ORDER — LACTULOSE 10 G/15ML
10 SOLUTION ORAL DAILY
Status: DISCONTINUED | OUTPATIENT
Start: 2022-10-11 | End: 2022-10-11 | Stop reason: HOSPADM

## 2022-10-10 RX ADMIN — HYDROMORPHONE HYDROCHLORIDE 1 MG: 1 INJECTION, SOLUTION INTRAMUSCULAR; INTRAVENOUS; SUBCUTANEOUS at 05:24

## 2022-10-10 RX ADMIN — DOCUSATE SODIUM 200 MG: 100 CAPSULE, LIQUID FILLED ORAL at 08:49

## 2022-10-10 RX ADMIN — SODIUM CHLORIDE, PRESERVATIVE FREE 10 ML: 5 INJECTION INTRAVENOUS at 17:43

## 2022-10-10 RX ADMIN — HYDROMORPHONE HYDROCHLORIDE 1 MG: 1 INJECTION, SOLUTION INTRAMUSCULAR; INTRAVENOUS; SUBCUTANEOUS at 17:42

## 2022-10-10 RX ADMIN — METOCLOPRAMIDE 5 MG: 5 INJECTION, SOLUTION INTRAMUSCULAR; INTRAVENOUS at 21:32

## 2022-10-10 RX ADMIN — METOCLOPRAMIDE 5 MG: 5 INJECTION, SOLUTION INTRAMUSCULAR; INTRAVENOUS at 04:28

## 2022-10-10 RX ADMIN — SODIUM CHLORIDE, PRESERVATIVE FREE 40 MG: 5 INJECTION INTRAVENOUS at 17:27

## 2022-10-10 RX ADMIN — LACTULOSE 20 G: 20 SOLUTION ORAL at 08:49

## 2022-10-10 RX ADMIN — HYDROXYZINE PAMOATE 25 MG: 25 CAPSULE ORAL at 16:53

## 2022-10-10 RX ADMIN — Medication 10 ML: at 21:32

## 2022-10-10 RX ADMIN — METOCLOPRAMIDE 5 MG: 5 INJECTION, SOLUTION INTRAMUSCULAR; INTRAVENOUS at 17:26

## 2022-10-10 RX ADMIN — ACETAMINOPHEN 650 MG: 325 TABLET ORAL at 11:13

## 2022-10-10 RX ADMIN — HYDROMORPHONE HYDROCHLORIDE 1 MG: 1 INJECTION, SOLUTION INTRAMUSCULAR; INTRAVENOUS; SUBCUTANEOUS at 22:27

## 2022-10-10 RX ADMIN — SODIUM CHLORIDE, PRESERVATIVE FREE 10 ML: 5 INJECTION INTRAVENOUS at 07:12

## 2022-10-10 RX ADMIN — SODIUM CHLORIDE, POTASSIUM CHLORIDE, SODIUM LACTATE AND CALCIUM CHLORIDE: 600; 310; 30; 20 INJECTION, SOLUTION INTRAVENOUS at 13:56

## 2022-10-10 RX ADMIN — KETOROLAC TROMETHAMINE 30 MG: 30 INJECTION, SOLUTION INTRAMUSCULAR at 07:20

## 2022-10-10 RX ADMIN — KETOROLAC TROMETHAMINE 30 MG: 30 INJECTION, SOLUTION INTRAMUSCULAR at 16:53

## 2022-10-10 RX ADMIN — METOCLOPRAMIDE 5 MG: 5 INJECTION, SOLUTION INTRAMUSCULAR; INTRAVENOUS at 09:00

## 2022-10-10 RX ADMIN — SODIUM CHLORIDE, PRESERVATIVE FREE 40 MG: 5 INJECTION INTRAVENOUS at 07:12

## 2022-10-10 RX ADMIN — HYDROXYZINE PAMOATE 25 MG: 25 CAPSULE ORAL at 10:50

## 2022-10-10 ASSESSMENT — PAIN SCALES - GENERAL
PAINLEVEL_OUTOF10: 7
PAINLEVEL_OUTOF10: 6
PAINLEVEL_OUTOF10: 7
PAINLEVEL_OUTOF10: 7
PAINLEVEL_OUTOF10: 0
PAINLEVEL_OUTOF10: 7

## 2022-10-10 ASSESSMENT — PAIN DESCRIPTION - FREQUENCY
FREQUENCY: INTERMITTENT
FREQUENCY: INTERMITTENT

## 2022-10-10 ASSESSMENT — PAIN DESCRIPTION - LOCATION
LOCATION: ABDOMEN

## 2022-10-10 ASSESSMENT — PAIN DESCRIPTION - PAIN TYPE
TYPE: ACUTE PAIN
TYPE: ACUTE PAIN

## 2022-10-10 ASSESSMENT — PAIN DESCRIPTION - ORIENTATION
ORIENTATION: RIGHT
ORIENTATION: RIGHT;LEFT
ORIENTATION: RIGHT;LEFT
ORIENTATION: RIGHT
ORIENTATION: RIGHT

## 2022-10-10 ASSESSMENT — PAIN DESCRIPTION - DESCRIPTORS
DESCRIPTORS: ACHING;GNAWING
DESCRIPTORS: ACHING;DISCOMFORT
DESCRIPTORS: ACHING;DISCOMFORT
DESCRIPTORS: ACHING;SHARP
DESCRIPTORS: ACHING

## 2022-10-10 ASSESSMENT — PAIN DESCRIPTION - ONSET
ONSET: ON-GOING
ONSET: ON-GOING

## 2022-10-10 NOTE — PROGRESS NOTES
PROGRESS NOTE      Patient Presents with/Seen in Consultation For      *Reason for Consult: acid reflux  CHIEF COMPLAINT:  abdominal pain    Subjective:     Patient seen Shreya Reed in bed states she is feeling a little better. Had loose watery stools yesterday approximately 10. Intermittent nausea, tolerating diet. Mild abdominal discomfort. Review of Systems  Aside from what was mentioned in the PMH and HPI, essentially unremarkable, all others negative. Objective:     /82   Pulse (!) 126   Temp 100.4 °F (38 °C) (Oral)   Resp 16   Ht 5' 1\" (1.549 m)   Wt 176 lb 5.9 oz (80 kg)   SpO2 95%   BMI 33.32 kg/m²     General appearance: alert, awake, laying in bed and cooperative  Eyes: conjunctiva pale, sclera anicteric. PERRL.   Lungs: clear to auscultation bilaterally  Heart: regular rate and rhythm, no murmur, 2+ pulses; no edema  Abdomen: softly-distended, tender to palpation no guarding no rebound; bowel sounds active; no masses,  no organomegaly  Extremities: extremities without edema  Pulses: 2+ and symmetric  Skin: Skin color, texture, turgor normal.   Neurologic: Grossly normal    hydrOXYzine pamoate (VISTARIL) capsule 25 mg, TID PRN  [START ON 10/11/2022] lactulose (CHRONULAC) 10 GM/15ML solution 10 g, Daily  ketorolac (TORADOL) injection 30 mg, Q6H PRN  metoclopramide (REGLAN) injection 5 mg, Q6H  docusate sodium (COLACE) capsule 200 mg, Daily  lactated ringers infusion, Continuous  pantoprazole (PROTONIX) 40 mg in sodium chloride (PF) 10 mL injection, Q12H  sodium chloride flush 0.9 % injection 5-40 mL, 2 times per day  sodium chloride flush 0.9 % injection 5-40 mL, PRN  0.9 % sodium chloride infusion, PRN  ondansetron (ZOFRAN-ODT) disintegrating tablet 4 mg, Q8H PRN   Or  ondansetron (ZOFRAN) injection 4 mg, Q6H PRN  polyethylene glycol (GLYCOLAX) packet 17 g, Daily PRN  acetaminophen (TYLENOL) tablet 650 mg, Q6H PRN   Or  acetaminophen (TYLENOL) suppository 650 mg, Q6H PRN  melatonin tablet 3 mg, Nightly PRN  HYDROmorphone (DILAUDID) injection 1 mg, Q4H PRN       Data Review  CBC:   Lab Results   Component Value Date/Time    WBC 12.7 10/10/2022 06:05 AM    RBC 3.09 10/10/2022 06:05 AM    HGB 9.8 10/10/2022 06:05 AM    HCT 29.4 10/10/2022 06:05 AM    MCV 95.1 10/10/2022 06:05 AM    MCH 31.7 10/10/2022 06:05 AM    MCHC 33.3 10/10/2022 06:05 AM    RDW 13.5 10/10/2022 06:05 AM     10/10/2022 06:05 AM    MPV 9.6 10/10/2022 06:05 AM     CMP:    Lab Results   Component Value Date/Time     10/10/2022 06:05 AM    K 3.7 10/10/2022 06:05 AM    CL 98 10/10/2022 06:05 AM    CO2 26 10/10/2022 06:05 AM    BUN 3 10/10/2022 06:05 AM    CREATININE 0.6 10/10/2022 06:05 AM    GFRAA >60 10/10/2022 06:05 AM    LABGLOM >60 10/10/2022 06:05 AM    GLUCOSE 96 10/10/2022 06:05 AM    GLUCOSE 96 02/16/2012 11:25 PM    PROT 5.9 10/10/2022 06:05 AM    LABALBU 2.6 10/10/2022 06:05 AM    LABALBU 4.0 02/16/2012 11:25 PM    CALCIUM 8.6 10/10/2022 06:05 AM    BILITOT 0.3 10/10/2022 06:05 AM    ALKPHOS 94 10/10/2022 06:05 AM    AST 11 10/10/2022 06:05 AM    ALT 6 10/10/2022 06:05 AM     Hepatic Function Panel:    Lab Results   Component Value Date/Time    ALKPHOS 94 10/10/2022 06:05 AM    ALT 6 10/10/2022 06:05 AM    AST 11 10/10/2022 06:05 AM    PROT 5.9 10/10/2022 06:05 AM    BILITOT 0.3 10/10/2022 06:05 AM    BILIDIR <0.2 10/07/2022 09:28 AM    IBILI see below 10/07/2022 09:28 AM    LABALBU 2.6 10/10/2022 06:05 AM    LABALBU 4.0 02/16/2012 11:25 PM       PT/INR:    Lab Results   Component Value Date/Time    PROTIME 20.3 10/05/2022 08:16 AM    INR 1.8 10/05/2022 08:16 AM     IRON:    Lab Results   Component Value Date/Time    IRON 70 07/09/2020 05:05 AM     Iron Saturation:  No components found for: PERCENTFE  FERRITIN:    Lab Results   Component Value Date/Time    FERRITIN 8 12/31/2021 11:32 PM         Assessment:     Active Problems:  Abdominal pain-diffuse  Nausea with vomiting  HX of acid reflux  Hypokalemia- defer  EGD 10/5/22 with Dr. Sara Mathis- normal  Constipation   Leukocytosis  Elevated CRP 38.3  Elevated sed rate 124  SBO vs ileus    Plan:   Reglan 5 mg q 6  Decrease Lactulose to 10 mg daily  Protonix  40 mg BID  Colace 200 mg daily   Medical management per PCP  Diet as tolerated   Surgery following, appreciate input - no surgical intervention needed   Supportive care  Discharge planning       Note: This report was completed utilizing computer voice recognition software. Every effort has been made to ensure accuracy, however; inadvertent computerized transcription errors may be present.      Discussed with Dr. Chuyita Holm per Dr. Ahsan Hernandez APRN-NP-C 10/10/2022  11:14 AM For Dr. Kaycee Ward

## 2022-10-10 NOTE — CARE COORDINATION
Updated plan of care. Iv fluids, KUB done to r/o ileus. Surgery re-consulted and signed off. Pt having bowel movements. Encourage ambulation, dilaudid decreased. Plan remains home with family.  Will follow-mjo

## 2022-10-10 NOTE — PATIENT CARE CONFERENCE
[facility] Quality Flow/Interdisciplinary Rounds Progress Note        Quality Flow Rounds held on October 10, 2022    Disciplines Attending:  Bedside Nurse, , , and Nursing Unit [address] [facility] [place] was admitted on 10/4/2022  1:43 AM    Anticipated Discharge Date:  Expected Discharge Date: 10/08/22    Disposition:    Gregor Score:  Gregor Scale Score: 19    Readmission Risk              Risk of Unplanned Readmission:  11           Discussed patient goal for the day, patient clinical progression, and barriers to discharge.   The following Goal(s) of the Day/Commitment(s) have been identified:  Diagnostics - Report Results      Kavita Franklin RN  October 10, 2022

## 2022-10-10 NOTE — PROGRESS NOTES
GENERAL SURGERY  DAILY PROGRESS NOTE  10/10/2022  Chief Complaint   Patient presents with    Abdominal Pain     Sharp ABD pain since 2100       Subjective:  No acute events overnight. Afebrile. Mildly tachycardic. Tolerating diet. Having Bowel function with multiple episodes of diarrhea yesterday after suppository. Abdominal pain improved. Objective:  /63   Pulse (!) 110   Temp 98.6 °F (37 °C) (Oral)   Resp 16   Ht 5' 1\" (1.549 m)   Wt 176 lb 5.9 oz (80 kg)   SpO2 95%   BMI 33.32 kg/m²     In: 1600.4 [I.V.:1600.4]  Out: -     General appearance: NAD, lying in bed  Lungs: Equal chest rise bilaterally, no retractions, no audible wheezing  Heart: warm throughout  Abdomen: soft, mildly tender to palpation throughout without rebound or guarding, mildly distended  Skin: no obvious rashes or lesions appreciated    CBC x3  Recent Labs     10/07/22  0928 10/08/22  1011 10/09/22  0940   WBC 12.5* 10.5 14.8*   RBC 3.48* 3.42* 3.35*   HGB 11.2* 11.1* 10.8*   HCT 33.2* 32.5* 32.0*   MCV 95.4 95.0 95.5   MCH 32.2 32.5 32.2   MCHC 33.7 34.2 33.8   RDW 13.4 13.4 13.3    247 253   MPV 9.7 9.9 10.0           CMP x3  Recent Labs     10/07/22  0928 10/08/22  1000 10/09/22  0940    137 135   K 3.6 3.4* 3.9    99 99   CO2 27 26 23   BUN 9 6 5*   CREATININE 0.6 0.5 0.6   GLUCOSE 89 93 98   CALCIUM 9.2 9.1 9.0   PROT 6.6 6.4 6.6   LABALBU 3.0* 2.9* 2.9*   BILITOT 0.4 0.3 0.4   ALKPHOS 87 90 105*   AST 14 12 13   ALT 9 7 7           Assessment/Plan:  30 y.o. female for whom general surgery was consulted for abdominal pain. Most likely enteritis in context of CT scan findings. Having bowel function. No surgical intervention or observation warranted at this time. Please call with any questions or concerns.     Belinda Segura DO  General Surgery Resident, PGY-1    Electronically signed on 10/10/2022 at 6:13 AM

## 2022-10-10 NOTE — PROGRESS NOTES
[facility] Progress Note    Admitting Date and Time: 10/4/2022  1:43 AM  Admit Dx: Gastroesophageal reflux disease without esophagitis [K21.9]  Intractable abdominal pain [R10.9]    Subjective:  Patient is being followed for Gastroesophageal reflux disease without esophagitis [K21.9]  Intractable abdominal pain [R10.9]     Patient seen Dangelo Patches in bed she is alert. She has some complaints of anxiety and at times ago tearful. Patient is denying any nausea or vomiting at this time and states she is moving her bowels multiple times yesterday and is tolerating oral intake. Patient still has some complaints of vague right upper quadrant pain with palpation. ROS: denies fever, chills, cp, sob, n/v, HA unless stated above.       lactulose  20 g Oral BID    metoclopramide  5 mg IntraVENous Q6H    docusate sodium  200 mg Oral Daily    pantoprazole (PROTONIX) 40 mg injection  40 mg IntraVENous Q12H    sodium chloride flush  5-40 mL IntraVENous 2 times per day     ketorolac, 30 mg, Q6H PRN  sodium chloride flush, 5-40 mL, PRN  sodium chloride, , PRN  ondansetron, 4 mg, Q8H PRN   Or  ondansetron, 4 mg, Q6H PRN  polyethylene glycol, 17 g, Daily PRN  acetaminophen, 650 mg, Q6H PRN   Or  acetaminophen, 650 mg, Q6H PRN  melatonin, 3 mg, Nightly PRN  HYDROmorphone, 1 mg, Q4H PRN         Objective:    /64   Pulse (!) 114   Temp 99.2 °F (37.3 °C) (Oral)   Resp 16   Ht 5' 1\" (1.549 m)   Wt 176 lb 5.9 oz (80 kg)   SpO2 97%   BMI 33.32 kg/m²     General Appearance: alert and oriented to person, place and time and in no acute distress  Skin: warm and dry  Head: normocephalic and atraumatic  Eyes: pupils equal, round, and reactive to light, extraocular eye movements intact, conjunctivae normal  Neck: neck supple and non tender without mass   Pulmonary/Chest: clear to auscultation bilaterally- no wheezes, rales or rhonchi, normal air movement, no respiratory distress  Cardiovascular: normal rate, normal S1 and S2 and no carotid bruits  Abdomen: soft, non-tender, non-distended, normal bowel sounds, no masses or organomegaly  Extremities: no cyanosis, no clubbing and no edema  Neurologic: no cranial nerve deficit and speech normal        Recent Labs     10/08/22  1000 10/09/22  0940 10/10/22  0605    135 133   K 3.4* 3.9 3.7   CL 99 99 98   CO2 26 23 26   BUN 6 5* 3*   CREATININE 0.5 0.6 0.6   GLUCOSE 93 98 96   CALCIUM 9.1 9.0 8.6       Recent Labs     10/08/22  1011 10/09/22  0940 10/10/22  0605   WBC 10.5 14.8* 12.7*   RBC 3.42* 3.35* 3.09*   HGB 11.1* 10.8* 9.8*   HCT 32.5* 32.0* 29.4*   MCV 95.0 95.5 95.1   MCH 32.5 32.2 31.7   MCHC 34.2 33.8 33.3   RDW 13.4 13.3 13.5    253 249   MPV 9.9 10.0 9.6           Assessment:    Principal Problem:    Intractable abdominal pain  Active Problems:    Gastroesophageal reflux disease without esophagitis  Resolved Problems:    * No resolved hospital problems. *      Plan:  Abdominal pain: CT abdomen shows small amount of free fluid in abdomen/pelvis with small loculated fluid in left parametrial region-General surgery suggesting findings attributed to enteritis-no surgical intervention indicated at this time. KUB 10/9 Findings consistent with ileus versus partial SBO and his previous study on 10/7. Leukocytosis trending down 14.8 > 12.7. CRP today 20.8, . Stool culture pending. Continue Reglan. Continue pain medications and antiemetics as needed-GI and general surgery input appreciated. Leukocytosis: secondary to #1. Urine culture negative. Fever of 100.4 and tachycardic. Will get BC x 2  Uterine Fibroids: Pt interested in Hysterectomy- Referral was made by Dr Fiona Loera. Pt will need to follow up outpatient. GERD: Continue Protonix  Constipation: Continue lactulose GlycoLax as needed. Multiple BMs  yesterday. Anxiety:  Added Vistaril    In review of EMR, valuation, management, and diagnosis. Care plan has been discussed with attending.   Time spent 25 minutes    NOTE: This report was transcribed using voice recognition software. Every effort was made to ensure accuracy; however, inadvertent computerized transcription errors may be present.   Electronically signed by NOLAN Sparks CNP on 10/10/2022 at 9:15 AM

## 2022-10-11 VITALS
BODY MASS INDEX: 33.3 KG/M2 | DIASTOLIC BLOOD PRESSURE: 71 MMHG | RESPIRATION RATE: 17 BRPM | WEIGHT: 176.37 LBS | OXYGEN SATURATION: 98 % | SYSTOLIC BLOOD PRESSURE: 110 MMHG | HEIGHT: 61 IN | TEMPERATURE: 98.1 F | HEART RATE: 126 BPM

## 2022-10-11 LAB
EKG ATRIAL RATE: 113 BPM
EKG P AXIS: 43 DEGREES
EKG P-R INTERVAL: 152 MS
EKG Q-T INTERVAL: 314 MS
EKG QRS DURATION: 64 MS
EKG QTC CALCULATION (BAZETT): 430 MS
EKG R AXIS: -5 DEGREES
EKG T AXIS: 25 DEGREES
EKG VENTRICULAR RATE: 113 BPM

## 2022-10-11 PROCEDURE — C9113 INJ PANTOPRAZOLE SODIUM, VIA: HCPCS | Performed by: INTERNAL MEDICINE

## 2022-10-11 PROCEDURE — 6370000000 HC RX 637 (ALT 250 FOR IP): Performed by: INTERNAL MEDICINE

## 2022-10-11 PROCEDURE — 6360000002 HC RX W HCPCS: Performed by: FAMILY MEDICINE

## 2022-10-11 PROCEDURE — 2580000003 HC RX 258: Performed by: INTERNAL MEDICINE

## 2022-10-11 PROCEDURE — 6360000002 HC RX W HCPCS: Performed by: INTERNAL MEDICINE

## 2022-10-11 PROCEDURE — 99239 HOSP IP/OBS DSCHRG MGMT >30: CPT | Performed by: INTERNAL MEDICINE

## 2022-10-11 PROCEDURE — A4216 STERILE WATER/SALINE, 10 ML: HCPCS | Performed by: INTERNAL MEDICINE

## 2022-10-11 PROCEDURE — 6370000000 HC RX 637 (ALT 250 FOR IP): Performed by: NURSE PRACTITIONER

## 2022-10-11 PROCEDURE — 6360000002 HC RX W HCPCS: Performed by: NURSE PRACTITIONER

## 2022-10-11 PROCEDURE — 93005 ELECTROCARDIOGRAM TRACING: CPT | Performed by: INTERNAL MEDICINE

## 2022-10-11 RX ORDER — METOCLOPRAMIDE 5 MG/1
5 TABLET ORAL 2 TIMES DAILY
Qty: 120 TABLET | Refills: 3 | Status: SHIPPED | OUTPATIENT
Start: 2022-10-11

## 2022-10-11 RX ORDER — PANTOPRAZOLE SODIUM 40 MG/1
40 TABLET, DELAYED RELEASE ORAL
Status: DISCONTINUED | OUTPATIENT
Start: 2022-10-12 | End: 2022-10-11 | Stop reason: HOSPADM

## 2022-10-11 RX ORDER — LACTULOSE 10 G/15ML
10 SOLUTION ORAL EVERY EVENING
Qty: 237 ML | Refills: 1 | Status: SHIPPED | OUTPATIENT
Start: 2022-10-11

## 2022-10-11 RX ORDER — PANTOPRAZOLE SODIUM 40 MG/1
40 TABLET, DELAYED RELEASE ORAL
Qty: 30 TABLET | Refills: 0 | Status: SHIPPED | OUTPATIENT
Start: 2022-10-11

## 2022-10-11 RX ORDER — METOCLOPRAMIDE 10 MG/1
10 TABLET ORAL 2 TIMES DAILY
Status: DISCONTINUED | OUTPATIENT
Start: 2022-10-11 | End: 2022-10-11 | Stop reason: HOSPADM

## 2022-10-11 RX ADMIN — KETOROLAC TROMETHAMINE 30 MG: 30 INJECTION, SOLUTION INTRAMUSCULAR at 11:59

## 2022-10-11 RX ADMIN — DOCUSATE SODIUM 200 MG: 100 CAPSULE, LIQUID FILLED ORAL at 10:02

## 2022-10-11 RX ADMIN — METOCLOPRAMIDE 5 MG: 5 INJECTION, SOLUTION INTRAMUSCULAR; INTRAVENOUS at 04:30

## 2022-10-11 RX ADMIN — LACTULOSE 10 G: 20 SOLUTION ORAL at 10:03

## 2022-10-11 RX ADMIN — KETOROLAC TROMETHAMINE 30 MG: 30 INJECTION, SOLUTION INTRAMUSCULAR at 01:03

## 2022-10-11 RX ADMIN — HYDROMORPHONE HYDROCHLORIDE 1 MG: 1 INJECTION, SOLUTION INTRAMUSCULAR; INTRAVENOUS; SUBCUTANEOUS at 05:45

## 2022-10-11 RX ADMIN — ACETAMINOPHEN 650 MG: 325 TABLET ORAL at 11:22

## 2022-10-11 RX ADMIN — SODIUM CHLORIDE, PRESERVATIVE FREE 40 MG: 5 INJECTION INTRAVENOUS at 05:44

## 2022-10-11 ASSESSMENT — PAIN DESCRIPTION - LOCATION
LOCATION: ABDOMEN

## 2022-10-11 ASSESSMENT — PAIN SCALES - GENERAL
PAINLEVEL_OUTOF10: 0
PAINLEVEL_OUTOF10: 8
PAINLEVEL_OUTOF10: 6
PAINLEVEL_OUTOF10: 7

## 2022-10-11 ASSESSMENT — PAIN DESCRIPTION - PAIN TYPE: TYPE: ACUTE PAIN

## 2022-10-11 ASSESSMENT — PAIN DESCRIPTION - FREQUENCY: FREQUENCY: INTERMITTENT

## 2022-10-11 ASSESSMENT — PAIN DESCRIPTION - ORIENTATION
ORIENTATION: RIGHT;LOWER
ORIENTATION: LOWER
ORIENTATION: RIGHT;LOWER

## 2022-10-11 ASSESSMENT — PAIN DESCRIPTION - DESCRIPTORS
DESCRIPTORS: ACHING;DISCOMFORT
DESCRIPTORS: ACHING;DISCOMFORT;DULL
DESCRIPTORS: ACHING;DISCOMFORT

## 2022-10-11 ASSESSMENT — PAIN - FUNCTIONAL ASSESSMENT: PAIN_FUNCTIONAL_ASSESSMENT: ACTIVITIES ARE NOT PREVENTED

## 2022-10-11 ASSESSMENT — PAIN DESCRIPTION - ONSET: ONSET: ON-GOING

## 2022-10-11 NOTE — PLAN OF CARE
Problem: Pain  Goal: Verbalizes/displays adequate comfort level or baseline comfort level  10/11/2022 0031 by Vernon Forman RN  Outcome: Progressing     Problem: ABCDS Injury Assessment  Goal: Absence of physical injury  Outcome: Progressing     Problem: Gastrointestinal - Adult  Goal: Minimal or absence of nausea and vomiting  Outcome: Progressing     Problem: Gastrointestinal - Adult  Goal: Maintains or returns to baseline bowel function  Outcome: Progressing     Problem: Gastrointestinal - Adult  Goal: Maintains adequate nutritional intake  Outcome: Progressing

## 2022-10-11 NOTE — PROGRESS NOTES
CLINICAL PHARMACY NOTE: MEDS TO BEDS    Total # of Prescriptions Filled: 1   The following medications were delivered to the patient:  Protonix 40    Additional Documentation:

## 2022-10-11 NOTE — PROGRESS NOTES
PROGRESS NOTE        Patient Presents with/Seen in Consultation For      *Reason for Consult: acid reflux  CHIEF COMPLAINT:  abdominal pain    Subjective:     Patient seen Bhargavi Marie in bed in no apparent distress. States she is feeling better. No abdominal pain having loose stools. 4 overnight. Tolerating diet, no nausea. POC d/w pt. Review of Systems  Aside from what was mentioned in the PMH and HPI, essentially unremarkable, all others negative. Objective:     /71   Pulse (!) 126   Temp 98.1 °F (36.7 °C) (Oral)   Resp 17   Ht 5' 1\" (1.549 m)   Wt 176 lb 5.9 oz (80 kg)   SpO2 98%   BMI 33.32 kg/m²     General appearance: alert, awake, laying in bed and cooperative  Eyes: conjunctiva pale, sclera anicteric. PERRL.   Lungs: clear to auscultation bilaterally  Heart: regular rate and rhythm, no murmur, 2+ pulses; no edema  Abdomen: soft, non-tender; bowel sounds active; no masses,  no organomegaly  Extremities: extremities without edema  Pulses: 2+ and symmetric  Skin: Skin color, texture, turgor normal.   Neurologic: Grossly normal    hydrOXYzine pamoate (VISTARIL) capsule 25 mg, TID PRN  lactulose (CHRONULAC) 10 GM/15ML solution 10 g, Daily  ketorolac (TORADOL) injection 30 mg, Q6H PRN  metoclopramide (REGLAN) injection 5 mg, Q6H  docusate sodium (COLACE) capsule 200 mg, Daily  lactated ringers infusion, Continuous  pantoprazole (PROTONIX) 40 mg in sodium chloride (PF) 10 mL injection, Q12H  sodium chloride flush 0.9 % injection 5-40 mL, 2 times per day  sodium chloride flush 0.9 % injection 5-40 mL, PRN  0.9 % sodium chloride infusion, PRN  ondansetron (ZOFRAN-ODT) disintegrating tablet 4 mg, Q8H PRN   Or  ondansetron (ZOFRAN) injection 4 mg, Q6H PRN  polyethylene glycol (GLYCOLAX) packet 17 g, Daily PRN  acetaminophen (TYLENOL) tablet 650 mg, Q6H PRN   Or  acetaminophen (TYLENOL) suppository 650 mg, Q6H PRN  melatonin tablet 3 mg, Nightly PRN  HYDROmorphone (DILAUDID) injection 1 mg, Q4H PRN Data Review  CBC:   Lab Results   Component Value Date/Time    WBC 12.7 10/10/2022 06:05 AM    RBC 3.09 10/10/2022 06:05 AM    HGB 9.8 10/10/2022 06:05 AM    HCT 29.4 10/10/2022 06:05 AM    MCV 95.1 10/10/2022 06:05 AM    MCH 31.7 10/10/2022 06:05 AM    MCHC 33.3 10/10/2022 06:05 AM    RDW 13.5 10/10/2022 06:05 AM     10/10/2022 06:05 AM    MPV 9.6 10/10/2022 06:05 AM     CMP:    Lab Results   Component Value Date/Time     10/10/2022 06:05 AM    K 3.7 10/10/2022 06:05 AM    CL 98 10/10/2022 06:05 AM    CO2 26 10/10/2022 06:05 AM    BUN 3 10/10/2022 06:05 AM    CREATININE 0.6 10/10/2022 06:05 AM    GFRAA >60 10/10/2022 06:05 AM    LABGLOM >60 10/10/2022 06:05 AM    GLUCOSE 96 10/10/2022 06:05 AM    GLUCOSE 96 02/16/2012 11:25 PM    PROT 5.9 10/10/2022 06:05 AM    LABALBU 2.6 10/10/2022 06:05 AM    LABALBU 4.0 02/16/2012 11:25 PM    CALCIUM 8.6 10/10/2022 06:05 AM    BILITOT 0.3 10/10/2022 06:05 AM    ALKPHOS 94 10/10/2022 06:05 AM    AST 11 10/10/2022 06:05 AM    ALT 6 10/10/2022 06:05 AM     Hepatic Function Panel:    Lab Results   Component Value Date/Time    ALKPHOS 94 10/10/2022 06:05 AM    ALT 6 10/10/2022 06:05 AM    AST 11 10/10/2022 06:05 AM    PROT 5.9 10/10/2022 06:05 AM    BILITOT 0.3 10/10/2022 06:05 AM    BILIDIR <0.2 10/07/2022 09:28 AM    IBILI see below 10/07/2022 09:28 AM    LABALBU 2.6 10/10/2022 06:05 AM    LABALBU 4.0 02/16/2012 11:25 PM       PT/INR:    Lab Results   Component Value Date/Time    PROTIME 20.3 10/05/2022 08:16 AM    INR 1.8 10/05/2022 08:16 AM     IRON:    Lab Results   Component Value Date/Time    IRON 70 07/09/2020 05:05 AM     Iron Saturation:  No components found for: PERCENTFE  FERRITIN:    Lab Results   Component Value Date/Time    FERRITIN 8 12/31/2021 11:32 PM         Assessment:     Active Problems:  Abdominal pain- improved per pt  Nausea with vomiting- resolved per pt  HX of acid reflux  Hypokalemia- defer  EGD 10/5/22 with Dr. Parker Rios- normal  Constipation Leukocytosis  Elevated CRP 38.3  Elevated sed rate 124  SBO vs ileus    Plan:   Change Reglan to 5 mg BID from IV q 6  Lactulose to 10 mg daily, d/w pt to discontinue if continued diarrhea after discharge  Protonix  40 mg daily   Colace 200 mg daily   Medical management per PCP  Diet as tolerated   Surgery following, appreciate input - no surgical intervention needed   Supportive care  Discharge per PCP, ok from GI POV with outpatient follow up visit       Note: This report was completed utilizing computer voice recognition software. Every effort has been made to ensure accuracy, however; inadvertent computerized transcription errors may be present.      Discussed with Dr. Danni Arteaga per Dr. Pepe FRANCISCO-NP-C 10/11/2022  9:38 AM For Dr. Kayren Primrose

## 2022-10-11 NOTE — PATIENT CARE CONFERENCE
[facility] Quality Flow/Interdisciplinary Rounds Progress Note        Quality Flow Rounds held on October 11, 2022    Disciplines Attending:  Bedside Nurse, , , and Nursing Unit [address] [facility] [place] was admitted on 10/4/2022  1:43 AM    Anticipated Discharge Date:  Expected Discharge Date: 10/08/22    Disposition:    Gregor Score:  Gregor Scale Score: 21    Readmission Risk              Risk of Unplanned Readmission:  12           Discussed patient goal for the day, patient clinical progression, and barriers to discharge.   The following Goal(s) of the Day/Commitment(s) have been identified:  Diagnostics - Report Results      Abhishek Kohli RN  October 11, 2022

## 2022-10-11 NOTE — DISCHARGE SUMMARY
[facility] Physician Discharge Summary       Ryanne Rodrigez MD  [facility] [address] [facility]  [phone]    Schedule an appointment as soon as possible for a visit  For post-hospital follow-up    Brody Medeiros MD  [address] [facility]  [phone]    Schedule an appointment as soon as possible for a visit in 2 week(s)  For follow-up    Johnny Street MD  [address] [place] [address]  [phone]    Call in 1 day(s)  hospital follow up      Activity level: As tolerated     Dispo: home      Condition on discharge: Stable     Patient ID:  Ely Navarro  [id number]  30 y.o.  1985    Admit date: 10/4/2022    Discharge date and time:  10/11/2022  1:36 PM    Admission Diagnoses: Principal Problem:    Intractable abdominal pain  Active Problems:    Gastroesophageal reflux disease without esophagitis  Resolved Problems:    * No resolved hospital problems. *      Discharge Diagnoses: Principal Problem:    Intractable abdominal pain  Active Problems:    Gastroesophageal reflux disease without esophagitis  Resolved Problems:    * No resolved hospital problems. *      Consults:  IP CONSULT TO GI  IP CONSULT TO GENERAL SURGERY  IP CONSULT TO IV TEAM  IP CONSULT TO IV TEAM  IP CONSULT TO OB GYN  IP CONSULT TO IV TEAM    Procedures: EGD    Hospital Course:   Patient Ely Navarro is a 30 y.o. presented with Gastroesophageal reflux disease without esophagitis [K21.9]  Intractable abdominal pain [R10.9]  Patient is a 28-year-old female who was admitted on 7/4 due to severe abdominal pain. She had a CT of the abdomen and pelvis and pelvic ultrasound done in the ED which were negative for any conditions responsible for her pain. GI was consulted and patient underwent an EGD on 2/5 which was unremarkable.   On 10/6 patient complained of worsening severe abdominal pain, abdominal series ordered which showed concern for partial or early complete SBO which was also confirmed by CT on 10/7. Also underwent an ultrasound of the gallbladder which was unremarkable. Noted fibroids on her uterus on the CT so GYN was consulted and recommended patient follow-up with her GYN as an outpatient for hysterectomy. General surgery was following and no surgical intervention were required. She was placed on lactulose, Protonix, Colace, and Reglan with improvement in her symptoms. Today she is tolerating diet well, and denies any acute complaints. She will be discharged home with follow-up with GI and her GYN for hysterectomy. Discharge Exam:    General Appearance: alert and oriented to person, place and time and in no acute distress  Skin: warm and dry  Head: normocephalic and atraumatic  Eyes: pupils equal, round, and reactive to light, extraocular eye movements intact, conjunctivae normal  Neck: neck supple and non tender without mass   Pulmonary/Chest: clear to auscultation bilaterally- no wheezes, rales or rhonchi, normal air movement, no respiratory distress  Cardiovascular: normal rate, normal S1 and S2 and no carotid bruits  Abdomen: soft, mildly tender, non-distended, normal bowel sounds, no masses or organomegaly  Extremities: no cyanosis, no clubbing and no edema  Neurologic: no cranial nerve deficit and speech normal    I/O last 3 completed shifts: In: 7015.3 [I.V.:7015.3]  Out: -   No intake/output data recorded. LABS:  Recent Labs     10/09/22  0940 10/10/22  0605    133   K 3.9 3.7   CL 99 98   CO2 23 26   BUN 5* 3*   CREATININE 0.6 0.6   GLUCOSE 98 96   CALCIUM 9.0 8.6       Recent Labs     10/09/22  0940 10/10/22  0605   WBC 14.8* 12.7*   RBC 3.35* 3.09*   HGB 10.8* 9.8*   HCT 32.0* 29.4*   MCV 95.5 95.1   MCH 32.2 31.7   MCHC 33.8 33.3   RDW 13.3 13.5    249   MPV 10.0 9.6       No results for input(s): POCGLU in the last 72 hours.     Imaging:  US PELVIS COMPLETE    Result Date: 10/4/2022  EXAMINATION: PELVIC ULTRASOUND; DOPPLER EVALUATION OF THE PELVIS 10/4/2022 TECHNIQUE: Transabdominal sonographic evaluation of the pelvis was performed. Endovaginal sonography could not be performed due to patient immobility. COMPARISON: None HISTORY: ORDERING SYSTEM PROVIDED HISTORY: pain; r/o torsion TECHNOLOGIST PROVIDED HISTORY: Reason for exam:->pain; r/o torsion What reading provider will be dictating this exam?->CRC FINDINGS: The uterus measures 12.4 x 8.3 x 9.6 cm in longitudinal, AP, and transverse dimensions. The uterus demonstrates heterogeneous echogenicity. Endometrial stripe thickness measures 7 mm. There is a small amount of fluid in the endometrial canal.  The right and left ovaries measure 4.2 x 1.8 x 2.8 cm and 3.5 x 1.6 x 2.6 cm, respectively. No solid or cystic adnexal mass is identified. Color Doppler and spectral waveform analysis reveal bilateral ovarian perfusion. No free fluid is noted in the pelvis. Enlarged uterus demonstrating heterogeneous echogenicity, potentially related to leiomyomatous change. Normal endometrial stripe thickness and small amount of fluid in the endometrial canal. No visible adnexal abnormality. No free fluid. CT ABDOMEN PELVIS W IV CONTRAST Additional Contrast? None    Result Date: 10/4/2022  EXAMINATION: CT OF THE ABDOMEN AND PELVIS WITH CONTRAST 10/4/2022 3:51 am TECHNIQUE: CT of the abdomen and pelvis was performed with the administration of intravenous contrast. Multiplanar reformatted images are provided for review. Automated exposure control, iterative reconstruction, and/or weight based adjustment of the mA/kV was utilized to reduce the radiation dose to as low as reasonably achievable.  COMPARISON: 09/30/2022 HISTORY: ORDERING SYSTEM PROVIDED HISTORY: diffuse abdominal pain TECHNOLOGIST PROVIDED HISTORY: Additional Contrast?->None Reason for exam:->diffuse abdominal pain Decision Support Exception - unselect if not a suspected or confirmed emergency medical condition->Emergency Medical Condition (MA) FINDINGS: Lower Chest:  Visualized portion of the lower chest demonstrates no acute abnormality. Organs: The liver, gallbladder, spleen, pancreas, adrenals, and kidneys are unremarkable. GI/Bowel: There is no evidence of bowel obstruction. No evidence of abnormal bowel wall thickening or distension. The appendix is normal. Pelvis: The urinary bladder is partially filled. The uterus is mildly enlarged. There is a small amount of fluid within the endometrial cavity. Peritoneum/Retroperitoneum: Trace free fluid layering within the pelvis. No extraluminal gas. No evidence of lymphadenopathy. Aorta is normal in caliber. Bones/Soft Tissues:  No acute abnormality of the visualized osseous structures. No acute abdominopelvic abnormality. Small amount of fluid within the endometrial cavity, nonspecific. US DUP ABD PEL RETRO SCROT COMPLETE    Result Date: 10/4/2022  EXAMINATION: PELVIC ULTRASOUND; DOPPLER EVALUATION OF THE PELVIS 10/4/2022 TECHNIQUE: Transabdominal sonographic evaluation of the pelvis was performed. Endovaginal sonography could not be performed due to patient immobility. COMPARISON: None HISTORY: ORDERING SYSTEM PROVIDED HISTORY: pain; r/o torsion TECHNOLOGIST PROVIDED HISTORY: Reason for exam:->pain; r/o torsion What reading provider will be dictating this exam?->CRC FINDINGS: The uterus measures 12.4 x 8.3 x 9.6 cm in longitudinal, AP, and transverse dimensions. The uterus demonstrates heterogeneous echogenicity. Endometrial stripe thickness measures 7 mm. There is a small amount of fluid in the endometrial canal.  The right and left ovaries measure 4.2 x 1.8 x 2.8 cm and 3.5 x 1.6 x 2.6 cm, respectively. No solid or cystic adnexal mass is identified. Color Doppler and spectral waveform analysis reveal bilateral ovarian perfusion. No free fluid is noted in the pelvis.      Enlarged uterus demonstrating heterogeneous echogenicity, potentially related to leiomyomatous change. Normal endometrial stripe thickness and small amount of fluid in the endometrial canal. No visible adnexal abnormality. No free fluid.        Patient Instructions:      Medication List        START taking these medications      lactulose 10 GM/15ML solution  Commonly known as: CHRONULAC  Take 15 mLs by mouth every evening     metoclopramide 5 MG tablet  Commonly known as: Reglan  Take 1 tablet by mouth in the morning and at bedtime     pantoprazole 40 MG tablet  Commonly known as: PROTONIX  Take 1 tablet by mouth every morning (before breakfast)            CONTINUE taking these medications      ferrous sulfate 325 (65 Fe) MG tablet  Commonly known as: IRON 325            STOP taking these medications      aspirin EC 81 MG EC tablet     busPIRone 10 MG tablet  Commonly known as: BUSPAR     dicyclomine 10 MG capsule  Commonly known as: Bentyl     FLUoxetine 20 MG capsule  Commonly known as: PROZAC     ibuprofen 600 MG tablet  Commonly known as: ADVIL;MOTRIN     omeprazole 40 MG delayed release capsule  Commonly known as: PRILOSEC               Where to Get Your Medications        These medications were sent to [address] [place] [address] [phone]  [address] [place] [address]      Phone: [phone]   pantoprazole 40 MG tablet       These medications were sent to [facility], [address], [address] [place] [id number]      Phone: [phone]   lactulose 10 GM/15ML solution  metoclopramide 5 MG tablet           Note that more than 30 minutes was spent in preparing discharge papers, discussing discharge with patient, medication review, etc.    Signed:  Electronically signed by Braulio Rosario MD on 10/11/2022 at 1:36 PM

## 2022-10-11 NOTE — DISCHARGE INSTRUCTIONS
Call Dr Alfonso Hodgson to schedule follow up       [facility] OFFICE FOR FOLLOW UP [address] [place] [address].   [phone]  [address] [place] [address],[address], [address] [place] [address]

## 2022-10-12 ENCOUNTER — HOSPITAL ENCOUNTER (EMERGENCY)
Age: 37
Discharge: HOME | End: 2022-10-12
Attending: EMERGENCY MEDICINE
Payer: COMMERCIAL

## 2022-10-12 ENCOUNTER — APPOINTMENT (OUTPATIENT)
Dept: GENERAL RADIOLOGY | Age: 37
End: 2022-10-12
Payer: COMMERCIAL

## 2022-10-12 ENCOUNTER — TELEPHONE (OUTPATIENT)
Dept: OBGYN | Age: 37
End: 2022-10-12

## 2022-10-12 VITALS
DIASTOLIC BLOOD PRESSURE: 65 MMHG | RESPIRATION RATE: 18 BRPM | TEMPERATURE: 98 F | OXYGEN SATURATION: 96 % | SYSTOLIC BLOOD PRESSURE: 102 MMHG | HEART RATE: 118 BPM

## 2022-10-12 DIAGNOSIS — R11.2 NAUSEA AND VOMITING, UNSPECIFIED VOMITING TYPE: ICD-10-CM

## 2022-10-12 DIAGNOSIS — R10.11 RIGHT UPPER QUADRANT ABDOMINAL PAIN: Primary | ICD-10-CM

## 2022-10-12 LAB
ALBUMIN SERPL-MCNC: 2.5 G/DL (ref 3.5–5.2)
ALP BLD-CCNC: 141 U/L (ref 35–104)
ALT SERPL-CCNC: 10 U/L (ref 0–32)
ANION GAP SERPL CALCULATED.3IONS-SCNC: 12 MMOL/L (ref 7–16)
AST SERPL-CCNC: 25 U/L (ref 0–31)
BACTERIA: ABNORMAL /HPF
BASOPHILS ABSOLUTE: 0.07 E9/L (ref 0–0.2)
BASOPHILS RELATIVE PERCENT: 0.4 % (ref 0–2)
BILIRUB SERPL-MCNC: 0.5 MG/DL (ref 0–1.2)
BILIRUBIN URINE: NEGATIVE
BLOOD, URINE: ABNORMAL
BUN BLDV-MCNC: 3 MG/DL (ref 6–20)
CALCIUM SERPL-MCNC: 8.5 MG/DL (ref 8.6–10.2)
CHLORIDE BLD-SCNC: 98 MMOL/L (ref 98–107)
CLARITY: CLEAR
CO2: 21 MMOL/L (ref 22–29)
COLOR: YELLOW
CREAT SERPL-MCNC: 0.6 MG/DL (ref 0.5–1)
CULTURE, STOOL: NORMAL
EOSINOPHILS ABSOLUTE: 0.06 E9/L (ref 0.05–0.5)
EOSINOPHILS RELATIVE PERCENT: 0.3 % (ref 0–6)
GFR AFRICAN AMERICAN: >60
GFR NON-AFRICAN AMERICAN: >60 ML/MIN/1.73
GLUCOSE BLD-MCNC: 98 MG/DL (ref 74–99)
GLUCOSE URINE: NEGATIVE MG/DL
HCG, URINE, POC: NEGATIVE
HCT VFR BLD CALC: 30.9 % (ref 34–48)
HEMOGLOBIN: 10.4 G/DL (ref 11.5–15.5)
IMMATURE GRANULOCYTES #: 0.38 E9/L
IMMATURE GRANULOCYTES %: 2.1 % (ref 0–5)
KETONES, URINE: ABNORMAL MG/DL
LACTIC ACID, SEPSIS: 1.1 MMOL/L (ref 0.5–1.9)
LEUKOCYTE ESTERASE, URINE: ABNORMAL
LIPASE: 27 U/L (ref 13–60)
LYMPHOCYTES ABSOLUTE: 1.08 E9/L (ref 1.5–4)
LYMPHOCYTES RELATIVE PERCENT: 6.1 % (ref 20–42)
Lab: 0
MCH RBC QN AUTO: 32.7 PG (ref 26–35)
MCHC RBC AUTO-ENTMCNC: 33.7 % (ref 32–34.5)
MCV RBC AUTO: 97.2 FL (ref 80–99.9)
MONOCYTES ABSOLUTE: 0.9 E9/L (ref 0.1–0.95)
MONOCYTES RELATIVE PERCENT: 5.1 % (ref 2–12)
NEGATIVE QC PASS/FAIL: NORMAL
NEUTROPHILS ABSOLUTE: 15.27 E9/L (ref 1.8–7.3)
NEUTROPHILS RELATIVE PERCENT: 86 % (ref 43–80)
NITRITE, URINE: NEGATIVE
PDW BLD-RTO: 14.1 FL (ref 11.5–15)
PH UA: 7 (ref 5–9)
PLATELET # BLD: 242 E9/L (ref 130–450)
PMV BLD AUTO: 11.2 FL (ref 7–12)
POSITIVE QC PASS/FAIL: NORMAL
POTASSIUM SERPL-SCNC: 4.4 MMOL/L (ref 3.5–5)
PROTEIN UA: NEGATIVE MG/DL
RBC # BLD: 3.18 E12/L (ref 3.5–5.5)
RBC UA: ABNORMAL /HPF (ref 0–2)
SODIUM BLD-SCNC: 131 MMOL/L (ref 132–146)
SPECIFIC GRAVITY UA: 1.01 (ref 1–1.03)
TOTAL PROTEIN: 7.2 G/DL (ref 6.4–8.3)
UROBILINOGEN, URINE: 1 E.U./DL
WBC # BLD: 17.8 E9/L (ref 4.5–11.5)
WBC UA: ABNORMAL /HPF (ref 0–5)

## 2022-10-12 PROCEDURE — 80053 COMPREHEN METABOLIC PANEL: CPT

## 2022-10-12 PROCEDURE — 6360000002 HC RX W HCPCS: Performed by: EMERGENCY MEDICINE

## 2022-10-12 PROCEDURE — 74022 RADEX COMPL AQT ABD SERIES: CPT

## 2022-10-12 PROCEDURE — 99284 EMERGENCY DEPT VISIT MOD MDM: CPT

## 2022-10-12 PROCEDURE — 83690 ASSAY OF LIPASE: CPT

## 2022-10-12 PROCEDURE — 96375 TX/PRO/DX INJ NEW DRUG ADDON: CPT

## 2022-10-12 PROCEDURE — 81001 URINALYSIS AUTO W/SCOPE: CPT

## 2022-10-12 PROCEDURE — 2580000003 HC RX 258: Performed by: EMERGENCY MEDICINE

## 2022-10-12 PROCEDURE — 85025 COMPLETE CBC W/AUTO DIFF WBC: CPT

## 2022-10-12 PROCEDURE — 96374 THER/PROPH/DIAG INJ IV PUSH: CPT

## 2022-10-12 PROCEDURE — 83605 ASSAY OF LACTIC ACID: CPT

## 2022-10-12 RX ORDER — MORPHINE SULFATE 4 MG/ML
4 INJECTION, SOLUTION INTRAMUSCULAR; INTRAVENOUS ONCE
Status: COMPLETED | OUTPATIENT
Start: 2022-10-12 | End: 2022-10-12

## 2022-10-12 RX ORDER — 0.9 % SODIUM CHLORIDE 0.9 %
1000 INTRAVENOUS SOLUTION INTRAVENOUS ONCE
Status: COMPLETED | OUTPATIENT
Start: 2022-10-12 | End: 2022-10-12

## 2022-10-12 RX ORDER — PROMETHAZINE HYDROCHLORIDE 25 MG/1
25 TABLET ORAL 3 TIMES DAILY PRN
Qty: 12 TABLET | Refills: 0 | Status: SHIPPED | OUTPATIENT
Start: 2022-10-12 | End: 2022-10-19

## 2022-10-12 RX ORDER — DROPERIDOL 2.5 MG/ML
1.25 INJECTION, SOLUTION INTRAMUSCULAR; INTRAVENOUS ONCE
Status: COMPLETED | OUTPATIENT
Start: 2022-10-12 | End: 2022-10-12

## 2022-10-12 RX ORDER — ONDANSETRON 2 MG/ML
4 INJECTION INTRAMUSCULAR; INTRAVENOUS ONCE
Status: COMPLETED | OUTPATIENT
Start: 2022-10-12 | End: 2022-10-12

## 2022-10-12 RX ADMIN — ONDANSETRON HYDROCHLORIDE 4 MG: 2 SOLUTION INTRAMUSCULAR; INTRAVENOUS at 12:37

## 2022-10-12 RX ADMIN — DROPERIDOL 1.25 MG: 2.5 INJECTION, SOLUTION INTRAMUSCULAR; INTRAVENOUS at 15:34

## 2022-10-12 RX ADMIN — SODIUM CHLORIDE 1000 ML: 9 INJECTION, SOLUTION INTRAVENOUS at 12:37

## 2022-10-12 RX ADMIN — MORPHINE SULFATE 4 MG: 4 INJECTION, SOLUTION INTRAMUSCULAR; INTRAVENOUS at 12:37

## 2022-10-12 ASSESSMENT — PAIN DESCRIPTION - LOCATION: LOCATION: ABDOMEN

## 2022-10-12 ASSESSMENT — PAIN - FUNCTIONAL ASSESSMENT: PAIN_FUNCTIONAL_ASSESSMENT: 0-10

## 2022-10-12 ASSESSMENT — PAIN DESCRIPTION - DESCRIPTORS: DESCRIPTORS: ACHING

## 2022-10-12 ASSESSMENT — PAIN SCALES - GENERAL
PAINLEVEL_OUTOF10: 7
PAINLEVEL_OUTOF10: 8

## 2022-10-12 ASSESSMENT — PAIN DESCRIPTION - ORIENTATION: ORIENTATION: RIGHT

## 2022-10-12 NOTE — ED NOTES
This patient was assessed by the doctor only. Nurse processed and completed the orders from this doctor ie labs, meds, and/or EKG.         Glenn Luciano RN  10/12/22 [id number]

## 2022-10-12 NOTE — ED NOTES
Department of Emergency Medicine  FIRST PROVIDER TRIAGE NOTE             Independent MLP           10/12/22  10:29 AM EDT    Date of Encounter: 10/12/22   MRN: [id number]      HPI: Troy Harry is a 30 y.o. female who presents to the ED for Abdominal Pain (Abd pain x 2 weeks . States she has been having issues with gallbladder and fibroids x 1 year. )      ROS: Negative for cp or sob. PE: Gen Appearance/Constitutional: alert  Musculoskeletal: moves all extremities x 4     Initial Plan of Care: All treatment areas with department are currently occupied. Plan to order/Initiate the following while awaiting opening in ED: labs.   Initiate Treatment-Testing, Proceed toTreatment Area When Bed Available for ED Attending/MLP to Continue Care    Electronically signed by NOLAN Mijares CNP   DD: 10/12/22      NOLAN Leslie CNP  10/12/22 [phone]

## 2022-10-12 NOTE — TELEPHONE ENCOUNTER
Patient was told to call for a f/u from the ED. Still in severe pain. Advised pain to return to the ED if pain is unbearable and persistent. Please review chart and advise.

## 2022-10-12 NOTE — ED PROVIDER NOTES
HPI:  10/12/22,   Time: 12:13 PM EDT         Tyler Mello is a 30 y.o. female presenting to the ED for abdominal pain nausea vomiting, beginning 12 hours ago. The complaint has been persistent, moderate in severity, and worsened by nothing. She was discharged from the hospital yesterday after an admission for a partial small bowel obstruction and uterine fibroids with associated abdominal pain which the patient had a normal EGD and an ultrasound that revealed biliary sludge. Patient states she was discharged and was okay for 12 hours and then started vomiting in the middle of the night and having increasing pain in her back and her right side    ROS:   Pertinent positives and negatives are stated within HPI, all other systems reviewed and are negative.  --------------------------------------------- PAST HISTORY ---------------------------------------------  Past Medical History:  has a past medical history of Anemia, DVT (deep venous thrombosis) ([facility] [address] DVT of lower extremity (deep venous thrombosis) ([facility] [address] History of DVT of lower extremity, and Migraine. Past Surgical History:  has a past surgical history that includes Induced  (10/12/12); Dilation & curettage; and Upper gastrointestinal endoscopy (N/A, 10/5/2022). Social History:  reports that she has never smoked. She has never used smokeless tobacco. She reports that she does not drink alcohol and does not use drugs. Family History: family history is not on file. The patients home medications have been reviewed.     Allergies: Latex    -------------------------------------------------- RESULTS -------------------------------------------------  All laboratory and radiology results have been personally reviewed by myself   LABS:  Results for orders placed or performed during the hospital encounter of 10/12/22   CBC with Auto Differential   Result Value Ref Range    WBC 17.8 (H) 4.5 - 11.5 E9/L    RBC 3.18 (L) 3.50 - 5.50 E12/L Hemoglobin 10.4 (L) 11.5 - 15.5 g/dL    Hematocrit 30.9 (L) 34.0 - 48.0 %    MCV 97.2 80.0 - 99.9 fL    MCH 32.7 26.0 - 35.0 pg    MCHC 33.7 32.0 - 34.5 %    RDW 14.1 11.5 - 15.0 fL    Platelets 925 308 - 755 E9/L    MPV 11.2 7.0 - 12.0 fL    Neutrophils % 86.0 (H) 43.0 - 80.0 %    Immature Granulocytes % 2.1 0.0 - 5.0 %    Lymphocytes % 6.1 (L) 20.0 - 42.0 %    Monocytes % 5.1 2.0 - 12.0 %    Eosinophils % 0.3 0.0 - 6.0 %    Basophils % 0.4 0.0 - 2.0 %    Neutrophils Absolute 15.27 (H) 1.80 - 7.30 E9/L    Immature Granulocytes # 0.38 E9/L    Lymphocytes Absolute 1.08 (L) 1.50 - 4.00 E9/L    Monocytes Absolute 0.90 0.10 - 0.95 E9/L    Eosinophils Absolute 0.06 0.05 - 0.50 E9/L    Basophils Absolute 0.07 0.00 - 0.20 E9/L   Comprehensive Metabolic Panel   Result Value Ref Range    Sodium 131 (L) 132 - 146 mmol/L    Potassium 4.4 3.5 - 5.0 mmol/L    Chloride 98 98 - 107 mmol/L    CO2 21 (L) 22 - 29 mmol/L    Anion Gap 12 7 - 16 mmol/L    Glucose 98 74 - 99 mg/dL    BUN 3 (L) 6 - 20 mg/dL    Creatinine 0.6 0.5 - 1.0 mg/dL    GFR Non-African American >60 >=60 mL/min/1.73    GFR African American >60     Calcium 8.5 (L) 8.6 - 10.2 mg/dL    Total Protein 7.2 6.4 - 8.3 g/dL    Albumin 2.5 (L) 3.5 - 5.2 g/dL    Total Bilirubin 0.5 0.0 - 1.2 mg/dL    Alkaline Phosphatase 141 (H) 35 - 104 U/L    ALT 10 0 - 32 U/L    AST 25 0 - 31 U/L   Urinalysis   Result Value Ref Range    Color, UA Yellow Straw/Yellow    Clarity, UA Clear Clear    Glucose, Ur Negative Negative mg/dL    Bilirubin Urine Negative Negative    Ketones, Urine TRACE (A) Negative mg/dL    Specific Gravity, UA 1.010 1.005 - 1.030    Blood, Urine TRACE-INTACT Negative    pH, UA 7.0 5.0 - 9.0    Protein, UA Negative Negative mg/dL    Urobilinogen, Urine 1.0 <2.0 E.U./dL    Nitrite, Urine Negative Negative    Leukocyte Esterase, Urine TRACE (A) Negative   Lactate, Sepsis   Result Value Ref Range    Lactic Acid, Sepsis 1.1 0.5 - 1.9 mmol/L   Lipase   Result Value Ref Range    Lipase 27 13 - 60 U/L   Microscopic Urinalysis   Result Value Ref Range    WBC, UA 1-3 0 - 5 /HPF    RBC, UA 1-3 0 - 2 /HPF    Bacteria, UA RARE (A) None Seen /HPF   POC Pregnancy Urine Qual   Result Value Ref Range    HCG, Urine, POC Negative Negative    Lot Number 0     Positive QC Pass/Fail Pass     Negative QC Pass/Fail Pass        RADIOLOGY:  Interpreted by Radiologist.  XR ACUTE ABD SERIES CHEST 1 VW   Final Result   Bibasilar chest opacities which are likely atelectasis. Unremarkable abdomen. ------------------------- NURSING NOTES AND VITALS REVIEWED ---------------------------   The nursing notes within the ED encounter and vital signs as below have been reviewed. /65   Pulse (!) 118   Temp 98 °F (36.7 °C)   Resp 18   SpO2 96%   Oxygen Saturation Interpretation: Normal      ---------------------------------------------------PHYSICAL EXAM--------------------------------------      Constitutional/General: Alert and oriented x3, well appearing, non toxic in NAD  Head: NC/AT  Eyes: PERRL, EOMI  Mouth: Oropharynx clear, handling secretions, no trismus  Neck: Supple, full ROM, no meningeal signs  Pulmonary: Lungs clear to auscultation bilaterally, no wheezes, rales, or rhonchi. Not in respiratory distress  Cardiovascular:  Regular echocardiac rate and rhythm, no murmurs, gallops, or rubs. 2+ distal pulses  Abdomen: Soft, diffusely tender, non distended, no guarding rebound or rigidity  Extremities: Moves all extremities x 4.  Warm and well perfused  Skin: warm and dry without rash  Neurologic: GCS 15,  Psych: Normal Affect      ------------------------------ ED COURSE/MEDICAL DECISION MAKING----------------------  Medications   morphine sulfate (PF) injection 4 mg (4 mg IntraVENous Given 10/12/22 1237)   ondansetron (ZOFRAN) injection 4 mg (4 mg IntraVENous Given 10/12/22 1237)   0.9 % sodium chloride bolus (0 mLs IntraVENous Stopped 10/12/22 0651)   droperidol (INAPSINE) injection 1.25 mg (1.25 mg IntraVENous Given 10/12/22 1530)         Medical Decision Making:    She was given IV fluids morphine Zofran and droperidol and improved. She felt woozy and was instructed not to drive. On reexamination her abdomen was nontender. Patient was able to tolerate oral fluids. She was texting and making phone calls and was in no distress at the time of discharge. She was told to return if she had increased pain fever vomiting    Counseling: The emergency provider has spoken with the patient and discussed todays results, in addition to providing specific details for the plan of care and counseling regarding the diagnosis and prognosis. Questions are answered at this time and they are agreeable with the plan.      --------------------------------- IMPRESSION AND DISPOSITION ---------------------------------    IMPRESSION  1. Right upper quadrant abdominal pain    2.  Nausea and vomiting, unspecified vomiting type        DISPOSITION  Disposition: Discharge to home  Patient condition is stable                  Monserrat Mcleod MD  10/12/22 4436

## 2022-10-13 LAB — CALPROTECTIN, FECAL: 349 UG/G

## 2022-10-13 NOTE — TELEPHONE ENCOUNTER
Pt returning call to follow up on ED appt. Next available for me to schedule would be Nov10. Please advise.  Thank you

## 2022-10-14 ENCOUNTER — TELEPHONE (OUTPATIENT)
Dept: ADMINISTRATIVE | Age: 37
End: 2022-10-14

## 2022-10-14 ENCOUNTER — NURSE TRIAGE (OUTPATIENT)
Dept: OTHER | Facility: CLINIC | Age: 37
End: 2022-10-14

## 2022-10-14 NOTE — TELEPHONE ENCOUNTER
Location of patient: [place]    Received call from Sudeep Comer at [facility] with Red Flag Complaint. Subjective: Caller states \"Abd pain\"     Current Symptoms: Lower abd pain. Seen in the ED 10/12 for similar reason. States pain is worsen. Last BM 10/10    Onset: a few days ago; worsening    Associated Symptoms: NA    Pain Severity: 8/10; contraction; intermittent    Temperature: denies fever    What has been tried: Tylenol    LMP:  9/25/22  Pregnant: No    Recommended disposition: Go to ED/UCC Now (Or to Office with PCP Approval)    Care advice provided, patient verbalizes understanding; denies any other questions or concerns; instructed to call back for any new or worsening symptoms. Writer provided warm transfer to Staff at [facility] for 2nd triage level    Attention Provider: Thank you for allowing me to participate in the care of your patient. The patient was connected to triage in response to information provided to the ECC/PSC. Please do not respond through this encounter as the response is not directed to a shared pool.       Reason for Disposition   Constant abdominal pain lasting > 2 hours    Protocols used: Abdominal Pain - Female-ADULT-[place]

## 2022-10-14 NOTE — TELEPHONE ENCOUNTER
I transferred patient to nurse Kayleen Reyes for painful fibroids. Patient was calling to see Dr Thuy Kapoor.

## 2022-10-15 LAB
BLOOD CULTURE, ROUTINE: NORMAL
CULTURE, BLOOD 2: NORMAL

## 2022-10-19 ENCOUNTER — OFFICE VISIT (OUTPATIENT)
Dept: OBGYN | Age: 37
End: 2022-10-19
Payer: COMMERCIAL

## 2022-10-19 VITALS
WEIGHT: 144 LBS | BODY MASS INDEX: 28.27 KG/M2 | HEIGHT: 60 IN | HEART RATE: 80 BPM | RESPIRATION RATE: 16 BRPM | SYSTOLIC BLOOD PRESSURE: 132 MMHG | DIASTOLIC BLOOD PRESSURE: 78 MMHG

## 2022-10-19 DIAGNOSIS — D21.9 FIBROIDS: ICD-10-CM

## 2022-10-19 DIAGNOSIS — N92.0 MENORRHAGIA WITH REGULAR CYCLE: ICD-10-CM

## 2022-10-19 DIAGNOSIS — N85.2 UTERINE ENLARGEMENT: Primary | ICD-10-CM

## 2022-10-19 DIAGNOSIS — D50.0 IRON DEFICIENCY ANEMIA DUE TO CHRONIC BLOOD LOSS: ICD-10-CM

## 2022-10-19 PROCEDURE — G8417 CALC BMI ABV UP PARAM F/U: HCPCS | Performed by: OBSTETRICS & GYNECOLOGY

## 2022-10-19 PROCEDURE — G8427 DOCREV CUR MEDS BY ELIG CLIN: HCPCS | Performed by: OBSTETRICS & GYNECOLOGY

## 2022-10-19 PROCEDURE — 1036F TOBACCO NON-USER: CPT | Performed by: OBSTETRICS & GYNECOLOGY

## 2022-10-19 PROCEDURE — 1111F DSCHRG MED/CURRENT MED MERGE: CPT | Performed by: OBSTETRICS & GYNECOLOGY

## 2022-10-19 PROCEDURE — 99213 OFFICE O/P EST LOW 20 MIN: CPT | Performed by: OBSTETRICS & GYNECOLOGY

## 2022-10-19 PROCEDURE — G8484 FLU IMMUNIZE NO ADMIN: HCPCS | Performed by: OBSTETRICS & GYNECOLOGY

## 2022-10-19 PROCEDURE — 99212 OFFICE O/P EST SF 10 MIN: CPT | Performed by: OBSTETRICS & GYNECOLOGY

## 2022-10-19 RX ORDER — MEDROXYPROGESTERONE ACETATE 10 MG/1
10 TABLET ORAL DAILY
Qty: 10 TABLET | Refills: 0 | Status: SHIPPED
Start: 2022-10-19 | End: 2022-10-25 | Stop reason: SDUPTHER

## 2022-10-19 NOTE — PROGRESS NOTES
Referred back from the ER for heavy Menses and pain. Never did get to see Dr. Stephanie Stafford yet. Most likely will need a hysterectomy for her fibroids done and we had actually discussed tht in January. I had actually discussed this with her in January. Will try to get her an appt with either Dr. Jamshid Wiclox or Dr. Stephanie Stafford in the near future to discuss possible surgery. Continues to have heavy bleeding meliton discomfort from the fibroids. Continue the Iron as well. Take one oral course of Provera 10 for 10 days now. See if we can get her flow a bit better until could possibly get surgery.

## 2022-10-19 NOTE — PROGRESS NOTES
Referred from ER for her fibroids. Schedule her back with  or . for possible surgery for these fibroids.

## 2022-10-25 ENCOUNTER — OFFICE VISIT (OUTPATIENT)
Dept: OBGYN | Age: 37
End: 2022-10-25
Payer: COMMERCIAL

## 2022-10-25 ENCOUNTER — TELEPHONE (OUTPATIENT)
Dept: BREAST CENTER | Age: 37
End: 2022-10-25

## 2022-10-25 ENCOUNTER — TELEPHONE (OUTPATIENT)
Dept: OBGYN | Age: 37
End: 2022-10-25

## 2022-10-25 VITALS
DIASTOLIC BLOOD PRESSURE: 77 MMHG | SYSTOLIC BLOOD PRESSURE: 113 MMHG | WEIGHT: 155.6 LBS | HEART RATE: 104 BPM | BODY MASS INDEX: 30.39 KG/M2

## 2022-10-25 DIAGNOSIS — D21.9 FIBROIDS: ICD-10-CM

## 2022-10-25 DIAGNOSIS — Z12.4 SCREENING FOR CERVICAL CANCER: ICD-10-CM

## 2022-10-25 DIAGNOSIS — N93.9 ABNORMAL UTERINE BLEEDING (AUB): ICD-10-CM

## 2022-10-25 DIAGNOSIS — D25.9 UTERINE LEIOMYOMA, UNSPECIFIED LOCATION: Primary | ICD-10-CM

## 2022-10-25 DIAGNOSIS — Z91.89 AT HIGH RISK FOR BREAST CANCER: ICD-10-CM

## 2022-10-25 DIAGNOSIS — D50.0 IRON DEFICIENCY ANEMIA DUE TO CHRONIC BLOOD LOSS: ICD-10-CM

## 2022-10-25 PROCEDURE — G8427 DOCREV CUR MEDS BY ELIG CLIN: HCPCS | Performed by: OBSTETRICS & GYNECOLOGY

## 2022-10-25 PROCEDURE — G8484 FLU IMMUNIZE NO ADMIN: HCPCS | Performed by: OBSTETRICS & GYNECOLOGY

## 2022-10-25 PROCEDURE — 1036F TOBACCO NON-USER: CPT | Performed by: OBSTETRICS & GYNECOLOGY

## 2022-10-25 PROCEDURE — 1111F DSCHRG MED/CURRENT MED MERGE: CPT | Performed by: OBSTETRICS & GYNECOLOGY

## 2022-10-25 PROCEDURE — G8417 CALC BMI ABV UP PARAM F/U: HCPCS | Performed by: OBSTETRICS & GYNECOLOGY

## 2022-10-25 PROCEDURE — 99214 OFFICE O/P EST MOD 30 MIN: CPT | Performed by: OBSTETRICS & GYNECOLOGY

## 2022-10-25 PROCEDURE — 99213 OFFICE O/P EST LOW 20 MIN: CPT | Performed by: OBSTETRICS & GYNECOLOGY

## 2022-10-25 RX ORDER — MEDROXYPROGESTERONE ACETATE 10 MG/1
10 TABLET ORAL DAILY
Qty: 30 TABLET | Refills: 1 | Status: SHIPPED | OUTPATIENT
Start: 2022-10-25 | End: 2022-11-24

## 2022-10-25 RX ORDER — LORAZEPAM 1 MG/1
TABLET ORAL
COMMUNITY
Start: 2022-10-17

## 2022-10-25 NOTE — PROGRESS NOTES
HISTORY OF PRESENT ILLNESS:    30 y.o. female  [id number] presents with complaint of irregular and heavy bleeding. Pt reports bleeding every 1-4 weeks lasting 1-4 weeks at a time. Sometimes bleeding is light, sometimes it is heavy. Pt reports bleeding for the last month. She was given provera last week and bleeding has since stopped. She reports extreme pain with her cycles. Pt is sexually active. She occasionally uses condoms. History is significant for enlarged fibroid uterus, 12x4x8. 3x9.6 cm. She also has a history of DVT in  and . [facility] Pt has needed a blood transfusion for her anemia last December. Past Medical History:   Past Medical History:   Diagnosis Date    Anemia 2020    DVT (deep venous thrombosis) ([facility])     DVT of lower extremity (deep venous thrombosis) ([facility])     left lower    History of DVT of lower extremity     left leg     Migraine                                              OB History    Para Term  AB Living   5 3   3 1 4   SAB IAB Ectopic Molar Multiple Live Births     1       3      # Outcome Date GA Lbr Jesse/2nd Weight Sex Delivery Anes PTL Lv   5  12 36w0d  4 lb 6 oz (1.984 kg) F Vag-Spont EPI N TAMIR      Birth Comments: srom   4 IAB 2012              Birth Comments: System Generated. Please review and update pregnancy details.    3  08 36w0d  4 lb 12 oz (2.155 kg) F Vag-Spont None N TAMIR      Birth Comments: srom   2  10/28/04 36w0d  5 lb 6 oz (2.438 kg) M Vag-Spont EPI N TAMIR      Birth Comments: srom   1                   Past Surgical History:   Past Surgical History:   Procedure Laterality Date    DILATION AND CURETTAGE      INDUCED   10/12/12    UPPER GASTROINTESTINAL ENDOSCOPY N/A 10/5/2022    EGD DIAGNOSTIC ONLY performed by Zee Reza MD at [facility] ENDOSCOPY        Allergies: Latex     Medications:   Current Outpatient Medications   Medication Sig Dispense Refill    medroxyPROGESTERone (PROVERA) 10 MG tablet Take 1 tablet by mouth daily 30 tablet 1    pantoprazole (PROTONIX) 40 MG tablet Take 1 tablet by mouth every morning (before breakfast) 30 tablet 0    metoclopramide (REGLAN) 5 MG tablet Take 1 tablet by mouth in the morning and at bedtime 120 tablet 3    lactulose (CHRONULAC) 10 GM/15ML solution Take 15 mLs by mouth every evening 237 mL 1    LORazepam (ATIVAN) 1 MG tablet take 1 tablet by mouth once daily if needed for 30 DAYS      ferrous sulfate (IRON 325) 325 (65 Fe) MG tablet Take 325 mg by mouth every morning (Patient not taking: Reported on 10/25/2022)       No current facility-administered medications for this visit. Social History:   Social History     Tobacco Use    Smoking status: Never    Smokeless tobacco: Never   Substance Use Topics    Alcohol use: No        Family History:   Family History   Problem Relation Age of Onset    Breast Cancer Sister 28    Breast Cancer Maternal Grandmother 62    Breast Cancer Maternal Aunt [address] [address]    Breast Cancer Maternal Cousin 34       REVIEW OF SYSTEMS:    Constitutional: negative  HEENT: negative  Breast: negative  Respiratory: negative  Cardiovascular: negative  Gastrointestinal: negative  Genitourinary: As per HPI  Integument: negative  Neurological: negative  Endocrine: negative          PHYSICAL EXAM  /77 (Position: Sitting)   Pulse (!) 104   Wt 155 lb 9.6 oz (70.6 kg)   LMP 09/25/2022 (Exact Date)   BMI 30.39 kg/m²   Patient's last menstrual period was 09/25/2022 (exact date). General appearance: alert, cooperative and in no acute distress.   Head: NCAT   Abdomen: soft, non-tender; bowel sounds normal; no masses,  no organomegaly  Psych: No acute distress, mood and affect full range  Neuro: Alert and oriented, no focal deficits     Pelvic Exam:   EXTERNAL GENITALIA: normal external genitalia, no external lesions  VAGINA: normal rugae, no discharge   CERVIX: normal appearing, no lesions, no bleeding  UTERUS: uterus isenlarged, approximately 16 weeks size, nontender  ADNEXA: normal adnexa in size, nontender and no masses. ASSESSMENT :      Diagnosis Orders   1. Uterine leiomyoma, unspecified location        2. Fibroids        3. Iron deficiency anemia due to chronic blood loss        4. Screening for cervical cancer  PAP SMEAR      5. Abnormal uterine bleeding (AUB)  PAP SMEAR    Prolactin    TSH    CBC    HCG, Quantitative, Pregnancy      6. At high risk for breast cancer  Toyin Bautista NP, [facility])           PLAN:  Get records from hematology. Discussed likely need for hysterectomy. Will control bleeding with provera until then. Return in about 2 weeks (around 11/8/2022) for Endometrial biopsy. Orders Placed This Encounter   Medications    medroxyPROGESTERone (PROVERA) 10 MG tablet     Sig: Take 1 tablet by mouth daily     Dispense:  30 tablet     Refill:  1         Orders Placed This Encounter   Procedures    PAP SMEAR     Order Specific Question:   Collection Type     Answer: Thin Prep     Order Specific Question:   Prior Abnormal Pap Test     Answer:   No     Order Specific Question:   Screening or Diagnostic     Answer:   Screening     Order Specific Question:   Additional STD Testing     Answer:   GC and Chlamydia     Order Specific Question:   Diagnosis Code for Additional STD Testing     Answer:   Screening for Chlamydial disease (Z11.8)     Order Specific Question:   HPV Requested?      Answer:   HPV Co-Test     Order Specific Question:   High Risk Patient     Answer:   N/A    Prolactin     Standing Status:   Future     Standing Expiration Date:   10/25/2023    TSH     Standing Status:   Future     Standing Expiration Date:   10/25/2023    CBC     Standing Status:   Future     Standing Expiration Date:   10/25/2023    HCG, Quantitative, Pregnancy     Standing Status:   Future     Standing Expiration Date: 10/25/2023    Eli Peters NP, Breast Care, [facility])     Referral Priority:   Routine     Referral Type:   Eval and Treat     Referral Reason:   Specialty Services Required     Referred to Provider:   NOLAN Rudd CNP     Requested Specialty:   Nurse Practitioner Acute Care     Number of Visits Requested:   1         Electronically signed by Cosmo Montenegro DO on 10/25/22

## 2022-10-25 NOTE — PROGRESS NOTES
Here today to for hysterectomy consult. Has painful  periods d/t fibroids. Pap smear obtained, labeled and sent to the lab. Additional lab reqs given to patient and directed to lab to obtain. Discharge instructions have been discussed with the patient. Patient advised to call our office with any questions or concerns. Voiced understanding.

## 2022-10-25 NOTE — TELEPHONE ENCOUNTER
Called to let patient know she got a referral from doctor for the breast clinic. Patient verbalized understanding.

## 2022-10-25 NOTE — TELEPHONE ENCOUNTER
Patient is a new referral, spoke with patient to gather information. Will call [facility] to obtain her last mammogram. She states she is not having any breast issues, just strong family history. Appointment given 11/30/22 with mammogram prior.     Electronically signed by Javier Tavares RN on 10/25/22 at 3:49 PM EDT

## 2022-10-27 DIAGNOSIS — Z12.31 VISIT FOR SCREENING MAMMOGRAM: Primary | ICD-10-CM

## 2022-10-28 ENCOUNTER — TELEPHONE (OUTPATIENT)
Dept: OBGYN | Age: 37
End: 2022-10-28

## 2022-10-28 DIAGNOSIS — A54.9 GONORRHEA: Primary | ICD-10-CM

## 2022-10-28 LAB
CHLAMYDIA BY THIN PREP: NEGATIVE
HPV SAMPLE: ABNORMAL
HPV TYPE 16: DETECTED
HPV TYPE 18: NOT DETECTED
HPV, HIGH RISK OTHER: DETECTED
INTERPRETATION: ABNORMAL
N. GONORRHOEAE DNA, THIN PREP: ABNORMAL
SOURCE: ABNORMAL
SOURCE: ABNORMAL

## 2022-10-28 NOTE — TELEPHONE ENCOUNTER
Pt has gonorrhea. Please have her come in for rocephin. Partner will need treated and they will need to abstain for 2 weeks.

## 2022-11-03 ENCOUNTER — NURSE ONLY (OUTPATIENT)
Dept: OBGYN | Age: 37
End: 2022-11-03
Payer: COMMERCIAL

## 2022-11-03 VITALS — HEART RATE: 110 BPM | SYSTOLIC BLOOD PRESSURE: 108 MMHG | DIASTOLIC BLOOD PRESSURE: 76 MMHG

## 2022-11-03 DIAGNOSIS — A54.9 GONORRHEA IN FEMALE: Primary | ICD-10-CM

## 2022-11-03 PROCEDURE — 99211 OFF/OP EST MAY X REQ PHY/QHP: CPT

## 2022-11-03 RX ORDER — CEFTRIAXONE 500 MG/1
500 INJECTION, POWDER, FOR SOLUTION INTRAMUSCULAR; INTRAVENOUS ONCE
Status: COMPLETED | OUTPATIENT
Start: 2022-11-03 | End: 2022-11-03

## 2022-11-03 RX ADMIN — CEFTRIAXONE SODIUM 500 MG: 500 INJECTION, POWDER, FOR SOLUTION INTRAMUSCULAR; INTRAVENOUS at 11:45

## 2022-11-03 NOTE — PROGRESS NOTES
Rocephin (diluted with Xylocaine 1%) 500 mg IM given in the right dorsogluteal without difficulty. Patient Tolerated well. Patient instructed to no intercourse for 2 weeks, partner needs treated. Patient voiced understanding.

## 2022-11-03 NOTE — TELEPHONE ENCOUNTER
Patient treated today, 11/3/22. She is scheduled for an EMBx on 11/9. Would you like that to be a ANGELINA or schedule another appointment for a week later?

## 2022-11-06 ENCOUNTER — HOSPITAL ENCOUNTER (EMERGENCY)
Age: 37
Discharge: HOME | End: 2022-11-07
Attending: EMERGENCY MEDICINE
Payer: COMMERCIAL

## 2022-11-06 ENCOUNTER — APPOINTMENT (OUTPATIENT)
Dept: GENERAL RADIOLOGY | Age: 37
End: 2022-11-06
Payer: COMMERCIAL

## 2022-11-06 DIAGNOSIS — R07.9 CHEST PAIN, UNSPECIFIED TYPE: Primary | ICD-10-CM

## 2022-11-06 LAB
ALBUMIN SERPL-MCNC: 4.1 G/DL (ref 3.5–5.2)
ALP BLD-CCNC: 79 U/L (ref 35–104)
ALT SERPL-CCNC: 15 U/L (ref 0–32)
ANION GAP SERPL CALCULATED.3IONS-SCNC: 13 MMOL/L (ref 7–16)
AST SERPL-CCNC: 18 U/L (ref 0–31)
BILIRUB SERPL-MCNC: 0.2 MG/DL (ref 0–1.2)
BUN BLDV-MCNC: 7 MG/DL (ref 6–20)
CALCIUM SERPL-MCNC: 9.1 MG/DL (ref 8.6–10.2)
CHLORIDE BLD-SCNC: 105 MMOL/L (ref 98–107)
CO2: 22 MMOL/L (ref 22–29)
CREAT SERPL-MCNC: 0.7 MG/DL (ref 0.5–1)
GFR SERPL CREATININE-BSD FRML MDRD: >60 ML/MIN/1.73
GLUCOSE BLD-MCNC: 100 MG/DL (ref 74–99)
HCG QUALITATIVE: NEGATIVE
HCT VFR BLD CALC: 35.3 % (ref 34–48)
HEMOGLOBIN: 11.7 G/DL (ref 11.5–15.5)
MAGNESIUM: 1.8 MG/DL (ref 1.6–2.6)
MCH RBC QN AUTO: 31.5 PG (ref 26–35)
MCHC RBC AUTO-ENTMCNC: 33.1 % (ref 32–34.5)
MCV RBC AUTO: 94.9 FL (ref 80–99.9)
PDW BLD-RTO: 14.8 FL (ref 11.5–15)
PLATELET # BLD: 242 E9/L (ref 130–450)
PMV BLD AUTO: 9.9 FL (ref 7–12)
POTASSIUM SERPL-SCNC: 3.2 MMOL/L (ref 3.5–5)
RBC # BLD: 3.72 E12/L (ref 3.5–5.5)
SODIUM BLD-SCNC: 140 MMOL/L (ref 132–146)
TOTAL PROTEIN: 7.5 G/DL (ref 6.4–8.3)
TROPONIN, HIGH SENSITIVITY: <6 NG/L (ref 0–9)
WBC # BLD: 3.8 E9/L (ref 4.5–11.5)

## 2022-11-06 PROCEDURE — 84703 CHORIONIC GONADOTROPIN ASSAY: CPT

## 2022-11-06 PROCEDURE — 80053 COMPREHEN METABOLIC PANEL: CPT

## 2022-11-06 PROCEDURE — 71045 X-RAY EXAM CHEST 1 VIEW: CPT

## 2022-11-06 PROCEDURE — 36415 COLL VENOUS BLD VENIPUNCTURE: CPT

## 2022-11-06 PROCEDURE — 96374 THER/PROPH/DIAG INJ IV PUSH: CPT

## 2022-11-06 PROCEDURE — 96375 TX/PRO/DX INJ NEW DRUG ADDON: CPT

## 2022-11-06 PROCEDURE — 83735 ASSAY OF MAGNESIUM: CPT

## 2022-11-06 PROCEDURE — 93005 ELECTROCARDIOGRAM TRACING: CPT | Performed by: EMERGENCY MEDICINE

## 2022-11-06 PROCEDURE — 85027 COMPLETE CBC AUTOMATED: CPT

## 2022-11-06 PROCEDURE — 84484 ASSAY OF TROPONIN QUANT: CPT

## 2022-11-06 PROCEDURE — 99285 EMERGENCY DEPT VISIT HI MDM: CPT

## 2022-11-06 ASSESSMENT — PAIN - FUNCTIONAL ASSESSMENT
PAIN_FUNCTIONAL_ASSESSMENT: PREVENTS OR INTERFERES WITH MANY ACTIVE NOT PASSIVE ACTIVITIES
PAIN_FUNCTIONAL_ASSESSMENT: 0-10

## 2022-11-06 ASSESSMENT — PAIN DESCRIPTION - LOCATION: LOCATION: CHEST

## 2022-11-06 ASSESSMENT — PAIN DESCRIPTION - ORIENTATION: ORIENTATION: RIGHT;MID

## 2022-11-06 ASSESSMENT — PAIN DESCRIPTION - PAIN TYPE: TYPE: ACUTE PAIN

## 2022-11-06 ASSESSMENT — PAIN DESCRIPTION - ONSET: ONSET: ON-GOING

## 2022-11-06 ASSESSMENT — PAIN DESCRIPTION - DESCRIPTORS: DESCRIPTORS: SHARP

## 2022-11-06 ASSESSMENT — PAIN DESCRIPTION - FREQUENCY: FREQUENCY: CONTINUOUS

## 2022-11-06 ASSESSMENT — PAIN SCALES - GENERAL: PAINLEVEL_OUTOF10: 8

## 2022-11-07 ENCOUNTER — APPOINTMENT (OUTPATIENT)
Dept: CT IMAGING | Age: 37
End: 2022-11-07
Payer: COMMERCIAL

## 2022-11-07 VITALS
HEART RATE: 78 BPM | DIASTOLIC BLOOD PRESSURE: 70 MMHG | OXYGEN SATURATION: 100 % | TEMPERATURE: 98.2 F | RESPIRATION RATE: 16 BRPM | SYSTOLIC BLOOD PRESSURE: 110 MMHG

## 2022-11-07 LAB
D DIMER: 647 NG/ML DDU
EKG ATRIAL RATE: 89 BPM
EKG P AXIS: 53 DEGREES
EKG P-R INTERVAL: 132 MS
EKG Q-T INTERVAL: 380 MS
EKG QRS DURATION: 64 MS
EKG QTC CALCULATION (BAZETT): 462 MS
EKG R AXIS: 4 DEGREES
EKG T AXIS: 24 DEGREES
EKG VENTRICULAR RATE: 89 BPM
TROPONIN, HIGH SENSITIVITY: <6 NG/L (ref 0–9)

## 2022-11-07 PROCEDURE — 2500000003 HC RX 250 WO HCPCS: Performed by: EMERGENCY MEDICINE

## 2022-11-07 PROCEDURE — A4216 STERILE WATER/SALINE, 10 ML: HCPCS | Performed by: EMERGENCY MEDICINE

## 2022-11-07 PROCEDURE — 99285 EMERGENCY DEPT VISIT HI MDM: CPT

## 2022-11-07 PROCEDURE — 36415 COLL VENOUS BLD VENIPUNCTURE: CPT

## 2022-11-07 PROCEDURE — 96374 THER/PROPH/DIAG INJ IV PUSH: CPT

## 2022-11-07 PROCEDURE — 6360000002 HC RX W HCPCS: Performed by: EMERGENCY MEDICINE

## 2022-11-07 PROCEDURE — 93010 ELECTROCARDIOGRAM REPORT: CPT | Performed by: INTERNAL MEDICINE

## 2022-11-07 PROCEDURE — 96375 TX/PRO/DX INJ NEW DRUG ADDON: CPT

## 2022-11-07 PROCEDURE — 6360000004 HC RX CONTRAST MEDICATION: Performed by: STUDENT IN AN ORGANIZED HEALTH CARE EDUCATION/TRAINING PROGRAM

## 2022-11-07 PROCEDURE — 2580000003 HC RX 258: Performed by: STUDENT IN AN ORGANIZED HEALTH CARE EDUCATION/TRAINING PROGRAM

## 2022-11-07 PROCEDURE — 85378 FIBRIN DEGRADE SEMIQUANT: CPT

## 2022-11-07 PROCEDURE — 71275 CT ANGIOGRAPHY CHEST: CPT

## 2022-11-07 PROCEDURE — 84484 ASSAY OF TROPONIN QUANT: CPT

## 2022-11-07 PROCEDURE — 2580000003 HC RX 258: Performed by: EMERGENCY MEDICINE

## 2022-11-07 RX ORDER — KETOROLAC TROMETHAMINE 30 MG/ML
15 INJECTION, SOLUTION INTRAMUSCULAR; INTRAVENOUS ONCE
Status: COMPLETED | OUTPATIENT
Start: 2022-11-07 | End: 2022-11-07

## 2022-11-07 RX ORDER — SODIUM CHLORIDE 0.9 % (FLUSH) 0.9 %
10 SYRINGE (ML) INJECTION PRN
Status: COMPLETED | OUTPATIENT
Start: 2022-11-07 | End: 2022-11-07

## 2022-11-07 RX ORDER — ONDANSETRON 2 MG/ML
4 INJECTION INTRAMUSCULAR; INTRAVENOUS ONCE
Status: COMPLETED | OUTPATIENT
Start: 2022-11-07 | End: 2022-11-07

## 2022-11-07 RX ADMIN — IOPAMIDOL 75 ML: 755 INJECTION, SOLUTION INTRAVENOUS at 01:46

## 2022-11-07 RX ADMIN — FAMOTIDINE 20 MG: 10 INJECTION INTRAVENOUS at 00:44

## 2022-11-07 RX ADMIN — SODIUM CHLORIDE, PRESERVATIVE FREE 10 ML: 5 INJECTION INTRAVENOUS at 01:46

## 2022-11-07 RX ADMIN — ONDANSETRON 4 MG: 2 INJECTION INTRAMUSCULAR; INTRAVENOUS at 00:41

## 2022-11-07 RX ADMIN — KETOROLAC TROMETHAMINE 15 MG: 30 INJECTION, SOLUTION INTRAMUSCULAR; INTRAVENOUS at 00:35

## 2022-11-07 ASSESSMENT — HEART SCORE: ECG: 0

## 2022-11-07 ASSESSMENT — PAIN DESCRIPTION - ORIENTATION: ORIENTATION: MID

## 2022-11-07 ASSESSMENT — PAIN DESCRIPTION - DESCRIPTORS: DESCRIPTORS: SHARP

## 2022-11-07 ASSESSMENT — PAIN DESCRIPTION - LOCATION: LOCATION: CHEST

## 2022-11-07 ASSESSMENT — PAIN SCALES - GENERAL
PAINLEVEL_OUTOF10: 7
PAINLEVEL_OUTOF10: 5

## 2022-11-07 NOTE — ED PROVIDER NOTES
Missy      Pt Name: Carmen Alvarado  MRN: [id number]  [place] 1985  Date of evaluation: 11/6/2022  Provider: Christi De Los Santos DO   Please note that N95 mask was worn for all patient encounters, with overlaying surgical mask, goggles, and gown/gloves for any respiratory complaint. CHIEF COMPLAINT       Chief Complaint   Patient presents with    Chest Pain     Right sided continuous sharp chest pain that began 30 min ago when she layed down to sleep       [id number] is a 30 y.o. female who presents to the emergency department with acute onset chest pain. Patient states that she was laying in bed when she developed chest pain on the right side of her chest.  It feels a sharp sensation. Denies any arm, neck or jaw pain. Denies any exertional components. Denies any cough or sputum production. Denies hemoptysis. Denies any difficulty breathing or shortness of breath. Denies any diaphoretic episodes. Denies any history of hypertension, hyperlipidemia, diabetes, personal history of coronary artery disease, family history of coronary artery disease, marijuana, heroin or cocaine utilization. Denies tobacco utilization. Denies any recent surgical procedures or periods of immobilization. No other acute complaints. She took no medication for symptoms. She does state that she feels slightly nauseated.     REVIEW OF SYSTEMS     At least 10 systems reviewed and otherwise acutely negative except as in the [place]     Past Medical History:   Diagnosis Date    Anemia 7/8/2020    DVT (deep venous thrombosis) ([facility])     DVT of lower extremity (deep venous thrombosis) ([facility] [address]     left lower    History of DVT of lower extremity 2014    left leg 2014    Migraine        SURGICAL HISTORY       Past Surgical History:   Procedure Laterality Date    DILATION AND CURETTAGE      INDUCED   10/12/12    UPPER GASTROINTESTINAL ENDOSCOPY N/A 10/5/2022    EGD DIAGNOSTIC ONLY performed by Marc Kim MD at [address] [facility]       Previous Medications    FERROUS SULFATE (IRON 325) 325 (65 FE) MG TABLET    Take 325 mg by mouth every morning    LACTULOSE (CHRONULAC) 10 GM/15ML SOLUTION    Take 15 mLs by mouth every evening    LORAZEPAM (ATIVAN) 1 MG TABLET    take 1 tablet by mouth once daily if needed for 30 DAYS    MEDROXYPROGESTERONE (PROVERA) 10 MG TABLET    Take 1 tablet by mouth daily    METOCLOPRAMIDE (REGLAN) 5 MG TABLET    Take 1 tablet by mouth in the morning and at bedtime    PANTOPRAZOLE (PROTONIX) 40 MG TABLET    Take 1 tablet by mouth every morning (before breakfast)       ALLERGIES     Latex    FAMILY HISTORY       Family History   Problem Relation Age of Onset    Breast Cancer Sister 28    Breast Cancer Maternal Grandmother 62    Breast Cancer Maternal Aunt 50    Breast Cancer Maternal Cousin 34        SOCIAL HISTORY       Social History     Socioeconomic History    Marital status: Single     Spouse name: None    Number of children: None    Years of education: None    Highest education level: None   Tobacco Use    Smoking status: Never    Smokeless tobacco: Never   Vaping Use    Vaping Use: Never used   Substance and Sexual Activity    Alcohol use: No    Drug use: No    Sexual activity: Yes     Partners: Male       PHYSICAL EXAM     Vitals:    Vitals:    22 2244 22 2254   BP:  138/88   Pulse:  80   Resp:  18   Temp: 98.2 °F (36.8 °C)    TempSrc: Oral    SpO2:  96%       Initial Encounter Physical Exam:  Vital Signs: As described above. Constitutional: Patient not in acute distress. Head: Normocephalic, atraumatic. Ears: No external trauma noted. Eyes: Sclera anicteric, conjunctiva pink without pallor. Nose: Nares patent without rhinorrhea or epistaxis. Mouth: Soft tissues normal, uvula midline without deviation or inflammation.   Neck: No masses appreciated. Cardiac: Regular rate and rhythm, no murmurs, rubs or gallops. Pulmonary: Lung sounds CTAB, no rales or wheezing, no accessory muscle use, no conversational dyspnea. Abdomen: Soft, non-tender abdomen. Neuro: Moving all extremities spontaneously. MSK: No gross bone deformities. DIFFERENTIAL DIAGNOSIS, MDM & INITIAL ENCOUNTER:   Differential Diagnosis:  Given the history and physical examination my immediate concerns include, but are not limited to lower suspicion for an acute coronary syndrome or vascular etiology, could be GI, lower suspicion for pulmonary embolism. Initial Diagnostic and Resuscitative Maneuvers/MDM: Patient placed on cardiac and pulse oximetry monitor, IV access to, nursing staff, blood or complaints of laboratory studies. Chest x-ray and EKG also obtained. DIAGNOSTIC RESULTS   RADIOLOGY:   Included below is the interpretation per the Radiologist below, if available, at the time of submission of this note:  XR CHEST PORTABLE    Result Date: 11/6/2022  EXAMINATION: [address] CHEST 11/6/2022 10:46 pm COMPARISON: 10/12/2022 HISTORY: ORDERING SYSTEM PROVIDED HISTORY: Chest Pain TECHNOLOGIST PROVIDED HISTORY: Reason for exam:->Chest Pain FINDINGS: The lungs are without acute focal process. There is no effusion or pneumothorax. The cardiomediastinal silhouette is without acute process. The osseous structures are without acute process. No acute process. CTA PULMONARY W CONTRAST    Result Date: 11/7/2022  EXAMINATION: CTA OF THE CHEST 11/7/2022 1:29 am TECHNIQUE: CTA of the chest was performed after the administration of intravenous contrast.  Multiplanar reformatted images are provided for review. MIP images are provided for review. Automated exposure control, iterative reconstruction, and/or weight based adjustment of the mA/kV was utilized to reduce the radiation dose to as low as reasonably achievable. COMPARISON: None.  HISTORY: ORDERING SYSTEM PROVIDED HISTORY: CP with pleurisy, elevated Dimer TECHNOLOGIST PROVIDED HISTORY: Reason for exam:->CP with pleurisy, elevated Dimer Decision Support Exception - unselect if not a suspected or confirmed emergency medical condition->Emergency Medical Condition (MA) FINDINGS: Pulmonary Arteries: Pulmonary arteries are adequately opacified for evaluation. No evidence of intraluminal filling defect to suggest pulmonary embolism. Main pulmonary artery is normal in caliber. Mediastinum: No evidence of mediastinal lymphadenopathy. The heart and pericardium demonstrate no acute abnormality. There is no acute abnormality of the thoracic aorta. Lungs/pleura: The lungs are without acute process. No focal consolidation or pulmonary edema. No evidence of pleural effusion or pneumothorax. Upper Abdomen: Limited images of the upper abdomen are unremarkable. Soft Tissues/Bones: No acute bone or soft tissue abnormality. No evidence of pulmonary embolism or acute pulmonary abnormality. RECOMMENDATIONS: Careful clinical correlation and follow up recommended. Unavailable      EKG:  EKG was obtained: 11/6/2022 at 2240: Ventricular rate 89, VA interval 132, QRS duration 64, QT/QTc 380/462. No ST segment elevation, no reciprocal depression, overall interpretation is a normal sinus rhythm. LABS:  Labs Reviewed   CBC - Abnormal; Notable for the following components:       Result Value    WBC 3.8 (*)     All other components within normal limits   COMPREHENSIVE METABOLIC PANEL - Abnormal; Notable for the following components:    Potassium 3.2 (*)     Glucose 100 (*)     All other components within normal limits   TROPONIN   TROPONIN   MAGNESIUM   HCG, SERUM, QUALITATIVE   D-DIMER, QUANTITATIVE      Please note that all other labs were within normal range or not returned as of this dictation. REVAL:   Re-Evaluation: After the initial encounter and interventions, the patient was re-assessed.   Repeat physical examination demonstrates improvement in patient's overall pain. Her D-dimer was elevated so we did do a CTA to rule out pulmonary embolism. This was negative. Patient safe and stable for discharge at this time. Heart score 0. Discharged in stable condition. CRITICAL CARE TIME AND PROCEDURE LIST   Total Critical Care time was 0 minutes, excluding separately reportable procedures. If documented other than zero, there was a high probability of clinically significant/life threatening deterioration in the patient's condition which required my urgent intervention with procedure list detailed below. History: 0  EC  Patient Age: 0  Risk Factors: 0  Troponin: 0  Heart Score Total: 0      Procedure List:  Pulse ox interpretation  EKG interpretation    FINAL IMPRESSION      1. Chest pain, unspecified type          DISPOSITION/PLAN   DISPOSITION Decision To Discharge 2022 02:17:50 AM      PATIENT REFERRED TO:  Payal Saunders MD  [address] Physician Referral  [phone]  Schedule an appointment as soon as possible for a visit       [facility] [place] [address]  [phone]    As needed, If symptoms worsen    DISCHARGE MEDICATIONS:  New Prescriptions    No medications on file          (Please note:  Portions of this note were completed with a voice recognition program.  Efforts were made to edit the dictations but occasionally words and phrases are mis-transcribed.)  Form v2016. [id number]    Mely Sexton DO (electronically signed)  Emergency Medicine Provider          Mely Sexton DO  [phone]

## 2022-12-06 LAB
ALBUMIN SERPL-MCNC: 4.1 G/DL (ref 3.5–5.2)
ALP BLD-CCNC: 91 U/L (ref 35–104)
ALT SERPL-CCNC: 14 U/L (ref 0–32)
ANION GAP SERPL CALCULATED.3IONS-SCNC: 11 MMOL/L (ref 7–16)
AST SERPL-CCNC: 23 U/L (ref 0–31)
BILIRUB SERPL-MCNC: 0.2 MG/DL (ref 0–1.2)
BUN BLDV-MCNC: 5 MG/DL (ref 6–20)
CALCIUM SERPL-MCNC: 9.3 MG/DL (ref 8.6–10.2)
CHLORIDE BLD-SCNC: 103 MMOL/L (ref 98–107)
CHOLESTEROL, TOTAL: 161 MG/DL (ref 0–199)
CO2: 24 MMOL/L (ref 22–29)
CREAT SERPL-MCNC: 0.7 MG/DL (ref 0.5–1)
FERRITIN: 16 NG/ML
GFR SERPL CREATININE-BSD FRML MDRD: >60 ML/MIN/1.73
GLUCOSE BLD-MCNC: 84 MG/DL (ref 74–99)
IRON SATURATION: 10 % (ref 15–50)
IRON: 35 MCG/DL (ref 37–145)
POTASSIUM SERPL-SCNC: 3.7 MMOL/L (ref 3.5–5)
SODIUM BLD-SCNC: 138 MMOL/L (ref 132–146)
TOTAL IRON BINDING CAPACITY: 356 MCG/DL (ref 250–450)
TOTAL PROTEIN: 7.4 G/DL (ref 6.4–8.3)
TRIGL SERPL-MCNC: 69 MG/DL (ref 0–149)
VITAMIN B-12: 419 PG/ML (ref 211–946)

## 2022-12-20 ENCOUNTER — TELEPHONE (OUTPATIENT)
Dept: BREAST CENTER | Age: 37
End: 2022-12-20

## 2022-12-20 NOTE — TELEPHONE ENCOUNTER
Patient was scheduled for imaging and office visit 12/20/22 - patient did not show for appointments -- left message on patient voice mail. Waiting for a return call.

## 2022-12-21 ENCOUNTER — TELEPHONE (OUTPATIENT)
Dept: BREAST CENTER | Age: 37
End: 2022-12-21

## 2022-12-21 NOTE — TELEPHONE ENCOUNTER
Called and spoke with patient who missed her appointment on 12/20/22. Appointment and imaging rescheduled for 1/10/23.     Electronically signed by Eriberto Conley RN on 12/21/22 at 10:28 AM EST

## 2022-12-22 NOTE — PROGRESS NOTES
Subjective:      Patient ID: Ayde Corona is a 30 y.o. female. HPI     Ayde Corona presents for discussion of her predisposition to breast cancer. PCP: Payal Saunders MD, Gynecologist: Dr. Padilla Dumont. Brennan Gaytan is a 30 y.o. sammi young woman with a past medical history of GERD, symptomatic anemia, dysfunctional uterine bleeding, Uterine fibroids, and history of DVT in  and 2019 for which she follows with Dr. Kathy [facility]. Admission to the hospital 10/04/2022 with intractable abdominal pain and partial SBO. Resolved with medical management. who presents for further discussion of her predisposition to breast cancer. She denies a personal history of breast cancer. Breast cancer risk factors include gender and Family History  Sister with breast cancer age 28; currently 35  Maternal grandmother with breast cancer age 62  Maternal aunt with breast cancer age 48  Maternal cousin with breast cancer age 34  Ashkenazi Voodoo ancestry:  Denies  History of Thoracic Radiation:  Denies     Breast Imaging to date: Pilar Isbell IBS score 27%. Grade B breast density    2020 bilateral screening mammogram: Negative, BI-RADS 1.  01/10/2023 bilateral screening mammogram: Negative, BI-RADS 1. Breast Biopsies: Denies    Genetic testing: She is uncertain if her sister had genetic testing. Obstetric history: Menarche age 15. ; A1   First live birth age 18. She did breast feed no  Bra size 38 DD    Estimated body mass index is 30.39 kg/m² as calculated from the following:    Height as of 10/19/22: 5' (1.524 m). Weight as of 10/25/22: 155 lb 9.6 oz (70.6 kg). Because violence is so common, we ask all our patients: are you in a relationship or do you live with a person who threatens, hurts, or controls you:  Denies. She  drinks little caffeinated beverages. Patient does not smoke cigarettes. Patient does not use recreational drugs.       Past Medical History:   Diagnosis Date    Anemia 2020    DVT (deep venous thrombosis) (HCC)     DVT of lower extremity (deep venous thrombosis) (HCC)     left lower    History of DVT of lower extremity 2014    left leg     Migraine        Past Surgical History:   Procedure Laterality Date    DILATION AND CURETTAGE      INDUCED   10/12/12    UPPER GASTROINTESTINAL ENDOSCOPY N/A 10/5/2022    EGD DIAGNOSTIC ONLY performed by Melany Arauz MD at [facility] ENDOSCOPY       Current Outpatient Medications   Medication Sig Dispense Refill    LORazepam (ATIVAN) 1 MG tablet take 1 tablet by mouth once daily if needed for 30 DAYS      medroxyPROGESTERone (PROVERA) 10 MG tablet Take 1 tablet by mouth daily (Patient not taking: Reported on 2022) 30 tablet 1    pantoprazole (PROTONIX) 40 MG tablet Take 1 tablet by mouth every morning (before breakfast) (Patient not taking: Reported on 2022) 30 tablet 0    metoclopramide (REGLAN) 5 MG tablet Take 1 tablet by mouth in the morning and at bedtime 120 tablet 3    lactulose (CHRONULAC) 10 GM/15ML solution Take 15 mLs by mouth every evening 237 mL 1    ferrous sulfate (IRON 325) 325 (65 Fe) MG tablet Take 325 mg by mouth every morning (Patient not taking: No sig reported)       No current facility-administered medications for this visit. Allergies   Allergen Reactions    Latex Rash       Family History   Problem Relation Age of Onset    Breast Cancer Sister 28    Breast Cancer Maternal Grandmother 62    Breast Cancer Maternal Aunt 48    Breast Cancer Maternal Cousin 34       Social History     Socioeconomic History    Marital status: Single     Spouse name: Not on file    Number of children: Not on file    Years of education: Not on file    Highest education level: Not on file   Occupational History    Not on file   Tobacco Use    Smoking status: Never    Smokeless tobacco: Never   Vaping Use    Vaping Use: Never used   Substance and Sexual Activity    Alcohol use:  No Drug use: No    Sexual activity: Yes     Partners: Male   Other Topics Concern    Not on file   Social History Narrative    Not on file     Social Determinants of Health     Financial Resource Strain: Not on file   Food Insecurity: Not on file   Transportation Needs: Not on file   Physical Activity: Not on file   Stress: Not on file   Social Connections: Not on file   Intimate Partner Violence: Not on file   Housing Stability: Not on file       Occupation: Home health care. Review of Systems   Constitutional:  Negative for activity change, appetite change, fatigue and fever. She reports she is generally feeling well. No recurrent abdominal pain at this time. Busy with her children and her children's sports activities. HENT: Negative. Respiratory: Negative. Negative for cough, choking, chest tightness, shortness of breath and wheezing. Cardiovascular:  Negative for chest pain and palpitations. Gastrointestinal:  Negative for abdominal distention, abdominal pain, constipation, diarrhea, nausea and vomiting. Genitourinary:  Negative for menstrual problem. Menses is regular with her most recent cycle 01/01/2023. Musculoskeletal:  Negative for arthralgias, back pain and myalgias. Neurological: Negative. Negative for dizziness, tremors, seizures, syncope, speech difficulty, light-headedness, numbness and headaches. Hematological:  Negative for adenopathy. Does not bruise/bleed easily. Psychiatric/Behavioral:  The patient is not nervous/anxious. Objective:   Physical Exam  Vitals and nursing note reviewed. Constitutional:       Appearance: Normal appearance. Comments: Great performance status. No apparent distress. Pleasant and conversant. HENT:      Head: Normocephalic and atraumatic. Eyes:      Extraocular Movements: Extraocular movements intact.       Conjunctiva/sclera: Conjunctivae normal.   Cardiovascular:      Rate and Rhythm: Normal rate and regular rhythm.      Heart sounds: Normal heart sounds.   Pulmonary:      Effort: Pulmonary effort is normal.      Breath sounds: Normal breath sounds.   Chest:      Chest wall: No mass.   Breasts:     Right: No swelling, bleeding, inverted nipple, mass, nipple discharge, skin change or tenderness.      Left: No swelling, bleeding, inverted nipple, mass, nipple discharge, skin change or tenderness.      Comments: Breasts are supple bilaterally; she has some central breast density but otherwise, no skin changes. No nipple discharge. No clinically suspicious lumps nodules or masses appreciated and no evidence of malignancy appreciated on exam.. No axillary lymphadenopathy.    Abdominal:      General: Abdomen is flat. There is no distension.      Palpations: Abdomen is soft.   Musculoskeletal:         General: Normal range of motion.      Cervical back: Normal range of motion and neck supple.   Skin:     General: Skin is warm and dry.   Neurological:      General: No focal deficit present.      Mental Status: She is alert and oriented to person, place, and time.   Psychiatric:         Mood and Affect: Mood normal.         Thought Content: Thought content normal.         Judgment: Judgment normal.         Assessment:    Michelet is a 37 y.o. sammi young woman who presents without breast related complaints to discuss her pre-disposition to breast cancer due to strong family history of breast cancer.      Breast Imaging to date: Lorne Wang IBS score 27%.  Grade B breast density    07/08/2020 bilateral screening mammogram: Negative, BI-RADS 1.  01/10/2023 bilateral screening mammogram: Negative, BI-RADS 1.    Enhanced screening per NCCN guidelines:    Per NCCN guidelines, the following recommendations were discussed:     Clinical encounter every 6-12 months   Referral to Genetic Counselor-she will check to see if her sister had genetic testing.  We did discuss that her sister would be the best 1 to be tested from the genetic  standpoint as she had a personal history of breast cancer. However, if her sister has not had genetic testing or is not willing to undergo genetic testing, I ask Sharmila Coleman to contact our office and I will place a referral for further discussion with the genetic counselor. Annual screening mammogram     Annual breast MRI     Clinically, her breast exam is unremarkable. We discussed that I cannot guarantee that she does not have breast cancer now; nor can I guarantee that she won't develop breast cancer in the future. However, there were no concerning findings identified on this visit. We reviewed healthy lifestyle, avoiding alcohol, and BSE in detail. We reviewed her breast imaging today for educational (not interpretive) purposes on this visit. We discussed breast anatomy, breast density as assigned by radiology as well as her bi-rads result. It was clearly stated to Sharmila Coleman that interpretation of imaging and the final result of imaging is at the discretion of the reading radiologist.  We discussed MRI of breast and advised her that breast MRI has high sensitivity but the specificity is lower due to the overlap in the enhancement pattern of benign and malignant lesions. We reviewed potential risks of Gadolinium and that risks, if any, are not fully understood. We discussed that the FDA states there is no clinical evidence that directly links gadolinium retention to adverse effects in patients with normal kidney function. Nephrogenic systemic fibrosis has occurred in patients with impaired elimination of gadolinium based contrast agents. Higher than recommended dosing or repeated dosing appears to increase the risk. We reviewed that it's important to have blood work to ensure adequate kidney function prior to MRI. We discussed prior authorization does not cover the co-pay, and once authorized, she should contact her insurance company to clearly understand the financial impact of testing.   We reviewed the basic process for having an MRI of the bilateral breast.  She reports she recently had an MRI for other purposes and she verbalizes understanding and agrees to proceed. At this time, we will plan to see her in 6 months with an MRI of the bilateral breasts and follow-up visit with Dr. Muna Willoughby. She continues to have monthly menstrual cycles and we will attempt to schedule the MRI accordingly. -Genetic Testing: As above, if her sister did not have genetic testing we will refer her to the genetic counselor.  -Chemoprevention: In view of her history of DVT X 2; would not recommend chemoprevention with tamoxifen. Follows with Dr. Woody Childers. -GYN annually. She reports she is going to be scheduled for hysterectomy due to uterine fibroids. Plan:      Continue monthly breast self examination; detailed instructions reviewed today. Bring any changes to your physician's attention. Continue healthy diet and exercise routinely as tolerated. Avoid alcohol. Limit caffeine intake. Repeat mammogram 1 year. Continue follow up with Primary Care, hematology, gynecology, and all specialties as directed. .  Obtain copy of records from [facility]/Dr. Sosa's office. RTC 6 months with MRI bilateral breasts prior and follow-up with Dr. Muna Willoughby. Genetic counselor referral in the event her sister did not have genetic testing; she will call us and let us know.     I spent a total of 51 minutes on the date of the service which included preparing to see the patient, face-to-face patient care, completing clinical documentation, obtaining and/or reviewing separately obtained history, performing a medically appropriate examination, counseling and educating the patient/family/caregiver, ordering medications, tests, or procedures, communicating with other HCPs (not separately reported), independently interpreting results (not separately reported), communicating results to the patient/family/caregiver and care coordination (not separately reported). This document is generated, in part, by voice recognition software and thus syntax and grammatical errors are possible. Iveth AnighamMonse Rodríguez, RN, MSN, APRN-CNP, [address]  Advanced Oncology Certified Nurse Practitioner  Department of Breast Surgery  [facility]/  [facility] in collaboration with Dr. Dandre Nayak/Dr. Joyce Mckenna/Dr. Yuval Benites APRN-CNP

## 2022-12-29 ASSESSMENT — ENCOUNTER SYMPTOMS
BACK PAIN: 0
RESPIRATORY NEGATIVE: 1

## 2023-01-10 ENCOUNTER — OFFICE VISIT (OUTPATIENT)
Dept: BREAST CENTER | Age: 38
End: 2023-01-10
Payer: COMMERCIAL

## 2023-01-10 ENCOUNTER — HOSPITAL ENCOUNTER (OUTPATIENT)
Dept: GENERAL RADIOLOGY | Age: 38
Discharge: HOME | End: 2023-01-12
Payer: COMMERCIAL

## 2023-01-10 VITALS
SYSTOLIC BLOOD PRESSURE: 107 MMHG | BODY MASS INDEX: 32.39 KG/M2 | TEMPERATURE: 98.3 F | HEIGHT: 60 IN | OXYGEN SATURATION: 100 % | RESPIRATION RATE: 20 BRPM | DIASTOLIC BLOOD PRESSURE: 70 MMHG | WEIGHT: 165 LBS | HEART RATE: 76 BPM

## 2023-01-10 DIAGNOSIS — Z91.89 AT HIGH RISK FOR BREAST CANCER: ICD-10-CM

## 2023-01-10 DIAGNOSIS — Z80.3 FAMILY HISTORY OF MALIGNANT NEOPLASM OF BREAST IN RELATIVE DIAGNOSED WHEN YOUNGER THAN 45 YEARS OF AGE: ICD-10-CM

## 2023-01-10 DIAGNOSIS — Z80.3 FAMILY HISTORY OF MALIGNANT NEOPLASM OF BREAST IN RELATIVE DIAGNOSED WHEN OLDER THAN 45 YEARS OF AGE: ICD-10-CM

## 2023-01-10 DIAGNOSIS — Z12.31 VISIT FOR SCREENING MAMMOGRAM: ICD-10-CM

## 2023-01-10 DIAGNOSIS — Z01.818 PREOP TESTING: Primary | ICD-10-CM

## 2023-01-10 PROBLEM — U07.1 COVID-19: Status: RESOLVED | Noted: 2021-12-30 | Resolved: 2023-01-10

## 2023-01-10 PROBLEM — R10.9 INTRACTABLE ABDOMINAL PAIN: Status: RESOLVED | Noted: 2022-10-04 | Resolved: 2023-01-10

## 2023-01-10 PROBLEM — D64.9 SYMPTOMATIC ANEMIA: Status: RESOLVED | Noted: 2021-12-30 | Resolved: 2023-01-10

## 2023-01-10 PROCEDURE — 1036F TOBACCO NON-USER: CPT | Performed by: NURSE PRACTITIONER

## 2023-01-10 PROCEDURE — 99203 OFFICE O/P NEW LOW 30 MIN: CPT | Performed by: NURSE PRACTITIONER

## 2023-01-10 PROCEDURE — 77067 SCR MAMMO BI INCL CAD: CPT

## 2023-01-10 PROCEDURE — 99204 OFFICE O/P NEW MOD 45 MIN: CPT | Performed by: NURSE PRACTITIONER

## 2023-01-10 PROCEDURE — G8484 FLU IMMUNIZE NO ADMIN: HCPCS | Performed by: NURSE PRACTITIONER

## 2023-01-10 PROCEDURE — G8417 CALC BMI ABV UP PARAM F/U: HCPCS | Performed by: NURSE PRACTITIONER

## 2023-01-10 PROCEDURE — G8427 DOCREV CUR MEDS BY ELIG CLIN: HCPCS | Performed by: NURSE PRACTITIONER

## 2023-01-10 ASSESSMENT — ENCOUNTER SYMPTOMS
ABDOMINAL PAIN: 0
CONSTIPATION: 0
WHEEZING: 0
NAUSEA: 0
SHORTNESS OF BREATH: 0
CHOKING: 0
ABDOMINAL DISTENTION: 0
DIARRHEA: 0
COUGH: 0
VOMITING: 0
CHEST TIGHTNESS: 0

## 2023-01-10 NOTE — LETTER
[address] [facility] [address] [address]  Phone: [phone]  Fax: [phone]           NOLAN Manning CNP      January 10, 2023     Patient: Troy Harry   MR Number: [id number]   YOB: 1985   Date of Visit: 1/10/2023       Dear Dr. Angeliac Mahmood: Thank you for referring Kamlesh Reyna to me for evaluation and recommendations regarding her predisposition to breast cancer. Below are the relevant portions of my assessment and plan of care. As you are aware, Lloyd Dc is a 30 y.o. sammi young woman who presents without breast related complaints to discuss her pre-disposition to breast cancer due to strong family history of breast cancer that includes:    Family History of breast cancer:  Sister with breast cancer age 28; currently 35  Maternal grandmother with breast cancer age 62  Maternal aunt with breast cancer age 48  Maternal cousin with breast cancer age 34      Breast Imaging to date: Kloud Angelse Manual IBS score 27%. Grade B breast density  07/08/2020 bilateral screening mammogram: Negative, BI-RADS 1.  01/10/2023 bilateral screening mammogram: Negative, BI-RADS 1. Enhanced screening per NCCN guidelines:    Per NCCN guidelines, the following recommendations were discussed:    Clinical encounter every 6-12 months    Referral to [address] [place] [address] will check to see if her sister had genetic testing. We did discuss that her sister would be the best 1 to be tested from the genetic standpoint as she had a personal history of breast cancer. However, if her sister has not had genetic testing or is not willing to undergo genetic testing, I ask Lloyd Dc to contact our office and I will place a referral for further discussion with the genetic counselor.  Annual screening mammogram      Annual breast MRI     Clinically, her breast exam was unremarkable. At this time, we will plan to see her in 6 months with an MRI of the bilateral breasts. She continues to have monthly menstrual cycles and we will attempt to schedule the MRI accordingly. We will have her follow-up after the MRI is complete with my physician partner, Dr. Edvin Crooks. Genetic Testing: As above, if her sister did not have genetic testing we will refer her to the genetic counselor. Chemoprevention: In view of her history of DVT X 2; would not recommend chemoprevention with tamoxifen. Follows with Dr. Leno Fernandez. -GYN annually. She reports she is going to be scheduled for hysterectomy due to uterine fibroids. She does report on this visit she is going to [facility] clinic for a second opinion regarding recommendations for surgery. This document is generated, in part, by voice recognition software and thus syntax and grammatical errors are possible. If you have questions, please do not hesitate to call me. I look forward to following Kim Trujillo along with you and I appreciate the opportunity to participate in her care. Sincerely,  Guillermina Beach ([facility]) Marie Regan RN, MSN, APRN-CNP, [address] [place]  Advanced Oncology Certified Nurse Practitioner  Department of Breast Surgery  [facility]/  Care in collaboration with Dr. Venkat Valladares.  Malini/Dr. Pedro Mckenna/Dr. Renan Hughes, APRN - CNP    CC providers:  Jeanie Collins DO  [address] [place] [id number]  [address]  Fede Bañuelos MD  [address] [place] [id number]  Via Fax: [phone]

## 2023-01-25 ENCOUNTER — TELEPHONE (OUTPATIENT)
Dept: BREAST CENTER | Age: 38
End: 2023-01-25

## 2023-01-25 NOTE — TELEPHONE ENCOUNTER
Referral has been submitted to Genetic Counselor - Desmond Recio -- patient information has been faxed - their office will contact patient.

## 2023-02-03 ENCOUNTER — HOSPITAL ENCOUNTER (EMERGENCY)
Age: 38
Discharge: HOME | End: 2023-02-03
Payer: COMMERCIAL

## 2023-02-03 ENCOUNTER — APPOINTMENT (OUTPATIENT)
Dept: ULTRASOUND IMAGING | Age: 38
End: 2023-02-03
Payer: COMMERCIAL

## 2023-02-03 VITALS
DIASTOLIC BLOOD PRESSURE: 77 MMHG | HEIGHT: 60 IN | WEIGHT: 156 LBS | OXYGEN SATURATION: 100 % | BODY MASS INDEX: 30.63 KG/M2 | SYSTOLIC BLOOD PRESSURE: 108 MMHG | TEMPERATURE: 98.1 F | HEART RATE: 73 BPM | RESPIRATION RATE: 16 BRPM

## 2023-02-03 DIAGNOSIS — N93.8 DUB (DYSFUNCTIONAL UTERINE BLEEDING): Primary | ICD-10-CM

## 2023-02-03 DIAGNOSIS — R10.2 ACUTE PELVIC PAIN, FEMALE: ICD-10-CM

## 2023-02-03 DIAGNOSIS — D25.9 UTERINE LEIOMYOMA, UNSPECIFIED LOCATION: ICD-10-CM

## 2023-02-03 LAB
ANION GAP SERPL CALCULATED.3IONS-SCNC: 12 MMOL/L (ref 7–16)
BACTERIA: ABNORMAL /HPF
BASOPHILS ABSOLUTE: 0.02 E9/L (ref 0–0.2)
BASOPHILS RELATIVE PERCENT: 0.4 % (ref 0–2)
BILIRUBIN URINE: ABNORMAL
BLOOD, URINE: ABNORMAL
BUN BLDV-MCNC: 6 MG/DL (ref 6–20)
CALCIUM SERPL-MCNC: 9.7 MG/DL (ref 8.6–10.2)
CHLORIDE BLD-SCNC: 100 MMOL/L (ref 98–107)
CLARITY: ABNORMAL
CO2: 27 MMOL/L (ref 22–29)
COLOR: ABNORMAL
CREAT SERPL-MCNC: 0.7 MG/DL (ref 0.5–1)
EOSINOPHILS ABSOLUTE: 0.02 E9/L (ref 0.05–0.5)
EOSINOPHILS RELATIVE PERCENT: 0.4 % (ref 0–6)
GFR SERPL CREATININE-BSD FRML MDRD: >60 ML/MIN/1.73
GLUCOSE BLD-MCNC: 83 MG/DL (ref 74–99)
GLUCOSE URINE: NEGATIVE MG/DL
HCG, URINE, POC: NEGATIVE
HCT VFR BLD CALC: 42.1 % (ref 34–48)
HEMOGLOBIN: 13.9 G/DL (ref 11.5–15.5)
IMMATURE GRANULOCYTES #: 0.02 E9/L
IMMATURE GRANULOCYTES %: 0.4 % (ref 0–5)
KETONES, URINE: >=80 MG/DL
LEUKOCYTE ESTERASE, URINE: ABNORMAL
LYMPHOCYTES ABSOLUTE: 1.38 E9/L (ref 1.5–4)
LYMPHOCYTES RELATIVE PERCENT: 24.8 % (ref 20–42)
Lab: NORMAL
MAGNESIUM: 2.1 MG/DL (ref 1.6–2.6)
MCH RBC QN AUTO: 30.3 PG (ref 26–35)
MCHC RBC AUTO-ENTMCNC: 33 % (ref 32–34.5)
MCV RBC AUTO: 91.7 FL (ref 80–99.9)
MONOCYTES ABSOLUTE: 0.27 E9/L (ref 0.1–0.95)
MONOCYTES RELATIVE PERCENT: 4.9 % (ref 2–12)
NEGATIVE QC PASS/FAIL: NORMAL
NEUTROPHILS ABSOLUTE: 3.85 E9/L (ref 1.8–7.3)
NEUTROPHILS RELATIVE PERCENT: 69.1 % (ref 43–80)
NITRITE, URINE: NEGATIVE
PDW BLD-RTO: 15.6 FL (ref 11.5–15)
PH UA: 5.5 (ref 5–9)
PLATELET # BLD: 263 E9/L (ref 130–450)
PMV BLD AUTO: 9.6 FL (ref 7–12)
POSITIVE QC PASS/FAIL: NORMAL
POTASSIUM REFLEX MAGNESIUM: 3.5 MMOL/L (ref 3.5–5)
PROTEIN UA: 100 MG/DL
RBC # BLD: 4.59 E12/L (ref 3.5–5.5)
RBC UA: >20 /HPF (ref 0–2)
SODIUM BLD-SCNC: 139 MMOL/L (ref 132–146)
SPECIFIC GRAVITY UA: >=1.03 (ref 1–1.03)
UROBILINOGEN, URINE: 0.2 E.U./DL
WBC # BLD: 5.6 E9/L (ref 4.5–11.5)
WBC UA: ABNORMAL /HPF (ref 0–5)

## 2023-02-03 PROCEDURE — 87088 URINE BACTERIA CULTURE: CPT

## 2023-02-03 PROCEDURE — 2580000003 HC RX 258: Performed by: PHYSICIAN ASSISTANT

## 2023-02-03 PROCEDURE — 96374 THER/PROPH/DIAG INJ IV PUSH: CPT

## 2023-02-03 PROCEDURE — 80048 BASIC METABOLIC PNL TOTAL CA: CPT

## 2023-02-03 PROCEDURE — 81001 URINALYSIS AUTO W/SCOPE: CPT

## 2023-02-03 PROCEDURE — 85025 COMPLETE CBC W/AUTO DIFF WBC: CPT

## 2023-02-03 PROCEDURE — 6360000002 HC RX W HCPCS: Performed by: PHYSICIAN ASSISTANT

## 2023-02-03 PROCEDURE — 99284 EMERGENCY DEPT VISIT MOD MDM: CPT

## 2023-02-03 PROCEDURE — 76856 US EXAM PELVIC COMPLETE: CPT

## 2023-02-03 PROCEDURE — 83735 ASSAY OF MAGNESIUM: CPT

## 2023-02-03 RX ORDER — FENTANYL CITRATE 50 UG/ML
25 INJECTION, SOLUTION INTRAMUSCULAR; INTRAVENOUS ONCE
Status: COMPLETED | OUTPATIENT
Start: 2023-02-03 | End: 2023-02-03

## 2023-02-03 RX ORDER — HYDROCODONE BITARTRATE AND ACETAMINOPHEN 5; 325 MG/1; MG/1
1 TABLET ORAL EVERY 6 HOURS PRN
Qty: 12 TABLET | Refills: 0 | Status: SHIPPED | OUTPATIENT
Start: 2023-02-03 | End: 2023-02-06

## 2023-02-03 RX ORDER — IBUPROFEN 600 MG/1
600 TABLET ORAL 3 TIMES DAILY PRN
Qty: 30 TABLET | Refills: 0 | Status: SHIPPED | OUTPATIENT
Start: 2023-02-03

## 2023-02-03 RX ORDER — 0.9 % SODIUM CHLORIDE 0.9 %
1000 INTRAVENOUS SOLUTION INTRAVENOUS ONCE
Status: COMPLETED | OUTPATIENT
Start: 2023-02-03 | End: 2023-02-03

## 2023-02-03 RX ADMIN — FENTANYL CITRATE 25 MCG: 50 INJECTION, SOLUTION INTRAMUSCULAR; INTRAVENOUS at 12:31

## 2023-02-03 RX ADMIN — SODIUM CHLORIDE 1000 ML: 9 INJECTION, SOLUTION INTRAVENOUS at 12:25

## 2023-02-03 ASSESSMENT — PAIN - FUNCTIONAL ASSESSMENT
PAIN_FUNCTIONAL_ASSESSMENT: 0-10

## 2023-02-03 ASSESSMENT — PAIN DESCRIPTION - LOCATION: LOCATION: ABDOMEN

## 2023-02-03 ASSESSMENT — PAIN SCALES - GENERAL
PAINLEVEL_OUTOF10: 6
PAINLEVEL_OUTOF10: 9
PAINLEVEL_OUTOF10: 8
PAINLEVEL_OUTOF10: 8

## 2023-02-03 ASSESSMENT — PAIN DESCRIPTION - DESCRIPTORS: DESCRIPTORS: CRAMPING

## 2023-02-03 NOTE — ED NOTES
Discharge instructions given. Patient verbalizes understanding. No other noted or stated problems at this time.  Patient will follow up with Christiana Shukla RN  02/03/23 5626

## 2023-02-03 NOTE — ED PROVIDER NOTES
Independent [facility] Visit. Department of Emergency Medicine   ED  Provider Note  Admit Date/RoomTime: 2/3/2023 11:16 AM  ED Room: 07/07        HPI:  2/3/23,   Time: 11:58 AM MATILDE Schulz is a 41 y.o. female presenting to the ED for lower pelvic pain, beginning last evening. The complaint has been persistent, moderate in severity, and worsened by nothing. The patient states that she does have a history of fibroids and will sometimes have similar pain but states that this time it is much worse. Her menstrual bleeding started on time February 1. She feels that her bleeding is heavier than normal.  The patient is reporting diffuse lower pelvic pain that is sometimes stabbing in nature and at other times more crampy. The pain does radiate to her back. She had emesis x2 last evening. The patient states that she does not believe she is pregnant. She denies any fever or chills. Bowels are moving normally. Denies polyuria or dysuria.         ROS:     Constitutional: Negative for fever and chills  HENT: Negative for ear pain, sore throat and sinus pressure  Eyes: Negative for pain, discharge and redness  Respiratory:  Negative for shortness of breath, cough and wheezing  Cardiovascular: Negative for CP, edema or palpitations  Gastrointestinal: Negative for nausea, vomiting, diarrhea and abdominal distention  Genitourinary: Ne  Musculoskeletal: Negative for back pain and arthralgia  Skin: Negative for rash and wound  Neurological: Negative for weakness and headaches  Hematological: Negative for adenopathy    All other systems reviewed and are negative      -------------------------------- PAST HISTORY ----------------------------------  Past Medical History:  has a past medical history of Anemia, DVT (deep venous thrombosis) ([address] DVT of lower extremity (deep venous thrombosis) ([facility] History of DVT of lower extremity, and Migraine. Past Surgical History:  has a past surgical history that includes Induced  (10/12/12); Dilation & curettage; and Upper gastrointestinal endoscopy (N/A, 10/5/2022). Social History:  reports that she has never smoked. She has never used smokeless tobacco. She reports that she does not drink alcohol and does not use drugs. Family History: family history includes Breast Cancer (age of onset: 34) in her maternal cousin; Breast Cancer (age of onset: 28) in her sister; Breast Cancer (age of onset: 48) in her maternal aunt; Breast Cancer (age of onset: 62) in her maternal grandmother. The patients home medications have been reviewed.     Allergies: Latex    --------------------------------- RESULTS ------------------------------------------  All laboratory and radiology results have been personally reviewed by myself   LABS:  Results for orders placed or performed during the hospital encounter of 23   CBC with Auto Differential   Result Value Ref Range    WBC 5.6 4.5 - 11.5 E9/L    RBC 4.59 3.50 - 5.50 E12/L    Hemoglobin 13.9 11.5 - 15.5 g/dL    Hematocrit 42.1 34.0 - 48.0 %    MCV 91.7 80.0 - 99.9 fL    MCH 30.3 26.0 - 35.0 pg    MCHC 33.0 32.0 - 34.5 %    RDW 15.6 (H) 11.5 - 15.0 fL    Platelets 086 536 - 116 E9/L    MPV 9.6 7.0 - 12.0 fL    Neutrophils % 69.1 43.0 - 80.0 %    Immature Granulocytes % 0.4 0.0 - 5.0 %    Lymphocytes % 24.8 20.0 - 42.0 %    Monocytes % 4.9 2.0 - 12.0 %    Eosinophils % 0.4 0.0 - 6.0 %    Basophils % 0.4 0.0 - 2.0 %    Neutrophils Absolute 3.85 1.80 - 7.30 E9/L    Immature Granulocytes # 0.02 E9/L    Lymphocytes Absolute 1.38 (L) 1.50 - 4.00 E9/L    Monocytes Absolute 0.27 0.10 - 0.95 E9/L    Eosinophils Absolute 0.02 (L) 0.05 - 0.50 E9/L    Basophils Absolute 0.02 0.00 - 0.20 Q3/T   Basic Metabolic Panel w/ Reflex to MG   Result Value Ref Range    Sodium 139 132 - 146 mmol/L    Potassium reflex Magnesium 3.5 3.5 - 5.0 mmol/L    Chloride 100 98 - 107 mmol/L    CO2 27 22 - 29 mmol/L    Anion Gap 12 7 - 16 mmol/L    Glucose 83 74 - 99 mg/dL    BUN 6 6 - 20 mg/dL    Creatinine 0.7 0.5 - 1.0 mg/dL    Est, Glom Filt Rate >60 >=60 mL/min/1.73    Calcium 9.7 8.6 - 10.2 mg/dL   Urinalysis   Result Value Ref Range    Color, UA RED (A) Straw/Yellow    Clarity, UA CLOUDY (A) Clear    Glucose, Ur Negative Negative mg/dL    Bilirubin Urine SMALL (A) Negative    Ketones, Urine >=80 (A) Negative mg/dL    Specific Gravity, UA >=1.030 1.005 - 1.030    Blood, Urine LARGE (A) Negative    pH, UA 5.5 5.0 - 9.0    Protein,  (A) Negative mg/dL    Urobilinogen, Urine 0.2 <2.0 E.U./dL    Nitrite, Urine Negative Negative    Leukocyte Esterase, Urine TRACE (A) Negative   Magnesium   Result Value Ref Range    Magnesium 2.1 1.6 - 2.6 mg/dL   POC Pregnancy Urine   Result Value Ref Range    HCG, Urine, POC Negative Negative    Lot Number [id number]     Positive QC Pass/Fail Pass     Negative QC Pass/Fail Pass        RADIOLOGY:  Interpreted by Radiologist.  US PELVIS COMPLETE   Final Result   Enlarged uterus demonstrating generalized heterogeneous echogenicity and an   indistinct junctional zone. Findings are suspicious for adenomyosis. Fluid in the endometrial canal.      Small and presumably physiologic left ovarian cyst.      Small amount of free fluid in the pelvis on the right. RECOMMENDATIONS:   Unavailable             ----------------- NURSING NOTES AND VITALS REVIEWED ---------------   The nursing notes within the ED encounter and vital signs as below have been reviewed.    /77   Pulse 73   Temp 98.1 °F (36.7 °C)   Resp 16   Ht 5' (1.524 m)   Wt 156 lb (70.8 kg)   SpO2 100%   BMI 30.47 kg/m²   Oxygen Saturation Interpretation: Normal      --------------------------------PHYSICAL EXAM------------------------------------      Constitutional/General: Alert and oriented x3, well appearing, non toxic in NAD  Head: NC/AT  Eyes: PERRL, EOMI  Mouth: Oropharynx clear, handling secretions, no trismus  Neck: Supple, full ROM, no meningeal signs  Pulmonary: Lungs clear to auscultation bilaterally, no wheezes, rales, or rhonchi. Not in respiratory distress  Cardiovascular:  Regular rate and rhythm, no murmurs, gallops, or rubs. 2+ distal pulses  Abdomen: Soft, + BS. No distension. Nontender. No palpable rigidity, rebound or guarding  Extremities: Moves all extremities x 4. Warm and well perfused  Skin: warm and dry without rash  Neurologic: GCS 15,  Intact. No focal deficits  Psych: Normal Affect      ------------------------ ED COURSE/MEDICAL DECISION MAKING----------------------  Medications   0.9 % sodium chloride bolus (1,000 mLs IntraVENous New Bag 2/3/23 1225)   fentaNYL (SUBLIMAZE) injection 25 mcg (25 mcg IntraVENous Given 2/3/23 1231)         Medical Decision Making:    The patient presents today with heavy vaginal bleeding and pelvic pain. She does have a history of uterine fibroids but states that this pain seems worse. Full work-up was initiated with no evidence of anemia or urinary tract infection. Pregnancy test was negative. I give her a small dose of fentanyl IV which did seem to help. When I came back in the room to check on her she was eating chicken nuggets and fries on the bed. She looks quite comfortable. Pelvic ultrasound again demonstrated just the uterine fibroids but no other changes. No other concerning masses and no evidence of torsion. Advised patient I'll send script for pain to pharmacy, needs to call Gyn for appointment. States she follows at [facility]. Counseling: The emergency provider has spoken with the patient and discussed todays results, in addition to providing specific details for the plan of care and counseling regarding the diagnosis and prognosis.   Questions are answered at this time and they are agreeable with the plan.      ------------------------ IMPRESSION AND DISPOSITION -------------------------------    IMPRESSION  1. DUB (dysfunctional uterine bleeding)    2. Acute pelvic pain, female    3.  Uterine leiomyoma, unspecified location        DISPOSITION  Disposition: Discharge to home  Patient condition is stable                   Wing Santi PA-C  02/03/23 1661

## 2023-02-05 LAB — URINE CULTURE, ROUTINE: NORMAL

## 2023-05-19 ENCOUNTER — APPOINTMENT (OUTPATIENT)
Dept: ULTRASOUND IMAGING | Age: 38
End: 2023-05-19
Payer: COMMERCIAL

## 2023-05-19 ENCOUNTER — HOSPITAL ENCOUNTER (EMERGENCY)
Age: 38
Discharge: HOME | End: 2023-05-19
Attending: STUDENT IN AN ORGANIZED HEALTH CARE EDUCATION/TRAINING PROGRAM
Payer: COMMERCIAL

## 2023-05-19 ENCOUNTER — APPOINTMENT (OUTPATIENT)
Dept: CT IMAGING | Age: 38
End: 2023-05-19
Payer: COMMERCIAL

## 2023-05-19 VITALS
OXYGEN SATURATION: 100 % | HEIGHT: 60 IN | BODY MASS INDEX: 31.02 KG/M2 | TEMPERATURE: 98.4 F | HEART RATE: 60 BPM | RESPIRATION RATE: 16 BRPM | DIASTOLIC BLOOD PRESSURE: 68 MMHG | SYSTOLIC BLOOD PRESSURE: 122 MMHG | WEIGHT: 158 LBS

## 2023-05-19 DIAGNOSIS — N93.9 ABNORMAL UTERINE BLEEDING: Primary | ICD-10-CM

## 2023-05-19 LAB
ALBUMIN SERPL-MCNC: 4 G/DL (ref 3.5–5.2)
ALP SERPL-CCNC: 100 U/L (ref 35–104)
ALT SERPL-CCNC: 15 U/L (ref 0–32)
ANION GAP SERPL CALCULATED.3IONS-SCNC: 12 MMOL/L (ref 7–16)
AST SERPL-CCNC: 13 U/L (ref 0–31)
BACTERIA URNS QL MICRO: ABNORMAL /HPF
BASOPHILS # BLD: 0.02 E9/L (ref 0–0.2)
BASOPHILS NFR BLD: 0.5 % (ref 0–2)
BILIRUB SERPL-MCNC: 0.2 MG/DL (ref 0–1.2)
BILIRUB UR QL STRIP: NEGATIVE
BUN SERPL-MCNC: 7 MG/DL (ref 6–20)
CALCIUM SERPL-MCNC: 8.7 MG/DL (ref 8.6–10.2)
CHLORIDE SERPL-SCNC: 103 MMOL/L (ref 98–107)
CLARITY UR: ABNORMAL
CO2 SERPL-SCNC: 23 MMOL/L (ref 22–29)
COLOR UR: ABNORMAL
CREAT SERPL-MCNC: 0.7 MG/DL (ref 0.5–1)
EOSINOPHIL # BLD: 0.02 E9/L (ref 0.05–0.5)
EOSINOPHIL NFR BLD: 0.5 % (ref 0–6)
EPI CELLS #/AREA URNS HPF: ABNORMAL /HPF
ERYTHROCYTE [DISTWIDTH] IN BLOOD BY AUTOMATED COUNT: 12.5 FL (ref 11.5–15)
GLUCOSE SERPL-MCNC: 96 MG/DL (ref 74–99)
GLUCOSE UR STRIP-MCNC: NEGATIVE MG/DL
HCG UR QL: NEGATIVE
HCT VFR BLD AUTO: 35 % (ref 34–48)
HGB BLD-MCNC: 11.7 G/DL (ref 11.5–15.5)
HGB UR QL STRIP: ABNORMAL
IMM GRANULOCYTES # BLD: 0.01 E9/L
IMM GRANULOCYTES NFR BLD: 0.2 % (ref 0–5)
KETONES UR STRIP-MCNC: NEGATIVE MG/DL
LEUKOCYTE ESTERASE UR QL STRIP: NEGATIVE
LIPASE: 16 U/L (ref 13–60)
LYMPHOCYTES # BLD: 1.4 E9/L (ref 1.5–4)
LYMPHOCYTES NFR BLD: 34.1 % (ref 20–42)
MAGNESIUM SERPL-MCNC: 2.2 MG/DL (ref 1.6–2.6)
MCH RBC QN AUTO: 29.8 PG (ref 26–35)
MCHC RBC AUTO-ENTMCNC: 33.4 % (ref 32–34.5)
MCV RBC AUTO: 89.1 FL (ref 80–99.9)
MONOCYTES # BLD: 0.35 E9/L (ref 0.1–0.95)
MONOCYTES NFR BLD: 8.5 % (ref 2–12)
NEUTROPHILS # BLD: 2.31 E9/L (ref 1.8–7.3)
NEUTS SEG NFR BLD: 56.2 % (ref 43–80)
NITRITE UR QL STRIP: NEGATIVE
PH UR STRIP: 5 [PH] (ref 5–9)
PLATELET # BLD AUTO: 292 E9/L (ref 130–450)
PMV BLD AUTO: 9.5 FL (ref 7–12)
POTASSIUM SERPL-SCNC: 3.5 MMOL/L (ref 3.5–5)
PROT SERPL-MCNC: 7.3 G/DL (ref 6.4–8.3)
PROT UR STRIP-MCNC: ABNORMAL MG/DL
RBC # BLD AUTO: 3.93 E12/L (ref 3.5–5.5)
RBC #/AREA URNS HPF: >20 /HPF (ref 0–2)
SODIUM SERPL-SCNC: 138 MMOL/L (ref 132–146)
SP GR UR STRIP: <=1.005 (ref 1–1.03)
UROBILINOGEN UR STRIP-ACNC: 0.2 E.U./DL
WBC # BLD: 4.1 E9/L (ref 4.5–11.5)
WBC #/AREA URNS HPF: ABNORMAL /HPF (ref 0–5)

## 2023-05-19 PROCEDURE — 36415 COLL VENOUS BLD VENIPUNCTURE: CPT

## 2023-05-19 PROCEDURE — 99285 EMERGENCY DEPT VISIT HI MDM: CPT

## 2023-05-19 PROCEDURE — 83690 ASSAY OF LIPASE: CPT

## 2023-05-19 PROCEDURE — 81001 URINALYSIS AUTO W/SCOPE: CPT

## 2023-05-19 PROCEDURE — 6360000004 HC RX CONTRAST MEDICATION: Performed by: RADIOLOGY

## 2023-05-19 PROCEDURE — 83735 ASSAY OF MAGNESIUM: CPT

## 2023-05-19 PROCEDURE — 76830 TRANSVAGINAL US NON-OB: CPT

## 2023-05-19 PROCEDURE — 80053 COMPREHEN METABOLIC PANEL: CPT

## 2023-05-19 PROCEDURE — 81025 URINE PREGNANCY TEST: CPT

## 2023-05-19 PROCEDURE — 85025 COMPLETE CBC W/AUTO DIFF WBC: CPT

## 2023-05-19 PROCEDURE — 74177 CT ABD & PELVIS W/CONTRAST: CPT

## 2023-05-19 PROCEDURE — 93975 VASCULAR STUDY: CPT

## 2023-05-19 RX ORDER — 0.9 % SODIUM CHLORIDE 0.9 %
1000 INTRAVENOUS SOLUTION INTRAVENOUS ONCE
Status: DISCONTINUED | OUTPATIENT
Start: 2023-05-19 | End: 2023-05-19 | Stop reason: HOSPADM

## 2023-05-19 RX ORDER — FENTANYL CITRATE 50 UG/ML
50 INJECTION, SOLUTION INTRAMUSCULAR; INTRAVENOUS ONCE
Status: DISCONTINUED | OUTPATIENT
Start: 2023-05-19 | End: 2023-05-19

## 2023-05-19 RX ADMIN — IOPAMIDOL 75 ML: 755 INJECTION, SOLUTION INTRAVENOUS at 18:01

## 2023-05-19 ASSESSMENT — ENCOUNTER SYMPTOMS
VOMITING: 0
BACK PAIN: 0
ABDOMINAL PAIN: 1
DIARRHEA: 0
COLOR CHANGE: 0
SHORTNESS OF BREATH: 0
NAUSEA: 0
COUGH: 0

## 2023-05-19 ASSESSMENT — PAIN DESCRIPTION - PAIN TYPE: TYPE: ACUTE PAIN

## 2023-05-19 ASSESSMENT — PAIN - FUNCTIONAL ASSESSMENT: PAIN_FUNCTIONAL_ASSESSMENT: 0-10

## 2023-05-19 ASSESSMENT — PAIN SCALES - GENERAL: PAINLEVEL_OUTOF10: 7

## 2023-05-19 ASSESSMENT — PAIN DESCRIPTION - DESCRIPTORS: DESCRIPTORS: SHARP

## 2023-05-19 ASSESSMENT — PAIN DESCRIPTION - LOCATION: LOCATION: ABDOMEN

## 2023-05-19 ASSESSMENT — PAIN DESCRIPTION - ORIENTATION: ORIENTATION: LEFT

## 2023-05-19 NOTE — DISCHARGE INSTRUCTIONS
Please follow-up with your OB/GYN. You may continue using ibuprofen. If symptoms are worsening please go to emergency department.     At this time as discussed your blood count is completely normal.

## 2023-05-19 NOTE — ED PROVIDER NOTES
HPI   82-year-old female here for evaluation of abdominal pain. Patient reporting lower abdominal cramping. She states she she has been on her period for the last 2 weeks having persistent vaginal bleeding. She states she has history of iron deficiency anemia requiring iron infusions and blood transfusions. She denies any black or bloody stools. She is not on any blood thinners. She states she was supposed to get an iron infusion few weeks prior but she was unable to do so. She does follow with  for hematology. She denies any chest pain or shortness of breath but she is having associated fatigue. No fevers or chills. Review of Systems   Constitutional:  Negative for chills and fever. HENT:  Negative for congestion. Respiratory:  Negative for cough and shortness of breath. Cardiovascular:  Negative for chest pain. Gastrointestinal:  Positive for abdominal pain. Negative for diarrhea, nausea and vomiting. Genitourinary:  Positive for vaginal bleeding. Negative for difficulty urinating, dysuria and hematuria. Musculoskeletal:  Negative for back pain. Skin:  Negative for color change. All other systems reviewed and are negative. Physical Exam  Vitals and nursing note reviewed. Constitutional:       Appearance: Normal appearance. HENT:      Head: Normocephalic and atraumatic. Nose: Nose normal. No congestion. Mouth/Throat:      Mouth: Mucous membranes are moist.      Pharynx: Oropharynx is clear. Eyes:      Conjunctiva/sclera: Conjunctivae normal.      Pupils: Pupils are equal, round, and reactive to light. Cardiovascular:      Rate and Rhythm: Normal rate and regular rhythm. Pulses: Normal pulses. Heart sounds: Normal heart sounds. Pulmonary:      Effort: Pulmonary effort is normal. No respiratory distress. Breath sounds: Normal breath sounds. Abdominal:      General: Bowel sounds are normal. There is no distension. Tenderness:  There

## 2023-06-14 ENCOUNTER — HOSPITAL ENCOUNTER (EMERGENCY)
Age: 38
Discharge: HOME | End: 2023-06-15
Attending: EMERGENCY MEDICINE
Payer: COMMERCIAL

## 2023-06-14 ENCOUNTER — APPOINTMENT (OUTPATIENT)
Dept: GENERAL RADIOLOGY | Age: 38
End: 2023-06-14
Payer: COMMERCIAL

## 2023-06-14 DIAGNOSIS — K21.9 GASTROESOPHAGEAL REFLUX DISEASE WITHOUT ESOPHAGITIS: ICD-10-CM

## 2023-06-14 DIAGNOSIS — R07.89 ATYPICAL CHEST PAIN: Primary | ICD-10-CM

## 2023-06-14 LAB
D DIMER: 434 NG/ML DDU
HCG UR QL: NEGATIVE
TROPONIN, HIGH SENSITIVITY: <6 NG/L (ref 0–9)

## 2023-06-14 PROCEDURE — 85025 COMPLETE CBC W/AUTO DIFF WBC: CPT

## 2023-06-14 PROCEDURE — 71046 X-RAY EXAM CHEST 2 VIEWS: CPT

## 2023-06-14 PROCEDURE — 84484 ASSAY OF TROPONIN QUANT: CPT

## 2023-06-14 PROCEDURE — 85378 FIBRIN DEGRADE SEMIQUANT: CPT

## 2023-06-14 PROCEDURE — 81025 URINE PREGNANCY TEST: CPT

## 2023-06-14 PROCEDURE — 99285 EMERGENCY DEPT VISIT HI MDM: CPT

## 2023-06-14 PROCEDURE — 6370000000 HC RX 637 (ALT 250 FOR IP): Performed by: EMERGENCY MEDICINE

## 2023-06-14 PROCEDURE — 93005 ELECTROCARDIOGRAM TRACING: CPT | Performed by: EMERGENCY MEDICINE

## 2023-06-14 PROCEDURE — 36415 COLL VENOUS BLD VENIPUNCTURE: CPT

## 2023-06-14 RX ORDER — 0.9 % SODIUM CHLORIDE 0.9 %
1000 INTRAVENOUS SOLUTION INTRAVENOUS ONCE
Status: COMPLETED | OUTPATIENT
Start: 2023-06-14 | End: 2023-06-15

## 2023-06-14 RX ADMIN — Medication: at 23:38

## 2023-06-14 ASSESSMENT — PAIN SCALES - GENERAL: PAINLEVEL_OUTOF10: 8

## 2023-06-14 ASSESSMENT — PAIN - FUNCTIONAL ASSESSMENT: PAIN_FUNCTIONAL_ASSESSMENT: 0-10

## 2023-06-14 ASSESSMENT — PAIN DESCRIPTION - LOCATION: LOCATION: CHEST

## 2023-06-14 ASSESSMENT — PAIN DESCRIPTION - DESCRIPTORS: DESCRIPTORS: DISCOMFORT;DULL;THROBBING

## 2023-06-15 ENCOUNTER — APPOINTMENT (OUTPATIENT)
Dept: CT IMAGING | Age: 38
End: 2023-06-15
Payer: COMMERCIAL

## 2023-06-15 VITALS
DIASTOLIC BLOOD PRESSURE: 63 MMHG | BODY MASS INDEX: 32.98 KG/M2 | WEIGHT: 168 LBS | TEMPERATURE: 98.7 F | HEIGHT: 60 IN | OXYGEN SATURATION: 98 % | HEART RATE: 89 BPM | SYSTOLIC BLOOD PRESSURE: 109 MMHG | RESPIRATION RATE: 18 BRPM

## 2023-06-15 LAB
BASOPHILS # BLD: 0.02 E9/L (ref 0–0.2)
BASOPHILS NFR BLD: 0.2 % (ref 0–2)
EKG ATRIAL RATE: 72 BPM
EKG P AXIS: 19 DEGREES
EKG P-R INTERVAL: 146 MS
EKG Q-T INTERVAL: 368 MS
EKG QRS DURATION: 66 MS
EKG QTC CALCULATION (BAZETT): 402 MS
EKG R AXIS: 27 DEGREES
EKG T AXIS: 35 DEGREES
EKG VENTRICULAR RATE: 72 BPM
EOSINOPHIL # BLD: 0.03 E9/L (ref 0.05–0.5)
EOSINOPHIL NFR BLD: 0.4 % (ref 0–6)
ERYTHROCYTE [DISTWIDTH] IN BLOOD BY AUTOMATED COUNT: 14.5 FL (ref 11.5–15)
HCT VFR BLD AUTO: 34.4 % (ref 34–48)
HGB BLD-MCNC: 10.9 G/DL (ref 11.5–15.5)
IMM GRANULOCYTES # BLD: 0.04 E9/L
IMM GRANULOCYTES NFR BLD: 0.5 % (ref 0–5)
LYMPHOCYTES # BLD: 0.88 E9/L (ref 1.5–4)
LYMPHOCYTES NFR BLD: 10.3 % (ref 20–42)
MCH RBC QN AUTO: 27.4 PG (ref 26–35)
MCHC RBC AUTO-ENTMCNC: 31.7 % (ref 32–34.5)
MCV RBC AUTO: 86.4 FL (ref 80–99.9)
MONOCYTES # BLD: 0.25 E9/L (ref 0.1–0.95)
MONOCYTES NFR BLD: 2.9 % (ref 2–12)
NEUTROPHILS # BLD: 7.32 E9/L (ref 1.8–7.3)
NEUTS SEG NFR BLD: 85.7 % (ref 43–80)
PLATELET # BLD AUTO: 301 E9/L (ref 130–450)
PMV BLD AUTO: 10.4 FL (ref 7–12)
RBC # BLD AUTO: 3.98 E12/L (ref 3.5–5.5)
WBC # BLD: 8.5 E9/L (ref 4.5–11.5)

## 2023-06-15 PROCEDURE — 71275 CT ANGIOGRAPHY CHEST: CPT

## 2023-06-15 PROCEDURE — 2580000003 HC RX 258: Performed by: EMERGENCY MEDICINE

## 2023-06-15 PROCEDURE — 93010 ELECTROCARDIOGRAM REPORT: CPT | Performed by: INTERNAL MEDICINE

## 2023-06-15 PROCEDURE — 6360000004 HC RX CONTRAST MEDICATION: Performed by: RADIOLOGY

## 2023-06-15 RX ORDER — ONDANSETRON 4 MG/1
8 TABLET, ORALLY DISINTEGRATING ORAL 3 TIMES DAILY PRN
Qty: 14 TABLET | Refills: 0 | Status: SHIPPED | OUTPATIENT
Start: 2023-06-15

## 2023-06-15 RX ORDER — FAMOTIDINE 20 MG/1
20 TABLET, FILM COATED ORAL 2 TIMES DAILY
Qty: 14 TABLET | Refills: 0 | Status: SHIPPED | OUTPATIENT
Start: 2023-06-15 | End: 2023-06-22

## 2023-06-15 RX ADMIN — SODIUM CHLORIDE 1000 ML: 9 INJECTION, SOLUTION INTRAVENOUS at 00:06

## 2023-06-15 RX ADMIN — IOPAMIDOL 75 ML: 755 INJECTION, SOLUTION INTRAVENOUS at 00:37

## 2023-06-15 ASSESSMENT — PAIN - FUNCTIONAL ASSESSMENT: PAIN_FUNCTIONAL_ASSESSMENT: NONE - DENIES PAIN

## 2023-06-15 NOTE — ED PROVIDER NOTES
HPI:  6/15/23, Time: 1:36 AM EDT        [id number] Lilliana Aquino is a 41 y.o. female presenting to the ED for gradual onset chest pain, beginning 30 minutes prior to arrival .  The complaint has been persistent, moderate in severity, and worsened by nothing. She denies any sharp stabbing character pain she has not had any pain radiating to the neck or jaw nor down the left arm and no pain radiating into the back. Patient states she ate lasagna for dinner prior to the onset of her symptoms. She does have history of DVT in the past and was previously was on anticoagulation with Eliquis but has been off Eliquis for approximately 3 years. She has not had any fever/chills, no colored sputum production hemoptysis, no change in bowel/bladder patterns, no syncope nor any near syncopal episode associated. No relief asked. Review of Systems:   A complete review of systems was performed and pertinent positives and negatives are stated within HPI, all other systems reviewed and are negative.      --------------------------------------------- PAST HISTORY ---------------------------------------------  Past Medical History:  has a past medical history of Anemia, DVT (deep venous thrombosis) ([facility] History of DVT of lower extremity, and Migraine. Past Surgical History:  has a past surgical history that includes Induced  (10/12/12); Dilation & curettage; and Upper gastrointestinal endoscopy (N/A, 10/5/2022). Social History:  reports that she has never smoked. She has never used smokeless tobacco. She reports that she does not drink alcohol and does not use drugs. Family History: family history includes Breast Cancer (age of onset: 34) in her maternal cousin; Breast Cancer (age of onset: 28) in her sister; Breast Cancer (age of onset: 48) in her maternal aunt; Breast Cancer (age of onset: 62) in her maternal grandmother.      The patients home medications have

## 2023-06-15 NOTE — DISCHARGE INSTRUCTIONS
NOTE:  Call your primary care physician tomorrow to let her know your progress overnight. Get immediate medical attention if any new/worsening symptoms occur.

## 2023-06-19 DIAGNOSIS — F41.8 TEST ANXIETY: Primary | ICD-10-CM

## 2023-06-19 RX ORDER — LORAZEPAM 1 MG/1
TABLET ORAL
Qty: 2 TABLET | Refills: 0 | Status: SHIPPED | OUTPATIENT
Start: 2023-06-19 | End: 2023-06-30

## 2023-06-22 ENCOUNTER — HOSPITAL ENCOUNTER (OUTPATIENT)
Dept: MRI IMAGING | Age: 38
Discharge: HOME | End: 2023-06-24
Payer: COMMERCIAL

## 2023-06-22 DIAGNOSIS — Z80.3 FAMILY HISTORY OF MALIGNANT NEOPLASM OF BREAST IN RELATIVE DIAGNOSED WHEN YOUNGER THAN 45 YEARS OF AGE: ICD-10-CM

## 2023-06-22 DIAGNOSIS — Z80.3 FAMILY HISTORY OF MALIGNANT NEOPLASM OF BREAST IN RELATIVE DIAGNOSED WHEN OLDER THAN 45 YEARS OF AGE: ICD-10-CM

## 2023-06-22 DIAGNOSIS — Z91.89 AT HIGH RISK FOR BREAST CANCER: ICD-10-CM

## 2023-06-22 PROCEDURE — C8908 MRI W/O FOL W/CONT, BREAST,: HCPCS

## 2023-06-22 PROCEDURE — A9585 GADOBUTROL INJECTION: HCPCS | Performed by: RADIOLOGY

## 2023-06-22 PROCEDURE — 6360000004 HC RX CONTRAST MEDICATION: Performed by: RADIOLOGY

## 2023-06-22 RX ADMIN — GADOBUTROL 8 ML: 604.72 INJECTION INTRAVENOUS at 20:00

## 2023-07-26 ASSESSMENT — ENCOUNTER SYMPTOMS
BACK PAIN: 0
SHORTNESS OF BREATH: 0
DIARRHEA: 0
VOMITING: 0
ABDOMINAL PAIN: 0
RESPIRATORY NEGATIVE: 1
ABDOMINAL DISTENTION: 0
CHOKING: 0
CHEST TIGHTNESS: 0
WHEEZING: 0
COUGH: 0
NAUSEA: 0
CONSTIPATION: 0

## 2023-07-26 NOTE — PROGRESS NOTES
Subjective: This note was copied forward from the last encounter. Essential components for this patient record were reviewed and verified on this visit including:  recent hospitalizations, recent imaging, PMH, PSH, FH, SOC HX, Allergies, and Medications were reviewed and updated as appropriate. In addition, the assessment and plan were copied from prior office note and updated accordingly. Patient ID: Maria R Fagan is a 41 y.o. female. HPI     Maria R Fagan presents for discussion of her predisposition to breast cancer. PCP: Skinny Carver MD, Gynecologist: Dr. Davy Rodríguez. Jessenia Shepard is a 41 y.o. sammi young woman with a past medical history of GERD, symptomatic anemia, dysfunctional uterine bleeding, Uterine fibroids, and history of DVT in 2014 and 2019 for which she follows with Dr. Pasquale [facility]. Admission to the hospital 10/04/2022 with intractable abdominal pain and partial SBO. Resolved with medical management. She presents for follow-up of her predisposition to breast cancer. She denies a personal history of breast cancer. Menarche age 15. G5, P4; A1.  First live birth age 18 and she did not breast-feed. She is a non-smoker    Breast cancer risk factors include gender and Family History  Maternal grandmother with breast cancer age 62  Maternal aunt with breast cancer age 48  Maternal cousin with breast cancer age 34  Ashkenazi Yarsanism ancestry:  Denies  History of Thoracic Radiation:  Denies     Breast Imaging to date: Estefania Chavarria IBS score 23%. Grade B breast density    07/08/2020 bilateral screening mammogram: Negative, BI-RADS 1.  01/10/2023 bilateral screening mammogram: Negative, BI-RADS 1.  06/23/2023 MRI of the bilateral breast: Negative, BI-RADS 1. Breast Biopsies: Denies    Genetic testing: Maternal cousin had genetic which was reported as negative    Occupation: Home health care.     Review of Systems   Constitutional:  Negative for

## 2023-08-02 ENCOUNTER — OFFICE VISIT (OUTPATIENT)
Dept: BREAST CENTER | Age: 38
End: 2023-08-02
Payer: COMMERCIAL

## 2023-08-02 VITALS
BODY MASS INDEX: 34.55 KG/M2 | HEART RATE: 82 BPM | HEIGHT: 60 IN | SYSTOLIC BLOOD PRESSURE: 108 MMHG | DIASTOLIC BLOOD PRESSURE: 70 MMHG | WEIGHT: 176 LBS | RESPIRATION RATE: 20 BRPM | TEMPERATURE: 98.8 F | OXYGEN SATURATION: 99 %

## 2023-08-02 DIAGNOSIS — Z91.89 AT HIGH RISK FOR BREAST CANCER: Primary | ICD-10-CM

## 2023-08-02 DIAGNOSIS — Z12.31 SCREENING MAMMOGRAM FOR HIGH-RISK PATIENT: ICD-10-CM

## 2023-08-02 PROBLEM — E66.9 OBESITY WITH BODY MASS INDEX 30 OR GREATER: Status: ACTIVE | Noted: 2023-08-02

## 2023-08-02 PROBLEM — I82.409 DEEP VEIN THROMBOSIS (DVT) (HCC): Status: ACTIVE | Noted: 2023-08-02

## 2023-08-02 PROCEDURE — 99213 OFFICE O/P EST LOW 20 MIN: CPT | Performed by: NURSE PRACTITIONER

## 2023-08-02 PROCEDURE — G8417 CALC BMI ABV UP PARAM F/U: HCPCS | Performed by: NURSE PRACTITIONER

## 2023-08-02 PROCEDURE — 1036F TOBACCO NON-USER: CPT | Performed by: NURSE PRACTITIONER

## 2023-08-02 PROCEDURE — G8427 DOCREV CUR MEDS BY ELIG CLIN: HCPCS | Performed by: NURSE PRACTITIONER

## 2023-08-02 NOTE — PATIENT INSTRUCTIONS
6 month follow up with mammogram same day. Any questions or concerns, please contact the office at [phone].

## 2023-10-03 PROCEDURE — 96361 HYDRATE IV INFUSION ADD-ON: CPT

## 2023-10-03 PROCEDURE — 96376 TX/PRO/DX INJ SAME DRUG ADON: CPT

## 2023-10-03 PROCEDURE — 96375 TX/PRO/DX INJ NEW DRUG ADDON: CPT

## 2023-10-03 PROCEDURE — 99285 EMERGENCY DEPT VISIT HI MDM: CPT

## 2023-10-03 PROCEDURE — 96374 THER/PROPH/DIAG INJ IV PUSH: CPT

## 2023-10-03 RX ORDER — ONDANSETRON 2 MG/ML
4 INJECTION INTRAMUSCULAR; INTRAVENOUS ONCE
Status: COMPLETED | OUTPATIENT
Start: 2023-10-04 | End: 2023-10-04

## 2023-10-03 RX ORDER — 0.9 % SODIUM CHLORIDE 0.9 %
1000 INTRAVENOUS SOLUTION INTRAVENOUS ONCE
Status: COMPLETED | OUTPATIENT
Start: 2023-10-04 | End: 2023-10-04

## 2023-10-04 ENCOUNTER — APPOINTMENT (OUTPATIENT)
Dept: CT IMAGING | Age: 38
End: 2023-10-04
Payer: COMMERCIAL

## 2023-10-04 ENCOUNTER — HOSPITAL ENCOUNTER (EMERGENCY)
Age: 38
Discharge: HOME | End: 2023-10-04
Attending: EMERGENCY MEDICINE
Payer: COMMERCIAL

## 2023-10-04 VITALS
TEMPERATURE: 98.2 F | SYSTOLIC BLOOD PRESSURE: 104 MMHG | OXYGEN SATURATION: 97 % | RESPIRATION RATE: 16 BRPM | DIASTOLIC BLOOD PRESSURE: 68 MMHG | HEART RATE: 87 BPM

## 2023-10-04 DIAGNOSIS — R10.2 COMBINED ABDOMINAL AND PELVIC PAIN, UNSPECIFIED LATERALITY: Primary | ICD-10-CM

## 2023-10-04 DIAGNOSIS — R10.9 COMBINED ABDOMINAL AND PELVIC PAIN, UNSPECIFIED LATERALITY: Primary | ICD-10-CM

## 2023-10-04 LAB
ALBUMIN SERPL-MCNC: 4 G/DL (ref 3.5–5.2)
ALP SERPL-CCNC: 104 U/L (ref 35–104)
ALT SERPL-CCNC: 8 U/L (ref 0–32)
AMYLASE SERPL-CCNC: 76 U/L (ref 20–100)
ANION GAP SERPL CALCULATED.3IONS-SCNC: 13 MMOL/L (ref 7–16)
AST SERPL-CCNC: 17 U/L (ref 0–31)
BACTERIA URNS QL MICRO: ABNORMAL
BILIRUB SERPL-MCNC: 0.3 MG/DL (ref 0–1.2)
BILIRUB UR QL STRIP: ABNORMAL
BUN SERPL-MCNC: 9 MG/DL (ref 6–20)
CALCIUM SERPL-MCNC: 8.8 MG/DL (ref 8.6–10.2)
CHLORIDE SERPL-SCNC: 103 MMOL/L (ref 98–107)
CLARITY UR: ABNORMAL
CO2 SERPL-SCNC: 22 MMOL/L (ref 22–29)
COLOR UR: ABNORMAL
CREAT SERPL-MCNC: 0.9 MG/DL (ref 0.5–1)
EPI CELLS #/AREA URNS HPF: ABNORMAL /HPF
ERYTHROCYTE [DISTWIDTH] IN BLOOD BY AUTOMATED COUNT: 13.3 % (ref 11.5–15)
GFR SERPL CREATININE-BSD FRML MDRD: >60 ML/MIN/1.73M2
GLUCOSE SERPL-MCNC: 118 MG/DL (ref 74–99)
GLUCOSE UR STRIP-MCNC: >=1000 MG/DL
HCG SERPL QL: NEGATIVE
HCG UR QL: NEGATIVE
HCT VFR BLD AUTO: 33.5 % (ref 34–48)
HGB BLD-MCNC: 11.3 G/DL (ref 11.5–15.5)
HGB UR QL STRIP.AUTO: ABNORMAL
KETONES UR STRIP-MCNC: ABNORMAL MG/DL
LACTATE BLDV-SCNC: 1.4 MMOL/L (ref 0.5–2.2)
LEUKOCYTE ESTERASE UR QL STRIP: ABNORMAL
LIPASE SERPL-CCNC: 19 U/L (ref 13–60)
MAGNESIUM SERPL-MCNC: 2.1 MG/DL (ref 1.6–2.6)
MCH RBC QN AUTO: 29.7 PG (ref 26–35)
MCHC RBC AUTO-ENTMCNC: 33.7 G/DL (ref 32–34.5)
MCV RBC AUTO: 88.2 FL (ref 80–99.9)
NITRITE UR QL STRIP: NEGATIVE
PH UR STRIP: 5 [PH] (ref 5–9)
PLATELET # BLD AUTO: 348 K/UL (ref 130–450)
PMV BLD AUTO: 9.5 FL (ref 7–12)
POTASSIUM SERPL-SCNC: 3.6 MMOL/L (ref 3.5–5)
PROT SERPL-MCNC: 7.4 G/DL (ref 6.4–8.3)
PROT UR STRIP-MCNC: 30 MG/DL
RBC # BLD AUTO: 3.8 M/UL (ref 3.5–5.5)
RBC #/AREA URNS HPF: ABNORMAL /HPF
SODIUM SERPL-SCNC: 138 MMOL/L (ref 132–146)
SP GR UR STRIP: 1.02 (ref 1–1.03)
UROBILINOGEN UR STRIP-ACNC: 0.2 EU/DL (ref 0–1)
WBC #/AREA URNS HPF: ABNORMAL /HPF
WBC OTHER # BLD: 5.2 K/UL (ref 4.5–11.5)

## 2023-10-04 PROCEDURE — A4216 STERILE WATER/SALINE, 10 ML: HCPCS | Performed by: EMERGENCY MEDICINE

## 2023-10-04 PROCEDURE — 83735 ASSAY OF MAGNESIUM: CPT

## 2023-10-04 PROCEDURE — 83605 ASSAY OF LACTIC ACID: CPT

## 2023-10-04 PROCEDURE — 6360000004 HC RX CONTRAST MEDICATION: Performed by: STUDENT IN AN ORGANIZED HEALTH CARE EDUCATION/TRAINING PROGRAM

## 2023-10-04 PROCEDURE — 84703 CHORIONIC GONADOTROPIN ASSAY: CPT

## 2023-10-04 PROCEDURE — 83690 ASSAY OF LIPASE: CPT

## 2023-10-04 PROCEDURE — 85027 COMPLETE CBC AUTOMATED: CPT

## 2023-10-04 PROCEDURE — 80053 COMPREHEN METABOLIC PANEL: CPT

## 2023-10-04 PROCEDURE — 6360000002 HC RX W HCPCS: Performed by: EMERGENCY MEDICINE

## 2023-10-04 PROCEDURE — 81001 URINALYSIS AUTO W/SCOPE: CPT

## 2023-10-04 PROCEDURE — 2580000003 HC RX 258: Performed by: EMERGENCY MEDICINE

## 2023-10-04 PROCEDURE — 82150 ASSAY OF AMYLASE: CPT

## 2023-10-04 PROCEDURE — 2500000003 HC RX 250 WO HCPCS: Performed by: EMERGENCY MEDICINE

## 2023-10-04 PROCEDURE — 74177 CT ABD & PELVIS W/CONTRAST: CPT

## 2023-10-04 RX ORDER — KETOROLAC TROMETHAMINE 30 MG/ML
30 INJECTION, SOLUTION INTRAMUSCULAR; INTRAVENOUS ONCE
Status: COMPLETED | OUTPATIENT
Start: 2023-10-04 | End: 2023-10-04

## 2023-10-04 RX ORDER — ONDANSETRON 2 MG/ML
4 INJECTION INTRAMUSCULAR; INTRAVENOUS ONCE
Status: COMPLETED | OUTPATIENT
Start: 2023-10-04 | End: 2023-10-04

## 2023-10-04 RX ORDER — HYDROMORPHONE HYDROCHLORIDE 1 MG/ML
1 INJECTION, SOLUTION INTRAMUSCULAR; INTRAVENOUS; SUBCUTANEOUS ONCE
Status: DISCONTINUED | OUTPATIENT
Start: 2023-10-04 | End: 2023-10-04 | Stop reason: HOSPADM

## 2023-10-04 RX ADMIN — ONDANSETRON 4 MG: 2 INJECTION INTRAMUSCULAR; INTRAVENOUS at 00:30

## 2023-10-04 RX ADMIN — SODIUM CHLORIDE 1000 ML: 9 INJECTION, SOLUTION INTRAVENOUS at 00:29

## 2023-10-04 RX ADMIN — FAMOTIDINE 20 MG: 10 INJECTION, SOLUTION INTRAVENOUS at 00:31

## 2023-10-04 RX ADMIN — ONDANSETRON 4 MG: 2 INJECTION INTRAMUSCULAR; INTRAVENOUS at 02:03

## 2023-10-04 RX ADMIN — IOPAMIDOL 75 ML: 755 INJECTION, SOLUTION INTRAVENOUS at 01:35

## 2023-10-04 RX ADMIN — KETOROLAC TROMETHAMINE 30 MG: 30 INJECTION, SOLUTION INTRAMUSCULAR; INTRAVENOUS at 02:02

## 2023-10-04 ASSESSMENT — PAIN DESCRIPTION - ORIENTATION
ORIENTATION: RIGHT;LEFT;LOWER
ORIENTATION: LOWER;MID;RIGHT;LEFT

## 2023-10-04 ASSESSMENT — PAIN - FUNCTIONAL ASSESSMENT
PAIN_FUNCTIONAL_ASSESSMENT: INTOLERABLE, UNABLE TO DO ANY ACTIVE OR PASSIVE ACTIVITIES
PAIN_FUNCTIONAL_ASSESSMENT: 0-10

## 2023-10-04 ASSESSMENT — PATIENT HEALTH QUESTIONNAIRE - PHQ9
SUM OF ALL RESPONSES TO PHQ9 QUESTIONS 1 & 2: 0
2. FEELING DOWN, DEPRESSED OR HOPELESS: 0
SUM OF ALL RESPONSES TO PHQ QUESTIONS 1-9: 0
SUM OF ALL RESPONSES TO PHQ QUESTIONS 1-9: 0
1. LITTLE INTEREST OR PLEASURE IN DOING THINGS: 0
SUM OF ALL RESPONSES TO PHQ QUESTIONS 1-9: 0
SUM OF ALL RESPONSES TO PHQ QUESTIONS 1-9: 0

## 2023-10-04 ASSESSMENT — PAIN DESCRIPTION - DESCRIPTORS
DESCRIPTORS: ACHING;SORE;TENDER
DESCRIPTORS: CRAMPING

## 2023-10-04 ASSESSMENT — PAIN DESCRIPTION - ONSET: ONSET: ON-GOING

## 2023-10-04 ASSESSMENT — LIFESTYLE VARIABLES
HOW OFTEN DO YOU HAVE A DRINK CONTAINING ALCOHOL: NEVER
HOW MANY STANDARD DRINKS CONTAINING ALCOHOL DO YOU HAVE ON A TYPICAL DAY: PATIENT DOES NOT DRINK

## 2023-10-04 ASSESSMENT — PAIN DESCRIPTION - PAIN TYPE: TYPE: ACUTE PAIN

## 2023-10-04 ASSESSMENT — PAIN DESCRIPTION - LOCATION
LOCATION: ABDOMEN
LOCATION: ABDOMEN

## 2023-10-04 ASSESSMENT — PAIN DESCRIPTION - FREQUENCY: FREQUENCY: INTERMITTENT

## 2023-10-04 ASSESSMENT — PAIN SCALES - GENERAL
PAINLEVEL_OUTOF10: 9
PAINLEVEL_OUTOF10: 10

## 2023-10-04 ASSESSMENT — PAIN DESCRIPTION - DIRECTION: RADIATING_TOWARDS: LOWER BACK

## 2023-10-04 NOTE — DISCHARGE INSTRUCTIONS
To follow-up with gynecologist soon as possible return if increased abdominal pain fevers vomiting or or any other pain or bleeding

## 2024-01-02 ENCOUNTER — HOSPITAL ENCOUNTER (EMERGENCY)
Age: 39
Discharge: HOME | End: 2024-01-02
Payer: COMMERCIAL

## 2024-01-02 VITALS
BODY MASS INDEX: 32.22 KG/M2 | HEART RATE: 98 BPM | TEMPERATURE: 98.3 F | DIASTOLIC BLOOD PRESSURE: 75 MMHG | OXYGEN SATURATION: 99 % | RESPIRATION RATE: 14 BRPM | SYSTOLIC BLOOD PRESSURE: 114 MMHG | WEIGHT: 165 LBS

## 2024-01-02 DIAGNOSIS — J10.1 INFLUENZA B: Primary | ICD-10-CM

## 2024-01-02 DIAGNOSIS — R05.1 ACUTE COUGH: ICD-10-CM

## 2024-01-02 LAB
FLUAV RNA RESP QL NAA+PROBE: NOT DETECTED
FLUBV RNA RESP QL NAA+PROBE: DETECTED
SARS-COV-2 RNA RESP QL NAA+PROBE: NOT DETECTED
SOURCE: ABNORMAL
SPECIMEN DESCRIPTION: ABNORMAL

## 2024-01-02 PROCEDURE — 87636 SARSCOV2 & INF A&B AMP PRB: CPT

## 2024-01-02 PROCEDURE — 99283 EMERGENCY DEPT VISIT LOW MDM: CPT

## 2024-01-02 RX ORDER — DEXTROMETHORPHAN HYDROBROMIDE AND PROMETHAZINE HYDROCHLORIDE 15; 6.25 MG/5ML; MG/5ML
5 SYRUP ORAL 2 TIMES DAILY
Qty: 70 ML | Refills: 0 | Status: SHIPPED | OUTPATIENT
Start: 2024-01-02 | End: 2024-01-09

## 2024-01-02 RX ORDER — OSELTAMIVIR PHOSPHATE 75 MG/1
75 CAPSULE ORAL 2 TIMES DAILY
Qty: 10 CAPSULE | Refills: 0 | Status: SHIPPED | OUTPATIENT
Start: 2024-01-02 | End: 2024-01-07

## 2024-01-02 ASSESSMENT — PAIN DESCRIPTION - ORIENTATION: ORIENTATION: RIGHT

## 2024-01-02 ASSESSMENT — PAIN DESCRIPTION - FREQUENCY: FREQUENCY: CONTINUOUS

## 2024-01-02 ASSESSMENT — PAIN DESCRIPTION - PAIN TYPE: TYPE: ACUTE PAIN

## 2024-01-02 ASSESSMENT — PAIN - FUNCTIONAL ASSESSMENT: PAIN_FUNCTIONAL_ASSESSMENT: 0-10

## 2024-01-02 ASSESSMENT — PAIN DESCRIPTION - LOCATION: LOCATION: HEAD

## 2024-01-02 ASSESSMENT — PAIN DESCRIPTION - DESCRIPTORS: DESCRIPTORS: ACHING

## 2024-01-02 ASSESSMENT — PAIN SCALES - GENERAL: PAINLEVEL_OUTOF10: 8

## 2024-01-02 ASSESSMENT — PAIN DESCRIPTION - ONSET: ONSET: ON-GOING

## 2024-01-02 NOTE — ED PROVIDER NOTES
and ready for outpatient    Patient was explicitly instructed on specific signs and symptoms on which to return to the emergency room for. Patient was instructed to return to the ER for any new or worsening symptoms. Additional discharge instructions were given verbally. All questions were answered. Patient is comfortable and agreeable with discharge plan. Patient in no acute distress and non-toxic in appearance.       Assessment     1. Influenza B    2. Acute cough      Plan   Discharged home.  Patient condition is stable    New Medications     New Prescriptions    OSELTAMIVIR (TAMIFLU) 75 MG CAPSULE    Take 1 capsule by mouth 2 times daily for 5 days    PROMETHAZINE-DEXTROMETHORPHAN (PROMETHAZINE-DM) 6.25-15 MG/5ML SYRUP    Take 5 mLs by mouth in the morning and at bedtime for 7 days     Electronically signed by Millicent Looney PA-C   DD: 1/2/24  **This report was transcribed using voice recognition software. Every effort was made to ensure accuracy; however, inadvertent computerized transcription errors may be present.  END OF ED PROVIDER NOTE        Millicent Looney PA-C  01/02/24 2019

## 2024-01-11 ENCOUNTER — TELEPHONE (OUTPATIENT)
Dept: BREAST CENTER | Age: 39
End: 2024-01-11

## 2024-01-11 NOTE — TELEPHONE ENCOUNTER
Patient had appointment for mammogram and office visit with Larissa feng for which she did not show. Called and left detailed message with call back number to reschedule.    Electronically signed by Camille Kat RN on 1/11/24 at 11:28 AM EST

## 2024-07-17 ENCOUNTER — APPOINTMENT (OUTPATIENT)
Dept: ULTRASOUND IMAGING | Age: 39
End: 2024-07-17
Payer: COMMERCIAL

## 2024-07-17 ENCOUNTER — HOSPITAL ENCOUNTER (EMERGENCY)
Age: 39
Discharge: HOME | End: 2024-07-17
Payer: COMMERCIAL

## 2024-07-17 VITALS
TEMPERATURE: 98.5 F | WEIGHT: 182.6 LBS | RESPIRATION RATE: 18 BRPM | DIASTOLIC BLOOD PRESSURE: 93 MMHG | OXYGEN SATURATION: 99 % | BODY MASS INDEX: 35.85 KG/M2 | HEIGHT: 60 IN | SYSTOLIC BLOOD PRESSURE: 135 MMHG | HEART RATE: 83 BPM

## 2024-07-17 DIAGNOSIS — N94.6 DYSMENORRHEA: Primary | ICD-10-CM

## 2024-07-17 DIAGNOSIS — N88.8 NABOTHIAN CYST: ICD-10-CM

## 2024-07-17 DIAGNOSIS — D64.9 ANEMIA, UNSPECIFIED TYPE: ICD-10-CM

## 2024-07-17 DIAGNOSIS — D25.9 UTERINE LEIOMYOMA, UNSPECIFIED LOCATION: ICD-10-CM

## 2024-07-17 LAB
ALBUMIN SERPL-MCNC: 4 G/DL (ref 3.5–5.2)
ALP SERPL-CCNC: 105 U/L (ref 35–104)
ALT SERPL-CCNC: <5 U/L (ref 0–32)
ANION GAP SERPL CALCULATED.3IONS-SCNC: 14 MMOL/L (ref 7–16)
AST SERPL-CCNC: 15 U/L (ref 0–31)
BASOPHILS # BLD: 0.03 K/UL (ref 0–0.2)
BASOPHILS NFR BLD: 1 % (ref 0–2)
BILIRUB SERPL-MCNC: 0.3 MG/DL (ref 0–1.2)
BILIRUB UR QL STRIP: NEGATIVE
BUN SERPL-MCNC: 6 MG/DL (ref 6–20)
CALCIUM SERPL-MCNC: 8.9 MG/DL (ref 8.6–10.2)
CHLORIDE SERPL-SCNC: 104 MMOL/L (ref 98–107)
CLARITY UR: CLEAR
CO2 SERPL-SCNC: 20 MMOL/L (ref 22–29)
COLOR UR: YELLOW
CREAT SERPL-MCNC: 0.7 MG/DL (ref 0.5–1)
EOSINOPHIL # BLD: 0.13 K/UL (ref 0.05–0.5)
EOSINOPHILS RELATIVE PERCENT: 4 % (ref 0–6)
ERYTHROCYTE [DISTWIDTH] IN BLOOD BY AUTOMATED COUNT: 14.2 % (ref 11.5–15)
GFR, ESTIMATED: >90 ML/MIN/1.73M2
GLUCOSE SERPL-MCNC: 99 MG/DL (ref 74–99)
GLUCOSE UR STRIP-MCNC: NEGATIVE MG/DL
HCG UR QL: NEGATIVE
HCT VFR BLD AUTO: 33.1 % (ref 34–48)
HGB BLD-MCNC: 10.9 G/DL (ref 11.5–15.5)
HGB UR QL STRIP.AUTO: ABNORMAL
IMM GRANULOCYTES # BLD AUTO: <0.03 K/UL (ref 0–0.58)
IMM GRANULOCYTES NFR BLD: 1 % (ref 0–5)
KETONES UR STRIP-MCNC: NEGATIVE MG/DL
LACTATE BLDV-SCNC: 1.3 MMOL/L (ref 0.5–2.2)
LEUKOCYTE ESTERASE UR QL STRIP: NEGATIVE
LIPASE SERPL-CCNC: 13 U/L (ref 13–60)
LYMPHOCYTES NFR BLD: 0.98 K/UL (ref 1.5–4)
LYMPHOCYTES RELATIVE PERCENT: 27 % (ref 20–42)
MCH RBC QN AUTO: 27.9 PG (ref 26–35)
MCHC RBC AUTO-ENTMCNC: 32.9 G/DL (ref 32–34.5)
MCV RBC AUTO: 84.7 FL (ref 80–99.9)
MONOCYTES NFR BLD: 0.3 K/UL (ref 0.1–0.95)
MONOCYTES NFR BLD: 8 % (ref 2–12)
NEUTROPHILS NFR BLD: 59 % (ref 43–80)
NEUTS SEG NFR BLD: 2.13 K/UL (ref 1.8–7.3)
NITRITE UR QL STRIP: NEGATIVE
PH UR STRIP: 5.5 [PH] (ref 5–9)
PLATELET # BLD AUTO: 305 K/UL (ref 130–450)
PMV BLD AUTO: 9.9 FL (ref 7–12)
POTASSIUM SERPL-SCNC: 3.9 MMOL/L (ref 3.5–5)
PROT SERPL-MCNC: 7.5 G/DL (ref 6.4–8.3)
PROT UR STRIP-MCNC: NEGATIVE MG/DL
RBC # BLD AUTO: 3.91 M/UL (ref 3.5–5.5)
RBC #/AREA URNS HPF: ABNORMAL /HPF
SODIUM SERPL-SCNC: 138 MMOL/L (ref 132–146)
SP GR UR STRIP: 1.02 (ref 1–1.03)
UROBILINOGEN UR STRIP-ACNC: 0.2 EU/DL (ref 0–1)
WBC #/AREA URNS HPF: ABNORMAL /HPF
WBC OTHER # BLD: 3.6 K/UL (ref 4.5–11.5)

## 2024-07-17 PROCEDURE — 2580000003 HC RX 258: Performed by: NURSE PRACTITIONER

## 2024-07-17 PROCEDURE — 83690 ASSAY OF LIPASE: CPT

## 2024-07-17 PROCEDURE — 80053 COMPREHEN METABOLIC PANEL: CPT

## 2024-07-17 PROCEDURE — 83605 ASSAY OF LACTIC ACID: CPT

## 2024-07-17 PROCEDURE — 6370000000 HC RX 637 (ALT 250 FOR IP): Performed by: NURSE PRACTITIONER

## 2024-07-17 PROCEDURE — 96374 THER/PROPH/DIAG INJ IV PUSH: CPT

## 2024-07-17 PROCEDURE — 99284 EMERGENCY DEPT VISIT MOD MDM: CPT

## 2024-07-17 PROCEDURE — 84703 CHORIONIC GONADOTROPIN ASSAY: CPT

## 2024-07-17 PROCEDURE — 6360000002 HC RX W HCPCS: Performed by: NURSE PRACTITIONER

## 2024-07-17 PROCEDURE — 76830 TRANSVAGINAL US NON-OB: CPT

## 2024-07-17 PROCEDURE — 81001 URINALYSIS AUTO W/SCOPE: CPT

## 2024-07-17 PROCEDURE — 85025 COMPLETE CBC W/AUTO DIFF WBC: CPT

## 2024-07-17 RX ORDER — CYCLOBENZAPRINE HCL 10 MG
5 TABLET ORAL ONCE
Status: COMPLETED | OUTPATIENT
Start: 2024-07-17 | End: 2024-07-17

## 2024-07-17 RX ORDER — HYDROCODONE BITARTRATE AND ACETAMINOPHEN 5; 325 MG/1; MG/1
TABLET ORAL
Status: DISCONTINUED
Start: 2024-07-17 | End: 2024-07-17 | Stop reason: HOSPADM

## 2024-07-17 RX ORDER — HYDROCODONE BITARTRATE AND ACETAMINOPHEN 5; 325 MG/1; MG/1
1 TABLET ORAL ONCE
Status: COMPLETED | OUTPATIENT
Start: 2024-07-17 | End: 2024-07-17

## 2024-07-17 RX ORDER — KETOROLAC TROMETHAMINE 30 MG/ML
30 INJECTION, SOLUTION INTRAMUSCULAR; INTRAVENOUS ONCE
Status: COMPLETED | OUTPATIENT
Start: 2024-07-17 | End: 2024-07-17

## 2024-07-17 RX ORDER — HYDROCODONE BITARTRATE AND ACETAMINOPHEN 5; 325 MG/1; MG/1
1 TABLET ORAL EVERY 8 HOURS PRN
Qty: 6 TABLET | Refills: 0 | Status: SHIPPED | OUTPATIENT
Start: 2024-07-17 | End: 2024-07-20

## 2024-07-17 RX ORDER — CYCLOBENZAPRINE HCL 5 MG
5 TABLET ORAL NIGHTLY PRN
Qty: 5 TABLET | Refills: 0 | Status: SHIPPED | OUTPATIENT
Start: 2024-07-17 | End: 2024-07-22

## 2024-07-17 RX ORDER — 0.9 % SODIUM CHLORIDE 0.9 %
1000 INTRAVENOUS SOLUTION INTRAVENOUS ONCE
Status: COMPLETED | OUTPATIENT
Start: 2024-07-17 | End: 2024-07-17

## 2024-07-17 RX ADMIN — HYDROCODONE BITARTRATE AND ACETAMINOPHEN 1 TABLET: 5; 325 TABLET ORAL at 13:24

## 2024-07-17 RX ADMIN — SODIUM CHLORIDE 1000 ML: 9 INJECTION, SOLUTION INTRAVENOUS at 12:10

## 2024-07-17 RX ADMIN — KETOROLAC TROMETHAMINE 30 MG: 30 INJECTION, SOLUTION INTRAMUSCULAR at 12:18

## 2024-07-17 RX ADMIN — CYCLOBENZAPRINE 5 MG: 10 TABLET, FILM COATED ORAL at 12:17

## 2024-07-17 ASSESSMENT — PAIN DESCRIPTION - ORIENTATION
ORIENTATION: LOWER
ORIENTATION: LOWER;MID

## 2024-07-17 ASSESSMENT — PAIN SCALES - GENERAL
PAINLEVEL_OUTOF10: 8

## 2024-07-17 ASSESSMENT — PAIN DESCRIPTION - LOCATION
LOCATION: ABDOMEN
LOCATION: ABDOMEN

## 2024-07-17 ASSESSMENT — PAIN DESCRIPTION - DESCRIPTORS
DESCRIPTORS: PRESSURE;CRAMPING;DISCOMFORT
DESCRIPTORS: CRAMPING;STABBING

## 2024-07-17 ASSESSMENT — PAIN DESCRIPTION - PAIN TYPE: TYPE: ACUTE PAIN

## 2024-07-17 ASSESSMENT — PAIN - FUNCTIONAL ASSESSMENT: PAIN_FUNCTIONAL_ASSESSMENT: 0-10

## 2024-07-17 NOTE — ED PROVIDER NOTES
Independent MIKE Visit.          [facility] EMERGENCY DEPARTMENT  EMERGENCY DEPARTMENT ENCOUNTER        Pt Name: Michelet Gan  MRN: [id number]  Birthdate 1985  Date of evaluation: 7/17/2024  Provider: NOLAN De Leon - CNP  PCP: Shayla Leon MD  Note Started: 3:06 PM EDT 7/17/24    CHIEF COMPLAINT       Chief Complaint   Patient presents with    Abdominal Pain     Low mid abdominal/pelvic cramping pain for 3 days       HISTORY OF PRESENT ILLNESS: 1 or more Elements     History from : Patient    Limitations to history : None    Michelet Gan is a 39 y.o. female who presents to the emerged department for lower mid abdominal pain.  Patient states that she has had menstrual cramps for the last 3 days she states that nothing she is taking at home is helping.  Patient states that every time she has her menstrual cycle she gets the same cramps.  Patient states that she has multiple uterine fibroids she states that she follows with the primary care physician as well as the women's health clinic [facility].  She states that she was told she needs a hysterectomy but does not want to have a hysterectomy at this time.  Patient states that she has chronic anemia she takes infusions once a month.  She states that the anemia is due to her heavy menstrual cycles that she has.  She denies any abnormal uterine bleeding at this time but states that she is just having the cramping.  She states that she has had no nausea no vomiting no diarrhea no fevers no chills no chest pain or shortness of breath no feelings of lightheadedness or dizziness.  Patient states that she has not called her OB/GYN since her symptoms started several days ago.    Nursing Notes were all reviewed and agreed with or any disagreements were addressed in the HPI.    REVIEW OF SYSTEMS :      Review of Systems   All other systems reviewed and are negative.      Positives and

## 2024-11-15 ENCOUNTER — HOSPITAL ENCOUNTER (EMERGENCY)
Age: 39
Discharge: HOME | End: 2024-11-15
Attending: EMERGENCY MEDICINE
Payer: COMMERCIAL

## 2024-11-15 ENCOUNTER — APPOINTMENT (OUTPATIENT)
Dept: ULTRASOUND IMAGING | Age: 39
End: 2024-11-15
Payer: COMMERCIAL

## 2024-11-15 VITALS
RESPIRATION RATE: 14 BRPM | TEMPERATURE: 98.3 F | OXYGEN SATURATION: 99 % | BODY MASS INDEX: 29.29 KG/M2 | HEART RATE: 92 BPM | SYSTOLIC BLOOD PRESSURE: 133 MMHG | DIASTOLIC BLOOD PRESSURE: 73 MMHG | WEIGHT: 150 LBS

## 2024-11-15 DIAGNOSIS — R10.30 LOWER ABDOMINAL PAIN: ICD-10-CM

## 2024-11-15 DIAGNOSIS — D64.9 ANEMIA, UNSPECIFIED TYPE: ICD-10-CM

## 2024-11-15 DIAGNOSIS — D25.9 UTERINE LEIOMYOMA, UNSPECIFIED LOCATION: Primary | ICD-10-CM

## 2024-11-15 LAB
ALBUMIN SERPL-MCNC: 4 G/DL (ref 3.5–5.2)
ALP SERPL-CCNC: 105 U/L (ref 35–104)
ALT SERPL-CCNC: <5 U/L (ref 0–32)
ANION GAP SERPL CALCULATED.3IONS-SCNC: 12 MMOL/L (ref 7–16)
AST SERPL-CCNC: 17 U/L (ref 0–31)
BACTERIA URNS QL MICRO: ABNORMAL
BASOPHILS # BLD: 0.02 K/UL (ref 0–0.2)
BASOPHILS NFR BLD: 0 % (ref 0–2)
BILIRUB SERPL-MCNC: 0.2 MG/DL (ref 0–1.2)
BILIRUB UR QL STRIP: NEGATIVE
BUN SERPL-MCNC: 6 MG/DL (ref 6–20)
CALCIUM SERPL-MCNC: 8.9 MG/DL (ref 8.6–10.2)
CHLORIDE SERPL-SCNC: 104 MMOL/L (ref 98–107)
CLARITY UR: ABNORMAL
CO2 SERPL-SCNC: 22 MMOL/L (ref 22–29)
COLOR UR: ABNORMAL
CREAT SERPL-MCNC: 0.7 MG/DL (ref 0.5–1)
EOSINOPHIL # BLD: 0.01 K/UL (ref 0.05–0.5)
EOSINOPHILS RELATIVE PERCENT: 0 % (ref 0–6)
EPI CELLS #/AREA URNS HPF: ABNORMAL /HPF
ERYTHROCYTE [DISTWIDTH] IN BLOOD BY AUTOMATED COUNT: 24.5 % (ref 11.5–15)
GFR, ESTIMATED: >90 ML/MIN/1.73M2
GLUCOSE SERPL-MCNC: 93 MG/DL (ref 74–99)
GLUCOSE UR STRIP-MCNC: NEGATIVE MG/DL
HCG UR QL: NEGATIVE
HCT VFR BLD AUTO: 31.2 % (ref 34–48)
HGB BLD-MCNC: 9.1 G/DL (ref 11.5–15.5)
HGB UR QL STRIP.AUTO: ABNORMAL
IMM GRANULOCYTES # BLD AUTO: <0.03 K/UL (ref 0–0.58)
IMM GRANULOCYTES NFR BLD: 0 % (ref 0–5)
KETONES UR STRIP-MCNC: NEGATIVE MG/DL
LEUKOCYTE ESTERASE UR QL STRIP: NEGATIVE
LIPASE SERPL-CCNC: 15 U/L (ref 13–60)
LYMPHOCYTES NFR BLD: 0.87 K/UL (ref 1.5–4)
LYMPHOCYTES RELATIVE PERCENT: 15 % (ref 20–42)
MCH RBC QN AUTO: 21.5 PG (ref 26–35)
MCHC RBC AUTO-ENTMCNC: 29.2 G/DL (ref 32–34.5)
MCV RBC AUTO: 73.8 FL (ref 80–99.9)
MONOCYTES NFR BLD: 0.33 K/UL (ref 0.1–0.95)
MONOCYTES NFR BLD: 6 % (ref 2–12)
NEUTROPHILS NFR BLD: 78 % (ref 43–80)
NEUTS SEG NFR BLD: 4.49 K/UL (ref 1.8–7.3)
NITRITE UR QL STRIP: NEGATIVE
PH UR STRIP: 5.5 [PH] (ref 5–9)
PLATELET # BLD AUTO: 320 K/UL (ref 130–450)
PMV BLD AUTO: 9.1 FL (ref 7–12)
POTASSIUM SERPL-SCNC: 4.1 MMOL/L (ref 3.5–5)
PROT SERPL-MCNC: 7.5 G/DL (ref 6.4–8.3)
PROT UR STRIP-MCNC: NEGATIVE MG/DL
RBC # BLD AUTO: 4.23 M/UL (ref 3.5–5.5)
RBC # BLD: ABNORMAL 10*6/UL
RBC #/AREA URNS HPF: ABNORMAL /HPF
SODIUM SERPL-SCNC: 138 MMOL/L (ref 132–146)
SP GR UR STRIP: >1.03 (ref 1–1.03)
UROBILINOGEN UR STRIP-ACNC: 0.2 EU/DL (ref 0–1)
WBC #/AREA URNS HPF: ABNORMAL /HPF
WBC OTHER # BLD: 5.7 K/UL (ref 4.5–11.5)

## 2024-11-15 PROCEDURE — 83690 ASSAY OF LIPASE: CPT

## 2024-11-15 PROCEDURE — 87086 URINE CULTURE/COLONY COUNT: CPT

## 2024-11-15 PROCEDURE — 80053 COMPREHEN METABOLIC PANEL: CPT

## 2024-11-15 PROCEDURE — 96372 THER/PROPH/DIAG INJ SC/IM: CPT

## 2024-11-15 PROCEDURE — 93975 VASCULAR STUDY: CPT

## 2024-11-15 PROCEDURE — 6360000002 HC RX W HCPCS: Performed by: EMERGENCY MEDICINE

## 2024-11-15 PROCEDURE — 85025 COMPLETE CBC W/AUTO DIFF WBC: CPT

## 2024-11-15 PROCEDURE — 84703 CHORIONIC GONADOTROPIN ASSAY: CPT

## 2024-11-15 PROCEDURE — 6370000000 HC RX 637 (ALT 250 FOR IP): Performed by: EMERGENCY MEDICINE

## 2024-11-15 PROCEDURE — 99284 EMERGENCY DEPT VISIT MOD MDM: CPT

## 2024-11-15 PROCEDURE — 81001 URINALYSIS AUTO W/SCOPE: CPT

## 2024-11-15 PROCEDURE — 76856 US EXAM PELVIC COMPLETE: CPT

## 2024-11-15 RX ORDER — KETOROLAC TROMETHAMINE 10 MG/1
10 TABLET, FILM COATED ORAL EVERY 6 HOURS PRN
Qty: 20 TABLET | Refills: 0 | Status: SHIPPED | OUTPATIENT
Start: 2024-11-15

## 2024-11-15 RX ORDER — ONDANSETRON 2 MG/ML
4 INJECTION INTRAMUSCULAR; INTRAVENOUS ONCE
Status: DISCONTINUED | OUTPATIENT
Start: 2024-11-15 | End: 2024-11-15

## 2024-11-15 RX ORDER — KETOROLAC TROMETHAMINE 30 MG/ML
30 INJECTION, SOLUTION INTRAMUSCULAR; INTRAVENOUS ONCE
Status: COMPLETED | OUTPATIENT
Start: 2024-11-15 | End: 2024-11-15

## 2024-11-15 RX ORDER — ONDANSETRON 4 MG/1
4 TABLET, ORALLY DISINTEGRATING ORAL ONCE
Status: COMPLETED | OUTPATIENT
Start: 2024-11-15 | End: 2024-11-15

## 2024-11-15 RX ADMIN — ONDANSETRON 4 MG: 4 TABLET, ORALLY DISINTEGRATING ORAL at 10:45

## 2024-11-15 RX ADMIN — KETOROLAC TROMETHAMINE 30 MG: 30 INJECTION, SOLUTION INTRAMUSCULAR at 10:45

## 2024-11-15 ASSESSMENT — PAIN DESCRIPTION - ORIENTATION
ORIENTATION: RIGHT
ORIENTATION: RIGHT

## 2024-11-15 ASSESSMENT — PAIN DESCRIPTION - LOCATION
LOCATION: ABDOMEN
LOCATION: ABDOMEN

## 2024-11-15 ASSESSMENT — PAIN SCALES - GENERAL
PAINLEVEL_OUTOF10: 8
PAINLEVEL_OUTOF10: 8

## 2024-11-15 ASSESSMENT — PAIN DESCRIPTION - DESCRIPTORS: DESCRIPTORS: ACHING;DULL;DISCOMFORT

## 2024-11-15 ASSESSMENT — PAIN - FUNCTIONAL ASSESSMENT: PAIN_FUNCTIONAL_ASSESSMENT: 0-10

## 2024-11-15 NOTE — ED PROVIDER NOTES
HPI:  11/15/24, Time: 10:31 AM MATILDE Gan is a 39 y.o. female presenting to the ED for pelvic pain.  Patient states she has a history of fibroids and follows with a physician at the [facility] for this.  She states that she is currently receiving iron transfusions.  She states that she needs surgery on her fibroids but they will not perform the surgery until her hemoglobin levels improved.  She states that she started her menstrual period on 2024.  She states that last night her abdominal cramping became severe with associated nausea and nonbloody emesis as well as diarrhea.  Patient has had similar episodes in the past most recently on 2024.  I reviewed the patient's chart.  She underwent a pelvic ultrasound at that time which showed an enlarged uterus with leiomyoma.  Patient denies fevers, chest pain, shortness of breath, cough, dysuria, had abnormal vaginal discharge.  She is having vaginal bleeding but states that she normally bleeds heavily and this is not changed.    I reviewed the patient's chart.  Patient seen by Dr. Truong with OB/GYN at [facility] on 2023 via televisit.  Recommended a trial of Toradol and outpatient ultrasound.    --------------------------------------------- PAST HISTORY ---------------------------------------------  Past Medical History:  has a past medical history of Anemia, DVT (deep venous thrombosis) (HCC), DVT of lower extremity (deep venous thrombosis) (HCC), Dysfunctional uterine bleeding, History of DVT of lower extremity, and Migraine.    Past Surgical History:  has a past surgical history that includes Induced  (10/12/12); Dilation & curettage; and Upper gastrointestinal endoscopy (N/A, 10/5/2022).    Social History:  reports that she has never smoked. She has never used smokeless tobacco. She reports that she does not drink alcohol and does not use drugs.    Family History: family history includes Breast Cancer (age of onset: 29) in  care:    has a past medical history of Anemia (07/08/2020), DVT (deep venous thrombosis) ([facility]), DVT of lower extremity (deep venous thrombosis) ([facility]), Dysfunctional uterine bleeding (07/09/2020), History of DVT of lower extremity (01/01/2014), and Migraine.     Medical Decision Making  Problems Addressed:  Anemia, unspecified type: chronic illness or injury  Lower abdominal pain: chronic illness or injury with exacerbation, progression, or side effects of treatment    Amount and/or Complexity of Data Reviewed  External Data Reviewed: labs and notes.  Labs: ordered. Decision-making details documented in ED Course.  Radiology: ordered. Decision-making details documented in ED Course.    Risk  Prescription drug management.           ED Course as of 11/15/24 1427   Fri Nov 15, 2024   1225 Pain improved on reevaluation. On repeat abdominal exam, suprapubic ttp only. No RLQ or periumbilical ttp, [JA]   1227 Patient goes to the [facility] cancer [facility] Monday for blood work and iron transfusion [JA]      ED Course User Index  [JA] Perla Alvarez MD       Patient remained hemodynamically stable throughout ED course.     Discharge Medication List as of 11/15/2024 12:29 PM        START taking these medications    Details   ketorolac (TORADOL) 10 MG tablet Take 1 tablet by mouth every 6 hours as needed for Pain, Disp-20 tablet, R-0Normal             Counseling:   The emergency provider has spoken with the patient and discussed today’s results, in addition to providing specific details for the plan of care and counseling regarding the diagnosis and prognosis.  Questions are answered at this time and they are agreeable with the plan.      --------------------------------- IMPRESSION AND DISPOSITION ---------------------------------    IMPRESSION  1. Uterine leiomyoma, unspecified location    2. Lower abdominal pain    3. Anemia, unspecified type        DISPOSITION  Disposition: Discharge to home  Patient condition is

## 2024-11-16 LAB
MICROORGANISM SPEC CULT: NO GROWTH
SERVICE CMNT-IMP: NORMAL
SPECIMEN DESCRIPTION: NORMAL

## 2025-01-06 ENCOUNTER — APPOINTMENT (OUTPATIENT)
Dept: GENERAL RADIOLOGY | Age: 40
End: 2025-01-06
Payer: COMMERCIAL

## 2025-01-06 ENCOUNTER — APPOINTMENT (OUTPATIENT)
Dept: CT IMAGING | Age: 40
End: 2025-01-06
Payer: COMMERCIAL

## 2025-01-06 ENCOUNTER — HOSPITAL ENCOUNTER (EMERGENCY)
Age: 40
Discharge: HOME | End: 2025-01-07
Attending: EMERGENCY MEDICINE
Payer: COMMERCIAL

## 2025-01-06 DIAGNOSIS — R07.9 CHEST PAIN, UNSPECIFIED TYPE: Primary | ICD-10-CM

## 2025-01-06 DIAGNOSIS — K44.9 HIATAL HERNIA: ICD-10-CM

## 2025-01-06 DIAGNOSIS — R07.89 ATYPICAL CHEST PAIN: ICD-10-CM

## 2025-01-06 DIAGNOSIS — R07.89 CHEST WALL PAIN: ICD-10-CM

## 2025-01-06 LAB
ALBUMIN SERPL-MCNC: 3.8 G/DL (ref 3.5–5.2)
ALP SERPL-CCNC: 101 U/L (ref 35–104)
ALT SERPL-CCNC: 7 U/L (ref 0–32)
AMYLASE SERPL-CCNC: 86 U/L (ref 20–100)
ANION GAP SERPL CALCULATED.3IONS-SCNC: 12 MMOL/L (ref 7–16)
AST SERPL-CCNC: 12 U/L (ref 0–31)
BILIRUB DIRECT SERPL-MCNC: <0.2 MG/DL (ref 0–0.3)
BILIRUB INDIRECT SERPL-MCNC: NORMAL MG/DL (ref 0–1)
BILIRUB SERPL-MCNC: <0.2 MG/DL (ref 0–1.2)
BNP SERPL-MCNC: 139 PG/ML (ref 0–125)
BUN SERPL-MCNC: 7 MG/DL (ref 6–20)
CALCIUM SERPL-MCNC: 8.5 MG/DL (ref 8.6–10.2)
CHLORIDE SERPL-SCNC: 104 MMOL/L (ref 98–107)
CK SERPL-CCNC: 71 U/L (ref 20–180)
CO2 SERPL-SCNC: 24 MMOL/L (ref 22–29)
CREAT SERPL-MCNC: 0.9 MG/DL (ref 0.5–1)
D-DIMER QUANTITATIVE: 1000 NG/ML DDU (ref 0–230)
ERYTHROCYTE [DISTWIDTH] IN BLOOD BY AUTOMATED COUNT: 22.6 % (ref 11.5–15)
GFR, ESTIMATED: >90 ML/MIN/1.73M2
GLUCOSE SERPL-MCNC: 106 MG/DL (ref 74–99)
HCG SERPL QL: NEGATIVE
HCT VFR BLD AUTO: 30.4 % (ref 34–48)
HGB BLD-MCNC: 9.3 G/DL (ref 11.5–15.5)
INR PPP: 1.1
LIPASE SERPL-CCNC: 20 U/L (ref 13–60)
MAGNESIUM SERPL-MCNC: 2 MG/DL (ref 1.6–2.6)
MCH RBC QN AUTO: 24.4 PG (ref 26–35)
MCHC RBC AUTO-ENTMCNC: 30.6 G/DL (ref 32–34.5)
MCV RBC AUTO: 79.8 FL (ref 80–99.9)
PLATELET # BLD AUTO: 303 K/UL (ref 130–450)
PMV BLD AUTO: 9.4 FL (ref 7–12)
POTASSIUM SERPL-SCNC: 3.6 MMOL/L (ref 3.5–5)
PROT SERPL-MCNC: 7 G/DL (ref 6.4–8.3)
PROTHROMBIN TIME: 11.9 SEC (ref 9.3–12.4)
RBC # BLD AUTO: 3.81 M/UL (ref 3.5–5.5)
SODIUM SERPL-SCNC: 140 MMOL/L (ref 132–146)
TROPONIN I SERPL HS-MCNC: <6 NG/L (ref 0–9)
WBC OTHER # BLD: 3.6 K/UL (ref 4.5–11.5)

## 2025-01-06 PROCEDURE — 82550 ASSAY OF CK (CPK): CPT

## 2025-01-06 PROCEDURE — 84484 ASSAY OF TROPONIN QUANT: CPT

## 2025-01-06 PROCEDURE — 86140 C-REACTIVE PROTEIN: CPT

## 2025-01-06 PROCEDURE — 83880 ASSAY OF NATRIURETIC PEPTIDE: CPT

## 2025-01-06 PROCEDURE — 84703 CHORIONIC GONADOTROPIN ASSAY: CPT

## 2025-01-06 PROCEDURE — 82150 ASSAY OF AMYLASE: CPT

## 2025-01-06 PROCEDURE — 85027 COMPLETE CBC AUTOMATED: CPT

## 2025-01-06 PROCEDURE — 85610 PROTHROMBIN TIME: CPT

## 2025-01-06 PROCEDURE — 80053 COMPREHEN METABOLIC PANEL: CPT

## 2025-01-06 PROCEDURE — 85379 FIBRIN DEGRADATION QUANT: CPT

## 2025-01-06 PROCEDURE — 71275 CT ANGIOGRAPHY CHEST: CPT

## 2025-01-06 PROCEDURE — 83690 ASSAY OF LIPASE: CPT

## 2025-01-06 PROCEDURE — 71045 X-RAY EXAM CHEST 1 VIEW: CPT

## 2025-01-06 PROCEDURE — 85652 RBC SED RATE AUTOMATED: CPT

## 2025-01-06 PROCEDURE — 6360000004 HC RX CONTRAST MEDICATION: Performed by: RADIOLOGY

## 2025-01-06 PROCEDURE — 82248 BILIRUBIN DIRECT: CPT

## 2025-01-06 PROCEDURE — 99285 EMERGENCY DEPT VISIT HI MDM: CPT

## 2025-01-06 PROCEDURE — 93005 ELECTROCARDIOGRAM TRACING: CPT | Performed by: EMERGENCY MEDICINE

## 2025-01-06 PROCEDURE — 83735 ASSAY OF MAGNESIUM: CPT

## 2025-01-06 RX ORDER — IOPAMIDOL 755 MG/ML
75 INJECTION, SOLUTION INTRAVASCULAR
Status: COMPLETED | OUTPATIENT
Start: 2025-01-06 | End: 2025-01-06

## 2025-01-06 RX ORDER — KETOROLAC TROMETHAMINE 30 MG/ML
15 INJECTION, SOLUTION INTRAMUSCULAR; INTRAVENOUS ONCE
Status: DISCONTINUED | OUTPATIENT
Start: 2025-01-06 | End: 2025-01-07 | Stop reason: HOSPADM

## 2025-01-06 RX ADMIN — IOPAMIDOL 75 ML: 755 INJECTION, SOLUTION INTRAVENOUS at 23:44

## 2025-01-06 ASSESSMENT — PAIN DESCRIPTION - DESCRIPTORS: DESCRIPTORS: ACHING

## 2025-01-06 ASSESSMENT — PAIN DESCRIPTION - ONSET: ONSET: ON-GOING

## 2025-01-06 ASSESSMENT — PAIN DESCRIPTION - FREQUENCY: FREQUENCY: CONTINUOUS

## 2025-01-06 ASSESSMENT — PAIN - FUNCTIONAL ASSESSMENT
PAIN_FUNCTIONAL_ASSESSMENT: 0-10
PAIN_FUNCTIONAL_ASSESSMENT: ACTIVITIES ARE NOT PREVENTED

## 2025-01-06 ASSESSMENT — PAIN DESCRIPTION - LOCATION: LOCATION: CHEST

## 2025-01-06 ASSESSMENT — PAIN SCALES - GENERAL: PAINLEVEL_OUTOF10: 7

## 2025-01-07 ENCOUNTER — APPOINTMENT (OUTPATIENT)
Dept: CT IMAGING | Age: 40
End: 2025-01-07
Attending: EMERGENCY MEDICINE
Payer: COMMERCIAL

## 2025-01-07 ENCOUNTER — APPOINTMENT (OUTPATIENT)
Dept: GENERAL RADIOLOGY | Age: 40
End: 2025-01-07
Payer: COMMERCIAL

## 2025-01-07 VITALS
TEMPERATURE: 98 F | HEART RATE: 87 BPM | BODY MASS INDEX: 32.39 KG/M2 | SYSTOLIC BLOOD PRESSURE: 123 MMHG | HEIGHT: 60 IN | WEIGHT: 165 LBS | OXYGEN SATURATION: 98 % | RESPIRATION RATE: 18 BRPM | DIASTOLIC BLOOD PRESSURE: 76 MMHG

## 2025-01-07 VITALS
RESPIRATION RATE: 16 BRPM | DIASTOLIC BLOOD PRESSURE: 72 MMHG | SYSTOLIC BLOOD PRESSURE: 108 MMHG | HEART RATE: 97 BPM | HEIGHT: 60 IN | TEMPERATURE: 98.1 F | BODY MASS INDEX: 32.39 KG/M2 | WEIGHT: 165 LBS | OXYGEN SATURATION: 99 %

## 2025-01-07 DIAGNOSIS — R07.9 CHEST PAIN, UNSPECIFIED TYPE: ICD-10-CM

## 2025-01-07 DIAGNOSIS — R10.9 ABDOMINAL PAIN, UNSPECIFIED ABDOMINAL LOCATION: Primary | ICD-10-CM

## 2025-01-07 PROBLEM — K44.9 HIATAL HERNIA: Status: ACTIVE | Noted: 2025-01-07

## 2025-01-07 LAB
ALBUMIN SERPL-MCNC: 3.9 G/DL (ref 3.5–5.2)
ALP SERPL-CCNC: 102 U/L (ref 35–104)
ALT SERPL-CCNC: 9 U/L (ref 0–32)
ANION GAP SERPL CALCULATED.3IONS-SCNC: 12 MMOL/L (ref 7–16)
AST SERPL-CCNC: 17 U/L (ref 0–31)
BACTERIA URNS QL MICRO: ABNORMAL
BASOPHILS # BLD: 0.01 K/UL (ref 0–0.2)
BASOPHILS NFR BLD: 0 % (ref 0–2)
BILIRUB SERPL-MCNC: 0.2 MG/DL (ref 0–1.2)
BILIRUB UR QL STRIP: NEGATIVE
BUN SERPL-MCNC: 5 MG/DL (ref 6–20)
CALCIUM SERPL-MCNC: 8.5 MG/DL (ref 8.6–10.2)
CHLORIDE SERPL-SCNC: 106 MMOL/L (ref 98–107)
CLARITY UR: ABNORMAL
CO2 SERPL-SCNC: 20 MMOL/L (ref 22–29)
COLOR UR: YELLOW
CREAT SERPL-MCNC: 0.7 MG/DL (ref 0.5–1)
CRP SERPL HS-MCNC: 7 MG/L (ref 0–5)
EKG ATRIAL RATE: 84 BPM
EKG ATRIAL RATE: 85 BPM
EKG P AXIS: 105 DEGREES
EKG P AXIS: 30 DEGREES
EKG P-R INTERVAL: 142 MS
EKG P-R INTERVAL: 146 MS
EKG Q-T INTERVAL: 364 MS
EKG Q-T INTERVAL: 368 MS
EKG QRS DURATION: 62 MS
EKG QRS DURATION: 66 MS
EKG QTC CALCULATION (BAZETT): 430 MS
EKG QTC CALCULATION (BAZETT): 437 MS
EKG R AXIS: -7 DEGREES
EKG R AXIS: 25 DEGREES
EKG T AXIS: 32 DEGREES
EKG T AXIS: 53 DEGREES
EKG VENTRICULAR RATE: 84 BPM
EKG VENTRICULAR RATE: 85 BPM
EOSINOPHIL # BLD: 0.01 K/UL (ref 0.05–0.5)
EOSINOPHILS RELATIVE PERCENT: 0 % (ref 0–6)
ERYTHROCYTE [DISTWIDTH] IN BLOOD BY AUTOMATED COUNT: 22.6 % (ref 11.5–15)
ERYTHROCYTE [SEDIMENTATION RATE] IN BLOOD BY WESTERGREN METHOD: 20 MM/HR (ref 0–20)
GFR, ESTIMATED: >90 ML/MIN/1.73M2
GLUCOSE SERPL-MCNC: 112 MG/DL (ref 74–99)
GLUCOSE UR STRIP-MCNC: NEGATIVE MG/DL
HCG, URINE, POC: NEGATIVE
HCT VFR BLD AUTO: 29.1 % (ref 34–48)
HGB BLD-MCNC: 9 G/DL (ref 11.5–15.5)
HGB UR QL STRIP.AUTO: ABNORMAL
IMM GRANULOCYTES # BLD AUTO: <0.03 K/UL (ref 0–0.58)
IMM GRANULOCYTES NFR BLD: 0 % (ref 0–5)
KETONES UR STRIP-MCNC: NEGATIVE MG/DL
LACTATE BLDV-SCNC: 2 MMOL/L (ref 0.5–1.9)
LEUKOCYTE ESTERASE UR QL STRIP: NEGATIVE
LYMPHOCYTES NFR BLD: 0.88 K/UL (ref 1.5–4)
LYMPHOCYTES RELATIVE PERCENT: 14 % (ref 20–42)
Lab: NORMAL
MCH RBC QN AUTO: 24.9 PG (ref 26–35)
MCHC RBC AUTO-ENTMCNC: 30.9 G/DL (ref 32–34.5)
MCV RBC AUTO: 80.4 FL (ref 80–99.9)
MONOCYTES NFR BLD: 0.34 K/UL (ref 0.1–0.95)
MONOCYTES NFR BLD: 6 % (ref 2–12)
NEGATIVE QC PASS/FAIL: NORMAL
NEUTROPHILS NFR BLD: 80 % (ref 43–80)
NEUTS SEG NFR BLD: 4.9 K/UL (ref 1.8–7.3)
NITRITE UR QL STRIP: NEGATIVE
PH UR STRIP: 6.5 [PH] (ref 5–9)
PLATELET CONFIRMATION: NORMAL
PLATELET, FLUORESCENCE: 274 K/UL (ref 130–450)
PMV BLD AUTO: 9.6 FL (ref 7–12)
POSITIVE QC PASS/FAIL: NORMAL
POTASSIUM SERPL-SCNC: 3.8 MMOL/L (ref 3.5–5)
PROT SERPL-MCNC: 7.1 G/DL (ref 6.4–8.3)
PROT UR STRIP-MCNC: NEGATIVE MG/DL
RBC # BLD AUTO: 3.62 M/UL (ref 3.5–5.5)
RBC # BLD: ABNORMAL 10*6/UL
RBC #/AREA URNS HPF: ABNORMAL /HPF
SODIUM SERPL-SCNC: 138 MMOL/L (ref 132–146)
SP GR UR STRIP: 1.01 (ref 1–1.03)
TROPONIN I SERPL HS-MCNC: <6 NG/L (ref 0–9)
UROBILINOGEN UR STRIP-ACNC: 0.2 EU/DL (ref 0–1)
WBC #/AREA URNS HPF: ABNORMAL /HPF
WBC OTHER # BLD: 6.2 K/UL (ref 4.5–11.5)

## 2025-01-07 PROCEDURE — 84484 ASSAY OF TROPONIN QUANT: CPT

## 2025-01-07 PROCEDURE — 6360000004 HC RX CONTRAST MEDICATION: Performed by: ORTHOPAEDIC SURGERY

## 2025-01-07 PROCEDURE — 80053 COMPREHEN METABOLIC PANEL: CPT

## 2025-01-07 PROCEDURE — 93010 ELECTROCARDIOGRAM REPORT: CPT | Performed by: INTERNAL MEDICINE

## 2025-01-07 PROCEDURE — 93005 ELECTROCARDIOGRAM TRACING: CPT | Performed by: EMERGENCY MEDICINE

## 2025-01-07 PROCEDURE — 2580000003 HC RX 258: Performed by: EMERGENCY MEDICINE

## 2025-01-07 PROCEDURE — 99285 EMERGENCY DEPT VISIT HI MDM: CPT

## 2025-01-07 PROCEDURE — 96374 THER/PROPH/DIAG INJ IV PUSH: CPT

## 2025-01-07 PROCEDURE — 74177 CT ABD & PELVIS W/CONTRAST: CPT

## 2025-01-07 PROCEDURE — 71046 X-RAY EXAM CHEST 2 VIEWS: CPT

## 2025-01-07 PROCEDURE — 6360000002 HC RX W HCPCS: Performed by: EMERGENCY MEDICINE

## 2025-01-07 PROCEDURE — 85025 COMPLETE CBC W/AUTO DIFF WBC: CPT

## 2025-01-07 PROCEDURE — 81001 URINALYSIS AUTO W/SCOPE: CPT

## 2025-01-07 PROCEDURE — 83605 ASSAY OF LACTIC ACID: CPT

## 2025-01-07 PROCEDURE — 6370000000 HC RX 637 (ALT 250 FOR IP): Performed by: EMERGENCY MEDICINE

## 2025-01-07 RX ORDER — FAMOTIDINE 20 MG/1
20 TABLET, FILM COATED ORAL 2 TIMES DAILY
Qty: 60 TABLET | Refills: 3 | Status: SHIPPED | OUTPATIENT
Start: 2025-01-07

## 2025-01-07 RX ORDER — HYDROCODONE BITARTRATE AND ACETAMINOPHEN 5; 325 MG/1; MG/1
1 TABLET ORAL EVERY 4 HOURS PRN
Qty: 10 TABLET | Refills: 0 | Status: SHIPPED | OUTPATIENT
Start: 2025-01-07 | End: 2025-01-10

## 2025-01-07 RX ORDER — OXYCODONE AND ACETAMINOPHEN 5; 325 MG/1; MG/1
1 TABLET ORAL ONCE
Status: DISCONTINUED | OUTPATIENT
Start: 2025-01-07 | End: 2025-01-07

## 2025-01-07 RX ORDER — NAPROXEN 500 MG/1
500 TABLET ORAL 2 TIMES DAILY
Qty: 14 TABLET | Refills: 0 | Status: SHIPPED | OUTPATIENT
Start: 2025-01-07 | End: 2025-01-14

## 2025-01-07 RX ORDER — ACETAMINOPHEN 500 MG
1000 TABLET ORAL ONCE
Status: COMPLETED | OUTPATIENT
Start: 2025-01-07 | End: 2025-01-07

## 2025-01-07 RX ORDER — 0.9 % SODIUM CHLORIDE 0.9 %
500 INTRAVENOUS SOLUTION INTRAVENOUS ONCE
Status: COMPLETED | OUTPATIENT
Start: 2025-01-07 | End: 2025-01-07

## 2025-01-07 RX ORDER — MORPHINE SULFATE 4 MG/ML
4 INJECTION, SOLUTION INTRAMUSCULAR; INTRAVENOUS ONCE
Status: DISCONTINUED | OUTPATIENT
Start: 2025-01-07 | End: 2025-01-07 | Stop reason: HOSPADM

## 2025-01-07 RX ORDER — IOPAMIDOL 755 MG/ML
75 INJECTION, SOLUTION INTRAVASCULAR
Status: COMPLETED | OUTPATIENT
Start: 2025-01-07 | End: 2025-01-07

## 2025-01-07 RX ORDER — KETOROLAC TROMETHAMINE 30 MG/ML
15 INJECTION, SOLUTION INTRAMUSCULAR; INTRAVENOUS ONCE
Status: COMPLETED | OUTPATIENT
Start: 2025-01-07 | End: 2025-01-07

## 2025-01-07 RX ADMIN — KETOROLAC TROMETHAMINE 15 MG: 30 INJECTION, SOLUTION INTRAMUSCULAR at 07:57

## 2025-01-07 RX ADMIN — IOPAMIDOL 75 ML: 755 INJECTION, SOLUTION INTRAVENOUS at 07:33

## 2025-01-07 RX ADMIN — SODIUM CHLORIDE 500 ML: 9 INJECTION, SOLUTION INTRAVENOUS at 07:58

## 2025-01-07 RX ADMIN — ACETAMINOPHEN 1000 MG: 500 TABLET, FILM COATED ORAL at 09:40

## 2025-01-07 ASSESSMENT — PAIN DESCRIPTION - LOCATION
LOCATION: CHEST;ABDOMEN
LOCATION: ABDOMEN;CHEST

## 2025-01-07 ASSESSMENT — PAIN SCALES - GENERAL
PAINLEVEL_OUTOF10: 9
PAINLEVEL_OUTOF10: 2
PAINLEVEL_OUTOF10: 8

## 2025-01-07 ASSESSMENT — LIFESTYLE VARIABLES: HOW OFTEN DO YOU HAVE A DRINK CONTAINING ALCOHOL: NEVER

## 2025-01-07 ASSESSMENT — PAIN - FUNCTIONAL ASSESSMENT
PAIN_FUNCTIONAL_ASSESSMENT: PREVENTS OR INTERFERES SOME ACTIVE ACTIVITIES AND ADLS
PAIN_FUNCTIONAL_ASSESSMENT: 0-10
PAIN_FUNCTIONAL_ASSESSMENT: 0-10

## 2025-01-07 ASSESSMENT — PAIN DESCRIPTION - DESCRIPTORS: DESCRIPTORS: STABBING;ACHING;DISCOMFORT

## 2025-01-07 ASSESSMENT — PAIN DESCRIPTION - ONSET: ONSET: ON-GOING

## 2025-01-07 ASSESSMENT — PAIN DESCRIPTION - FREQUENCY: FREQUENCY: CONTINUOUS

## 2025-01-07 ASSESSMENT — PAIN DESCRIPTION - PAIN TYPE: TYPE: ACUTE PAIN

## 2025-01-07 ASSESSMENT — HEART SCORE: ECG: NORMAL

## 2025-01-07 NOTE — ED PROVIDER NOTES
HPI:  25, Time: 11:45 PM MATILDE Gan is a 39 y.o. female presenting to the ED for right sided chest pain, beginning hours ago.  The complaint has been persistent, moderate in severity, and worsened by nothing.  She has had no trauma she is not on birth control pills she denies any illicit drugs her sat is 100% on room air her heart rate on her EKG is only 85 no leg swelling no calf pain she is not on birth control pills I am able to reproduce her chest pain by palpation over the chest wall she has had no fevers no vomiting discussed the possibly of pericarditis though I do not see any evidence of that on the EKG    ROS:   Pertinent positives and negatives are stated within HPI, all other systems reviewed and are negative.  --------------------------------------------- PAST HISTORY ---------------------------------------------  Past Medical History:  has a past medical history of Anemia, DVT (deep venous thrombosis) (HCC), DVT of lower extremity (deep venous thrombosis) (HCC), Dysfunctional uterine bleeding, History of DVT of lower extremity, and Migraine.    Past Surgical History:  has a past surgical history that includes Induced  (10/12/12); Dilation & curettage; and Upper gastrointestinal endoscopy (N/A, 10/5/2022).    Social History:  reports that she has never smoked. She has never used smokeless tobacco. She reports that she does not drink alcohol and does not use drugs.    Family History: family history includes Breast Cancer (age of onset: 29) in her maternal cousin; Breast Cancer (age of onset: 32) in her sister; Breast Cancer (age of onset: 50) in her maternal aunt; Breast Cancer (age of onset: 57) in her maternal grandmother.     The patient’s home medications have been reviewed.    Allergies: Latex    ---------------------------------------------------PHYSICAL EXAM--------------------------------------    Constitutional/General: Alert and oriented x3, well appearing, non toxic  myself.  /74   Pulse (!) 108   Temp 98.1 °F (36.7 °C) (Oral)   Resp 16   Ht 1.524 m (5')   Wt 74.8 kg (165 lb)   SpO2 100%   BMI 32.22 kg/m²   Oxygen Saturation Interpretation: Normal    The patient’s available past medical records and past encounters were reviewed.        ------------------------------ ED COURSE/MEDICAL DECISION MAKING----------------------  Medications   ketorolac (TORADOL) injection 15 mg (has no administration in time range)   iopamidol (ISOVUE-370) 76 % injection 75 mL (75 mLs IntraVENous Given 1/6/25 8309)             Medical Decision Making:       Michelet Gan is a 39 y.o. female presenting to the ED for right sided chest pain, beginning hours ago.  The complaint has been persistent, moderate in severity, and worsened by nothing.  She has had no trauma she is not on birth control pills she denies any illicit drugs her sat is 100% on room air her heart rate on her EKG is only 85 no leg swelling no calf pain she is not on birth control pills I am able to reproduce her chest pain by palpation over the chest wall she has had no fevers no vomiting discussed the possibly of pericarditis though I do not see any evidence of that on the EKG    Re-Evaluations:             Re-evaluation.  Patient’s symptoms are improving      Consultations:                 Critical Care:         This patient's ED course included: a personal history and physicial examination, re-evaluation prior to disposition, multiple bedside re-evaluations, IV medications, cardiac monitoring, continuous pulse oximetry, complex medical decision making and emergency management, and a personal history and physicial eaxmination    This patient has remained hemodynamically stable during their ED course.    Counseling:   The emergency provider has spoken with the patient and discussed today’s results, in addition to providing specific details for the plan of care and counseling regarding the diagnosis and prognosis.

## 2025-01-07 NOTE — DISCHARGE INSTRUCTIONS
Return if increased chest pain shortness of breath fevers vomiting leg swelling or calf pain follow-up with a cardiologist as soon as possible

## 2025-01-07 NOTE — ED PROVIDER NOTES
[facility] EMERGENCY DEPARTMENT  EMERGENCY DEPARTMENT ENCOUNTER        Pt Name: Michelet Gan  MRN: [id number]  Birthdate 1985  Date of evaluation: 1/7/2025  Provider: Teto Lozano MD  PCP: Shayla Leon MD  Note Started: 6:21 AM EST 1/7/25    CHIEF COMPLAINT       Chief Complaint   Patient presents with    Chest Pain     Mid chest pain that started yesterday was seen and worked up in [place] ER. Pain never went away     Abdominal Pain     RLQ pain        HISTORY OF PRESENT ILLNESS: 1 or more Elements        Michelet Gan is a 39 y.o. female who presents for chest pain and abdominal pain.  Patient reports chest and abdominal pain started yesterday.  She reports aching pain in her chest that is nonexertional and worse with palpation and movement that started yesterday.  She reports at the time she went to [facility] emergency department had a full workup including EKG as well as cardiac enzymes and a CT angiogram of her chest, her workup was reassuring and she was discharged.  She reports that she was driving so she was not able to receive much for pain.  He also reports that she was having generalized abdominal pain in the right and left lower quadrant as well.  She reports she has a history of uterine fibroids and just started her menstrual cycle and says pain feels similar to the cramps that she gets during her menstrual cycle.  The pain is not sharp or stabbing and nonradiating.  She denies any history of ovarian cyst or torsion.  She reports the pain is mostly generalized but is localized in both lower quadrants.  She denies possibility of pregnancy.  She denies trauma.  She does report that she has had some nausea and diarrhea.  She denies any urinary or pelvic complaints.  She denies any other complaints.  She denies any cardiac history.    Seen at [facility] today, CTA neg for PE or other acute pathology, also had full work up including neg trop  [facility] Emergency Department  [address] [address]  [phone]    If symptoms worsen      DISCHARGE MEDICATIONS:  New Prescriptions    HYDROCODONE-ACETAMINOPHEN (NORCO) 5-325 MG PER TABLET    Take 1 tablet by mouth every 4 hours as needed for Pain for up to 3 days. Intended supply: 3 days. Take lowest dose possible to manage pain Max Daily Amount: 6 tablets       DISCONTINUED MEDICATIONS:  Discontinued Medications    No medications on file         Periodic Controlled Substance Monitoring: Possible medication side effects, risk of tolerance/dependence & alternative treatments discussed., No signs of potential drug abuse or diversion identified. (Teto Lozano MD)    (Please note that portions of this note were completed with a voice recognition program.  Efforts were made to edit the dictations but occasionally words are mis-transcribed.)    Teto Lozano MD (electronically signed)            Teto Lozano MD  01/07/25 3093

## 2025-06-27 ENCOUNTER — APPOINTMENT (OUTPATIENT)
Dept: CT IMAGING | Age: 40
End: 2025-06-27
Payer: COMMERCIAL

## 2025-06-27 ENCOUNTER — HOSPITAL ENCOUNTER (EMERGENCY)
Age: 40
Discharge: HOME | End: 2025-06-27
Attending: EMERGENCY MEDICINE
Payer: COMMERCIAL

## 2025-06-27 VITALS
RESPIRATION RATE: 18 BRPM | HEART RATE: 100 BPM | TEMPERATURE: 98.6 F | OXYGEN SATURATION: 99 % | DIASTOLIC BLOOD PRESSURE: 73 MMHG | SYSTOLIC BLOOD PRESSURE: 119 MMHG

## 2025-06-27 DIAGNOSIS — D21.9 FIBROIDS: Primary | ICD-10-CM

## 2025-06-27 DIAGNOSIS — N93.8 DYSFUNCTIONAL UTERINE BLEEDING: ICD-10-CM

## 2025-06-27 LAB
ALBUMIN SERPL-MCNC: 4 G/DL (ref 3.5–5.2)
ALP SERPL-CCNC: 123 U/L (ref 35–104)
ALT SERPL-CCNC: 10 U/L (ref 0–35)
ANION GAP SERPL CALCULATED.3IONS-SCNC: 13 MMOL/L (ref 7–16)
AST SERPL-CCNC: 19 U/L (ref 0–35)
BACTERIA URNS QL MICRO: ABNORMAL
BASOPHILS # BLD: 0.02 K/UL (ref 0–0.2)
BASOPHILS NFR BLD: 0 % (ref 0–2)
BILIRUB SERPL-MCNC: 0.4 MG/DL (ref 0–1.2)
BILIRUB UR QL STRIP: NEGATIVE
BUN SERPL-MCNC: 6 MG/DL (ref 6–20)
CALCIUM SERPL-MCNC: 9.2 MG/DL (ref 8.6–10)
CHLORIDE SERPL-SCNC: 102 MMOL/L (ref 98–107)
CLARITY UR: ABNORMAL
CO2 SERPL-SCNC: 24 MMOL/L (ref 22–29)
COLOR UR: ABNORMAL
CREAT SERPL-MCNC: 0.7 MG/DL (ref 0.5–1)
EOSINOPHIL # BLD: 0.08 K/UL (ref 0.05–0.5)
EOSINOPHILS RELATIVE PERCENT: 1 % (ref 0–6)
EPI CELLS #/AREA URNS HPF: ABNORMAL /HPF
ERYTHROCYTE [DISTWIDTH] IN BLOOD BY AUTOMATED COUNT: 18.5 % (ref 11.5–15)
GFR, ESTIMATED: >90 ML/MIN/1.73M2
GLUCOSE SERPL-MCNC: 100 MG/DL (ref 74–99)
GLUCOSE UR STRIP-MCNC: NEGATIVE MG/DL
HCG UR QL: NEGATIVE
HCT VFR BLD AUTO: 31.8 % (ref 34–48)
HGB BLD-MCNC: 9.7 G/DL (ref 11.5–15.5)
HGB UR QL STRIP.AUTO: ABNORMAL
IMM GRANULOCYTES # BLD AUTO: <0.03 K/UL (ref 0–0.58)
IMM GRANULOCYTES NFR BLD: 0 % (ref 0–5)
KETONES UR STRIP-MCNC: NEGATIVE MG/DL
LEUKOCYTE ESTERASE UR QL STRIP: ABNORMAL
LYMPHOCYTES NFR BLD: 1.05 K/UL (ref 1.5–4)
LYMPHOCYTES RELATIVE PERCENT: 11 % (ref 20–42)
MCH RBC QN AUTO: 22.2 PG (ref 26–35)
MCHC RBC AUTO-ENTMCNC: 30.5 G/DL (ref 32–34.5)
MCV RBC AUTO: 72.9 FL (ref 80–99.9)
MONOCYTES NFR BLD: 0.53 K/UL (ref 0.1–0.95)
MONOCYTES NFR BLD: 6 % (ref 2–12)
NEUTROPHILS NFR BLD: 82 % (ref 43–80)
NEUTS SEG NFR BLD: 7.62 K/UL (ref 1.8–7.3)
NITRITE UR QL STRIP: NEGATIVE
PH UR STRIP: 6 [PH] (ref 5–8)
PLATELET # BLD AUTO: 395 K/UL (ref 130–450)
PMV BLD AUTO: 9 FL (ref 7–12)
POTASSIUM SERPL-SCNC: 3.8 MMOL/L (ref 3.5–5.1)
PROT SERPL-MCNC: 7.7 G/DL (ref 6.4–8.3)
PROT UR STRIP-MCNC: >=300 MG/DL
RBC # BLD AUTO: 4.36 M/UL (ref 3.5–5.5)
RBC #/AREA URNS HPF: ABNORMAL /HPF
SODIUM SERPL-SCNC: 139 MMOL/L (ref 136–145)
SP GR UR STRIP: 1.02 (ref 1–1.03)
UROBILINOGEN UR STRIP-ACNC: 1 EU/DL (ref 0–1)
WBC #/AREA URNS HPF: ABNORMAL /HPF
WBC OTHER # BLD: 9.3 K/UL (ref 4.5–11.5)

## 2025-06-27 PROCEDURE — 80053 COMPREHEN METABOLIC PANEL: CPT

## 2025-06-27 PROCEDURE — 6360000002 HC RX W HCPCS: Performed by: EMERGENCY MEDICINE

## 2025-06-27 PROCEDURE — 81001 URINALYSIS AUTO W/SCOPE: CPT

## 2025-06-27 PROCEDURE — 74177 CT ABD & PELVIS W/CONTRAST: CPT

## 2025-06-27 PROCEDURE — 84703 CHORIONIC GONADOTROPIN ASSAY: CPT

## 2025-06-27 PROCEDURE — 87086 URINE CULTURE/COLONY COUNT: CPT

## 2025-06-27 PROCEDURE — 6360000004 HC RX CONTRAST MEDICATION: Performed by: RADIOLOGY

## 2025-06-27 PROCEDURE — 85025 COMPLETE CBC W/AUTO DIFF WBC: CPT

## 2025-06-27 PROCEDURE — 99285 EMERGENCY DEPT VISIT HI MDM: CPT

## 2025-06-27 PROCEDURE — 96374 THER/PROPH/DIAG INJ IV PUSH: CPT

## 2025-06-27 RX ORDER — KETOROLAC TROMETHAMINE 10 MG/1
10 TABLET, FILM COATED ORAL EVERY 6 HOURS PRN
Qty: 20 TABLET | Refills: 0 | Status: SHIPPED | OUTPATIENT
Start: 2025-06-27

## 2025-06-27 RX ORDER — KETOROLAC TROMETHAMINE 30 MG/ML
15 INJECTION, SOLUTION INTRAMUSCULAR; INTRAVENOUS ONCE
Status: COMPLETED | OUTPATIENT
Start: 2025-06-27 | End: 2025-06-27

## 2025-06-27 RX ORDER — IOPAMIDOL 755 MG/ML
75 INJECTION, SOLUTION INTRAVASCULAR
Status: COMPLETED | OUTPATIENT
Start: 2025-06-27 | End: 2025-06-27

## 2025-06-27 RX ADMIN — IOPAMIDOL 75 ML: 755 INJECTION, SOLUTION INTRAVENOUS at 08:48

## 2025-06-27 RX ADMIN — KETOROLAC TROMETHAMINE 15 MG: 30 INJECTION, SOLUTION INTRAMUSCULAR at 07:52

## 2025-06-27 ASSESSMENT — PAIN DESCRIPTION - FREQUENCY: FREQUENCY: CONTINUOUS

## 2025-06-27 ASSESSMENT — PAIN DESCRIPTION - ONSET: ONSET: ON-GOING

## 2025-06-27 ASSESSMENT — PAIN SCALES - GENERAL
PAINLEVEL_OUTOF10: 9
PAINLEVEL_OUTOF10: 9

## 2025-06-27 ASSESSMENT — PAIN DESCRIPTION - LOCATION: LOCATION: ABDOMEN

## 2025-06-27 ASSESSMENT — PAIN DESCRIPTION - DESCRIPTORS: DESCRIPTORS: DISCOMFORT;CRAMPING;TIGHTNESS

## 2025-06-27 ASSESSMENT — PAIN - FUNCTIONAL ASSESSMENT: PAIN_FUNCTIONAL_ASSESSMENT: 0-10

## 2025-06-27 ASSESSMENT — PAIN DESCRIPTION - PAIN TYPE: TYPE: ACUTE PAIN

## 2025-06-28 LAB
MICROORGANISM SPEC CULT: ABNORMAL
MICROORGANISM SPEC CULT: ABNORMAL
SERVICE CMNT-IMP: ABNORMAL
SPECIMEN DESCRIPTION: ABNORMAL

## 2025-07-28 ENCOUNTER — APPOINTMENT (OUTPATIENT)
Dept: CT IMAGING | Age: 40
End: 2025-07-28
Payer: COMMERCIAL

## 2025-07-28 ENCOUNTER — HOSPITAL ENCOUNTER (EMERGENCY)
Age: 40
Discharge: ANOTHER HOSPITAL | End: 2025-07-29
Attending: EMERGENCY MEDICINE
Payer: COMMERCIAL

## 2025-07-28 DIAGNOSIS — R79.89 ELEVATED D-DIMER: ICD-10-CM

## 2025-07-28 DIAGNOSIS — R10.30 LOWER ABDOMINAL PAIN: ICD-10-CM

## 2025-07-28 DIAGNOSIS — H67.3 OTITIS MEDIA OF BOTH EARS IN DISEASE CLASSIFIED ELSEWHERE: ICD-10-CM

## 2025-07-28 DIAGNOSIS — I26.99 PULMONARY EMBOLISM, OTHER, UNSPECIFIED CHRONICITY, UNSPECIFIED WHETHER ACUTE COR PULMONALE PRESENT (HCC): Primary | ICD-10-CM

## 2025-07-28 DIAGNOSIS — R07.89 CHEST WALL PAIN: ICD-10-CM

## 2025-07-28 DIAGNOSIS — M79.605 LEFT LEG PAIN: ICD-10-CM

## 2025-07-28 DIAGNOSIS — E87.6 HYPOKALEMIA: ICD-10-CM

## 2025-07-28 LAB
ALBUMIN SERPL-MCNC: 4.1 G/DL (ref 3.5–5.2)
ALP SERPL-CCNC: 115 U/L (ref 35–104)
ALT SERPL-CCNC: 12 U/L (ref 0–35)
ANION GAP SERPL CALCULATED.3IONS-SCNC: 12 MMOL/L (ref 7–16)
AST SERPL-CCNC: 18 U/L (ref 0–35)
BASOPHILS # BLD: 0.02 K/UL (ref 0–0.2)
BASOPHILS NFR BLD: 0 % (ref 0–2)
BILIRUB SERPL-MCNC: 0.4 MG/DL (ref 0–1.2)
BILIRUB UR QL STRIP: NEGATIVE
BNP SERPL-MCNC: 114 PG/ML (ref 0–125)
BUN SERPL-MCNC: 7 MG/DL (ref 6–20)
CALCIUM SERPL-MCNC: 9.1 MG/DL (ref 8.6–10)
CHLORIDE SERPL-SCNC: 102 MMOL/L (ref 98–107)
CLARITY UR: CLEAR
CO2 SERPL-SCNC: 21 MMOL/L (ref 22–29)
COLOR UR: YELLOW
CREAT SERPL-MCNC: 0.7 MG/DL (ref 0.5–1)
D-DIMER QUANTITATIVE: 2061 NG/ML DDU (ref 0–230)
EOSINOPHIL # BLD: 0.01 K/UL (ref 0.05–0.5)
EOSINOPHILS RELATIVE PERCENT: 0 % (ref 0–6)
ERYTHROCYTE [DISTWIDTH] IN BLOOD BY AUTOMATED COUNT: 19 % (ref 11.5–15)
FLUAV RNA RESP QL NAA+PROBE: NOT DETECTED
FLUBV RNA RESP QL NAA+PROBE: NOT DETECTED
GFR, ESTIMATED: >90 ML/MIN/1.73M2
GLUCOSE SERPL-MCNC: 89 MG/DL (ref 74–99)
GLUCOSE UR STRIP-MCNC: NEGATIVE MG/DL
HCG UR QL: NEGATIVE
HCT VFR BLD AUTO: 29.7 % (ref 34–48)
HGB BLD-MCNC: 9 G/DL (ref 11.5–15.5)
HGB UR QL STRIP.AUTO: ABNORMAL
IMM GRANULOCYTES # BLD AUTO: <0.03 K/UL (ref 0–0.58)
IMM GRANULOCYTES NFR BLD: 0 % (ref 0–5)
INR PPP: 1.1
KETONES UR STRIP-MCNC: NEGATIVE MG/DL
LACTATE BLDV-SCNC: 1.3 MMOL/L (ref 0.5–2.2)
LEUKOCYTE ESTERASE UR QL STRIP: NEGATIVE
LIPASE SERPL-CCNC: 16 U/L (ref 13–60)
LYMPHOCYTES NFR BLD: 1.28 K/UL (ref 1.5–4)
LYMPHOCYTES RELATIVE PERCENT: 17 % (ref 20–42)
MAGNESIUM SERPL-MCNC: 2.1 MG/DL (ref 1.6–2.6)
MCH RBC QN AUTO: 21.3 PG (ref 26–35)
MCHC RBC AUTO-ENTMCNC: 30.3 G/DL (ref 32–34.5)
MCV RBC AUTO: 70.4 FL (ref 80–99.9)
MONOCYTES NFR BLD: 0.41 K/UL (ref 0.1–0.95)
MONOCYTES NFR BLD: 6 % (ref 2–12)
NEUTROPHILS NFR BLD: 77 % (ref 43–80)
NEUTS SEG NFR BLD: 5.72 K/UL (ref 1.8–7.3)
NITRITE UR QL STRIP: NEGATIVE
PH UR STRIP: 6 [PH] (ref 5–8)
PLATELET # BLD AUTO: 355 K/UL (ref 130–450)
PMV BLD AUTO: 9.1 FL (ref 7–12)
POTASSIUM SERPL-SCNC: 3.4 MMOL/L (ref 3.5–5.1)
PROT SERPL-MCNC: 7.5 G/DL (ref 6.4–8.3)
PROT UR STRIP-MCNC: NEGATIVE MG/DL
PROTHROMBIN TIME: 12.4 SEC (ref 9.3–12.4)
RBC # BLD AUTO: 4.22 M/UL (ref 3.5–5.5)
RBC #/AREA URNS HPF: NORMAL /HPF
SARS-COV-2 RNA RESP QL NAA+PROBE: NOT DETECTED
SODIUM SERPL-SCNC: 136 MMOL/L (ref 136–145)
SOURCE: NORMAL
SP GR UR STRIP: <1.005 (ref 1–1.03)
SPECIMEN DESCRIPTION: NORMAL
TROPONIN I SERPL HS-MCNC: <6 NG/L (ref 0–14)
UROBILINOGEN UR STRIP-ACNC: 0.2 EU/DL (ref 0–1)
WBC #/AREA URNS HPF: NORMAL /HPF
WBC OTHER # BLD: 7.5 K/UL (ref 4.5–11.5)

## 2025-07-28 PROCEDURE — 6360000002 HC RX W HCPCS: Performed by: NURSE PRACTITIONER

## 2025-07-28 PROCEDURE — 81001 URINALYSIS AUTO W/SCOPE: CPT

## 2025-07-28 PROCEDURE — 99285 EMERGENCY DEPT VISIT HI MDM: CPT

## 2025-07-28 PROCEDURE — 84484 ASSAY OF TROPONIN QUANT: CPT

## 2025-07-28 PROCEDURE — 74177 CT ABD & PELVIS W/CONTRAST: CPT

## 2025-07-28 PROCEDURE — 84703 CHORIONIC GONADOTROPIN ASSAY: CPT

## 2025-07-28 PROCEDURE — 96374 THER/PROPH/DIAG INJ IV PUSH: CPT

## 2025-07-28 PROCEDURE — 83690 ASSAY OF LIPASE: CPT

## 2025-07-28 PROCEDURE — 87636 SARSCOV2 & INF A&B AMP PRB: CPT

## 2025-07-28 PROCEDURE — 83880 ASSAY OF NATRIURETIC PEPTIDE: CPT

## 2025-07-28 PROCEDURE — 6360000004 HC RX CONTRAST MEDICATION: Performed by: RADIOLOGY

## 2025-07-28 PROCEDURE — 6370000000 HC RX 637 (ALT 250 FOR IP): Performed by: EMERGENCY MEDICINE

## 2025-07-28 PROCEDURE — 2500000003 HC RX 250 WO HCPCS: Performed by: NURSE PRACTITIONER

## 2025-07-28 PROCEDURE — 2580000003 HC RX 258: Performed by: NURSE PRACTITIONER

## 2025-07-28 PROCEDURE — 85379 FIBRIN DEGRADATION QUANT: CPT

## 2025-07-28 PROCEDURE — 85610 PROTHROMBIN TIME: CPT

## 2025-07-28 PROCEDURE — 93005 ELECTROCARDIOGRAM TRACING: CPT

## 2025-07-28 PROCEDURE — 83735 ASSAY OF MAGNESIUM: CPT

## 2025-07-28 PROCEDURE — 85025 COMPLETE CBC W/AUTO DIFF WBC: CPT

## 2025-07-28 PROCEDURE — 96375 TX/PRO/DX INJ NEW DRUG ADDON: CPT

## 2025-07-28 PROCEDURE — 71275 CT ANGIOGRAPHY CHEST: CPT

## 2025-07-28 PROCEDURE — 6370000000 HC RX 637 (ALT 250 FOR IP): Performed by: NURSE PRACTITIONER

## 2025-07-28 PROCEDURE — 83605 ASSAY OF LACTIC ACID: CPT

## 2025-07-28 PROCEDURE — 80053 COMPREHEN METABOLIC PANEL: CPT

## 2025-07-28 RX ORDER — CEFUROXIME AXETIL 250 MG/1
250 TABLET ORAL 2 TIMES DAILY
Qty: 14 TABLET | Refills: 0 | Status: ON HOLD | OUTPATIENT
Start: 2025-07-28 | End: 2025-07-29 | Stop reason: HOSPADM

## 2025-07-28 RX ORDER — 0.9 % SODIUM CHLORIDE 0.9 %
1000 INTRAVENOUS SOLUTION INTRAVENOUS ONCE
Status: COMPLETED | OUTPATIENT
Start: 2025-07-28 | End: 2025-07-28

## 2025-07-28 RX ORDER — POTASSIUM CHLORIDE 1500 MG/1
40 TABLET, EXTENDED RELEASE ORAL ONCE
Status: COMPLETED | OUTPATIENT
Start: 2025-07-28 | End: 2025-07-28

## 2025-07-28 RX ORDER — ONDANSETRON 2 MG/ML
4 INJECTION INTRAMUSCULAR; INTRAVENOUS ONCE
Status: COMPLETED | OUTPATIENT
Start: 2025-07-28 | End: 2025-07-28

## 2025-07-28 RX ORDER — PROCHLORPERAZINE MALEATE 10 MG
10 TABLET ORAL EVERY 6 HOURS PRN
Qty: 20 TABLET | Refills: 0 | Status: SHIPPED | OUTPATIENT
Start: 2025-07-28

## 2025-07-28 RX ORDER — CEFUROXIME AXETIL 500 MG/1
500 TABLET ORAL ONCE
Status: DISCONTINUED | OUTPATIENT
Start: 2025-07-28 | End: 2025-07-28

## 2025-07-28 RX ORDER — IOPAMIDOL 755 MG/ML
75 INJECTION, SOLUTION INTRAVASCULAR
Status: COMPLETED | OUTPATIENT
Start: 2025-07-28 | End: 2025-07-28

## 2025-07-28 RX ADMIN — ONDANSETRON 4 MG: 2 INJECTION, SOLUTION INTRAMUSCULAR; INTRAVENOUS at 19:02

## 2025-07-28 RX ADMIN — IOPAMIDOL 75 ML: 755 INJECTION, SOLUTION INTRAVENOUS at 20:33

## 2025-07-28 RX ADMIN — AMOXICILLIN AND CLAVULANATE POTASSIUM 1 TABLET: 875; 125 TABLET, FILM COATED ORAL at 19:02

## 2025-07-28 RX ADMIN — SODIUM CHLORIDE 1000 ML: 0.9 INJECTION, SOLUTION INTRAVENOUS at 19:02

## 2025-07-28 RX ADMIN — POTASSIUM CHLORIDE 40 MEQ: 1500 TABLET, EXTENDED RELEASE ORAL at 19:59

## 2025-07-28 RX ADMIN — APIXABAN 10 MG: 5 TABLET, FILM COATED ORAL at 23:38

## 2025-07-28 RX ADMIN — FAMOTIDINE 20 MG: 10 INJECTION, SOLUTION INTRAVENOUS at 19:02

## 2025-07-28 ASSESSMENT — PAIN DESCRIPTION - PAIN TYPE: TYPE: ACUTE PAIN

## 2025-07-28 ASSESSMENT — PAIN - FUNCTIONAL ASSESSMENT: PAIN_FUNCTIONAL_ASSESSMENT: 0-10

## 2025-07-28 ASSESSMENT — PAIN DESCRIPTION - LOCATION: LOCATION: ABDOMEN;BACK;LEG

## 2025-07-28 ASSESSMENT — PAIN DESCRIPTION - ORIENTATION: ORIENTATION: LEFT

## 2025-07-28 ASSESSMENT — PAIN DESCRIPTION - FREQUENCY: FREQUENCY: CONTINUOUS

## 2025-07-28 ASSESSMENT — PAIN DESCRIPTION - DESCRIPTORS: DESCRIPTORS: ACHING;DISCOMFORT;SORE;TENDER

## 2025-07-28 ASSESSMENT — PAIN DESCRIPTION - ONSET: ONSET: ON-GOING

## 2025-07-28 ASSESSMENT — PAIN SCALES - GENERAL: PAINLEVEL_OUTOF10: 10

## 2025-07-28 NOTE — ED PROVIDER NOTES
Independent MIKE Visit.   HPI:  25, Time: 7:20 PM EDT         Michelet Gan is a 40 y.o. female presenting to the ED for bilateral ear pain, chest wall pain lower abd pain and left leg pain beginning 2 day ago.  The complaint has been persistent, moderate in severity, and worsened by nothing. Feels has  blood clot left leg, and has ovarian cysts in pelvis. Going to see gyn at [place] tomorrow. Hx of dvt. Not on anticoagulation.    ROS:   Pertinent positives and negatives are stated within HPI, all other systems reviewed and are negative.  --------------------------------------------- PAST HISTORY ---------------------------------------------  Past Medical History:  has a past medical history of Anemia, DVT (deep venous thrombosis) (HCC), DVT of lower extremity (deep venous thrombosis) (HCC), Dysfunctional uterine bleeding, History of DVT of lower extremity, and Migraine.    Past Surgical History:  has a past surgical history that includes Induced  (10/12/12); Dilation & curettage; and Upper gastrointestinal endoscopy (N/A, 10/5/2022).    Social History:  reports that she has never smoked. She has never used smokeless tobacco. She reports that she does not drink alcohol and does not use drugs.    Family History: family history includes Breast Cancer (age of onset: 29) in her maternal cousin; Breast Cancer (age of onset: 32) in her sister; Breast Cancer (age of onset: 50) in her maternal aunt; Breast Cancer (age of onset: 57) in her maternal grandmother.     The patient’s home medications have been reviewed.    Allergies: Latex    ---------------------------------------------------PHYSICAL EXAM--------------------------------------  + left ear with bulging tm  Constitutional/General: Alert and oriented x3, well appearing, non toxic in NAD  Head: Normocephalic and atraumatic  Eyes: PERRL, EOMI  Mouth: Oropharynx clear, handling secretions, no trismus  Neck: Supple, full ROM, non tender to palpation in

## 2025-07-28 NOTE — DISCHARGE INSTRUCTIONS
Finish antibiotics even if you feel better    Nausea meds as needed  Muscle relaxers    See family doctor as needed    Keep appt tomorrow with gyn    You will need to go to ultrasound at [facility] before 11 pm. Do not stop anywhere for an ultrasound of your legs

## 2025-07-29 ENCOUNTER — HOSPITAL ENCOUNTER (INPATIENT)
Age: 40
LOS: 2 days | Discharge: HOME | DRG: 134 | End: 2025-07-31
Attending: INTERNAL MEDICINE | Admitting: INTERNAL MEDICINE
Payer: COMMERCIAL

## 2025-07-29 ENCOUNTER — APPOINTMENT (OUTPATIENT)
Age: 40
DRG: 134 | End: 2025-07-29
Attending: INTERNAL MEDICINE
Payer: COMMERCIAL

## 2025-07-29 VITALS
DIASTOLIC BLOOD PRESSURE: 75 MMHG | SYSTOLIC BLOOD PRESSURE: 127 MMHG | RESPIRATION RATE: 17 BRPM | BODY MASS INDEX: 34.96 KG/M2 | WEIGHT: 179 LBS | HEART RATE: 68 BPM | OXYGEN SATURATION: 100 % | TEMPERATURE: 98.1 F

## 2025-07-29 DIAGNOSIS — I26.99 PE (PULMONARY THROMBOEMBOLISM) (HCC): Primary | ICD-10-CM

## 2025-07-29 DIAGNOSIS — D50.0 IRON DEFICIENCY ANEMIA DUE TO CHRONIC BLOOD LOSS: ICD-10-CM

## 2025-07-29 PROBLEM — D64.9 ANEMIA: Status: RESOLVED | Noted: 2020-07-08 | Resolved: 2025-07-29

## 2025-07-29 PROBLEM — N93.8 DUB (DYSFUNCTIONAL UTERINE BLEEDING): Status: ACTIVE | Noted: 2020-07-09

## 2025-07-29 LAB
ALBUMIN SERPL-MCNC: 3.7 G/DL (ref 3.5–5.2)
ALP SERPL-CCNC: 99 U/L (ref 35–104)
ALT SERPL-CCNC: 10 U/L (ref 0–35)
ANION GAP SERPL CALCULATED.3IONS-SCNC: 12 MMOL/L (ref 7–16)
AST SERPL-CCNC: 15 U/L (ref 0–35)
BASOPHILS # BLD: 0.02 K/UL (ref 0–0.2)
BASOPHILS NFR BLD: 0 % (ref 0–2)
BILIRUB SERPL-MCNC: 0.3 MG/DL (ref 0–1.2)
BUN SERPL-MCNC: 5 MG/DL (ref 6–20)
CALCIUM SERPL-MCNC: 8.9 MG/DL (ref 8.6–10)
CHLORIDE SERPL-SCNC: 107 MMOL/L (ref 98–107)
CO2 SERPL-SCNC: 21 MMOL/L (ref 22–29)
CREAT SERPL-MCNC: 0.7 MG/DL (ref 0.5–1)
ECHO AO ANNULUS INDEX: 1.62 CM/M2
ECHO AO ASC DIAM: 3.2 CM
ECHO AO ASCENDING AORTA INDEX: 1.85 CM/M2
ECHO AV ANNULUS DIAM: 2.8 CM
ECHO AV AREA PEAK VELOCITY: 3 CM2
ECHO AV AREA VTI: 3.1 CM2
ECHO AV AREA/BSA PEAK VELOCITY: 1.7 CM2/M2
ECHO AV AREA/BSA VTI: 1.8 CM2/M2
ECHO AV CUSP MM: 2 CM
ECHO AV MEAN GRADIENT: 5 MMHG
ECHO AV MEAN VELOCITY: 1 M/S
ECHO AV PEAK GRADIENT: 8 MMHG
ECHO AV PEAK VELOCITY: 1.4 M/S
ECHO AV VELOCITY RATIO: 0.93
ECHO AV VTI: 25.7 CM
ECHO BSA: 1.8 M2
ECHO EST RA PRESSURE: 3 MMHG
ECHO LA DIAMETER INDEX: 2.08 CM/M2
ECHO LA DIAMETER: 3.6 CM
ECHO LA VOL A-L A2C: 44 ML (ref 22–52)
ECHO LA VOL A-L A4C: 36 ML (ref 22–52)
ECHO LA VOL MOD A2C: 42 ML (ref 22–52)
ECHO LA VOL MOD A4C: 33 ML (ref 22–52)
ECHO LA VOLUME AREA LENGTH: 41 ML
ECHO LA VOLUME INDEX A-L A2C: 25 ML/M2 (ref 16–34)
ECHO LA VOLUME INDEX A-L A4C: 21 ML/M2 (ref 16–34)
ECHO LA VOLUME INDEX AREA LENGTH: 24 ML/M2 (ref 16–34)
ECHO LA VOLUME INDEX MOD A2C: 24 ML/M2 (ref 16–34)
ECHO LA VOLUME INDEX MOD A4C: 19 ML/M2 (ref 16–34)
ECHO LV E' LATERAL VELOCITY: 14 CM/S
ECHO LV E' SEPTAL VELOCITY: 8 CM/S
ECHO LV EF PHYSICIAN: 55 %
ECHO LV FRACTIONAL SHORTENING: 34 % (ref 28–44)
ECHO LV INTERNAL DIMENSION DIASTOLE INDEX: 2.37 CM/M2
ECHO LV INTERNAL DIMENSION DIASTOLIC: 4.1 CM (ref 3.9–5.3)
ECHO LV INTERNAL DIMENSION SYSTOLIC INDEX: 1.56 CM/M2
ECHO LV INTERNAL DIMENSION SYSTOLIC: 2.7 CM
ECHO LV ISOVOLUMETRIC RELAXATION TIME (IVRT): 96.9 MS
ECHO LV IVSD: 0.9 CM (ref 0.6–0.9)
ECHO LV IVSS: 1.3 CM
ECHO LV MASS 2D: 114.1 G (ref 67–162)
ECHO LV MASS INDEX 2D: 66 G/M2 (ref 43–95)
ECHO LV POSTERIOR WALL DIASTOLIC: 0.9 CM (ref 0.6–0.9)
ECHO LV POSTERIOR WALL SYSTOLIC: 1.1 CM
ECHO LV RELATIVE WALL THICKNESS RATIO: 0.44
ECHO LVOT AREA: 3.5 CM2
ECHO LVOT AV VTI INDEX: 0.93
ECHO LVOT DIAM: 2.1 CM
ECHO LVOT MEAN GRADIENT: 3 MMHG
ECHO LVOT PEAK GRADIENT: 7 MMHG
ECHO LVOT PEAK VELOCITY: 1.3 M/S
ECHO LVOT STROKE VOLUME INDEX: 47.8 ML/M2
ECHO LVOT SV: 82.7 ML
ECHO LVOT VTI: 23.9 CM
ECHO MV "A" WAVE DURATION: 110.7 MSEC
ECHO MV A VELOCITY: 0.6 M/S
ECHO MV AREA PHT: 2.5 CM2
ECHO MV AREA VTI: 3.7 CM2
ECHO MV E DECELERATION TIME (DT): 197.1 MS
ECHO MV E VELOCITY: 1.02 M/S
ECHO MV E/A RATIO: 1.7
ECHO MV E/E' LATERAL: 7.29
ECHO MV E/E' RATIO (AVERAGED): 10.02
ECHO MV E/E' SEPTAL: 12.75
ECHO MV LVOT VTI INDEX: 0.94
ECHO MV MAX VELOCITY: 0.9 M/S
ECHO MV MEAN GRADIENT: 2 MMHG
ECHO MV MEAN VELOCITY: 0.6 M/S
ECHO MV PEAK GRADIENT: 4 MMHG
ECHO MV PRESSURE HALF TIME (PHT): 86.8 MS
ECHO MV VTI: 22.4 CM
ECHO PULMONARY ARTERY END DIASTOLIC PRESSURE: 3 MMHG
ECHO PV MAX VELOCITY: 1 M/S
ECHO PV MEAN GRADIENT: 2 MMHG
ECHO PV MEAN VELOCITY: 0.7 M/S
ECHO PV PEAK GRADIENT: 4 MMHG
ECHO PV REGURGITANT MAX VELOCITY: 0.9 M/S
ECHO PV VTI: 20.1 CM
ECHO PVEIN A DURATION: 106.1 MS
ECHO PVEIN A VELOCITY: 0.3 M/S
ECHO PVEIN PEAK D VELOCITY: 0.5 M/S
ECHO PVEIN PEAK S VELOCITY: 0.6 M/S
ECHO PVEIN S/D RATIO: 1.2
ECHO RIGHT VENTRICULAR SYSTOLIC PRESSURE (RVSP): 21 MMHG
ECHO RV BASAL DIMENSION: 3.5 CM
ECHO RV INTERNAL DIMENSION: 3.5 CM
ECHO RV LONGITUDINAL DIMENSION: 6.7 CM
ECHO RV MID DIMENSION: 3.4 CM
ECHO RV TAPSE: 1.7 CM (ref 1.7–?)
ECHO TV REGURGITANT MAX VELOCITY: 2.12 M/S
ECHO TV REGURGITANT PEAK GRADIENT: 18 MMHG
EOSINOPHIL # BLD: 0.02 K/UL (ref 0.05–0.5)
EOSINOPHILS RELATIVE PERCENT: 0 % (ref 0–6)
ERYTHROCYTE [DISTWIDTH] IN BLOOD BY AUTOMATED COUNT: 19.1 % (ref 11.5–15)
FERRITIN SERPL-MCNC: 11 NG/ML
GFR, ESTIMATED: >90 ML/MIN/1.73M2
GLUCOSE SERPL-MCNC: 103 MG/DL (ref 74–99)
HCT VFR BLD AUTO: 26.8 % (ref 34–48)
HGB BLD-MCNC: 7.9 G/DL (ref 11.5–15.5)
IMM GRANULOCYTES # BLD AUTO: 0.03 K/UL (ref 0–0.58)
IMM GRANULOCYTES NFR BLD: 0 % (ref 0–5)
LYMPHOCYTES NFR BLD: 1.22 K/UL (ref 1.5–4)
LYMPHOCYTES RELATIVE PERCENT: 17 % (ref 20–42)
MCH RBC QN AUTO: 21 PG (ref 26–35)
MCHC RBC AUTO-ENTMCNC: 29.5 G/DL (ref 32–34.5)
MCV RBC AUTO: 71.1 FL (ref 80–99.9)
MONOCYTES NFR BLD: 0.47 K/UL (ref 0.1–0.95)
MONOCYTES NFR BLD: 7 % (ref 2–12)
NEUTROPHILS NFR BLD: 75 % (ref 43–80)
NEUTS SEG NFR BLD: 5.29 K/UL (ref 1.8–7.3)
PLATELET # BLD AUTO: 289 K/UL (ref 130–450)
PMV BLD AUTO: 8.7 FL (ref 7–12)
POTASSIUM SERPL-SCNC: 4 MMOL/L (ref 3.5–5.1)
PROT SERPL-MCNC: 6.7 G/DL (ref 6.4–8.3)
RBC # BLD AUTO: 3.77 M/UL (ref 3.5–5.5)
SODIUM SERPL-SCNC: 139 MMOL/L (ref 136–145)
WBC OTHER # BLD: 7.1 K/UL (ref 4.5–11.5)

## 2025-07-29 PROCEDURE — 6370000000 HC RX 637 (ALT 250 FOR IP): Performed by: INTERNAL MEDICINE

## 2025-07-29 PROCEDURE — 6360000002 HC RX W HCPCS: Performed by: INTERNAL MEDICINE

## 2025-07-29 PROCEDURE — 80053 COMPREHEN METABOLIC PANEL: CPT

## 2025-07-29 PROCEDURE — 1200000000 HC SEMI PRIVATE

## 2025-07-29 PROCEDURE — 93306 TTE W/DOPPLER COMPLETE: CPT

## 2025-07-29 PROCEDURE — 99223 1ST HOSP IP/OBS HIGH 75: CPT | Performed by: INTERNAL MEDICINE

## 2025-07-29 PROCEDURE — 2580000003 HC RX 258: Performed by: INTERNAL MEDICINE

## 2025-07-29 PROCEDURE — 82728 ASSAY OF FERRITIN: CPT

## 2025-07-29 PROCEDURE — 99254 IP/OBS CNSLTJ NEW/EST MOD 60: CPT | Performed by: INTERNAL MEDICINE

## 2025-07-29 PROCEDURE — 85025 COMPLETE CBC W/AUTO DIFF WBC: CPT

## 2025-07-29 PROCEDURE — 6370000000 HC RX 637 (ALT 250 FOR IP): Performed by: NURSE PRACTITIONER

## 2025-07-29 PROCEDURE — 93306 TTE W/DOPPLER COMPLETE: CPT | Performed by: INTERNAL MEDICINE

## 2025-07-29 RX ORDER — TRAMADOL HYDROCHLORIDE 50 MG/1
50 TABLET ORAL EVERY 4 HOURS PRN
Status: DISCONTINUED | OUTPATIENT
Start: 2025-07-29 | End: 2025-07-30

## 2025-07-29 RX ORDER — FERROUS SULFATE 325(65) MG
325 TABLET ORAL
Status: DISCONTINUED | OUTPATIENT
Start: 2025-07-31 | End: 2025-07-31 | Stop reason: HOSPADM

## 2025-07-29 RX ORDER — POTASSIUM CHLORIDE 1500 MG/1
40 TABLET, EXTENDED RELEASE ORAL ONCE
Status: COMPLETED | OUTPATIENT
Start: 2025-07-29 | End: 2025-07-29

## 2025-07-29 RX ORDER — FERROUS SULFATE 325(65) MG
325 TABLET ORAL
Qty: 30 TABLET | Refills: 3 | Status: SHIPPED | OUTPATIENT
Start: 2025-07-31

## 2025-07-29 RX ORDER — MORPHINE SULFATE 2 MG/ML
2 INJECTION, SOLUTION INTRAMUSCULAR; INTRAVENOUS
Refills: 0 | Status: DISCONTINUED | OUTPATIENT
Start: 2025-07-29 | End: 2025-07-30

## 2025-07-29 RX ORDER — ONDANSETRON 2 MG/ML
4 INJECTION INTRAMUSCULAR; INTRAVENOUS EVERY 6 HOURS PRN
Status: DISCONTINUED | OUTPATIENT
Start: 2025-07-29 | End: 2025-07-31 | Stop reason: HOSPADM

## 2025-07-29 RX ORDER — HYDROCODONE BITARTRATE AND ACETAMINOPHEN 10; 325 MG/1; MG/1
1 TABLET ORAL EVERY 6 HOURS PRN
Refills: 0 | Status: DISCONTINUED | OUTPATIENT
Start: 2025-07-29 | End: 2025-07-31 | Stop reason: HOSPADM

## 2025-07-29 RX ADMIN — HYDROCODONE BITARTRATE AND ACETAMINOPHEN 1 TABLET: 10; 325 TABLET ORAL at 08:58

## 2025-07-29 RX ADMIN — ONDANSETRON 4 MG: 2 INJECTION, SOLUTION INTRAMUSCULAR; INTRAVENOUS at 19:22

## 2025-07-29 RX ADMIN — SODIUM CHLORIDE 100 MG: 9 INJECTION, SOLUTION INTRAVENOUS at 05:26

## 2025-07-29 RX ADMIN — APIXABAN 10 MG: 5 TABLET, FILM COATED ORAL at 08:52

## 2025-07-29 RX ADMIN — POTASSIUM CHLORIDE 40 MEQ: 1500 TABLET, EXTENDED RELEASE ORAL at 04:01

## 2025-07-29 RX ADMIN — SODIUM CHLORIDE 25 MG: 9 INJECTION, SOLUTION INTRAVENOUS at 04:03

## 2025-07-29 RX ADMIN — APIXABAN 10 MG: 5 TABLET, FILM COATED ORAL at 20:49

## 2025-07-29 RX ADMIN — HYDROCODONE BITARTRATE AND ACETAMINOPHEN 1 TABLET: 10; 325 TABLET ORAL at 14:51

## 2025-07-29 RX ADMIN — HYDROCODONE BITARTRATE AND ACETAMINOPHEN 1 TABLET: 10; 325 TABLET ORAL at 02:50

## 2025-07-29 RX ADMIN — HYDROCODONE BITARTRATE AND ACETAMINOPHEN 1 TABLET: 10; 325 TABLET ORAL at 23:14

## 2025-07-29 RX ADMIN — TRAMADOL HYDROCHLORIDE 50 MG: 50 TABLET, COATED ORAL at 20:48

## 2025-07-29 RX ADMIN — MORPHINE SULFATE 2 MG: 2 INJECTION, SOLUTION INTRAMUSCULAR; INTRAVENOUS at 07:49

## 2025-07-29 ASSESSMENT — PAIN DESCRIPTION - LOCATION
LOCATION: ABDOMEN;BACK
LOCATION: ABDOMEN;BACK
LOCATION: BACK
LOCATION: BACK;ABDOMEN
LOCATION: ABDOMEN;BACK
LOCATION: ABDOMEN;BACK

## 2025-07-29 ASSESSMENT — PAIN SCALES - GENERAL
PAINLEVEL_OUTOF10: 8
PAINLEVEL_OUTOF10: 9
PAINLEVEL_OUTOF10: 8
PAINLEVEL_OUTOF10: 8
PAINLEVEL_OUTOF10: 5
PAINLEVEL_OUTOF10: 10

## 2025-07-29 ASSESSMENT — PAIN DESCRIPTION - FREQUENCY
FREQUENCY: CONTINUOUS

## 2025-07-29 ASSESSMENT — PAIN DESCRIPTION - ORIENTATION
ORIENTATION: LOWER
ORIENTATION: MID
ORIENTATION: LOWER
ORIENTATION: MID
ORIENTATION: MID
ORIENTATION: LOWER

## 2025-07-29 ASSESSMENT — PAIN DESCRIPTION - PAIN TYPE
TYPE: ACUTE PAIN

## 2025-07-29 ASSESSMENT — PAIN DESCRIPTION - ONSET
ONSET: ON-GOING

## 2025-07-29 ASSESSMENT — PAIN DESCRIPTION - DESCRIPTORS
DESCRIPTORS: SHARP;CRAMPING
DESCRIPTORS: ACHING;DISCOMFORT;SORE
DESCRIPTORS: SHARP;STABBING
DESCRIPTORS: CRAMPING;ACHING;DISCOMFORT
DESCRIPTORS: SHARP;STABBING
DESCRIPTORS: ACHING;DISCOMFORT;CRAMPING

## 2025-07-29 ASSESSMENT — PAIN - FUNCTIONAL ASSESSMENT
PAIN_FUNCTIONAL_ASSESSMENT: PREVENTS OR INTERFERES SOME ACTIVE ACTIVITIES AND ADLS
PAIN_FUNCTIONAL_ASSESSMENT: NONE - DENIES PAIN

## 2025-07-29 NOTE — PROGRESS NOTES
4 Eyes Skin Assessment     NAME:  Michelet Gan  YOB: 1985  MEDICAL RECORD NUMBER:  [id number]    The patient is being assessed for  Admission    I agree that at least one RN has performed a thorough Head to Toe Skin Assessment on the patient. ALL assessment sites listed below have been assessed.      Areas assessed by both nurses:    Head, Face, Ears, Shoulders, Back, Chest, Arms, Elbows, Hands, Sacrum. Buttock, Coccyx, Ischium, and Legs. Feet and Heels        Does the Patient have a Wound? No noted wound(s)       Gregor Prevention initiated by RN: No  Wound Care Orders initiated by RN: No    For hospital-acquired stage 1 & 2 and ALL Stage 3,4, Unstageable, DTI, NWPT, and Complex wounds: place order “IP Wound Care/Ostomy Nurse Eval and Treat” by RN under : NA    New Ostomies, if present place, Ostomy referral order under : No     Nurse 1 eSignature: Electronically signed by Michelle Sams RN on 7/29/25 at 2:56 AM EDT    **SHARE this note so that the co-signing nurse can place an eSignature**    Nurse 2 eSignature: {Esignature:[id number]}

## 2025-07-29 NOTE — ED PROVIDER NOTES
ATTENDING PROVIDER ATTESTATION:     I have personally performed and/or participated in the history, exam, medical decision making, and procedures and agree with all pertinent clinical information.      I have also reviewed and agree with the past medical, family and social history unless otherwise noted.    My findings/Plan: Patient was seen by midlevel Rula Alcocer and fernando Andujar please see their notes for full H&P please like to discuss patient most likely has a DVT in her left leg I also was concerned about her hemoglobin being low I discussed that with the admitting physician since she has not a pulmonary emboli that is nonthrombosed at this time her D-dimer was very elevated she has chronic anemia from heavy vaginal bleeding I discussed the risk of Eliquis with her as well as the admitting physician she also states she has been having chest pain and shortness of breath with exertion over the last 2 days as well we did start her on Eliquis she will need an ultrasound of her left leg and be watched closely her vital signs are stable she is never had any heart disease before but once again I was concerned that she has chest pain and shortness of breath with exertion and nonthrombosed pulmonary emboli EKG was unremarkable cardiac enzymes were negative there was no strain noted on the CTA of her chest patient was transferred for further evaluation and treatment     Goran Ruvalcaba MD  07/29/25 2978

## 2025-07-29 NOTE — H&P
[facility] Hospitalist Group History and Physical        Chief Complaint:  +PE  History of Present Illness   The patient is a 40 y.o. female    Hx of DVT- L LE -- she is not taking blood thinners    L LE pain and chest discomfort- see at [place] [facility]  Per CT:  \"Small linear nonocclusive thrombus in the pulmonary artery branch to the  medial basal segment of the right lower lobe. This embolus may be subacute.  Overall clot burden is low.  No definite acute occlusive pulmonary embolus\"    Dx with PE and sent here    She is supposed to see outpatient GYN  Hx of ovarian cyst and lower abd pain noted  Hx of DUB +heavy periods, hgb 9.0, microcytosis mcv 70, inc RDW, likely THOMAS- but not taking home iron regularly  Per CT:  \"Unchanged enlarged uterus with heterogeneous low-attenuation of the   uterine fundus.  Adenomyosis was suggested on the prior pelvic ultrasound. \"      - hx taken from the [facility]  REVIEW OF SYSTEMS:  no fevers, chills,     Past Medical History:      Diagnosis Date    Anemia 2020    DVT (deep venous thrombosis) (HCC)     DVT of lower extremity (deep venous thrombosis) (HCC)     left lower    Dysfunctional uterine bleeding 2020    History of DVT of lower extremity 2014    left leg 2014    Migraine        Past Surgical History:        Procedure Laterality Date    DILATION AND CURETTAGE      INDUCED   10/12/12    UPPER GASTROINTESTINAL ENDOSCOPY N/A 10/5/2022    EGD DIAGNOSTIC ONLY performed by Liam Doshi MD at [facility] ENDOSCOPY       Home Medications:  Prior to Admission medications    Medication Sig Start Date End Date Taking? Authorizing Provider   tiZANidine (ZANAFLEX) 4 MG tablet Take 1 tablet by mouth every 6 hours as needed (muscle spasm)  Patient not taking: Reported on 2025   Elda De La Cruz, APRN - CNP   cefUROXime (CEFTIN) 250 MG tablet Take 1 tablet by mouth 2 times daily for 7 days  Patient not taking: Reported on 2025

## 2025-07-29 NOTE — DISCHARGE INSTRUCTIONS
Please take your Eliquis as prescribed.   Get labs done in 3~5 days  Follow-up with your primary care physician within 7 to 14 days.  Please take rest of your medications as prescribed with

## 2025-07-29 NOTE — CONSULTS
[facility]    PULMONARY/CRITICAL CARE CONSULTATION NOTE    Patient: Michelet Gan  MRN: [id number]  : 1985    Encounter Date: 2025  Encounter Time: 10:46 AM     Date of Admission: .2025  2:05 AM    Consulting Physician:  Primary Care Physician:      Shayla Leon MD     [phone]    PROBLEM LIST:  Patient Active Problem List   Diagnosis    Obesity (BMI 30-39.9)    History of DVT (deep vein thrombosis)    DUB (dysfunctional uterine bleeding)    Chest pain    Gastroesophageal reflux disease without esophagitis    Family history of malignant neoplasm of breast in relative diagnosed when younger than 45 years of age    At high risk for breast cancer    Deep vein thrombosis (DVT) (HCC)    Obesity with body mass index 30 or greater    Chest wall pain    Hiatal hernia    PE (pulmonary thromboembolism) (HCC)    Iron deficiency anemia due to chronic blood loss     Reason for Consultation: Pulmonary Embolism     HPI:   Michelet Gan is a 40 y.o. female with past medical history noted for ovarian cyst, heavy menstrual periods that presented to hospital for shortness of breath and chest pains.  Shortness of breath moderate and 6-7/10.    CTA Chest 2025 noted concern for right sub-segmental pulmonary embolism on CTA Chest 2025.    Patient has known history of DVT about  in lower extremity but not on anticoagulation prior to hospital admission.  No right heart strain on echocardiogram.    Patient denies recent extended/long car ride, plane ride, train ride, injury, surgery, or sedentary lifestyle.  Patient has no hypercoagulable history and takes no hormonal supplementations.  She has ovarian cyst for which she must follow up with OB/GYN outpatient.    Patient known to Dr. Solitario for hematology/oncology for which she has received iron infusions.     PAST MEDICAL HISTORY:     Past Medical History:   Diagnosis Date    Anemia 2020    DVT (deep venous

## 2025-07-29 NOTE — PROGRESS NOTES
Nocturnist note reviewed, agree with plan and additions as below:    A/P  PE, subacute: Transferred from [place] ER due to PE found on imaging.  CTA prolonged with small nonocclusive embolus in pulmonary artery branch of the medial base of the right lobe.  No RV strain noted.  Pulm consulted by admitting physician.  This started on Eliquis will continue.  Echo ordered.  VS stable continue to monitor the      Dispo: Home, await Pulm clearance     NOTE: Portions of this report may have been transcribed using voice recognition software. Every effort was made to ensure accuracy; however, inadvertent computerized transcription errors may be present.  Electronically signed by Abbe Nolasco MD on 7/29/2025 at 9:17 AM

## 2025-07-29 NOTE — PLAN OF CARE
Problem: Discharge Planning  Goal: Discharge to home or other facility with appropriate resources  7/29/2025 0745 by Jerrell Brock RN  Outcome: Progressing     Problem: ABCDS Injury Assessment  Goal: Absence of physical injury  7/29/2025 0745 by Jerrell Brock RN  Outcome: Progressing     Problem: Pain  Goal: Verbalizes/displays adequate comfort level or baseline comfort level  7/29/2025 0745 by Jerrell Brock RN  Outcome: Progressing     Problem: Safety - Adult  Goal: Free from fall injury  Outcome: Progressing

## 2025-07-30 ENCOUNTER — APPOINTMENT (OUTPATIENT)
Dept: GENERAL RADIOLOGY | Age: 40
DRG: 134 | End: 2025-07-30
Attending: INTERNAL MEDICINE
Payer: COMMERCIAL

## 2025-07-30 ENCOUNTER — APPOINTMENT (OUTPATIENT)
Dept: ULTRASOUND IMAGING | Age: 40
DRG: 134 | End: 2025-07-30
Attending: INTERNAL MEDICINE
Payer: COMMERCIAL

## 2025-07-30 LAB
ALBUMIN SERPL-MCNC: 3.7 G/DL (ref 3.5–5.2)
ALP SERPL-CCNC: 106 U/L (ref 35–104)
ALT SERPL-CCNC: 9 U/L (ref 0–35)
ANION GAP SERPL CALCULATED.3IONS-SCNC: 11 MMOL/L (ref 7–16)
AST SERPL-CCNC: 15 U/L (ref 0–35)
BASOPHILS # BLD: 0 K/UL (ref 0–0.2)
BASOPHILS NFR BLD: 0 % (ref 0–2)
BILIRUB SERPL-MCNC: 0.3 MG/DL (ref 0–1.2)
BUN SERPL-MCNC: 4 MG/DL (ref 6–20)
CALCIUM SERPL-MCNC: 9 MG/DL (ref 8.6–10)
CHLORIDE SERPL-SCNC: 101 MMOL/L (ref 98–107)
CO2 SERPL-SCNC: 23 MMOL/L (ref 22–29)
CREAT SERPL-MCNC: 0.7 MG/DL (ref 0.5–1)
EOSINOPHIL # BLD: 0.09 K/UL (ref 0.05–0.5)
EOSINOPHILS RELATIVE PERCENT: 1 % (ref 0–6)
ERYTHROCYTE [DISTWIDTH] IN BLOOD BY AUTOMATED COUNT: 19.5 % (ref 11.5–15)
GFR, ESTIMATED: >90 ML/MIN/1.73M2
GLUCOSE SERPL-MCNC: 99 MG/DL (ref 74–99)
HCT VFR BLD AUTO: 27.4 % (ref 34–48)
HGB BLD-MCNC: 7.9 G/DL (ref 11.5–15.5)
LYMPHOCYTES NFR BLD: 1.13 K/UL (ref 1.5–4)
LYMPHOCYTES RELATIVE PERCENT: 11 % (ref 20–42)
MCH RBC QN AUTO: 20.7 PG (ref 26–35)
MCHC RBC AUTO-ENTMCNC: 28.8 G/DL (ref 32–34.5)
MCV RBC AUTO: 71.9 FL (ref 80–99.9)
MONOCYTES NFR BLD: 0.26 K/UL (ref 0.1–0.95)
MONOCYTES NFR BLD: 3 % (ref 2–12)
NEUTROPHILS NFR BLD: 85 % (ref 43–80)
NEUTS SEG NFR BLD: 8.52 K/UL (ref 1.8–7.3)
PLATELET # BLD AUTO: 322 K/UL (ref 130–450)
PMV BLD AUTO: 9.4 FL (ref 7–12)
POTASSIUM SERPL-SCNC: 3.8 MMOL/L (ref 3.5–5.1)
PROT SERPL-MCNC: 7.1 G/DL (ref 6.4–8.3)
RBC # BLD AUTO: 3.81 M/UL (ref 3.5–5.5)
RBC # BLD: ABNORMAL 10*6/UL
SODIUM SERPL-SCNC: 135 MMOL/L (ref 136–145)
WBC OTHER # BLD: 10 K/UL (ref 4.5–11.5)

## 2025-07-30 PROCEDURE — 2580000003 HC RX 258: Performed by: INTERNAL MEDICINE

## 2025-07-30 PROCEDURE — 85025 COMPLETE CBC W/AUTO DIFF WBC: CPT

## 2025-07-30 PROCEDURE — 93970 EXTREMITY STUDY: CPT

## 2025-07-30 PROCEDURE — 99232 SBSQ HOSP IP/OBS MODERATE 35: CPT | Performed by: INTERNAL MEDICINE

## 2025-07-30 PROCEDURE — 6360000002 HC RX W HCPCS: Performed by: INTERNAL MEDICINE

## 2025-07-30 PROCEDURE — 80053 COMPREHEN METABOLIC PANEL: CPT

## 2025-07-30 PROCEDURE — 6370000000 HC RX 637 (ALT 250 FOR IP): Performed by: INTERNAL MEDICINE

## 2025-07-30 PROCEDURE — 74018 RADEX ABDOMEN 1 VIEW: CPT

## 2025-07-30 PROCEDURE — 1200000000 HC SEMI PRIVATE

## 2025-07-30 PROCEDURE — 6370000000 HC RX 637 (ALT 250 FOR IP): Performed by: NURSE PRACTITIONER

## 2025-07-30 RX ORDER — SENNOSIDES 8.6 MG/1
2 TABLET ORAL NIGHTLY
Status: DISCONTINUED | OUTPATIENT
Start: 2025-07-30 | End: 2025-07-31 | Stop reason: HOSPADM

## 2025-07-30 RX ADMIN — ONDANSETRON 4 MG: 2 INJECTION, SOLUTION INTRAMUSCULAR; INTRAVENOUS at 04:39

## 2025-07-30 RX ADMIN — APIXABAN 10 MG: 5 TABLET, FILM COATED ORAL at 20:08

## 2025-07-30 RX ADMIN — HYDROCODONE BITARTRATE AND ACETAMINOPHEN 1 TABLET: 10; 325 TABLET ORAL at 09:08

## 2025-07-30 RX ADMIN — TRAMADOL HYDROCHLORIDE 50 MG: 50 TABLET, COATED ORAL at 04:35

## 2025-07-30 RX ADMIN — SENNOSIDES 17.2 MG: 8.6 TABLET, COATED ORAL at 11:12

## 2025-07-30 RX ADMIN — APIXABAN 10 MG: 5 TABLET, FILM COATED ORAL at 09:08

## 2025-07-30 RX ADMIN — MORPHINE SULFATE 2 MG: 2 INJECTION, SOLUTION INTRAMUSCULAR; INTRAVENOUS at 05:51

## 2025-07-30 RX ADMIN — HYDROMORPHONE HYDROCHLORIDE 0.5 MG: 1 INJECTION, SOLUTION INTRAMUSCULAR; INTRAVENOUS; SUBCUTANEOUS at 21:28

## 2025-07-30 RX ADMIN — SODIUM CHLORIDE 125 MG: 9 INJECTION, SOLUTION INTRAVENOUS at 09:14

## 2025-07-30 RX ADMIN — HYDROMORPHONE HYDROCHLORIDE 0.5 MG: 1 INJECTION, SOLUTION INTRAMUSCULAR; INTRAVENOUS; SUBCUTANEOUS at 13:49

## 2025-07-30 RX ADMIN — HYDROCODONE BITARTRATE AND ACETAMINOPHEN 1 TABLET: 10; 325 TABLET ORAL at 16:56

## 2025-07-30 ASSESSMENT — PAIN SCALES - GENERAL
PAINLEVEL_OUTOF10: 8
PAINLEVEL_OUTOF10: 10
PAINLEVEL_OUTOF10: 8
PAINLEVEL_OUTOF10: 7

## 2025-07-30 ASSESSMENT — PAIN DESCRIPTION - LOCATION
LOCATION: ABDOMEN;BACK
LOCATION: ABDOMEN
LOCATION: ABDOMEN;BACK
LOCATION: ABDOMEN
LOCATION: ABDOMEN

## 2025-07-30 ASSESSMENT — PAIN - FUNCTIONAL ASSESSMENT
PAIN_FUNCTIONAL_ASSESSMENT: PREVENTS OR INTERFERES SOME ACTIVE ACTIVITIES AND ADLS
PAIN_FUNCTIONAL_ASSESSMENT: PREVENTS OR INTERFERES SOME ACTIVE ACTIVITIES AND ADLS

## 2025-07-30 ASSESSMENT — PAIN DESCRIPTION - DESCRIPTORS
DESCRIPTORS: ACHING
DESCRIPTORS: ACHING;CRAMPING;DISCOMFORT
DESCRIPTORS: ACHING;DISCOMFORT;SORE
DESCRIPTORS: ACHING
DESCRIPTORS: CRAMPING;SQUEEZING;TIGHTNESS

## 2025-07-30 ASSESSMENT — PAIN DESCRIPTION - ORIENTATION
ORIENTATION: MID
ORIENTATION: RIGHT;LOWER
ORIENTATION: MID

## 2025-07-30 ASSESSMENT — PAIN DESCRIPTION - ONSET
ONSET: ON-GOING
ONSET: ON-GOING

## 2025-07-30 ASSESSMENT — PAIN DESCRIPTION - PAIN TYPE
TYPE: ACUTE PAIN
TYPE: ACUTE PAIN

## 2025-07-30 ASSESSMENT — PAIN DESCRIPTION - FREQUENCY
FREQUENCY: CONTINUOUS
FREQUENCY: CONTINUOUS

## 2025-07-30 NOTE — PROGRESS NOTES
The patient was throwing up and pain was uncontrolled despite taking the prn norco ordered. Patient refused to take IV pain medication. Patient requested oral pain medication other than norco. Radha Weiner NP notified.

## 2025-07-30 NOTE — CARE COORDINATION
Chart reviewed for transition of care at discharge. Admitted with a PE, and started on Eliquis. Met with patient to discuss discharge planning. Patient lives with her BF and 4 children in a 2 story home with bedroom on the first floor. She is independent wit ADl's and uses no DME. No history of HHC. Her PCP is Dr Leon and pharmacy is [facility] on [address]. Her NOK is her son Wayne Mendez. Her discharge plan is home with Boyfriend or family transporting. She is requesting a note to be off for work per doctor, once discharged. No other discharge need voiced. CM will follow.  Electronically signed by Ronak Aguilera RN on 7/30/2025 at 3:44 PM

## 2025-07-30 NOTE — CONSULTS
[facility]  Hematology/Oncology  Consult  Dr. Андрей Thompson M.D.    Patient Name: Michelet Gan  YOB: 1985  PCP: Shayla Leon MD   Referring Provider: [address] [place] [address] EMERGENCY DEPT / [facility] [address]     Reason for Consultation: No chief complaint on file.       History of Present Illness:  40 years old female known to Dr. Sosa with recurrent iron deficiency anemia menorrhagia, history of provoked left calf DVT many years ago.  Came to the ER complaining of chest pain and shortness of breath.  Vitals were stable in the ER.  Labs remarkable for hemoglobin 9.7 MCV 72 ferritin 11.  CT chest with contrast positive for small nonocclusive thrombus right lower lobe pulmonary artery, likely subacute with a low clot burden.  Ultrasound Doppler lower extremities showed a positive DVT in distal left lower extremity.  Patient started on Eliquis.    Review of systems: Over 10 systems were reviewed and all were negative except as mentioned above.    Diagnostic Data:     Past Medical History:   Diagnosis Date    Anemia 07/08/2020    DVT (deep venous thrombosis) (HCC)     DVT of lower extremity (deep venous thrombosis) (HCC)     left lower    Dysfunctional uterine bleeding 07/09/2020    History of DVT of lower extremity 01/01/2014    left leg 2014    Migraine        Patient Active Problem List    Diagnosis Date Noted    Family history of malignant neoplasm of breast in relative diagnosed when younger than 45 years of age 01/10/2023    At high risk for breast cancer 01/10/2023    Chest pain 10/04/2022    Gastroesophageal reflux disease without esophagitis 10/04/2022    PE (pulmonary thromboembolism) (HCC) 07/29/2025    Iron deficiency anemia due to chronic blood loss 07/29/2025    Hiatal hernia 01/07/2025    Chest wall pain 01/06/2025    Deep vein thrombosis (DVT) (HCC) 08/02/2023    Obesity with body mass index 30 or greater 08/02/2023    Obesity (BMI 30-39.9) 07/09/2020    History of

## 2025-07-30 NOTE — PLAN OF CARE
Problem: ABCDS Injury Assessment  Goal: Absence of physical injury  7/30/2025 1054 by Mickie Gonzalez, RN  Outcome: Progressing     Problem: Pain  Goal: Verbalizes/displays adequate comfort level or baseline comfort level  7/30/2025 1054 by Mickie Gonzalez, RN  Outcome: Progressing     Problem: Safety - Adult  Goal: Free from fall injury  7/30/2025 1054 by Mickie Gonzalez, RN  Outcome: Progressing

## 2025-07-30 NOTE — PROGRESS NOTES
[facility] Hospitalist Progress Note    Admitting Date and Time: 7/29/2025  2:05 AM  Admit Dx: Pulmonary embolism (HCC) [I26.99]  PE (pulmonary thromboembolism) (HCC) [I26.99]    Subjective:  Patient is being followed for Pulmonary embolism (HCC) [I26.99]  PE (pulmonary thromboembolism) (HCC) [I26.99]   Patient is seen today.  She complains of right-sided abdominal pain which is worse.  Also, patient is currently menstruating.    ROS: denies fever, chills, cp, sob, n/v, HA unless stated above.      apixaban  10 mg Oral BID    Followed by    [START ON 8/5/2025] apixaban  5 mg Oral BID    ferric gluconate (FERRLECIT) 125 mg in sodium chloride 0.9 % 100 mL IVPB  125 mg IntraVENous Once    [START ON 7/31/2025] ferrous sulfate  325 mg Oral Daily with breakfast     HYDROcodone-acetaminophen, 1 tablet, Q6H PRN  morphine, 2 mg, Q3H PRN  ondansetron, 4 mg, Q6H PRN  diphenhydrAMINE (BENADRYL) 25 mg in sodium chloride 0.9 % 50 mL IVPB, 25 mg, Nightly PRN  traMADol, 50 mg, Q4H PRN         Objective:    BP (!) 99/54   Pulse 71   Temp 98.1 °F (36.7 °C) (Oral)   Resp 16   Ht 1.524 m (5')   Wt 76.2 kg (168 lb)   SpO2 99%   BMI 32.81 kg/m²     General Appearance: alert and oriented to person, place and time and in no acute distress  Skin: warm and dry  Head: normocephalic and atraumatic  Eyes: pupils equal, round, and reactive to light, extraocular eye movements intact, conjunctivae normal  Neck: neck supple and non tender without mass   Pulmonary/Chest: clear to auscultation bilaterally- no wheezes, rales or rhonchi, normal air movement, no respiratory distress  Cardiovascular: normal rate, normal S1 and S2 and no carotid bruits  Abdomen: Right-sided abdominal tenderness,  Extremities: no cyanosis, no clubbing and no edema  Neurologic: no cranial nerve deficit and speech normal        Recent Labs     07/28/25  1830 07/29/25  0532    139   K 3.4* 4.0    107   CO2 21* 21*   BUN 7 5*   CREATININE 0.7 0.7

## 2025-07-30 NOTE — ACP (ADVANCE CARE PLANNING)
Advance Care Planning   The patient has the following advanced directives on file:  Advance Directives       Power of  Living Will ACP-Advance Directive ACP-Power of     Not on File Filed on 07/26/13 Filed Not on File            The patient has appointed the following active healthcare agents:    Primary Decision Maker: ziyad Mendez - Child - [phone]    The Patient has the following current code status:    Code Status: Full Code      Ronak Aguilera RN  7/30/2025

## 2025-07-30 NOTE — PROGRESS NOTES
[facility]  PULMONARY/CRITICAL CARE PROGRESS NOTE    Patient: Michelet Gan  MRN: [id number]  : 1985    Encounter Date: 2025  Encounter Time: 4:13 PM     Date of Admission: .2025  2:05 AM    Consulting Physician:  Primary Care Physician:     Shayla Leon MD     [phone]    Reason for Consultation: Pulmonary Embolism     Problem List:  Patient Active Problem List   Diagnosis    Obesity (BMI 30-39.9)    History of DVT (deep vein thrombosis)    DUB (dysfunctional uterine bleeding)    Chest pain    Gastroesophageal reflux disease without esophagitis    Family history of malignant neoplasm of breast in relative diagnosed when younger than 45 years of age    At high risk for breast cancer    Deep vein thrombosis (DVT) (HCC)    Obesity with body mass index 30 or greater    Chest wall pain    Hiatal hernia    PE (pulmonary thromboembolism) (HCC)    Iron deficiency anemia due to chronic blood loss     SUBJECTIVE: Patient seen and examined.  Overnight the patient had no shortness of breath, cough, increased work of breathing.    Patient remains on room air at this time.    HOME MEDICATIONS:  Prior to Admission medications    Medication Sig Start Date End Date Taking? Authorizing Provider   apixaban (ELIQUIS) 5 MG TABS tablet Take 2 tablets by mouth 2 times daily for 7 days, THEN 1 tablet 2 times daily. 25 Yes Abbe Nolasco MD   ferrous sulfate (IRON 325) 325 (65 Fe) MG tablet Take 1 tablet by mouth daily (with breakfast) 25  Yes Abbe Nolasco MD   tiZANidine (ZANAFLEX) 4 MG tablet Take 1 tablet by mouth every 6 hours as needed (muscle spasm)  Patient not taking: Reported on 2025   Elda De La Cruz APRN - CNP   prochlorperazine (COMPAZINE) 10 MG tablet Take 1 tablet by mouth every 6 hours as needed (nausea vomiting)  Patient not taking: Reported on 2025   Elda De La Cruz APRN - CNP   naproxen (NAPROSYN)

## 2025-07-31 VITALS
DIASTOLIC BLOOD PRESSURE: 68 MMHG | RESPIRATION RATE: 18 BRPM | HEIGHT: 60 IN | SYSTOLIC BLOOD PRESSURE: 109 MMHG | TEMPERATURE: 98.4 F | WEIGHT: 168 LBS | HEART RATE: 107 BPM | OXYGEN SATURATION: 99 % | BODY MASS INDEX: 32.98 KG/M2

## 2025-07-31 LAB
ANION GAP SERPL CALCULATED.3IONS-SCNC: 15 MMOL/L (ref 7–16)
BASOPHILS # BLD: 0 K/UL (ref 0–0.2)
BASOPHILS NFR BLD: 0 % (ref 0–2)
BUN SERPL-MCNC: 5 MG/DL (ref 6–20)
CALCIUM SERPL-MCNC: 9.3 MG/DL (ref 8.6–10)
CHLORIDE SERPL-SCNC: 97 MMOL/L (ref 98–107)
CO2 SERPL-SCNC: 24 MMOL/L (ref 22–29)
CREAT SERPL-MCNC: 0.7 MG/DL (ref 0.5–1)
EKG ATRIAL RATE: 71 BPM
EKG P AXIS: 22 DEGREES
EKG P-R INTERVAL: 144 MS
EKG Q-T INTERVAL: 402 MS
EKG QRS DURATION: 68 MS
EKG QTC CALCULATION (BAZETT): 436 MS
EKG R AXIS: 5 DEGREES
EKG T AXIS: 33 DEGREES
EKG VENTRICULAR RATE: 71 BPM
EOSINOPHIL # BLD: 0 K/UL (ref 0.05–0.5)
EOSINOPHILS RELATIVE PERCENT: 0 % (ref 0–6)
ERYTHROCYTE [DISTWIDTH] IN BLOOD BY AUTOMATED COUNT: 20 % (ref 11.5–15)
GFR, ESTIMATED: >90 ML/MIN/1.73M2
GLUCOSE SERPL-MCNC: 107 MG/DL (ref 74–99)
HCT VFR BLD AUTO: 28.7 % (ref 34–48)
HGB BLD-MCNC: 8.3 G/DL (ref 11.5–15.5)
LYMPHOCYTES NFR BLD: 1.15 K/UL (ref 1.5–4)
LYMPHOCYTES RELATIVE PERCENT: 9 % (ref 20–42)
MCH RBC QN AUTO: 21.1 PG (ref 26–35)
MCHC RBC AUTO-ENTMCNC: 28.9 G/DL (ref 32–34.5)
MCV RBC AUTO: 73 FL (ref 80–99.9)
METAMYELOCYTES ABSOLUTE COUNT: 0.11 K/UL (ref 0–0.12)
METAMYELOCYTES: 1 % (ref 0–1)
MONOCYTES NFR BLD: 0.69 K/UL (ref 0.1–0.95)
MONOCYTES NFR BLD: 5 % (ref 2–12)
NEUTROPHILS NFR BLD: 85 % (ref 43–80)
NEUTS SEG NFR BLD: 11.25 K/UL (ref 1.8–7.3)
PLATELET # BLD AUTO: 362 K/UL (ref 130–450)
PMV BLD AUTO: 9.4 FL (ref 7–12)
POTASSIUM SERPL-SCNC: 3.4 MMOL/L (ref 3.5–5.1)
RBC # BLD AUTO: 3.93 M/UL (ref 3.5–5.5)
RBC # BLD: ABNORMAL 10*6/UL
SODIUM SERPL-SCNC: 136 MMOL/L (ref 136–145)
WBC OTHER # BLD: 13.2 K/UL (ref 4.5–11.5)

## 2025-07-31 PROCEDURE — 6370000000 HC RX 637 (ALT 250 FOR IP): Performed by: STUDENT IN AN ORGANIZED HEALTH CARE EDUCATION/TRAINING PROGRAM

## 2025-07-31 PROCEDURE — 85025 COMPLETE CBC W/AUTO DIFF WBC: CPT

## 2025-07-31 PROCEDURE — 99232 SBSQ HOSP IP/OBS MODERATE 35: CPT | Performed by: INTERNAL MEDICINE

## 2025-07-31 PROCEDURE — 99239 HOSP IP/OBS DSCHRG MGMT >30: CPT | Performed by: STUDENT IN AN ORGANIZED HEALTH CARE EDUCATION/TRAINING PROGRAM

## 2025-07-31 PROCEDURE — 80048 BASIC METABOLIC PNL TOTAL CA: CPT

## 2025-07-31 PROCEDURE — 93010 ELECTROCARDIOGRAM REPORT: CPT | Performed by: INTERNAL MEDICINE

## 2025-07-31 PROCEDURE — 6370000000 HC RX 637 (ALT 250 FOR IP): Performed by: INTERNAL MEDICINE

## 2025-07-31 RX ORDER — POLYETHYLENE GLYCOL 3350 17 G/17G
17 POWDER, FOR SOLUTION ORAL DAILY PRN
Status: DISCONTINUED | OUTPATIENT
Start: 2025-07-31 | End: 2025-07-31 | Stop reason: HOSPADM

## 2025-07-31 RX ORDER — HYDROCODONE BITARTRATE AND ACETAMINOPHEN 5; 325 MG/1; MG/1
1 TABLET ORAL EVERY 6 HOURS PRN
Qty: 12 TABLET | Refills: 0 | Status: SHIPPED | OUTPATIENT
Start: 2025-07-31 | End: 2025-08-03

## 2025-07-31 RX ADMIN — HYDROCODONE BITARTRATE AND ACETAMINOPHEN 1 TABLET: 10; 325 TABLET ORAL at 09:37

## 2025-07-31 RX ADMIN — APIXABAN 10 MG: 5 TABLET, FILM COATED ORAL at 09:37

## 2025-07-31 RX ADMIN — HYDROCODONE BITARTRATE AND ACETAMINOPHEN 1 TABLET: 10; 325 TABLET ORAL at 16:19

## 2025-07-31 RX ADMIN — FERROUS SULFATE TAB 325 MG (65 MG ELEMENTAL FE) 325 MG: 325 (65 FE) TAB at 09:37

## 2025-07-31 RX ADMIN — POLYETHYLENE GLYCOL 3350 17 G: 17 POWDER, FOR SOLUTION ORAL at 12:36

## 2025-07-31 RX ADMIN — HYDROCODONE BITARTRATE AND ACETAMINOPHEN 1 TABLET: 10; 325 TABLET ORAL at 02:52

## 2025-07-31 ASSESSMENT — PAIN DESCRIPTION - ORIENTATION: ORIENTATION: LOWER

## 2025-07-31 ASSESSMENT — PAIN DESCRIPTION - DESCRIPTORS
DESCRIPTORS: CRAMPING;DISCOMFORT
DESCRIPTORS: CRAMPING

## 2025-07-31 ASSESSMENT — PAIN DESCRIPTION - LOCATION
LOCATION: ABDOMEN
LOCATION: ABDOMEN

## 2025-07-31 ASSESSMENT — PAIN SCALES - GENERAL
PAINLEVEL_OUTOF10: 7
PAINLEVEL_OUTOF10: 7

## 2025-07-31 NOTE — PROGRESS NOTES
Spiritual Health History and Assessment/Progress Note  [facility]    Spiritual/Emotional Needs,  ,  ,      Name: Michelet Gan MRN: [id number]    Age: 40 y.o.     Sex: female   Language: English   Yarsanism: None   PE (pulmonary thromboembolism) (HCC)     Date: 7/31/2025                           Spiritual Assessment began in [facility] [facility] MED SURG        Referral/Consult From: Rounding   Encounter Overview/Reason: Spiritual/Emotional Needs  Service Provided For: Patient    Mindy, Belief, Meaning:   Patient has beliefs or practices that help with coping during difficult times  Family/Friends No family/friends present      Importance and Influence:  Patient has no beliefs influential to healthcare decision-making identified during this visit  Family/Friends No family/friends present    Community:  Patient is connected with a spiritual community  Family/Friends No family/friends present    Assessment and Plan of Care:     Patient Interventions include: Facilitated expression of thoughts and feelings and Provided sacramental/Episcopal ritual  Family/Friends Interventions include: No family/friends present    Patient Plan of Care: No spiritual needs identified for follow-up  Family/Friends Plan of Care: No family/friends present    Electronically signed by Chaplain Edwin on 7/31/2025 at 7:57 PM

## 2025-07-31 NOTE — DISCHARGE SUMMARY
[facility] Hospitalist Physician Discharge Summary     Shayla Leon MD  [address]  [facility] [address] [phone]    Call      Shayla Leon MD  [address]  [address]  [facility] [address] [phone]    Follow up      Shayla Leon MD  [address]  [address]  [facility] [address] [phone]    Follow up  Appointment made with Dr. Leon for 8/6/25 at 4:15    Activity level: as tolerated  Dispo: home    Condition on discharge: stable    Patient ID:  Michelet Gan, 40 y.o., 1985  [id number]    Admit date: 7/29/2025  Discharge date and time:  7/31/2025  5:47 PM    Admission Diagnoses: Principal Problem:    PE (pulmonary thromboembolism) (HCC)  Active Problems:    Obesity (BMI 30-39.9)    History of DVT (deep vein thrombosis)    DUB (dysfunctional uterine bleeding)    Iron deficiency anemia due to chronic blood loss  Resolved Problems:    * No resolved hospital problems. *    Discharge Diagnoses: Principal Problem:    PE (pulmonary thromboembolism) (HCC)  Active Problems:    Obesity (BMI 30-39.9)    History of DVT (deep vein thrombosis)    DUB (dysfunctional uterine bleeding)    Iron deficiency anemia due to chronic blood loss  Resolved Problems:    * No resolved hospital problems. *    Consults:  IP CONSULT TO PULMONOLOGY  IP CONSULT TO SOCIAL WORK  IP CONSULT TO ONCOLOGY    Procedures:   ECHO 7/29    Left Ventricle: Normal left ventricular systolic function with a visually estimated EF of 55 - 60%. Left ventricle size is normal. Normal wall thickness. Normal wall motion. Normal diastolic function.    Right Ventricle: Right ventricle size is normal. Normal systolic function. No evidence of RV strain.    Tricuspid Valve: Unable to assess RVSP.    Left Atrium: Left atrium size is normal.    Right Atrium: Right atrium size is normal.    Image quality is adequate.      Hospital Course:   Patient Michelet Gan is a 40 y.o. presented with Pulmonary

## 2025-07-31 NOTE — PLAN OF CARE
Problem: Discharge Planning  Goal: Discharge to home or other facility with appropriate resources  7/31/2025 1602 by Mickie Gonzalez RN  Outcome: Adequate for Discharge  7/31/2025 1201 by Mickie Gonzalez RN  Outcome: Progressing     Problem: ABCDS Injury Assessment  Goal: Absence of physical injury  7/31/2025 1602 by Mickie Gonzalez RN  Outcome: Adequate for Discharge  7/31/2025 1201 by Mickie Gonzalez RN  Outcome: Progressing     Problem: Pain  Goal: Verbalizes/displays adequate comfort level or baseline comfort level  7/31/2025 1602 by Mickie Gonzalez RN  Outcome: Adequate for Discharge  7/31/2025 1201 by Mickie Gonzalez RN  Outcome: Progressing     Problem: Safety - Adult  Goal: Free from fall injury  7/31/2025 1602 by Mickie Gonzalez RN  Outcome: Adequate for Discharge  7/31/2025 1201 by Mickie Gonzalez RN  Outcome: Progressing

## 2025-07-31 NOTE — PLAN OF CARE
Problem: Discharge Planning  Goal: Discharge to home or other facility with appropriate resources  7/31/2025 1201 by Mickie Gonzalez, RN  Outcome: Progressing     Problem: ABCDS Injury Assessment  Goal: Absence of physical injury  7/31/2025 1201 by Mickie Gonzalez RN  Outcome: Progressing     Problem: Pain  Goal: Verbalizes/displays adequate comfort level or baseline comfort level  7/31/2025 1201 by Mickie Gonzalez, RN  Outcome: Progressing     Problem: Safety - Adult  Goal: Free from fall injury  7/31/2025 1201 by Mickie Gonzalez, RN  Outcome: Progressing      yes

## 2025-07-31 NOTE — PROGRESS NOTES
[facility]  PULMONARY/CRITICAL CARE PROGRESS NOTE    Patient: Michelet Gan  MRN: [id number]  : 1985    Encounter Date: 2025  Encounter Time: 10:08 AM     Date of Admission: .2025  2:05 AM    Consulting Physician:  Primary Care Physician:     Shayla Leon MD     [phone]    Reason for Consultation: Pulmonary Embolism     Problem List:  Patient Active Problem List   Diagnosis    Obesity (BMI 30-39.9)    History of DVT (deep vein thrombosis)    DUB (dysfunctional uterine bleeding)    Chest pain    Gastroesophageal reflux disease without esophagitis    Family history of malignant neoplasm of breast in relative diagnosed when younger than 45 years of age    At high risk for breast cancer    Deep vein thrombosis (DVT) (HCC)    Obesity with body mass index 30 or greater    Chest wall pain    Hiatal hernia    PE (pulmonary thromboembolism) (HCC)    Iron deficiency anemia due to chronic blood loss     SUBJECTIVE: Patient seen and examined.  Overnight the patient had no shortness of breath, cough, increased work of breathing.    Patient remains on room air at this time.    HOME MEDICATIONS:  Prior to Admission medications    Medication Sig Start Date End Date Taking? Authorizing Provider   apixaban (ELIQUIS) 5 MG TABS tablet Take 2 tablets by mouth 2 times daily for 7 days, THEN 1 tablet 2 times daily. 25 Yes Abbe Nolasco MD   ferrous sulfate (IRON 325) 325 (65 Fe) MG tablet Take 1 tablet by mouth daily (with breakfast) 25  Yes Abbe Nolasco MD   tiZANidine (ZANAFLEX) 4 MG tablet Take 1 tablet by mouth every 6 hours as needed (muscle spasm)  Patient not taking: Reported on 2025   Elda De La Cruz APRN - JACQUES   prochlorperazine (COMPAZINE) 10 MG tablet Take 1 tablet by mouth every 6 hours as needed (nausea vomiting)  Patient not taking: Reported on 2025   Elda De La Cruz APRN - JACQUES   naproxen

## 2025-07-31 NOTE — PROGRESS NOTES
[facility] Hospitalist Physician Discharge Summary     Shayla Leon MD  [address]  [place] [facility] [address] [phone]    Call      Shayla Leon MD  [address]  [address]  [place] [address] [phone]    Follow up      Shayla Leon MD  [address]  [address]  [place] [address] [phone]    Follow up  Appointment made with Dr. Leon for 8/6/25 at 4:15    Activity level: as tolerated  Dispo: home    Condition on discharge: stable    Patient ID:  Michelet Gan, 40 y.o., 1985  [id number]    Admit date: 7/29/2025  Discharge date and time:  7/31/2025  4:28 PM    Admission Diagnoses: Principal Problem:    PE (pulmonary thromboembolism) (HCC)  Active Problems:    Obesity (BMI 30-39.9)    History of DVT (deep vein thrombosis)    DUB (dysfunctional uterine bleeding)    Iron deficiency anemia due to chronic blood loss  Resolved Problems:    * No resolved hospital problems. *    Discharge Diagnoses: Principal Problem:    PE (pulmonary thromboembolism) (HCC)  Active Problems:    Obesity (BMI 30-39.9)    History of DVT (deep vein thrombosis)    DUB (dysfunctional uterine bleeding)    Iron deficiency anemia due to chronic blood loss  Resolved Problems:    * No resolved hospital problems. *    Consults:  IP CONSULT TO PULMONOLOGY  IP CONSULT TO SOCIAL WORK  IP CONSULT TO ONCOLOGY    Procedures:   ECHO 7/29    Left Ventricle: Normal left ventricular systolic function with a visually estimated EF of 55 - 60%. Left ventricle size is normal. Normal wall thickness. Normal wall motion. Normal diastolic function.    Right Ventricle: Right ventricle size is normal. Normal systolic function. No evidence of RV strain.    Tricuspid Valve: Unable to assess RVSP.    Left Atrium: Left atrium size is normal.    Right Atrium: Right atrium size is normal.    Image quality is adequate.      Hospital Course:   Patient Michelet Gan is a 40 y.o. presented with Pulmonary

## 2025-07-31 NOTE — PLAN OF CARE
Problem: Discharge Planning  Goal: Discharge to home or other facility with appropriate resources  7/31/2025 0015 by Michelle Sams RN  Outcome: Progressing  7/30/2025 1054 by Mickie Gonzalez RN  Outcome: Progressing     Problem: ABCDS Injury Assessment  Goal: Absence of physical injury  7/31/2025 0015 by Michelle Sams RN  Outcome: Progressing  7/30/2025 1054 by Mickie Gonzalez RN  Outcome: Progressing     Problem: Pain  Goal: Verbalizes/displays adequate comfort level or baseline comfort level  7/31/2025 0015 by Michelle Sams RN  Outcome: Progressing  7/30/2025 1054 by Mickie Gonzalez RN  Outcome: Progressing     Problem: Safety - Adult  Goal: Free from fall injury  7/31/2025 0015 by Michelle Sams RN  Outcome: Progressing  7/30/2025 1054 by Mickie Gonzalez RN  Outcome: Progressing

## 2025-08-04 ENCOUNTER — APPOINTMENT (OUTPATIENT)
Dept: CT IMAGING | Age: 40
End: 2025-08-04
Payer: COMMERCIAL

## 2025-08-04 ENCOUNTER — HOSPITAL ENCOUNTER (EMERGENCY)
Age: 40
Discharge: HOME | End: 2025-08-04
Attending: EMERGENCY MEDICINE
Payer: COMMERCIAL

## 2025-08-04 VITALS
HEART RATE: 100 BPM | HEIGHT: 60 IN | DIASTOLIC BLOOD PRESSURE: 68 MMHG | OXYGEN SATURATION: 97 % | SYSTOLIC BLOOD PRESSURE: 107 MMHG | RESPIRATION RATE: 16 BRPM | BODY MASS INDEX: 32 KG/M2 | WEIGHT: 163 LBS | TEMPERATURE: 98.7 F

## 2025-08-04 DIAGNOSIS — N39.0 URINARY TRACT INFECTION WITH HEMATURIA, SITE UNSPECIFIED: ICD-10-CM

## 2025-08-04 DIAGNOSIS — R31.9 URINARY TRACT INFECTION WITH HEMATURIA, SITE UNSPECIFIED: ICD-10-CM

## 2025-08-04 DIAGNOSIS — D64.9 ANEMIA, UNSPECIFIED TYPE: Primary | ICD-10-CM

## 2025-08-04 DIAGNOSIS — R10.84 GENERALIZED ABDOMINAL PAIN: ICD-10-CM

## 2025-08-04 DIAGNOSIS — R11.2 NAUSEA AND VOMITING, UNSPECIFIED VOMITING TYPE: ICD-10-CM

## 2025-08-04 LAB
ALBUMIN SERPL-MCNC: 3.7 G/DL (ref 3.5–5.2)
ALP SERPL-CCNC: 93 U/L (ref 35–104)
ALT SERPL-CCNC: 7 U/L (ref 0–35)
ANION GAP SERPL CALCULATED.3IONS-SCNC: 14 MMOL/L (ref 7–16)
AST SERPL-CCNC: 24 U/L (ref 0–35)
BACTERIA URNS QL MICRO: ABNORMAL
BASOPHILS # BLD: 0.02 K/UL (ref 0–0.2)
BASOPHILS NFR BLD: 0 % (ref 0–2)
BILIRUB SERPL-MCNC: 0.3 MG/DL (ref 0–1.2)
BILIRUB UR QL STRIP: NEGATIVE
BUN SERPL-MCNC: 6 MG/DL (ref 6–20)
CALCIUM SERPL-MCNC: 9.2 MG/DL (ref 8.6–10)
CHLORIDE SERPL-SCNC: 97 MMOL/L (ref 98–107)
CLARITY UR: ABNORMAL
CO2 SERPL-SCNC: 25 MMOL/L (ref 22–29)
COLOR UR: ABNORMAL
CREAT SERPL-MCNC: 0.7 MG/DL (ref 0.5–1)
EKG ATRIAL RATE: 99 BPM
EKG P AXIS: 60 DEGREES
EKG P-R INTERVAL: 140 MS
EKG Q-T INTERVAL: 348 MS
EKG QRS DURATION: 64 MS
EKG QTC CALCULATION (BAZETT): 446 MS
EKG R AXIS: -2 DEGREES
EKG T AXIS: 32 DEGREES
EKG VENTRICULAR RATE: 99 BPM
EOSINOPHIL # BLD: 0 K/UL (ref 0.05–0.5)
EOSINOPHILS RELATIVE PERCENT: 0 % (ref 0–6)
ERYTHROCYTE [DISTWIDTH] IN BLOOD BY AUTOMATED COUNT: 22.9 % (ref 11.5–15)
GFR, ESTIMATED: >90 ML/MIN/1.73M2
GLUCOSE SERPL-MCNC: 96 MG/DL (ref 74–99)
GLUCOSE UR STRIP-MCNC: NEGATIVE MG/DL
HCG, URINE, POC: NEGATIVE
HCT VFR BLD AUTO: 24.8 % (ref 34–48)
HGB BLD-MCNC: 7.3 G/DL (ref 11.5–15.5)
HGB UR QL STRIP.AUTO: ABNORMAL
IMM GRANULOCYTES # BLD AUTO: 0.06 K/UL (ref 0–0.58)
IMM GRANULOCYTES NFR BLD: 1 % (ref 0–5)
KETONES UR STRIP-MCNC: 40 MG/DL
LACTATE BLDV-SCNC: 1.5 MMOL/L (ref 0.5–1.9)
LEUKOCYTE ESTERASE UR QL STRIP: ABNORMAL
LIPASE SERPL-CCNC: 16 U/L (ref 13–60)
LYMPHOCYTES NFR BLD: 1.23 K/UL (ref 1.5–4)
LYMPHOCYTES RELATIVE PERCENT: 13 % (ref 20–42)
Lab: NORMAL
MCH RBC QN AUTO: 21.7 PG (ref 26–35)
MCHC RBC AUTO-ENTMCNC: 29.4 G/DL (ref 32–34.5)
MCV RBC AUTO: 73.6 FL (ref 80–99.9)
MONOCYTES NFR BLD: 0.78 K/UL (ref 0.1–0.95)
MONOCYTES NFR BLD: 8 % (ref 2–12)
NEGATIVE QC PASS/FAIL: NORMAL
NEUTROPHILS NFR BLD: 79 % (ref 43–80)
NEUTS SEG NFR BLD: 7.76 K/UL (ref 1.8–7.3)
NITRITE UR QL STRIP: POSITIVE
PH UR STRIP: 6.5 [PH] (ref 5–8)
PLATELET # BLD AUTO: 379 K/UL (ref 130–450)
PMV BLD AUTO: 9.7 FL (ref 7–12)
POSITIVE QC PASS/FAIL: NORMAL
POTASSIUM SERPL-SCNC: 3.6 MMOL/L (ref 3.5–5.1)
PROT SERPL-MCNC: 7.4 G/DL (ref 6.4–8.3)
PROT UR STRIP-MCNC: 100 MG/DL
RBC # BLD AUTO: 3.37 M/UL (ref 3.5–5.5)
RBC # BLD: ABNORMAL 10*6/UL
RBC #/AREA URNS HPF: ABNORMAL /HPF
SODIUM SERPL-SCNC: 136 MMOL/L (ref 136–145)
SP GR UR STRIP: 1.01 (ref 1–1.03)
UROBILINOGEN UR STRIP-ACNC: 1 EU/DL (ref 0–1)
WBC #/AREA URNS HPF: ABNORMAL /HPF
WBC OTHER # BLD: 9.9 K/UL (ref 4.5–11.5)

## 2025-08-04 PROCEDURE — 6370000000 HC RX 637 (ALT 250 FOR IP)

## 2025-08-04 PROCEDURE — 36430 TRANSFUSION BLD/BLD COMPNT: CPT

## 2025-08-04 PROCEDURE — 96374 THER/PROPH/DIAG INJ IV PUSH: CPT

## 2025-08-04 PROCEDURE — 86923 COMPATIBILITY TEST ELECTRIC: CPT

## 2025-08-04 PROCEDURE — 93010 ELECTROCARDIOGRAM REPORT: CPT | Performed by: INTERNAL MEDICINE

## 2025-08-04 PROCEDURE — 6360000004 HC RX CONTRAST MEDICATION: Performed by: RADIOLOGY

## 2025-08-04 PROCEDURE — 81001 URINALYSIS AUTO W/SCOPE: CPT

## 2025-08-04 PROCEDURE — 2500000003 HC RX 250 WO HCPCS

## 2025-08-04 PROCEDURE — 93005 ELECTROCARDIOGRAM TRACING: CPT | Performed by: EMERGENCY MEDICINE

## 2025-08-04 PROCEDURE — 74177 CT ABD & PELVIS W/CONTRAST: CPT

## 2025-08-04 PROCEDURE — P9016 RBC LEUKOCYTES REDUCED: HCPCS

## 2025-08-04 PROCEDURE — 86900 BLOOD TYPING SEROLOGIC ABO: CPT

## 2025-08-04 PROCEDURE — 80053 COMPREHEN METABOLIC PANEL: CPT

## 2025-08-04 PROCEDURE — 86850 RBC ANTIBODY SCREEN: CPT

## 2025-08-04 PROCEDURE — 96361 HYDRATE IV INFUSION ADD-ON: CPT

## 2025-08-04 PROCEDURE — 83605 ASSAY OF LACTIC ACID: CPT

## 2025-08-04 PROCEDURE — 99285 EMERGENCY DEPT VISIT HI MDM: CPT

## 2025-08-04 PROCEDURE — 87040 BLOOD CULTURE FOR BACTERIA: CPT

## 2025-08-04 PROCEDURE — 83690 ASSAY OF LIPASE: CPT

## 2025-08-04 PROCEDURE — 86901 BLOOD TYPING SEROLOGIC RH(D): CPT

## 2025-08-04 PROCEDURE — 96375 TX/PRO/DX INJ NEW DRUG ADDON: CPT

## 2025-08-04 PROCEDURE — 71275 CT ANGIOGRAPHY CHEST: CPT

## 2025-08-04 PROCEDURE — 6360000002 HC RX W HCPCS

## 2025-08-04 PROCEDURE — 2580000003 HC RX 258

## 2025-08-04 PROCEDURE — 87086 URINE CULTURE/COLONY COUNT: CPT

## 2025-08-04 PROCEDURE — 85025 COMPLETE CBC W/AUTO DIFF WBC: CPT

## 2025-08-04 RX ORDER — CEPHALEXIN 500 MG/1
500 CAPSULE ORAL 4 TIMES DAILY
Qty: 20 CAPSULE | Refills: 0 | Status: SHIPPED | OUTPATIENT
Start: 2025-08-04 | End: 2025-08-09

## 2025-08-04 RX ORDER — DIPHENHYDRAMINE HYDROCHLORIDE 50 MG/ML
12.5 INJECTION, SOLUTION INTRAMUSCULAR; INTRAVENOUS ONCE
Status: DISCONTINUED | OUTPATIENT
Start: 2025-08-04 | End: 2025-08-05 | Stop reason: HOSPADM

## 2025-08-04 RX ORDER — LORAZEPAM 2 MG/ML
0.5 INJECTION INTRAMUSCULAR ONCE
Status: DISCONTINUED | OUTPATIENT
Start: 2025-08-04 | End: 2025-08-04

## 2025-08-04 RX ORDER — FENTANYL CITRATE 50 UG/ML
50 INJECTION, SOLUTION INTRAMUSCULAR; INTRAVENOUS ONCE
Status: COMPLETED | OUTPATIENT
Start: 2025-08-04 | End: 2025-08-04

## 2025-08-04 RX ORDER — SODIUM CHLORIDE 9 MG/ML
INJECTION, SOLUTION INTRAVENOUS PRN
Status: DISCONTINUED | OUTPATIENT
Start: 2025-08-04 | End: 2025-08-05 | Stop reason: HOSPADM

## 2025-08-04 RX ORDER — IOPAMIDOL 755 MG/ML
75 INJECTION, SOLUTION INTRAVASCULAR
Status: COMPLETED | OUTPATIENT
Start: 2025-08-04 | End: 2025-08-04

## 2025-08-04 RX ORDER — ONDANSETRON 2 MG/ML
4 INJECTION INTRAMUSCULAR; INTRAVENOUS ONCE
Status: COMPLETED | OUTPATIENT
Start: 2025-08-04 | End: 2025-08-04

## 2025-08-04 RX ORDER — LORAZEPAM 0.5 MG/1
0.5 TABLET ORAL ONCE
Status: COMPLETED | OUTPATIENT
Start: 2025-08-04 | End: 2025-08-04

## 2025-08-04 RX ORDER — METOCLOPRAMIDE HYDROCHLORIDE 5 MG/ML
10 INJECTION INTRAMUSCULAR; INTRAVENOUS ONCE
Status: COMPLETED | OUTPATIENT
Start: 2025-08-04 | End: 2025-08-04

## 2025-08-04 RX ORDER — ACETAMINOPHEN 325 MG/1
650 TABLET ORAL ONCE
Status: COMPLETED | OUTPATIENT
Start: 2025-08-04 | End: 2025-08-04

## 2025-08-04 RX ORDER — 0.9 % SODIUM CHLORIDE 0.9 %
1000 INTRAVENOUS SOLUTION INTRAVENOUS ONCE
Status: COMPLETED | OUTPATIENT
Start: 2025-08-04 | End: 2025-08-04

## 2025-08-04 RX ORDER — DIPHENHYDRAMINE HYDROCHLORIDE 50 MG/ML
25 INJECTION, SOLUTION INTRAMUSCULAR; INTRAVENOUS ONCE
Status: COMPLETED | OUTPATIENT
Start: 2025-08-04 | End: 2025-08-04

## 2025-08-04 RX ADMIN — METOCLOPRAMIDE 10 MG: 5 INJECTION, SOLUTION INTRAMUSCULAR; INTRAVENOUS at 15:13

## 2025-08-04 RX ADMIN — FENTANYL CITRATE 50 MCG: 50 INJECTION INTRAMUSCULAR; INTRAVENOUS at 12:52

## 2025-08-04 RX ADMIN — LORAZEPAM 0.5 MG: 0.5 TABLET ORAL at 16:19

## 2025-08-04 RX ADMIN — IOPAMIDOL 75 ML: 755 INJECTION, SOLUTION INTRAVENOUS at 16:11

## 2025-08-04 RX ADMIN — DIPHENHYDRAMINE HYDROCHLORIDE 25 MG: 50 INJECTION INTRAMUSCULAR; INTRAVENOUS at 15:13

## 2025-08-04 RX ADMIN — WATER 1000 MG: 1 INJECTION INTRAMUSCULAR; INTRAVENOUS; SUBCUTANEOUS at 15:12

## 2025-08-04 RX ADMIN — SODIUM CHLORIDE 1000 ML: 0.9 INJECTION, SOLUTION INTRAVENOUS at 12:55

## 2025-08-04 RX ADMIN — ONDANSETRON 4 MG: 2 INJECTION, SOLUTION INTRAMUSCULAR; INTRAVENOUS at 12:52

## 2025-08-04 RX ADMIN — APIXABAN 5 MG: 5 TABLET, FILM COATED ORAL at 22:24

## 2025-08-04 RX ADMIN — ACETAMINOPHEN 650 MG: 325 TABLET ORAL at 22:24

## 2025-08-04 ASSESSMENT — PAIN SCALES - GENERAL
PAINLEVEL_OUTOF10: 10
PAINLEVEL_OUTOF10: 8
PAINLEVEL_OUTOF10: 6

## 2025-08-04 ASSESSMENT — PAIN DESCRIPTION - ORIENTATION
ORIENTATION: RIGHT

## 2025-08-04 ASSESSMENT — PAIN DESCRIPTION - DESCRIPTORS
DESCRIPTORS: DULL
DESCRIPTORS: DULL

## 2025-08-04 ASSESSMENT — PAIN DESCRIPTION - LOCATION
LOCATION: ABDOMEN
LOCATION: ABDOMEN;BACK
LOCATION: ABDOMEN

## 2025-08-04 ASSESSMENT — PAIN - FUNCTIONAL ASSESSMENT
PAIN_FUNCTIONAL_ASSESSMENT: ACTIVITIES ARE NOT PREVENTED
PAIN_FUNCTIONAL_ASSESSMENT: 0-10
PAIN_FUNCTIONAL_ASSESSMENT: ACTIVITIES ARE NOT PREVENTED

## 2025-08-04 ASSESSMENT — PAIN DESCRIPTION - PAIN TYPE: TYPE: ACUTE PAIN

## 2025-08-04 ASSESSMENT — PAIN DESCRIPTION - ONSET: ONSET: ON-GOING

## 2025-08-04 ASSESSMENT — PAIN DESCRIPTION - FREQUENCY: FREQUENCY: CONTINUOUS

## 2025-08-05 ENCOUNTER — APPOINTMENT (OUTPATIENT)
Dept: ULTRASOUND IMAGING | Age: 40
End: 2025-08-05
Payer: COMMERCIAL

## 2025-08-05 ENCOUNTER — HOSPITAL ENCOUNTER (EMERGENCY)
Age: 40
Discharge: HOME | End: 2025-08-06
Attending: EMERGENCY MEDICINE
Payer: COMMERCIAL

## 2025-08-05 DIAGNOSIS — N93.9 VAGINAL BLEEDING: Primary | ICD-10-CM

## 2025-08-05 LAB
ABO + RH BLD: NORMAL
ABO/RH: NORMAL
ANION GAP SERPL CALCULATED.3IONS-SCNC: 14 MMOL/L (ref 7–16)
ANTIBODY SCREEN: NEGATIVE
ARM BAND NUMBER: NORMAL
ARM BAND NUMBER: NORMAL
BASOPHILS # BLD: 0.02 K/UL (ref 0–0.2)
BASOPHILS NFR BLD: 0 % (ref 0–2)
BLOOD BANK BLOOD PRODUCT EXPIRATION DATE: NORMAL
BLOOD BANK DISPENSE STATUS: NORMAL
BLOOD BANK ISBT PRODUCT BLOOD TYPE: 6200
BLOOD BANK PRODUCT CODE: NORMAL
BLOOD BANK SAMPLE EXPIRATION: NORMAL
BLOOD BANK SAMPLE EXPIRATION: NORMAL
BLOOD BANK UNIT TYPE AND RH: NORMAL
BLOOD GROUP ANTIBODIES SERPL: NEGATIVE
BPU ID: NORMAL
BUN SERPL-MCNC: 8 MG/DL (ref 6–20)
CALCIUM SERPL-MCNC: 9.2 MG/DL (ref 8.6–10)
CHLORIDE SERPL-SCNC: 98 MMOL/L (ref 98–107)
CO2 SERPL-SCNC: 23 MMOL/L (ref 22–29)
COMPONENT: NORMAL
CREAT SERPL-MCNC: 0.8 MG/DL (ref 0.5–1)
CROSSMATCH RESULT: NORMAL
EOSINOPHIL # BLD: 0.02 K/UL (ref 0.05–0.5)
EOSINOPHILS RELATIVE PERCENT: 0 % (ref 0–6)
ERYTHROCYTE [DISTWIDTH] IN BLOOD BY AUTOMATED COUNT: 21.9 % (ref 11.5–15)
GFR, ESTIMATED: >90 ML/MIN/1.73M2
GLUCOSE SERPL-MCNC: 95 MG/DL (ref 74–99)
HCT VFR BLD AUTO: 30.8 % (ref 34–48)
HGB BLD-MCNC: 9.4 G/DL (ref 11.5–15.5)
IMM GRANULOCYTES # BLD AUTO: 0.08 K/UL (ref 0–0.58)
IMM GRANULOCYTES NFR BLD: 1 % (ref 0–5)
INR PPP: 1.5
LYMPHOCYTES NFR BLD: 1.63 K/UL (ref 1.5–4)
LYMPHOCYTES RELATIVE PERCENT: 15 % (ref 20–42)
MCH RBC QN AUTO: 22.7 PG (ref 26–35)
MCHC RBC AUTO-ENTMCNC: 30.5 G/DL (ref 32–34.5)
MCV RBC AUTO: 74.4 FL (ref 80–99.9)
MICROORGANISM SPEC CULT: NO GROWTH
MONOCYTES NFR BLD: 0.6 K/UL (ref 0.1–0.95)
MONOCYTES NFR BLD: 6 % (ref 2–12)
NEUTROPHILS NFR BLD: 78 % (ref 43–80)
NEUTS SEG NFR BLD: 8.26 K/UL (ref 1.8–7.3)
PARTIAL THROMBOPLASTIN TIME: 28.1 SEC (ref 24.5–35.1)
PLATELET # BLD AUTO: 385 K/UL (ref 130–450)
PMV BLD AUTO: 10.4 FL (ref 7–12)
POTASSIUM SERPL-SCNC: 3.8 MMOL/L (ref 3.5–5.1)
PROTHROMBIN TIME: 16.7 SEC (ref 9.3–12.4)
RBC # BLD AUTO: 4.14 M/UL (ref 3.5–5.5)
RBC # BLD: ABNORMAL 10*6/UL
SERVICE CMNT-IMP: NORMAL
SODIUM SERPL-SCNC: 135 MMOL/L (ref 136–145)
SPECIMEN DESCRIPTION: NORMAL
TRANSFUSION STATUS: NORMAL
UNIT DIVISION: 0
UNIT ISSUE DATE/TIME: NORMAL
WBC OTHER # BLD: 10.6 K/UL (ref 4.5–11.5)

## 2025-08-05 PROCEDURE — 96361 HYDRATE IV INFUSION ADD-ON: CPT

## 2025-08-05 PROCEDURE — 86850 RBC ANTIBODY SCREEN: CPT

## 2025-08-05 PROCEDURE — 76856 US EXAM PELVIC COMPLETE: CPT

## 2025-08-05 PROCEDURE — 85610 PROTHROMBIN TIME: CPT

## 2025-08-05 PROCEDURE — 86900 BLOOD TYPING SEROLOGIC ABO: CPT

## 2025-08-05 PROCEDURE — 80048 BASIC METABOLIC PNL TOTAL CA: CPT

## 2025-08-05 PROCEDURE — 86901 BLOOD TYPING SEROLOGIC RH(D): CPT

## 2025-08-05 PROCEDURE — 6360000002 HC RX W HCPCS: Performed by: EMERGENCY MEDICINE

## 2025-08-05 PROCEDURE — 85025 COMPLETE CBC W/AUTO DIFF WBC: CPT

## 2025-08-05 PROCEDURE — 76830 TRANSVAGINAL US NON-OB: CPT

## 2025-08-05 PROCEDURE — 96374 THER/PROPH/DIAG INJ IV PUSH: CPT

## 2025-08-05 PROCEDURE — 85730 THROMBOPLASTIN TIME PARTIAL: CPT

## 2025-08-05 PROCEDURE — 2580000003 HC RX 258

## 2025-08-05 PROCEDURE — 99284 EMERGENCY DEPT VISIT MOD MDM: CPT

## 2025-08-05 RX ORDER — 0.9 % SODIUM CHLORIDE 0.9 %
1000 INTRAVENOUS SOLUTION INTRAVENOUS ONCE
Status: COMPLETED | OUTPATIENT
Start: 2025-08-05 | End: 2025-08-05

## 2025-08-05 RX ORDER — FENTANYL CITRATE 50 UG/ML
50 INJECTION, SOLUTION INTRAMUSCULAR; INTRAVENOUS ONCE
Status: COMPLETED | OUTPATIENT
Start: 2025-08-05 | End: 2025-08-05

## 2025-08-05 RX ADMIN — SODIUM CHLORIDE 1000 ML: 0.9 INJECTION, SOLUTION INTRAVENOUS at 21:55

## 2025-08-05 RX ADMIN — FENTANYL CITRATE 50 MCG: 50 INJECTION INTRAMUSCULAR; INTRAVENOUS at 23:24

## 2025-08-05 ASSESSMENT — PAIN DESCRIPTION - ORIENTATION: ORIENTATION: MID;LOWER

## 2025-08-05 ASSESSMENT — PAIN - FUNCTIONAL ASSESSMENT
PAIN_FUNCTIONAL_ASSESSMENT: ACTIVITIES ARE NOT PREVENTED
PAIN_FUNCTIONAL_ASSESSMENT: 0-10

## 2025-08-05 ASSESSMENT — PAIN DESCRIPTION - LOCATION
LOCATION: ABDOMEN;VAGINA
LOCATION: VAGINA;ABDOMEN

## 2025-08-05 ASSESSMENT — PAIN SCALES - GENERAL
PAINLEVEL_OUTOF10: 8
PAINLEVEL_OUTOF10: 8

## 2025-08-05 ASSESSMENT — PAIN DESCRIPTION - DESCRIPTORS
DESCRIPTORS: ACHING;CRAMPING
DESCRIPTORS: CRAMPING

## 2025-08-05 ASSESSMENT — PAIN DESCRIPTION - FREQUENCY: FREQUENCY: CONTINUOUS

## 2025-08-06 VITALS
RESPIRATION RATE: 18 BRPM | BODY MASS INDEX: 32.39 KG/M2 | HEART RATE: 92 BPM | DIASTOLIC BLOOD PRESSURE: 70 MMHG | TEMPERATURE: 98.3 F | WEIGHT: 165 LBS | OXYGEN SATURATION: 99 % | HEIGHT: 60 IN | SYSTOLIC BLOOD PRESSURE: 105 MMHG

## 2025-08-06 RX ORDER — MEDROXYPROGESTERONE ACETATE 10 MG
10 TABLET ORAL DAILY
Qty: 21 TABLET | Refills: 0 | Status: SHIPPED | OUTPATIENT
Start: 2025-08-05 | End: 2025-08-26

## 2025-08-06 RX ORDER — HYDROCODONE BITARTRATE AND ACETAMINOPHEN 5; 325 MG/1; MG/1
1 TABLET ORAL EVERY 4 HOURS PRN
Qty: 18 TABLET | Refills: 0 | Status: SHIPPED | OUTPATIENT
Start: 2025-08-06 | End: 2025-08-09

## 2025-08-06 RX ORDER — MEDROXYPROGESTERONE ACETATE 10 MG
20 TABLET ORAL 3 TIMES DAILY
Qty: 42 TABLET | Refills: 0 | Status: SHIPPED | OUTPATIENT
Start: 2025-08-05 | End: 2025-08-12

## 2025-08-09 LAB
MICROORGANISM SPEC CULT: NORMAL
MICROORGANISM SPEC CULT: NORMAL
SERVICE CMNT-IMP: NORMAL
SERVICE CMNT-IMP: NORMAL
SPECIMEN DESCRIPTION: NORMAL
SPECIMEN DESCRIPTION: NORMAL

## 2025-08-11 ENCOUNTER — HOSPITAL ENCOUNTER (OUTPATIENT)
Age: 40
Setting detail: OBSERVATION
Discharge: HOME | End: 2025-08-13
Attending: STUDENT IN AN ORGANIZED HEALTH CARE EDUCATION/TRAINING PROGRAM | Admitting: INTERNAL MEDICINE
Payer: COMMERCIAL

## 2025-08-11 DIAGNOSIS — D64.9 ANEMIA, UNSPECIFIED TYPE: Primary | ICD-10-CM

## 2025-08-11 PROBLEM — D62 ACUTE ON CHRONIC BLOOD LOSS ANEMIA: Status: ACTIVE | Noted: 2025-08-11

## 2025-08-11 LAB
ALBUMIN SERPL-MCNC: 3.6 G/DL (ref 3.5–5.2)
ALP SERPL-CCNC: 80 U/L (ref 35–104)
ALT SERPL-CCNC: 13 U/L (ref 0–35)
ANION GAP SERPL CALCULATED.3IONS-SCNC: 12 MMOL/L (ref 7–16)
AST SERPL-CCNC: 16 U/L (ref 0–35)
BASOPHILS # BLD: 0.02 K/UL (ref 0–0.2)
BASOPHILS NFR BLD: 0 % (ref 0–2)
BILIRUB SERPL-MCNC: <0.2 MG/DL (ref 0–1.2)
BUN SERPL-MCNC: 6 MG/DL (ref 6–20)
CALCIUM SERPL-MCNC: 8.8 MG/DL (ref 8.6–10)
CHLORIDE SERPL-SCNC: 103 MMOL/L (ref 98–107)
CO2 SERPL-SCNC: 23 MMOL/L (ref 22–29)
CREAT SERPL-MCNC: 0.8 MG/DL (ref 0.5–1)
EOSINOPHIL # BLD: 0 K/UL (ref 0.05–0.5)
EOSINOPHILS RELATIVE PERCENT: 0 % (ref 0–6)
ERYTHROCYTE [DISTWIDTH] IN BLOOD BY AUTOMATED COUNT: 22.6 % (ref 11.5–15)
GFR, ESTIMATED: >90 ML/MIN/1.73M2
GLUCOSE SERPL-MCNC: 95 MG/DL (ref 74–99)
HCT VFR BLD AUTO: 21.3 % (ref 34–48)
HGB BLD-MCNC: 6 G/DL (ref 11.5–15.5)
IMM GRANULOCYTES # BLD AUTO: 0.05 K/UL (ref 0–0.58)
IMM GRANULOCYTES NFR BLD: 1 % (ref 0–5)
LYMPHOCYTES NFR BLD: 1.64 K/UL (ref 1.5–4)
LYMPHOCYTES RELATIVE PERCENT: 26 % (ref 20–42)
MCH RBC QN AUTO: 22.6 PG (ref 26–35)
MCHC RBC AUTO-ENTMCNC: 28.2 G/DL (ref 32–34.5)
MCV RBC AUTO: 80.1 FL (ref 80–99.9)
MONOCYTES NFR BLD: 0.43 K/UL (ref 0.1–0.95)
MONOCYTES NFR BLD: 7 % (ref 2–12)
NEUTROPHILS NFR BLD: 66 % (ref 43–80)
NEUTS SEG NFR BLD: 4.15 K/UL (ref 1.8–7.3)
PLATELET # BLD AUTO: 443 K/UL (ref 130–450)
PMV BLD AUTO: 9.8 FL (ref 7–12)
POTASSIUM SERPL-SCNC: 3.8 MMOL/L (ref 3.5–5.1)
PROT SERPL-MCNC: 6.6 G/DL (ref 6.4–8.3)
RBC # BLD AUTO: 2.66 M/UL (ref 3.5–5.5)
RBC # BLD: ABNORMAL 10*6/UL
SODIUM SERPL-SCNC: 137 MMOL/L (ref 136–145)
WBC OTHER # BLD: 6.3 K/UL (ref 4.5–11.5)

## 2025-08-11 PROCEDURE — 99223 1ST HOSP IP/OBS HIGH 75: CPT | Performed by: INTERNAL MEDICINE

## 2025-08-11 PROCEDURE — P9016 RBC LEUKOCYTES REDUCED: HCPCS

## 2025-08-11 PROCEDURE — 86850 RBC ANTIBODY SCREEN: CPT

## 2025-08-11 PROCEDURE — 86901 BLOOD TYPING SEROLOGIC RH(D): CPT

## 2025-08-11 PROCEDURE — 6370000000 HC RX 637 (ALT 250 FOR IP): Performed by: INTERNAL MEDICINE

## 2025-08-11 PROCEDURE — 85025 COMPLETE CBC W/AUTO DIFF WBC: CPT

## 2025-08-11 PROCEDURE — 86900 BLOOD TYPING SEROLOGIC ABO: CPT

## 2025-08-11 PROCEDURE — 86923 COMPATIBILITY TEST ELECTRIC: CPT

## 2025-08-11 PROCEDURE — G0378 HOSPITAL OBSERVATION PER HR: HCPCS

## 2025-08-11 PROCEDURE — 99285 EMERGENCY DEPT VISIT HI MDM: CPT

## 2025-08-11 PROCEDURE — 80053 COMPREHEN METABOLIC PANEL: CPT

## 2025-08-11 RX ORDER — KETOROLAC TROMETHAMINE 15 MG/ML
15 INJECTION, SOLUTION INTRAMUSCULAR; INTRAVENOUS EVERY 6 HOURS
Status: ACTIVE | OUTPATIENT
Start: 2025-08-11 | End: 2025-08-12

## 2025-08-11 RX ORDER — FERROUS SULFATE 325(65) MG
325 TABLET ORAL
Status: DISCONTINUED | OUTPATIENT
Start: 2025-08-12 | End: 2025-08-13 | Stop reason: HOSPADM

## 2025-08-11 RX ORDER — HYDROCODONE BITARTRATE AND ACETAMINOPHEN 5; 325 MG/1; MG/1
1 TABLET ORAL EVERY 6 HOURS PRN
COMMUNITY

## 2025-08-11 RX ORDER — SODIUM CHLORIDE 9 MG/ML
INJECTION, SOLUTION INTRAVENOUS PRN
Status: DISCONTINUED | OUTPATIENT
Start: 2025-08-11 | End: 2025-08-13 | Stop reason: HOSPADM

## 2025-08-11 RX ORDER — MEDROXYPROGESTERONE ACETATE 10 MG
20 TABLET ORAL 3 TIMES DAILY
Status: DISCONTINUED | OUTPATIENT
Start: 2025-08-11 | End: 2025-08-13 | Stop reason: HOSPADM

## 2025-08-11 RX ADMIN — APIXABAN 5 MG: 5 TABLET, FILM COATED ORAL at 22:24

## 2025-08-11 RX ADMIN — MEDROXYPROGESTERONE ACETATE 20 MG: 10 TABLET ORAL at 23:15

## 2025-08-11 ASSESSMENT — PAIN SCALES - GENERAL: PAINLEVEL_OUTOF10: 0

## 2025-08-12 LAB
BASOPHILS # BLD: 0.01 K/UL (ref 0–0.2)
BASOPHILS NFR BLD: 0 % (ref 0–2)
EOSINOPHIL # BLD: 0.02 K/UL (ref 0.05–0.5)
EOSINOPHILS RELATIVE PERCENT: 0 % (ref 0–6)
ERYTHROCYTE [DISTWIDTH] IN BLOOD BY AUTOMATED COUNT: 18.7 % (ref 11.5–15)
HCT VFR BLD AUTO: 25.5 % (ref 34–48)
HCT VFR BLD AUTO: 28.5 % (ref 34–48)
HCT VFR BLD AUTO: 29.1 % (ref 34–48)
HCT VFR BLD AUTO: 32 % (ref 34–48)
HGB BLD-MCNC: 8 G/DL (ref 11.5–15.5)
HGB BLD-MCNC: 9.3 G/DL (ref 11.5–15.5)
HGB BLD-MCNC: 9.6 G/DL (ref 11.5–15.5)
HGB BLD-MCNC: 9.9 G/DL (ref 11.5–15.5)
IMM GRANULOCYTES # BLD AUTO: 0.05 K/UL (ref 0–0.58)
IMM GRANULOCYTES NFR BLD: 1 % (ref 0–5)
LYMPHOCYTES NFR BLD: 1.78 K/UL (ref 1.5–4)
LYMPHOCYTES RELATIVE PERCENT: 26 % (ref 20–42)
MCH RBC QN AUTO: 25.2 PG (ref 26–35)
MCHC RBC AUTO-ENTMCNC: 32.6 G/DL (ref 32–34.5)
MCV RBC AUTO: 77.2 FL (ref 80–99.9)
MONOCYTES NFR BLD: 0.5 K/UL (ref 0.1–0.95)
MONOCYTES NFR BLD: 7 % (ref 2–12)
NEUTROPHILS NFR BLD: 65 % (ref 43–80)
NEUTS SEG NFR BLD: 4.37 K/UL (ref 1.8–7.3)
PLATELET # BLD AUTO: 345 K/UL (ref 130–450)
PMV BLD AUTO: 9.5 FL (ref 7–12)
RBC # BLD AUTO: 3.69 M/UL (ref 3.5–5.5)
WBC OTHER # BLD: 6.7 K/UL (ref 4.5–11.5)

## 2025-08-12 PROCEDURE — 6370000000 HC RX 637 (ALT 250 FOR IP): Performed by: INTERNAL MEDICINE

## 2025-08-12 PROCEDURE — 6360000002 HC RX W HCPCS: Performed by: INTERNAL MEDICINE

## 2025-08-12 PROCEDURE — 99232 SBSQ HOSP IP/OBS MODERATE 35: CPT | Performed by: INTERNAL MEDICINE

## 2025-08-12 PROCEDURE — 85025 COMPLETE CBC W/AUTO DIFF WBC: CPT

## 2025-08-12 PROCEDURE — 96375 TX/PRO/DX INJ NEW DRUG ADDON: CPT

## 2025-08-12 PROCEDURE — 36430 TRANSFUSION BLD/BLD COMPNT: CPT

## 2025-08-12 PROCEDURE — 6370000000 HC RX 637 (ALT 250 FOR IP): Performed by: NURSE PRACTITIONER

## 2025-08-12 PROCEDURE — 96365 THER/PROPH/DIAG IV INF INIT: CPT

## 2025-08-12 PROCEDURE — G0378 HOSPITAL OBSERVATION PER HR: HCPCS

## 2025-08-12 PROCEDURE — 2580000003 HC RX 258: Performed by: INTERNAL MEDICINE

## 2025-08-12 PROCEDURE — 85014 HEMATOCRIT: CPT

## 2025-08-12 PROCEDURE — 36415 COLL VENOUS BLD VENIPUNCTURE: CPT

## 2025-08-12 PROCEDURE — 85018 HEMOGLOBIN: CPT

## 2025-08-12 PROCEDURE — 96366 THER/PROPH/DIAG IV INF ADDON: CPT

## 2025-08-12 PROCEDURE — P9016 RBC LEUKOCYTES REDUCED: HCPCS

## 2025-08-12 RX ORDER — ACETAMINOPHEN 325 MG/1
650 TABLET ORAL EVERY 6 HOURS PRN
Status: DISCONTINUED | OUTPATIENT
Start: 2025-08-12 | End: 2025-08-13 | Stop reason: HOSPADM

## 2025-08-12 RX ORDER — ACETAMINOPHEN 325 MG/1
650 TABLET ORAL EVERY 4 HOURS PRN
Status: DISCONTINUED | OUTPATIENT
Start: 2025-08-12 | End: 2025-08-13 | Stop reason: HOSPADM

## 2025-08-12 RX ORDER — ONDANSETRON 2 MG/ML
4 INJECTION INTRAMUSCULAR; INTRAVENOUS EVERY 6 HOURS PRN
Status: DISCONTINUED | OUTPATIENT
Start: 2025-08-12 | End: 2025-08-13 | Stop reason: HOSPADM

## 2025-08-12 RX ADMIN — APIXABAN 5 MG: 5 TABLET, FILM COATED ORAL at 20:06

## 2025-08-12 RX ADMIN — ONDANSETRON 4 MG: 2 INJECTION, SOLUTION INTRAMUSCULAR; INTRAVENOUS at 17:23

## 2025-08-12 RX ADMIN — APIXABAN 5 MG: 5 TABLET, FILM COATED ORAL at 08:19

## 2025-08-12 RX ADMIN — MEDROXYPROGESTERONE ACETATE 20 MG: 10 TABLET ORAL at 14:53

## 2025-08-12 RX ADMIN — SODIUM CHLORIDE 125 MG: 9 INJECTION, SOLUTION INTRAVENOUS at 06:46

## 2025-08-12 RX ADMIN — MEDROXYPROGESTERONE ACETATE 20 MG: 10 TABLET ORAL at 09:20

## 2025-08-12 RX ADMIN — MEDROXYPROGESTERONE ACETATE 20 MG: 10 TABLET ORAL at 20:06

## 2025-08-12 RX ADMIN — ACETAMINOPHEN 650 MG: 325 TABLET ORAL at 02:13

## 2025-08-12 RX ADMIN — FERROUS SULFATE TAB 325 MG (65 MG ELEMENTAL FE) 325 MG: 325 (65 FE) TAB at 08:19

## 2025-08-12 ASSESSMENT — PAIN - FUNCTIONAL ASSESSMENT
PAIN_FUNCTIONAL_ASSESSMENT: 0-10
PAIN_FUNCTIONAL_ASSESSMENT: 0-10

## 2025-08-12 ASSESSMENT — PAIN DESCRIPTION - DESCRIPTORS: DESCRIPTORS: ACHING

## 2025-08-12 ASSESSMENT — PAIN SCALES - GENERAL
PAINLEVEL_OUTOF10: 0
PAINLEVEL_OUTOF10: 0
PAINLEVEL_OUTOF10: 7

## 2025-08-12 ASSESSMENT — PAIN DESCRIPTION - LOCATION: LOCATION: HEAD

## 2025-08-13 VITALS
SYSTOLIC BLOOD PRESSURE: 97 MMHG | RESPIRATION RATE: 16 BRPM | HEIGHT: 60 IN | WEIGHT: 167 LBS | HEART RATE: 75 BPM | DIASTOLIC BLOOD PRESSURE: 70 MMHG | TEMPERATURE: 98.1 F | BODY MASS INDEX: 32.79 KG/M2 | OXYGEN SATURATION: 100 %

## 2025-08-13 LAB
ABO/RH: NORMAL
ANION GAP SERPL CALCULATED.3IONS-SCNC: 13 MMOL/L (ref 7–16)
ANTIBODY SCREEN: NEGATIVE
ARM BAND NUMBER: NORMAL
BASOPHILS # BLD: 0.01 K/UL (ref 0–0.2)
BASOPHILS NFR BLD: 0 % (ref 0–2)
BLOOD BANK BLOOD PRODUCT EXPIRATION DATE: NORMAL
BLOOD BANK BLOOD PRODUCT EXPIRATION DATE: NORMAL
BLOOD BANK DISPENSE STATUS: NORMAL
BLOOD BANK DISPENSE STATUS: NORMAL
BLOOD BANK ISBT PRODUCT BLOOD TYPE: 6200
BLOOD BANK ISBT PRODUCT BLOOD TYPE: 6200
BLOOD BANK PRODUCT CODE: NORMAL
BLOOD BANK PRODUCT CODE: NORMAL
BLOOD BANK SAMPLE EXPIRATION: NORMAL
BLOOD BANK UNIT TYPE AND RH: NORMAL
BLOOD BANK UNIT TYPE AND RH: NORMAL
BPU ID: NORMAL
BPU ID: NORMAL
BUN SERPL-MCNC: 8 MG/DL (ref 6–20)
CALCIUM SERPL-MCNC: 9 MG/DL (ref 8.6–10)
CHLORIDE SERPL-SCNC: 107 MMOL/L (ref 98–107)
CO2 SERPL-SCNC: 19 MMOL/L (ref 22–29)
COMPONENT: NORMAL
COMPONENT: NORMAL
CREAT SERPL-MCNC: 0.7 MG/DL (ref 0.5–1)
CROSSMATCH RESULT: NORMAL
CROSSMATCH RESULT: NORMAL
EOSINOPHIL # BLD: 0.04 K/UL (ref 0.05–0.5)
EOSINOPHILS RELATIVE PERCENT: 1 % (ref 0–6)
ERYTHROCYTE [DISTWIDTH] IN BLOOD BY AUTOMATED COUNT: 18.9 % (ref 11.5–15)
GFR, ESTIMATED: >90 ML/MIN/1.73M2
GLUCOSE SERPL-MCNC: 97 MG/DL (ref 74–99)
HCT VFR BLD AUTO: 32 % (ref 34–48)
HGB BLD-MCNC: 9.6 G/DL (ref 11.5–15.5)
IMM GRANULOCYTES # BLD AUTO: 0.05 K/UL (ref 0–0.58)
IMM GRANULOCYTES NFR BLD: 1 % (ref 0–5)
LYMPHOCYTES NFR BLD: 2.02 K/UL (ref 1.5–4)
LYMPHOCYTES RELATIVE PERCENT: 32 % (ref 20–42)
MCH RBC QN AUTO: 24.4 PG (ref 26–35)
MCHC RBC AUTO-ENTMCNC: 30 G/DL (ref 32–34.5)
MCV RBC AUTO: 81.4 FL (ref 80–99.9)
MONOCYTES NFR BLD: 0.33 K/UL (ref 0.1–0.95)
MONOCYTES NFR BLD: 5 % (ref 2–12)
NEUTROPHILS NFR BLD: 62 % (ref 43–80)
NEUTS SEG NFR BLD: 3.92 K/UL (ref 1.8–7.3)
PLATELET # BLD AUTO: 402 K/UL (ref 130–450)
PMV BLD AUTO: 9.4 FL (ref 7–12)
POTASSIUM SERPL-SCNC: 3.6 MMOL/L (ref 3.5–5.1)
RBC # BLD AUTO: 3.93 M/UL (ref 3.5–5.5)
SODIUM SERPL-SCNC: 139 MMOL/L (ref 136–145)
TRANSFUSION STATUS: NORMAL
TRANSFUSION STATUS: NORMAL
UNIT DIVISION: 0
UNIT DIVISION: 0
UNIT ISSUE DATE/TIME: NORMAL
UNIT ISSUE DATE/TIME: NORMAL
WBC OTHER # BLD: 6.4 K/UL (ref 4.5–11.5)

## 2025-08-13 PROCEDURE — 96366 THER/PROPH/DIAG IV INF ADDON: CPT

## 2025-08-13 PROCEDURE — G0378 HOSPITAL OBSERVATION PER HR: HCPCS

## 2025-08-13 PROCEDURE — 6360000002 HC RX W HCPCS: Performed by: INTERNAL MEDICINE

## 2025-08-13 PROCEDURE — 6370000000 HC RX 637 (ALT 250 FOR IP): Performed by: NURSE PRACTITIONER

## 2025-08-13 PROCEDURE — 99239 HOSP IP/OBS DSCHRG MGMT >30: CPT | Performed by: INTERNAL MEDICINE

## 2025-08-13 PROCEDURE — 85025 COMPLETE CBC W/AUTO DIFF WBC: CPT

## 2025-08-13 PROCEDURE — 6370000000 HC RX 637 (ALT 250 FOR IP): Performed by: INTERNAL MEDICINE

## 2025-08-13 PROCEDURE — 2580000003 HC RX 258: Performed by: INTERNAL MEDICINE

## 2025-08-13 PROCEDURE — 80048 BASIC METABOLIC PNL TOTAL CA: CPT

## 2025-08-13 PROCEDURE — 36415 COLL VENOUS BLD VENIPUNCTURE: CPT

## 2025-08-13 RX ADMIN — ACETAMINOPHEN 650 MG: 325 TABLET ORAL at 04:28

## 2025-08-13 RX ADMIN — FERROUS SULFATE TAB 325 MG (65 MG ELEMENTAL FE) 325 MG: 325 (65 FE) TAB at 09:31

## 2025-08-13 RX ADMIN — APIXABAN 5 MG: 5 TABLET, FILM COATED ORAL at 09:31

## 2025-08-13 RX ADMIN — SODIUM CHLORIDE 125 MG: 9 INJECTION, SOLUTION INTRAVENOUS at 06:23

## 2025-08-13 RX ADMIN — MEDROXYPROGESTERONE ACETATE 20 MG: 10 TABLET ORAL at 09:31

## 2025-08-13 ASSESSMENT — PAIN SCALES - GENERAL
PAINLEVEL_OUTOF10: 6
PAINLEVEL_OUTOF10: 8
PAINLEVEL_OUTOF10: 0

## 2025-08-13 ASSESSMENT — PAIN - FUNCTIONAL ASSESSMENT
PAIN_FUNCTIONAL_ASSESSMENT: 0-10
PAIN_FUNCTIONAL_ASSESSMENT: 0-10

## 2025-08-13 ASSESSMENT — PAIN DESCRIPTION - ORIENTATION: ORIENTATION: LOWER

## 2025-08-13 ASSESSMENT — PAIN DESCRIPTION - DESCRIPTORS: DESCRIPTORS: ACHING;DISCOMFORT

## 2025-08-13 ASSESSMENT — PAIN DESCRIPTION - LOCATION: LOCATION: ABDOMEN

## (undated) DEVICE — SPONGE GZ W4XL4IN RAYON POLY FILL CVR W/ NONWOVEN FAB

## (undated) DEVICE — BLOCK BITE 60FR RUBBER ADLT DENTAL

## (undated) DEVICE — GRADUATE TRIANG MEASURE 1000ML BLK PRNT